# Patient Record
Sex: MALE | Race: WHITE | Employment: UNEMPLOYED | URBAN - METROPOLITAN AREA
[De-identification: names, ages, dates, MRNs, and addresses within clinical notes are randomized per-mention and may not be internally consistent; named-entity substitution may affect disease eponyms.]

---

## 2018-06-15 ENCOUNTER — HOSPITAL ENCOUNTER (INPATIENT)
Age: 23
LOS: 5 days | Discharge: HOME | DRG: 885 | End: 2018-06-20
Attending: PSYCHIATRY & NEUROLOGY | Admitting: PSYCHIATRY & NEUROLOGY
Payer: COMMERCIAL

## 2018-06-15 ENCOUNTER — HOSPITAL ENCOUNTER (OUTPATIENT)
Age: 23
Discharge: HOME | End: 2018-06-15
Payer: COMMERCIAL

## 2018-06-15 ENCOUNTER — HOSPITAL ENCOUNTER (EMERGENCY)
Age: 23
Discharge: PSYCHIATRIC OR SUBSTANCE USE FACILITY | End: 2018-06-15
Attending: EMERGENCY MEDICINE
Payer: COMMERCIAL

## 2018-06-15 VITALS
OXYGEN SATURATION: 99 % | RESPIRATION RATE: 22 BRPM | TEMPERATURE: 97.2 F | WEIGHT: 150 LBS | BODY MASS INDEX: 24.11 KG/M2 | HEART RATE: 85 BPM | DIASTOLIC BLOOD PRESSURE: 54 MMHG | SYSTOLIC BLOOD PRESSURE: 114 MMHG | HEIGHT: 66 IN

## 2018-06-15 DIAGNOSIS — F20.9 SCHIZOPHRENIA, UNSPECIFIED TYPE (HCC): ICD-10-CM

## 2018-06-15 DIAGNOSIS — F19.10 POLYSUBSTANCE ABUSE (HCC): ICD-10-CM

## 2018-06-15 DIAGNOSIS — Z91.199 MEDICALLY NONCOMPLIANT: Primary | ICD-10-CM

## 2018-06-15 DIAGNOSIS — F22 PARANOIA (PSYCHOSIS) (HCC): ICD-10-CM

## 2018-06-15 DIAGNOSIS — F20.0 PARANOID SCHIZOPHRENIA (HCC): ICD-10-CM

## 2018-06-15 LAB
ALBUMIN SERPL-MCNC: 4.2 G/DL (ref 3.5–5.2)
ALP BLD-CCNC: 74 U/L (ref 40–129)
ALT SERPL-CCNC: 40 U/L (ref 0–40)
AMPHETAMINE SCREEN, URINE: POSITIVE
ANION GAP SERPL CALCULATED.3IONS-SCNC: 19 MMOL/L (ref 7–16)
AST SERPL-CCNC: 49 U/L (ref 0–39)
ATYPICAL LYMPHOCYTE RELATIVE PERCENT: 8 % (ref 0–4)
BACTERIA: ABNORMAL /HPF
BARBITURATE SCREEN URINE: NOT DETECTED
BASOPHILS ABSOLUTE: 0.06 E9/L (ref 0–0.2)
BASOPHILS RELATIVE PERCENT: 1 % (ref 0–2)
BENZODIAZEPINE SCREEN, URINE: POSITIVE
BILIRUB SERPL-MCNC: 0.5 MG/DL (ref 0–1.2)
BILIRUBIN URINE: ABNORMAL
BLOOD, URINE: ABNORMAL
BUN BLDV-MCNC: 14 MG/DL (ref 6–20)
CALCIUM SERPL-MCNC: 8.8 MG/DL (ref 8.6–10.2)
CANNABINOID SCREEN URINE: NOT DETECTED
CHLORIDE BLD-SCNC: 102 MMOL/L (ref 98–107)
CLARITY: ABNORMAL
CO2: 19 MMOL/L (ref 22–29)
COCAINE METABOLITE SCREEN URINE: POSITIVE
COLOR: YELLOW
CREAT SERPL-MCNC: 1.1 MG/DL (ref 0.7–1.2)
EOSINOPHILS ABSOLUTE: 0 E9/L (ref 0.05–0.5)
EOSINOPHILS RELATIVE PERCENT: 0 % (ref 0–6)
ETHANOL: 49 MG/DL (ref 0–0.08)
GFR AFRICAN AMERICAN: >60
GFR NON-AFRICAN AMERICAN: >60 ML/MIN/1.73
GLUCOSE BLD-MCNC: 91 MG/DL (ref 74–109)
GLUCOSE URINE: NEGATIVE MG/DL
HCT VFR BLD CALC: 41.9 % (ref 37–54)
HEMOGLOBIN: 14.3 G/DL (ref 12.5–16.5)
KETONES, URINE: ABNORMAL MG/DL
LEUKOCYTE ESTERASE, URINE: NEGATIVE
LYMPHOCYTES ABSOLUTE: 2.22 E9/L (ref 1.5–4)
LYMPHOCYTES RELATIVE PERCENT: 29 % (ref 20–42)
MCH RBC QN AUTO: 29.8 PG (ref 26–35)
MCHC RBC AUTO-ENTMCNC: 34.1 % (ref 32–34.5)
MCV RBC AUTO: 87.3 FL (ref 80–99.9)
METHADONE SCREEN, URINE: NOT DETECTED
MONOCYTES ABSOLUTE: 0.72 E9/L (ref 0.1–0.95)
MONOCYTES RELATIVE PERCENT: 12 % (ref 2–12)
MUCUS: PRESENT
NEUTROPHILS ABSOLUTE: 3 E9/L (ref 1.8–7.3)
NEUTROPHILS RELATIVE PERCENT: 50 % (ref 43–80)
NITRITE, URINE: NEGATIVE
OPIATE SCREEN URINE: POSITIVE
PDW BLD-RTO: 12.8 FL (ref 11.5–15)
PH UA: 5.5 (ref 5–9)
PHENCYCLIDINE SCREEN URINE: NOT DETECTED
PLATELET # BLD: 204 E9/L (ref 130–450)
PMV BLD AUTO: 9.2 FL (ref 7–12)
POTASSIUM SERPL-SCNC: 3.5 MMOL/L (ref 3.5–5)
PROPOXYPHENE SCREEN: NOT DETECTED
PROTEIN UA: ABNORMAL MG/DL
RBC # BLD: 4.8 E12/L (ref 3.8–5.8)
RBC UA: ABNORMAL /HPF (ref 0–2)
SODIUM BLD-SCNC: 140 MMOL/L (ref 132–146)
SPECIFIC GRAVITY UA: >=1.03 (ref 1–1.03)
T4 TOTAL: 7.8 MCG/DL (ref 4.5–11.7)
TOTAL PROTEIN: 6.9 G/DL (ref 6.4–8.3)
TOXIC GRANULATION: ABNORMAL
TSH SERPL DL<=0.05 MIU/L-ACNC: 1.01 UIU/ML (ref 0.27–4.2)
UROBILINOGEN, URINE: 0.2 E.U./DL
VALPROIC ACID LEVEL: <3 MCG/ML (ref 50–100)
WBC # BLD: 6 E9/L (ref 4.5–11.5)
WBC UA: ABNORMAL /HPF (ref 0–5)

## 2018-06-15 PROCEDURE — 96372 THER/PROPH/DIAG INJ SC/IM: CPT

## 2018-06-15 PROCEDURE — 80053 COMPREHEN METABOLIC PANEL: CPT

## 2018-06-15 PROCEDURE — 6360000002 HC RX W HCPCS: Performed by: EMERGENCY MEDICINE

## 2018-06-15 PROCEDURE — 84436 ASSAY OF TOTAL THYROXINE: CPT

## 2018-06-15 PROCEDURE — G0480 DRUG TEST DEF 1-7 CLASSES: HCPCS

## 2018-06-15 PROCEDURE — 99285 EMERGENCY DEPT VISIT HI MDM: CPT

## 2018-06-15 PROCEDURE — 84443 ASSAY THYROID STIM HORMONE: CPT

## 2018-06-15 PROCEDURE — A0425 GROUND MILEAGE: HCPCS

## 2018-06-15 PROCEDURE — 6360000002 HC RX W HCPCS

## 2018-06-15 PROCEDURE — 1240000000 HC EMOTIONAL WELLNESS R&B

## 2018-06-15 PROCEDURE — 81001 URINALYSIS AUTO W/SCOPE: CPT

## 2018-06-15 PROCEDURE — 85025 COMPLETE CBC W/AUTO DIFF WBC: CPT

## 2018-06-15 PROCEDURE — 80164 ASSAY DIPROPYLACETIC ACD TOT: CPT

## 2018-06-15 PROCEDURE — A0428 BLS: HCPCS

## 2018-06-15 PROCEDURE — 36415 COLL VENOUS BLD VENIPUNCTURE: CPT

## 2018-06-15 PROCEDURE — 2580000003 HC RX 258

## 2018-06-15 PROCEDURE — 80307 DRUG TEST PRSMV CHEM ANLYZR: CPT

## 2018-06-15 RX ORDER — ZIPRASIDONE MESYLATE 20 MG/ML
10 INJECTION, POWDER, LYOPHILIZED, FOR SOLUTION INTRAMUSCULAR ONCE
Status: COMPLETED | OUTPATIENT
Start: 2018-06-15 | End: 2018-06-15

## 2018-06-15 RX ORDER — ZIPRASIDONE MESYLATE 20 MG/ML
INJECTION, POWDER, LYOPHILIZED, FOR SOLUTION INTRAMUSCULAR
Status: COMPLETED
Start: 2018-06-15 | End: 2018-06-15

## 2018-06-15 RX ORDER — CHLORPROMAZINE HYDROCHLORIDE 10 MG/1
10 TABLET, FILM COATED ORAL 3 TIMES DAILY
Status: ON HOLD | COMMUNITY
End: 2018-06-20 | Stop reason: HOSPADM

## 2018-06-15 RX ORDER — LORAZEPAM 2 MG/ML
2 INJECTION INTRAMUSCULAR ONCE
Status: COMPLETED | OUTPATIENT
Start: 2018-06-15 | End: 2018-06-15

## 2018-06-15 RX ORDER — SODIUM CHLORIDE 0.9 % (FLUSH) 0.9 %
10 SYRINGE (ML) INJECTION PRN
Status: DISCONTINUED | OUTPATIENT
Start: 2018-06-15 | End: 2018-06-15

## 2018-06-15 RX ORDER — 0.9 % SODIUM CHLORIDE 0.9 %
1000 INTRAVENOUS SOLUTION INTRAVENOUS ONCE
Status: DISCONTINUED | OUTPATIENT
Start: 2018-06-15 | End: 2018-06-15

## 2018-06-15 RX ORDER — ZIPRASIDONE MESYLATE 20 MG/ML
20 INJECTION, POWDER, LYOPHILIZED, FOR SOLUTION INTRAMUSCULAR ONCE
Status: COMPLETED | OUTPATIENT
Start: 2018-06-15 | End: 2018-06-15

## 2018-06-15 RX ADMIN — WATER 1.2 ML: 1 INJECTION INTRAMUSCULAR; INTRAVENOUS; SUBCUTANEOUS at 21:48

## 2018-06-15 RX ADMIN — WATER 10 ML: 1 INJECTION INTRAMUSCULAR; INTRAVENOUS; SUBCUTANEOUS at 14:18

## 2018-06-15 RX ADMIN — ZIPRASIDONE MESYLATE 10 MG: 20 INJECTION, POWDER, LYOPHILIZED, FOR SOLUTION INTRAMUSCULAR at 14:17

## 2018-06-15 RX ADMIN — ZIPRASIDONE MESYLATE 20 MG: 20 INJECTION, POWDER, LYOPHILIZED, FOR SOLUTION INTRAMUSCULAR at 21:47

## 2018-06-15 RX ADMIN — LORAZEPAM 2 MG: 2 INJECTION INTRAMUSCULAR; INTRAVENOUS at 14:16

## 2018-06-15 ASSESSMENT — ENCOUNTER SYMPTOMS
NAUSEA: 0
DIARRHEA: 0
ABDOMINAL PAIN: 0
BACK PAIN: 0
SHORTNESS OF BREATH: 0
WHEEZING: 0
VOMITING: 0
COUGH: 0

## 2018-06-15 NOTE — ED PROVIDER NOTES
69-year-old male with history of schizophrenia on Thorazine presents to the ED with chief complaint of psychiatric evaluation. Patient was pink slipped by police. Police were called due to disturbance by [facility]. Patient was apparently asking people for money and suddenly became \"psychotic\". Patient denies homicidal or suicidal radiation. As patient is rolling in on the EMT cot he states \"you guys are all going to fuck me in the ass and beat me up\". Patient is acting combatively. Denies drug and alcohol use today. Mental Health Problem   Presenting symptoms: aggressive behavior, agitation, delusional and paranoid behavior    Presenting symptoms: no hallucinations, no homicidal ideas, no suicidal thoughts, no suicidal threats and no suicide attempt    Patient accompanied by:  Law enforcement  Degree of incapacity (severity):  Severe  Onset quality:  Unable to specify  Timing:  Constant  Progression:  Worsening  Chronicity:  Recurrent  Treatment compliance:  Unable to specify  Time since last psychoactive medication taken:  4 hours  Relieved by:  Nothing  Worsened by:  Nothing  Ineffective treatments:  None tried  Associated symptoms: anxiety and irritability    Associated symptoms: no abdominal pain, no chest pain and no headaches    Risk factors: hx of mental illness        Review of Systems   Constitutional: Positive for irritability. Negative for chills and fever. Respiratory: Negative for cough, shortness of breath and wheezing. Cardiovascular: Negative for chest pain. Gastrointestinal: Negative for abdominal pain, diarrhea, nausea and vomiting. Genitourinary: Negative for dysuria and frequency. Musculoskeletal: Negative for arthralgias and back pain. Skin: Negative for rash and wound. Neurological: Negative for weakness and headaches. Hematological: Negative for adenopathy. Psychiatric/Behavioral: Positive for agitation and paranoia.  Negative for hallucinations, homicidal ideas and suicidal ideas. The patient is nervous/anxious. All other systems reviewed and are negative. Physical Exam   Constitutional: He appears well-developed and well-nourished. He appears distressed. HENT:   Head: Normocephalic and atraumatic. Eyes: Conjunctivae and EOM are normal. Pupils are equal, round, and reactive to light. Neck: Normal range of motion. Neck supple. Cardiovascular: Normal rate, regular rhythm and normal heart sounds. No murmur heard. Pulmonary/Chest: Effort normal and breath sounds normal. No respiratory distress. He has no wheezes. He has no rales. Abdominal: Soft. Bowel sounds are normal. There is no tenderness. There is no rebound and no guarding. Musculoskeletal: He exhibits no edema, tenderness or deformity. Neurological: He is alert. No cranial nerve deficit. Coordination normal.   Oriented ×2   Skin: Skin is warm. He is diaphoretic. Psychiatric:   Combative behavior  Paranoid and delusional thoughts  Irritable and anxious   Nursing note and vitals reviewed. Procedures    ED Course as of Jayme 15 1740   Fri Jayme 15, 2018   1446 Pt repeatedly asked staff \"are you vázquez\", [place] you trying to fuck me in the ass\". Patient repeatedly mentioned that he will \"fight everybody in the place and would fight each  one on one. \" Patient had to be sedated due to intense combativeness for the safety of himself and the staff. Geodon and Ativan given. [BM]   1535 Pt declined that he recently drank EtOH and did drugs. However, ethanol level, opiate level, cocaine level positive. [BM]   1 Pt medically cleared for psych admission.  [BM]      ED Course User Index  [BM] Nancy Russell, DO       --------------------------------------------- PAST HISTORY ---------------------------------------------  Past Medical History:  has no past medical history on file. Past Surgical History:  has no past surgical history on file.     Social History:  reports that he has quit smoking. His smoking use included Cigarettes. He does not have any smokeless tobacco history on file. He reports that he does not drink alcohol or use drugs. Family History: family history is not on file. The patients home medications have been reviewed. Allergies: Patient has no known allergies.     -------------------------------------------------- RESULTS -------------------------------------------------    LABS:  Results for orders placed or performed during the hospital encounter of 06/15/18   CBC auto differential   Result Value Ref Range    WBC 6.0 4.5 - 11.5 E9/L    RBC 4.80 3.80 - 5.80 E12/L    Hemoglobin 14.3 12.5 - 16.5 g/dL    Hematocrit 41.9 37.0 - 54.0 %    MCV 87.3 80.0 - 99.9 fL    MCH 29.8 26.0 - 35.0 pg    MCHC 34.1 32.0 - 34.5 %    RDW 12.8 11.5 - 15.0 fL    Platelets 394 268 - 164 E9/L    MPV 9.2 7.0 - 12.0 fL    Neutrophils % 50.0 43.0 - 80.0 %    Lymphocytes % 29.0 20.0 - 42.0 %    Monocytes % 12.0 2.0 - 12.0 %    Eosinophils % 0.0 0.0 - 6.0 %    Basophils % 1.0 0.0 - 2.0 %    Neutrophils # 3.00 1.80 - 7.30 E9/L    Lymphocytes # 2.22 1.50 - 4.00 E9/L    Monocytes # 0.72 0.10 - 0.95 E9/L    Eosinophils # 0.00 (L) 0.05 - 0.50 E9/L    Basophils # 0.06 0.00 - 0.20 E9/L    Atypical Lymphocytes Relative 8.0 (H) 0.0 - 4.0 %    Toxic Granulation 1+    Comprehensive Metabolic Panel   Result Value Ref Range    Sodium 140 132 - 146 mmol/L    Potassium 3.5 3.5 - 5.0 mmol/L    Chloride 102 98 - 107 mmol/L    CO2 19 (L) 22 - 29 mmol/L    Anion Gap 19 (H) 7 - 16 mmol/L    Glucose 91 74 - 109 mg/dL    BUN 14 6 - 20 mg/dL    CREATININE 1.1 0.7 - 1.2 mg/dL    GFR Non-African American >60 >=60 mL/min/1.73    GFR African American >60     Calcium 8.8 8.6 - 10.2 mg/dL    Total Protein 6.9 6.4 - 8.3 g/dL    Alb 4.2 3.5 - 5.2 g/dL    Total Bilirubin 0.5 0.0 - 1.2 mg/dL    Alkaline Phosphatase 74 40 - 129 U/L    ALT 40 0 - 40 U/L    AST 49 (H) 0 - 39 U/L   TSH without Reflex   Result Value Ref Range    TSH

## 2018-06-15 NOTE — CARE COORDINATION
Social Work/Transition of Care:    Pt is a 19 y/o male who presented to the ED due to being pinkslipped by [facility]. Per pinkslip \" On 6/15/18 @ 1213 hrs I Ptl[facility]diaz[facility] Courtney [address] was dispatched to [facility] for the above male asking people for money. Upon speaking to [place], he appeared to be under the influence of unknown narcotics and also stated he suffers from schizophrenia and Bipolar. He further stated he has not been taking his medication and did not know what day it is\"  When pt arrived to the ED he needed to be medicated for safety and stabilization, SW attempted to awake pt during shift but due to medication was not able. Once pt is medically cleared can be referred to [facility] for Telehealth assesment for appropriate disposition. Electronically signed by LJ Lane on 4/73/5815 at 4:21 PM       [phone] Pt to be medically cleared once UDS screen is resulted, YESY called [facility] spoke with [place] to refer, pt is [place], Self Pay and will need inpatient mental health admission for safety and stabilization due to pt reporting DX of Schizophrenia and is not med compliant.     Electronically signed by Yinka Cadet on 0/80/1944 at 5:09 PM
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
## 2018-06-15 NOTE — ED NOTES
Rounded on pt pt sleeping at his time sitter at the bedside. Hot meal safety tray ordered for pt.        Lida Hylton RN  06/15/18 1161

## 2018-06-15 NOTE — ED NOTES
Checked on pt pt sleeping sitter at the bedside. Pt meal tray with sitter.   No acute distress noted     Efrain Najera RN  06/15/18 0692

## 2018-06-16 PROBLEM — F20.9 SCHIZOPHRENIA (HCC): Status: ACTIVE | Noted: 2018-06-16

## 2018-06-16 LAB
ACETAMINOPHEN LEVEL: <5 MCG/ML (ref 10–30)
ETHANOL: 130 MG/DL (ref 0–0.08)
SALICYLATE, SERUM: <0.3 MG/DL (ref 0–30)
TRICYCLIC ANTIDEPRESSANTS SCREEN SERUM: NEGATIVE NG/ML

## 2018-06-16 PROCEDURE — 6370000000 HC RX 637 (ALT 250 FOR IP)

## 2018-06-16 PROCEDURE — 6370000000 HC RX 637 (ALT 250 FOR IP): Performed by: PSYCHIATRY & NEUROLOGY

## 2018-06-16 PROCEDURE — 1240000000 HC EMOTIONAL WELLNESS R&B

## 2018-06-16 PROCEDURE — 99221 1ST HOSP IP/OBS SF/LOW 40: CPT | Performed by: PSYCHIATRY & NEUROLOGY

## 2018-06-16 RX ORDER — MAGNESIUM HYDROXIDE/ALUMINUM HYDROXICE/SIMETHICONE 120; 1200; 1200 MG/30ML; MG/30ML; MG/30ML
30 SUSPENSION ORAL PRN
Status: DISCONTINUED | OUTPATIENT
Start: 2018-06-15 | End: 2018-06-20 | Stop reason: HOSPADM

## 2018-06-16 RX ORDER — OLANZAPINE 5 MG/1
5 TABLET ORAL 2 TIMES DAILY
Status: DISCONTINUED | OUTPATIENT
Start: 2018-06-16 | End: 2018-06-18

## 2018-06-16 RX ORDER — TRAZODONE HYDROCHLORIDE 50 MG/1
50 TABLET ORAL NIGHTLY PRN
Status: DISCONTINUED | OUTPATIENT
Start: 2018-06-16 | End: 2018-06-20 | Stop reason: HOSPADM

## 2018-06-16 RX ORDER — HALOPERIDOL 5 MG/ML
5 INJECTION INTRAMUSCULAR EVERY 4 HOURS PRN
Status: DISCONTINUED | OUTPATIENT
Start: 2018-06-15 | End: 2018-06-20 | Stop reason: HOSPADM

## 2018-06-16 RX ORDER — BENZTROPINE MESYLATE 1 MG/ML
2 INJECTION INTRAMUSCULAR; INTRAVENOUS 2 TIMES DAILY PRN
Status: DISCONTINUED | OUTPATIENT
Start: 2018-06-15 | End: 2018-06-20 | Stop reason: HOSPADM

## 2018-06-16 RX ORDER — TRAZODONE HYDROCHLORIDE 50 MG/1
TABLET ORAL
Status: COMPLETED
Start: 2018-06-16 | End: 2018-06-16

## 2018-06-16 RX ORDER — HYDROXYZINE PAMOATE 50 MG/1
50 CAPSULE ORAL 3 TIMES DAILY PRN
Status: DISCONTINUED | OUTPATIENT
Start: 2018-06-15 | End: 2018-06-20 | Stop reason: HOSPADM

## 2018-06-16 RX ORDER — OLANZAPINE 5 MG/1
5 TABLET ORAL
Status: SHIPPED | OUTPATIENT
Start: 2018-06-15 | End: 2018-06-15

## 2018-06-16 RX ORDER — OLANZAPINE 5 MG/1
5 TABLET ORAL NIGHTLY
Status: DISCONTINUED | OUTPATIENT
Start: 2018-06-16 | End: 2018-06-16

## 2018-06-16 RX ORDER — ACETAMINOPHEN 325 MG/1
650 TABLET ORAL EVERY 4 HOURS PRN
Status: DISCONTINUED | OUTPATIENT
Start: 2018-06-15 | End: 2018-06-20 | Stop reason: HOSPADM

## 2018-06-16 RX ORDER — ZOLPIDEM TARTRATE 5 MG/1
5 TABLET ORAL NIGHTLY PRN
Status: DISCONTINUED | OUTPATIENT
Start: 2018-06-16 | End: 2018-06-18

## 2018-06-16 RX ADMIN — TRAZODONE HYDROCHLORIDE 50 MG: 50 TABLET ORAL at 03:22

## 2018-06-16 RX ADMIN — TRAZODONE HYDROCHLORIDE 50 MG: 50 TABLET ORAL at 20:29

## 2018-06-16 RX ADMIN — OLANZAPINE 5 MG: 5 TABLET, FILM COATED ORAL at 20:29

## 2018-06-16 RX ADMIN — HYDROXYZINE PAMOATE 50 MG: 50 CAPSULE ORAL at 14:15

## 2018-06-16 RX ADMIN — ACETAMINOPHEN 650 MG: 325 TABLET ORAL at 02:45

## 2018-06-16 RX ADMIN — ZOLPIDEM TARTRATE 5 MG: 5 TABLET ORAL at 20:29

## 2018-06-16 RX ADMIN — ACETAMINOPHEN 650 MG: 325 TABLET ORAL at 16:53

## 2018-06-16 RX ADMIN — HYDROXYZINE PAMOATE 50 MG: 50 CAPSULE ORAL at 20:29

## 2018-06-16 RX ADMIN — ACETAMINOPHEN 650 MG: 325 TABLET ORAL at 21:22

## 2018-06-16 ASSESSMENT — PAIN DESCRIPTION - DESCRIPTORS
DESCRIPTORS: ACHING;DISCOMFORT;SORE
DESCRIPTORS: ACHING;DISCOMFORT;SORE
DESCRIPTORS: ACHING;CONSTANT;DISCOMFORT

## 2018-06-16 ASSESSMENT — PAIN DESCRIPTION - LOCATION
LOCATION: GENERALIZED
LOCATION: GENERALIZED
LOCATION: ANKLE

## 2018-06-16 ASSESSMENT — PAIN DESCRIPTION - PAIN TYPE
TYPE: ACUTE PAIN

## 2018-06-16 ASSESSMENT — SLEEP AND FATIGUE QUESTIONNAIRES
DIFFICULTY STAYING ASLEEP: YES
DIFFICULTY FALLING ASLEEP: YES
SLEEP PATTERN: DIFFICULTY FALLING ASLEEP;DISTURBED/INTERRUPTED SLEEP;RESTLESSNESS;NIGHTMARES/TERRORS
DO YOU USE A SLEEP AID: NO
RESTFUL SLEEP: NO
AVERAGE NUMBER OF SLEEP HOURS: 4
DIFFICULTY ARISING: NO
DO YOU HAVE DIFFICULTY SLEEPING: YES

## 2018-06-16 ASSESSMENT — LIFESTYLE VARIABLES
HISTORY_ALCOHOL_USE: NO
HISTORY_ALCOHOL_USE: NO

## 2018-06-16 ASSESSMENT — PAIN DESCRIPTION - ORIENTATION: ORIENTATION: RIGHT

## 2018-06-16 ASSESSMENT — PAIN SCALES - GENERAL
PAINLEVEL_OUTOF10: 10
PAINLEVEL_OUTOF10: 6
PAINLEVEL_OUTOF10: 7
PAINLEVEL_OUTOF10: 6
PAINLEVEL_OUTOF10: 10

## 2018-06-16 NOTE — ED NOTES
Rounded on pt,  Pt given orange juice per pt request educated pt reason for pink slip and psychiatric evaluation. Educated pt that services are unavailable here and that he needs to be seen at [facility].        Chuy Branch RN  06/15/18 0187

## 2018-06-16 NOTE — ED NOTES
Pt accepted to [facility] by Dr Michaela Rey.  Please call report to floor @ [phone] and make tx arrangements     Sabina Vigil RN  06/15/18 6470

## 2018-06-16 NOTE — ED NOTES
Called the access center made them aware patient needing placement, and that we are waiting to hear back from [address] [place] [address], [address] [place] [address]  06/15/18 2015

## 2018-06-16 NOTE — ED NOTES
Spoke with Olivia Nance at [facility], patient is accepted to [facility] report number is [phone] RN  06/15/18 8812

## 2018-06-17 PROBLEM — F23 ACUTE PSYCHOSIS (HCC): Status: ACTIVE | Noted: 2018-06-16

## 2018-06-17 PROCEDURE — 6370000000 HC RX 637 (ALT 250 FOR IP): Performed by: PSYCHIATRY & NEUROLOGY

## 2018-06-17 PROCEDURE — 1240000000 HC EMOTIONAL WELLNESS R&B

## 2018-06-17 PROCEDURE — 99231 SBSQ HOSP IP/OBS SF/LOW 25: CPT | Performed by: PSYCHIATRY & NEUROLOGY

## 2018-06-17 RX ADMIN — TRAZODONE HYDROCHLORIDE 50 MG: 50 TABLET ORAL at 20:50

## 2018-06-17 RX ADMIN — OLANZAPINE 5 MG: 5 TABLET, FILM COATED ORAL at 20:51

## 2018-06-17 RX ADMIN — ZOLPIDEM TARTRATE 5 MG: 5 TABLET ORAL at 20:51

## 2018-06-17 RX ADMIN — OLANZAPINE 5 MG: 5 TABLET, FILM COATED ORAL at 08:50

## 2018-06-17 RX ADMIN — ACETAMINOPHEN 650 MG: 325 TABLET ORAL at 17:15

## 2018-06-17 ASSESSMENT — PAIN SCALES - GENERAL
PAINLEVEL_OUTOF10: 6
PAINLEVEL_OUTOF10: 0

## 2018-06-18 PROCEDURE — 6370000000 HC RX 637 (ALT 250 FOR IP): Performed by: PSYCHIATRY & NEUROLOGY

## 2018-06-18 PROCEDURE — 99232 SBSQ HOSP IP/OBS MODERATE 35: CPT | Performed by: PSYCHIATRY & NEUROLOGY

## 2018-06-18 PROCEDURE — 1240000000 HC EMOTIONAL WELLNESS R&B

## 2018-06-18 RX ORDER — OLANZAPINE 5 MG/1
5 TABLET ORAL 3 TIMES DAILY
Status: DISCONTINUED | OUTPATIENT
Start: 2018-06-18 | End: 2018-06-19

## 2018-06-18 RX ADMIN — OLANZAPINE 5 MG: 5 TABLET, FILM COATED ORAL at 09:22

## 2018-06-18 RX ADMIN — TRAZODONE HYDROCHLORIDE 50 MG: 50 TABLET ORAL at 20:22

## 2018-06-18 RX ADMIN — OLANZAPINE 5 MG: 5 TABLET, FILM COATED ORAL at 20:22

## 2018-06-18 RX ADMIN — OLANZAPINE 5 MG: 5 TABLET, FILM COATED ORAL at 12:53

## 2018-06-18 ASSESSMENT — PAIN SCALES - GENERAL: PAINLEVEL_OUTOF10: 0

## 2018-06-19 PROCEDURE — 6370000000 HC RX 637 (ALT 250 FOR IP): Performed by: PSYCHIATRY & NEUROLOGY

## 2018-06-19 PROCEDURE — 1240000000 HC EMOTIONAL WELLNESS R&B

## 2018-06-19 PROCEDURE — 99232 SBSQ HOSP IP/OBS MODERATE 35: CPT | Performed by: PSYCHIATRY & NEUROLOGY

## 2018-06-19 RX ORDER — OLANZAPINE 10 MG/1
20 TABLET ORAL EVERY EVENING
Status: DISCONTINUED | OUTPATIENT
Start: 2018-06-19 | End: 2018-06-20 | Stop reason: HOSPADM

## 2018-06-19 RX ADMIN — HYDROXYZINE PAMOATE 50 MG: 50 CAPSULE ORAL at 15:33

## 2018-06-19 RX ADMIN — OLANZAPINE 20 MG: 10 TABLET, FILM COATED ORAL at 17:44

## 2018-06-19 RX ADMIN — TRAZODONE HYDROCHLORIDE 50 MG: 50 TABLET ORAL at 20:28

## 2018-06-19 RX ADMIN — OLANZAPINE 5 MG: 5 TABLET, FILM COATED ORAL at 09:18

## 2018-06-19 RX ADMIN — ACETAMINOPHEN 650 MG: 325 TABLET ORAL at 15:33

## 2018-06-19 ASSESSMENT — PAIN SCALES - GENERAL
PAINLEVEL_OUTOF10: 0
PAINLEVEL_OUTOF10: 7

## 2018-06-20 VITALS — RESPIRATION RATE: 16 BRPM | WEIGHT: 150 LBS | HEIGHT: 66 IN | BODY MASS INDEX: 24.11 KG/M2

## 2018-06-20 LAB
7-AMINOCLONAZEPAM, URINE: <5 NG/ML
ALPHA-HYDROXYALPRAZOLAM, URINE: 905 NG/ML
ALPHA-HYDROXYMIDAZOLAM, URINE: <20 NG/ML
ALPRAZOLAM, URINE: 282 NG/ML
CHLORDIAZEPOXIDE, URINE: <20 NG/ML
CLONAZEPAM, URINE: <5 NG/ML
DIAZEPAM, URINE: <20 NG/ML
LORAZEPAM, URINE: 50 NG/ML
MIDAZOLAM, URINE: <20 NG/ML
NORDIAZEPAM, URINE: <20 NG/ML
OXAZEPAM, URINE: <20 NG/ML
TEMAZEPAM, URINE: <20 NG/ML

## 2018-06-20 PROCEDURE — 99238 HOSP IP/OBS DSCHRG MGMT 30/<: CPT | Performed by: PSYCHIATRY & NEUROLOGY

## 2018-06-20 RX ORDER — OLANZAPINE 20 MG/1
20 TABLET ORAL EVERY EVENING
Qty: 30 TABLET | Refills: 0 | Status: ON HOLD | OUTPATIENT
Start: 2018-06-20 | End: 2020-08-28 | Stop reason: SDUPTHER

## 2018-06-21 LAB — COCAINE, CONFIRM, URINE: >1000 NG/ML

## 2018-06-22 LAB
6AM URINE: <10 NG/ML
CODEINE, URINE: <20 NG/ML
HYDROCODONE, URINE: 3397 NG/ML
HYDROMORPHONE, URINE: 89 NG/ML
MORPHINE URINE: <20 NG/ML
NORHYDROCODONE, URINE: 3679 NG/ML
NOROXYCODONE, URINE: <20 NG/ML
NOROXYMORPHONE, URINE: <20 NG/ML
OXYCODONE, URINE CONFIRMATION: <20 NG/ML
OXYMORPHONE, URINE: <20 NG/ML

## 2018-06-23 LAB
AMPHETAMINES, URINE: >5000 NG/ML
METHAMPHETAMINE, URINE: NORMAL NG/ML
METHYLENEDIOXYAMPHETAMINE, UR: <200 NG/ML
METHYLENEDIOXYETHYLAMPHETAMINE, UR: <200 NG/ML
METHYLENEDIOXYMETHAMPHETAMINE, UR: <200 NG/ML

## 2019-01-10 ENCOUNTER — HOSPITAL ENCOUNTER (EMERGENCY)
Age: 24
Discharge: ANOTHER HOSPITAL | End: 2019-01-10
Attending: EMERGENCY MEDICINE
Payer: COMMERCIAL

## 2019-01-10 VITALS
OXYGEN SATURATION: 98 % | SYSTOLIC BLOOD PRESSURE: 128 MMHG | DIASTOLIC BLOOD PRESSURE: 88 MMHG | TEMPERATURE: 98.2 F | HEART RATE: 76 BPM | WEIGHT: 150 LBS | BODY MASS INDEX: 22.22 KG/M2 | RESPIRATION RATE: 16 BRPM | HEIGHT: 69 IN

## 2019-01-10 DIAGNOSIS — F39 MOOD DISORDER (HCC): Primary | ICD-10-CM

## 2019-01-10 LAB
ACETAMINOPHEN LEVEL: <5 MCG/ML (ref 10–30)
ALBUMIN SERPL-MCNC: 4.8 G/DL (ref 3.5–5.2)
ALP BLD-CCNC: 78 U/L (ref 40–129)
ALT SERPL-CCNC: 12 U/L (ref 0–40)
AMORPHOUS: NORMAL
AMPHETAMINE SCREEN, URINE: NOT DETECTED
ANION GAP SERPL CALCULATED.3IONS-SCNC: 15 MMOL/L (ref 7–16)
AST SERPL-CCNC: 23 U/L (ref 0–39)
BACTERIA: NORMAL /HPF
BARBITURATE SCREEN URINE: NOT DETECTED
BENZODIAZEPINE SCREEN, URINE: NOT DETECTED
BILIRUB SERPL-MCNC: 0.6 MG/DL (ref 0–1.2)
BILIRUBIN URINE: NEGATIVE
BLOOD, URINE: NEGATIVE
BUN BLDV-MCNC: 14 MG/DL (ref 6–20)
CALCIUM SERPL-MCNC: 9.7 MG/DL (ref 8.6–10.2)
CANNABINOID SCREEN URINE: NOT DETECTED
CHLORIDE BLD-SCNC: 103 MMOL/L (ref 98–107)
CLARITY: ABNORMAL
CO2: 25 MMOL/L (ref 22–29)
COCAINE METABOLITE SCREEN URINE: NOT DETECTED
COLOR: YELLOW
CREAT SERPL-MCNC: 0.9 MG/DL (ref 0.7–1.2)
EKG ATRIAL RATE: 68 BPM
EKG P AXIS: 30 DEGREES
EKG P-R INTERVAL: 122 MS
EKG Q-T INTERVAL: 390 MS
EKG QRS DURATION: 76 MS
EKG QTC CALCULATION (BAZETT): 414 MS
EKG R AXIS: 78 DEGREES
EKG T AXIS: 18 DEGREES
EKG VENTRICULAR RATE: 68 BPM
ETHANOL: <10 MG/DL (ref 0–0.08)
GFR AFRICAN AMERICAN: >60
GFR NON-AFRICAN AMERICAN: >60 ML/MIN/1.73
GLUCOSE BLD-MCNC: 103 MG/DL (ref 74–99)
GLUCOSE URINE: NEGATIVE MG/DL
HCT VFR BLD CALC: 45.2 % (ref 37–54)
HEMOGLOBIN: 15.4 G/DL (ref 12.5–16.5)
KETONES, URINE: NEGATIVE MG/DL
LEUKOCYTE ESTERASE, URINE: NEGATIVE
MCH RBC QN AUTO: 30.4 PG (ref 26–35)
MCHC RBC AUTO-ENTMCNC: 34.1 % (ref 32–34.5)
MCV RBC AUTO: 89.2 FL (ref 80–99.9)
METHADONE SCREEN, URINE: NOT DETECTED
NITRITE, URINE: NEGATIVE
OPIATE SCREEN URINE: NOT DETECTED
PDW BLD-RTO: 11.9 FL (ref 11.5–15)
PH UA: 7.5 (ref 5–9)
PHENCYCLIDINE SCREEN URINE: NOT DETECTED
PLATELET # BLD: 229 E9/L (ref 130–450)
PMV BLD AUTO: 9.5 FL (ref 7–12)
POTASSIUM REFLEX MAGNESIUM: 4.1 MMOL/L (ref 3.5–5)
PROPOXYPHENE SCREEN: NOT DETECTED
PROTEIN UA: NEGATIVE MG/DL
RBC # BLD: 5.07 E12/L (ref 3.8–5.8)
RBC UA: NORMAL /HPF (ref 0–2)
SALICYLATE, SERUM: <0.3 MG/DL (ref 0–30)
SODIUM BLD-SCNC: 143 MMOL/L (ref 132–146)
SPECIFIC GRAVITY UA: 1.01 (ref 1–1.03)
TOTAL PROTEIN: 7.7 G/DL (ref 6.4–8.3)
TRICYCLIC ANTIDEPRESSANTS SCREEN SERUM: NEGATIVE NG/ML
TSH SERPL DL<=0.05 MIU/L-ACNC: 1.1 UIU/ML (ref 0.27–4.2)
UROBILINOGEN, URINE: 0.2 E.U./DL
WBC # BLD: 7 E9/L (ref 4.5–11.5)
WBC UA: NORMAL /HPF (ref 0–5)

## 2019-01-10 PROCEDURE — 93005 ELECTROCARDIOGRAM TRACING: CPT

## 2019-01-10 PROCEDURE — 80307 DRUG TEST PRSMV CHEM ANLYZR: CPT

## 2019-01-10 PROCEDURE — 87088 URINE BACTERIA CULTURE: CPT

## 2019-01-10 PROCEDURE — 99285 EMERGENCY DEPT VISIT HI MDM: CPT

## 2019-01-10 PROCEDURE — 84443 ASSAY THYROID STIM HORMONE: CPT

## 2019-01-10 PROCEDURE — 85027 COMPLETE CBC AUTOMATED: CPT

## 2019-01-10 PROCEDURE — 80053 COMPREHEN METABOLIC PANEL: CPT

## 2019-01-10 PROCEDURE — 81001 URINALYSIS AUTO W/SCOPE: CPT

## 2019-01-10 PROCEDURE — G0480 DRUG TEST DEF 1-7 CLASSES: HCPCS

## 2019-01-10 PROCEDURE — 36415 COLL VENOUS BLD VENIPUNCTURE: CPT

## 2019-01-10 NOTE — ED NOTES
Call placed to [facility] to inquire about status of referral, no answer at [address], left message asking that we be called re: status of referral.      April Simeon, MSW, LSW  01/10/19 9838
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
## 2019-01-10 NOTE — ED NOTES
Pt is uncooperative and nonsensical in his answers makes brief eye contact     Lenard Cheng RN  01/10/19 0929

## 2019-01-10 NOTE — ED PROVIDER NOTES
Department of Emergency Medicine   ED  Provider Note  Admit Date/RoomTime: 1/10/2019 11:15 AM  ED Room: [address] [place] [address]          History of Present Illness:  1/10/19, Time: 12:23 PM         Minh Partida is a 20 y.o. male presenting to the ED for medical clearance, beginning earlier today. The complaint has been persistent, mild in severity, and worsened by nothing. Pt comes to the ED from skilled nursing. It was reported that pt has had aggressive behavior and delusional in skilled nursing. Pt was pick slipped from skilled nursing. Pt comes to the ED for medical clearance to go to another facility, [facility]. Pt does complain of specific pain but asks for pain medication. No reported suicidal ideations. No other complaints. Review of Systems:   Pertinent positives and negatives are stated within HPI, all other systems reviewed and are negative.      --------------------------------------------- PAST HISTORY ---------------------------------------------  Past Medical History:  has no past medical history on file. Past Surgical History:  has no past surgical history on file. Social History:  reports that he has quit smoking. His smoking use included Cigarettes. He does not have any smokeless tobacco history on file. He reports that he does not drink alcohol or use drugs. Family History: family history is not on file. The patients home medications have been reviewed. Allergies: Patient has no known allergies. ---------------------------------------------------PHYSICAL EXAM--------------------------------------    Constitutional/General: Alert and oriented x3  Head: Normocephalic and atraumatic  Eyes: PERRL, EOMI  Mouth: Oropharynx clear, handling secretions  Neck: Supple, full ROM  Respiratory: Lungs clear to auscultation bilaterally, no wheezes, rales, or rhonchi. Not in respiratory distress  Cardiovascular:  Regular rate. Regular rhythm. No murmurs, gallops, or rubs.  2+ distal pulses  GI:  Abdomen Soft, Non tender, Non distended. +BS. No rebound, guarding, or rigidity. No pulsatile masses. Musculoskeletal: Moves all extremities x 4. Warm and well perfused, no edema. Integument: skin warm and dry  Neurologic: GCS 15, no focal deficits  Psychiatric: Normal Affect,       -------------------------------------------------- RESULTS -------------------------------------------------  I have personally reviewed all laboratory and imaging results for this patient. Results are listed below.      LABS:  Results for orders placed or performed during the hospital encounter of 01/10/19   CBC   Result Value Ref Range    WBC 7.0 4.5 - 11.5 E9/L    RBC 5.07 3.80 - 5.80 E12/L    Hemoglobin 15.4 12.5 - 16.5 g/dL    Hematocrit 45.2 37.0 - 54.0 %    MCV 89.2 80.0 - 99.9 fL    MCH 30.4 26.0 - 35.0 pg    MCHC 34.1 32.0 - 34.5 %    RDW 11.9 11.5 - 15.0 fL    Platelets 694 561 - 060 E9/L    MPV 9.5 7.0 - 12.0 fL   Comprehensive Metabolic Panel w/ Reflex to MG   Result Value Ref Range    Sodium 143 132 - 146 mmol/L    Potassium reflex Magnesium 4.1 3.5 - 5.0 mmol/L    Chloride 103 98 - 107 mmol/L    CO2 25 22 - 29 mmol/L    Anion Gap 15 7 - 16 mmol/L    Glucose 103 (H) 74 - 99 mg/dL    BUN 14 6 - 20 mg/dL    CREATININE 0.9 0.7 - 1.2 mg/dL    GFR Non-African American >60 >=60 mL/min/1.73    GFR African American >60     Calcium 9.7 8.6 - 10.2 mg/dL    Total Protein 7.7 6.4 - 8.3 g/dL    Alb 4.8 3.5 - 5.2 g/dL    Total Bilirubin 0.6 0.0 - 1.2 mg/dL    Alkaline Phosphatase 78 40 - 129 U/L    ALT 12 0 - 40 U/L    AST 23 0 - 39 U/L   Urinalysis, reflex to microscopic   Result Value Ref Range    Color, UA Yellow Straw/Yellow    Clarity, UA CLOUDY (A) Clear    Glucose, Ur Negative Negative mg/dL    Bilirubin Urine Negative Negative    Ketones, Urine Negative Negative mg/dL    Specific Gravity, UA 1.015 1.005 - 1.030    Blood, Urine Negative Negative    pH, UA 7.5 5.0 - 9.0    Protein, UA Negative Negative mg/dL    Urobilinogen, Urine 0.2 <2.0 E.U./dL Nitrite, Urine Negative Negative    Leukocyte Esterase, Urine Negative Negative   Urine Drug Screen   Result Value Ref Range    Amphetamine Screen, Urine NOT DETECTED Negative <1000 ng/mL    Barbiturate Screen, Ur NOT DETECTED Negative < 200 ng/mL    Benzodiazepine Screen, Urine NOT DETECTED Negative < 200 ng/mL    Cannabinoid Scrn, Ur NOT DETECTED Negative < 50ng/mL    Cocaine Metabolite Screen, Urine NOT DETECTED Negative < 300 ng/mL    Opiate Scrn, Ur NOT DETECTED Negative < 300ng/mL    PCP Screen, Urine NOT DETECTED Negative < 25 ng/mL    Methadone Screen, Urine NOT DETECTED Negative <300 ng/mL    Propoxyphene Scrn, Ur NOT DETECTED Negative <300 ng/mL   TSH without Reflex   Result Value Ref Range    TSH 1.100 0.270 - 4.200 uIU/mL   Serum Drug Screen   Result Value Ref Range    Ethanol Lvl <10 mg/dL    Acetaminophen Level <5.0 (L) 10.0 - 16.8 mcg/mL    Salicylate, Serum <8.7 0.0 - 30.0 mg/dL    TCA Scrn NEGATIVE Cutoff:300 ng/mL   Microscopic Urinalysis   Result Value Ref Range    WBC, UA NONE 0 - 5 /HPF    RBC, UA NONE 0 - 2 /HPF    Bacteria, UA NONE /HPF    Amorphous, UA MANY        RADIOLOGY:  Interpreted by Radiologist.  No orders to display       ------------------------- NURSING NOTES AND VITALS REVIEWED ---------------------------   The nursing notes within the ED encounter and vital signs as below have been reviewed by myself. /88   Pulse 68   Resp 16   Ht 5' 9\" (1.753 m)   Wt 150 lb (68 kg)   SpO2 99%   BMI 22.15 kg/m²   Oxygen Saturation Interpretation: Normal    The patients available past medical records and past encounters were reviewed.         ------------------------------ ED COURSE/MEDICAL DECISION MAKING----------------------  Medications - No data to display    Medical Decision Making:    [facility] notified, pt can me monitored at [facility], dc until bed available at [facility]    This patient's ED course included: a personal history and physicial examination and re-evaluation prior to disposition  This patient has remained hemodynamically stable during their ED course. Re-Evaluations:                 Counseling: The emergency provider has spoken with the patient and discussed todays results, in addition to providing specific details for the plan of care and counseling regarding the diagnosis and prognosis. Questions are answered at this time and they are agreeable with the plan.       --------------------------------- IMPRESSION AND DISPOSITION ---------------------------------    IMPRESSION  Mood disorder  DISPOSITION  Disposition: Transfer to [facility] mental health unit - medically cleared for admission  Patient condition is  Stable,  Medically cleared for psych    SCRIBE ATTESTATION  1/10/19, 12:23 PM.    This note is prepared by Ronald Gómez acting as Scribe for Jason Muhammad MD.    Jason Muhammad MD:  The scribe's documentation has been prepared under my direction and personally reviewed by me in its entirety. I confirm that the note above accurately reflects all work, treatment, procedures, and medical decision making performed by me.              Jason Muhammad MD  01/10/19 1829 Hooplevalentina Peña MD  01/10/19 4653

## 2019-01-10 NOTE — ED NOTES
Spoke with [address] in Admission at [address] [place] [address], confirm that they have received all needed information with exception of specifics of pending legal charges. They report that currently they have a large number of pending referrals, state it will be at least 24 hours before pt can possibly be admitted. Patient to be returned to [facility] to await transfer to [facility], patient to be on suicide precautions at snf facility per Dr Rosalina Smith until transfer to [facility], Ab [address]. [place] Griffith who accompanies the pt currently was made aware of this. [facility] is aware of patient and referral, all [facility] information was faxed to them. [facility] does request that information regarding specific criminal charges be faxed to them at [phone], this request was given to [employer] and also placed in patient discharge instructions.       [facility] PATEL Rojas, [place]  01/10/19 4121

## 2019-01-11 NOTE — ED NOTES
Pt to go back to [facility] refused to sign paper work n-n given to Zbigniew Brito [phone] informed pt to be on suicide watch also given copy of lab work.        Betzy Hobson RN  01/10/19 1919

## 2019-01-11 NOTE — ED NOTES
Pt cont at times with verbal aggression deputy acosta cont to be at bedside pt cont talking nonsensical      Giuseppe Bethea RN  01/10/19 1929

## 2019-01-13 LAB — URINE CULTURE, ROUTINE: NORMAL

## 2020-02-03 PROCEDURE — 93005 ELECTROCARDIOGRAM TRACING: CPT | Performed by: EMERGENCY MEDICINE

## 2020-08-23 ENCOUNTER — HOSPITAL ENCOUNTER (INPATIENT)
Age: 25
LOS: 5 days | Discharge: HOME | DRG: 750 | End: 2020-08-28
Attending: EMERGENCY MEDICINE | Admitting: PSYCHIATRY & NEUROLOGY
Payer: COMMERCIAL

## 2020-08-23 LAB
ACETAMINOPHEN LEVEL: <5 MCG/ML (ref 10–30)
ALBUMIN SERPL-MCNC: 5 G/DL (ref 3.5–5.2)
ALP BLD-CCNC: 79 U/L (ref 40–129)
ALT SERPL-CCNC: 15 U/L (ref 0–40)
AMPHETAMINE SCREEN, URINE: NOT DETECTED
ANION GAP SERPL CALCULATED.3IONS-SCNC: 13 MMOL/L (ref 7–16)
AST SERPL-CCNC: 17 U/L (ref 0–39)
BARBITURATE SCREEN URINE: NOT DETECTED
BENZODIAZEPINE SCREEN, URINE: NOT DETECTED
BILIRUB SERPL-MCNC: 0.6 MG/DL (ref 0–1.2)
BILIRUBIN URINE: NEGATIVE
BLOOD, URINE: NEGATIVE
BUN BLDV-MCNC: 13 MG/DL (ref 6–20)
CALCIUM SERPL-MCNC: 10 MG/DL (ref 8.6–10.2)
CANNABINOID SCREEN URINE: NOT DETECTED
CHLORIDE BLD-SCNC: 97 MMOL/L (ref 98–107)
CLARITY: CLEAR
CO2: 25 MMOL/L (ref 22–29)
COCAINE METABOLITE SCREEN URINE: NOT DETECTED
COLOR: NORMAL
CREAT SERPL-MCNC: 1 MG/DL (ref 0.7–1.2)
EKG ATRIAL RATE: 71 BPM
EKG P AXIS: 8 DEGREES
EKG P-R INTERVAL: 120 MS
EKG Q-T INTERVAL: 368 MS
EKG QRS DURATION: 80 MS
EKG QTC CALCULATION (BAZETT): 399 MS
EKG R AXIS: 43 DEGREES
EKG T AXIS: 12 DEGREES
EKG VENTRICULAR RATE: 71 BPM
ETHANOL: <10 MG/DL (ref 0–0.08)
FENTANYL SCREEN, URINE: NOT DETECTED
GFR AFRICAN AMERICAN: >60
GFR NON-AFRICAN AMERICAN: >60 ML/MIN/1.73
GLUCOSE BLD-MCNC: 95 MG/DL (ref 74–99)
GLUCOSE URINE: NEGATIVE MG/DL
HCT VFR BLD CALC: 49.5 % (ref 37–54)
HEMOGLOBIN: 16.8 G/DL (ref 12.5–16.5)
KETONES, URINE: NEGATIVE MG/DL
LEUKOCYTE ESTERASE, URINE: NEGATIVE
Lab: NORMAL
MCH RBC QN AUTO: 30.1 PG (ref 26–35)
MCHC RBC AUTO-ENTMCNC: 33.9 % (ref 32–34.5)
MCV RBC AUTO: 88.6 FL (ref 80–99.9)
METHADONE SCREEN, URINE: NOT DETECTED
NITRITE, URINE: NEGATIVE
OPIATE SCREEN URINE: NOT DETECTED
OXYCODONE URINE: NOT DETECTED
PDW BLD-RTO: 11.9 FL (ref 11.5–15)
PH UA: 6.5 (ref 5–9)
PHENCYCLIDINE SCREEN URINE: NOT DETECTED
PLATELET # BLD: 228 E9/L (ref 130–450)
PMV BLD AUTO: 9.4 FL (ref 7–12)
POTASSIUM REFLEX MAGNESIUM: 4.3 MMOL/L (ref 3.5–5)
PROTEIN UA: NEGATIVE MG/DL
RBC # BLD: 5.59 E12/L (ref 3.8–5.8)
SALICYLATE, SERUM: <0.3 MG/DL (ref 0–30)
SARS-COV-2, NAAT: ABNORMAL
SARS-COV-2, NAAT: NOT DETECTED
SODIUM BLD-SCNC: 135 MMOL/L (ref 132–146)
SPECIFIC GRAVITY UA: <=1.005 (ref 1–1.03)
TOTAL PROTEIN: 7.9 G/DL (ref 6.4–8.3)
TRICYCLIC ANTIDEPRESSANTS SCREEN SERUM: NEGATIVE NG/ML
UROBILINOGEN, URINE: 0.2 E.U./DL
WBC # BLD: 7.9 E9/L (ref 4.5–11.5)

## 2020-08-23 PROCEDURE — 81003 URINALYSIS AUTO W/O SCOPE: CPT

## 2020-08-23 PROCEDURE — G0480 DRUG TEST DEF 1-7 CLASSES: HCPCS

## 2020-08-23 PROCEDURE — 1240000000 HC EMOTIONAL WELLNESS R&B

## 2020-08-23 PROCEDURE — 93010 ELECTROCARDIOGRAM REPORT: CPT | Performed by: INTERNAL MEDICINE

## 2020-08-23 PROCEDURE — 2500000003 HC RX 250 WO HCPCS: Performed by: EMERGENCY MEDICINE

## 2020-08-23 PROCEDURE — 80053 COMPREHEN METABOLIC PANEL: CPT

## 2020-08-23 PROCEDURE — 93005 ELECTROCARDIOGRAM TRACING: CPT | Performed by: EMERGENCY MEDICINE

## 2020-08-23 PROCEDURE — U0002 COVID-19 LAB TEST NON-CDC: HCPCS

## 2020-08-23 PROCEDURE — 80307 DRUG TEST PRSMV CHEM ANLYZR: CPT

## 2020-08-23 PROCEDURE — 99284 EMERGENCY DEPT VISIT MOD MDM: CPT

## 2020-08-23 PROCEDURE — 96372 THER/PROPH/DIAG INJ SC/IM: CPT

## 2020-08-23 PROCEDURE — 85027 COMPLETE CBC AUTOMATED: CPT

## 2020-08-23 PROCEDURE — 99283 EMERGENCY DEPT VISIT LOW MDM: CPT

## 2020-08-23 PROCEDURE — 6360000002 HC RX W HCPCS: Performed by: EMERGENCY MEDICINE

## 2020-08-23 RX ORDER — LORAZEPAM 2 MG/ML
2 INJECTION INTRAMUSCULAR ONCE
Status: COMPLETED | OUTPATIENT
Start: 2020-08-23 | End: 2020-08-23

## 2020-08-23 RX ORDER — OLANZAPINE 10 MG/1
10 INJECTION, POWDER, LYOPHILIZED, FOR SOLUTION INTRAMUSCULAR
Status: COMPLETED | OUTPATIENT
Start: 2020-08-23 | End: 2020-08-23

## 2020-08-23 RX ADMIN — LORAZEPAM 2 MG: 2 INJECTION INTRAMUSCULAR; INTRAVENOUS at 13:37

## 2020-08-23 RX ADMIN — OLANZAPINE 10 MG: 10 INJECTION, POWDER, FOR SOLUTION INTRAMUSCULAR at 15:16

## 2020-08-23 NOTE — ED NOTES
Patient arrived in the [facility] AN [facility]FFIL[facility] OF [facility] ER anxious, paranoid. Patient refusing to answer many questions. Patient states he wants to hear information \"from a white person\". Patient fixated on filing a police report, unable to specify for what. Patient reports he does not want to talk to black people,  women, or homosexuals.      PATEL Lehman, [facility]  08/23/20 [address], MSW, [place]  08/23/20 1206
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
## 2020-08-23 NOTE — ED NOTES
Pt not cooperative in triage. Not answering questions.  States \"Im done talking to you\"     Radha Tatum RN  08/23/20 [id number]

## 2020-08-23 NOTE — ED NOTES
Notified Dr. Debbi Liu of need for admission. pt accepted to [facility].       Mey Berry RN  08/23/20 1940

## 2020-08-24 PROBLEM — F25.0 SCHIZOAFFECTIVE DISORDER, BIPOLAR TYPE (HCC): Status: ACTIVE | Noted: 2020-08-24

## 2020-08-24 PROCEDURE — 1240000000 HC EMOTIONAL WELLNESS R&B

## 2020-08-24 PROCEDURE — 6370000000 HC RX 637 (ALT 250 FOR IP): Performed by: PSYCHIATRY & NEUROLOGY

## 2020-08-24 PROCEDURE — 99222 1ST HOSP IP/OBS MODERATE 55: CPT | Performed by: NURSE PRACTITIONER

## 2020-08-24 RX ORDER — HALOPERIDOL 5 MG
5 TABLET ORAL EVERY 6 HOURS PRN
Status: DISCONTINUED | OUTPATIENT
Start: 2020-08-24 | End: 2020-08-28 | Stop reason: HOSPADM

## 2020-08-24 RX ORDER — HYDROXYZINE PAMOATE 50 MG/1
50 CAPSULE ORAL 3 TIMES DAILY PRN
Status: DISCONTINUED | OUTPATIENT
Start: 2020-08-24 | End: 2020-08-28 | Stop reason: HOSPADM

## 2020-08-24 RX ORDER — SERTRALINE HYDROCHLORIDE 100 MG/1
100 TABLET, FILM COATED ORAL DAILY
Status: ON HOLD | COMMUNITY
End: 2020-08-28 | Stop reason: HOSPADM

## 2020-08-24 RX ORDER — OLANZAPINE 10 MG/1
10 TABLET ORAL DAILY
Status: DISCONTINUED | OUTPATIENT
Start: 2020-08-24 | End: 2020-08-26

## 2020-08-24 RX ORDER — OLANZAPINE 10 MG/1
20 TABLET ORAL NIGHTLY
Status: DISCONTINUED | OUTPATIENT
Start: 2020-08-24 | End: 2020-08-27

## 2020-08-24 RX ORDER — TRAZODONE HYDROCHLORIDE 50 MG/1
50 TABLET ORAL NIGHTLY PRN
Status: ON HOLD | COMMUNITY
End: 2020-08-28 | Stop reason: HOSPADM

## 2020-08-24 RX ORDER — OLANZAPINE 10 MG/1
20 TABLET ORAL NIGHTLY
Status: DISCONTINUED | OUTPATIENT
Start: 2020-08-24 | End: 2020-08-24

## 2020-08-24 RX ORDER — HALOPERIDOL 5 MG/ML
5 INJECTION INTRAMUSCULAR EVERY 6 HOURS PRN
Status: DISCONTINUED | OUTPATIENT
Start: 2020-08-24 | End: 2020-08-28 | Stop reason: HOSPADM

## 2020-08-24 RX ORDER — TRAZODONE HYDROCHLORIDE 50 MG/1
50 TABLET ORAL NIGHTLY PRN
Status: DISCONTINUED | OUTPATIENT
Start: 2020-08-24 | End: 2020-08-28 | Stop reason: HOSPADM

## 2020-08-24 RX ORDER — MAGNESIUM HYDROXIDE/ALUMINUM HYDROXICE/SIMETHICONE 120; 1200; 1200 MG/30ML; MG/30ML; MG/30ML
30 SUSPENSION ORAL PRN
Status: DISCONTINUED | OUTPATIENT
Start: 2020-08-24 | End: 2020-08-28 | Stop reason: HOSPADM

## 2020-08-24 RX ORDER — ACETAMINOPHEN 325 MG/1
650 TABLET ORAL EVERY 6 HOURS PRN
Status: DISCONTINUED | OUTPATIENT
Start: 2020-08-24 | End: 2020-08-28 | Stop reason: HOSPADM

## 2020-08-24 RX ADMIN — TRAZODONE HYDROCHLORIDE 50 MG: 50 TABLET ORAL at 21:21

## 2020-08-24 RX ADMIN — OLANZAPINE 20 MG: 10 TABLET, FILM COATED ORAL at 21:21

## 2020-08-24 RX ADMIN — HYDROXYZINE PAMOATE 50 MG: 50 CAPSULE ORAL at 19:19

## 2020-08-24 RX ADMIN — OLANZAPINE 10 MG: 10 TABLET, FILM COATED ORAL at 08:52

## 2020-08-24 RX ADMIN — NICOTINE POLACRILEX 2 MG: 2 GUM, CHEWING BUCCAL at 19:45

## 2020-08-24 ASSESSMENT — PAIN DESCRIPTION - LOCATION
LOCATION: FOOT;HAND
LOCATION_2: FOOT

## 2020-08-24 ASSESSMENT — PAIN SCALES - GENERAL: PAINLEVEL_OUTOF10: 7

## 2020-08-24 ASSESSMENT — PAIN DESCRIPTION - DESCRIPTORS
DESCRIPTORS: ACHING
DESCRIPTORS_2: ACHING

## 2020-08-24 ASSESSMENT — PAIN DESCRIPTION - ONSET
ONSET: ON-GOING
ONSET_2: ON-GOING

## 2020-08-24 ASSESSMENT — PAIN DESCRIPTION - FREQUENCY: FREQUENCY: INTERMITTENT

## 2020-08-24 ASSESSMENT — PAIN DESCRIPTION - PAIN TYPE: TYPE: ACUTE PAIN

## 2020-08-24 ASSESSMENT — PAIN DESCRIPTION - INTENSITY: RATING_2: 7

## 2020-08-24 NOTE — PROGRESS NOTES
Patient arrived to unit via wheel chair from [facility] AN AFFILIATE OF [facility] with belongings that he came to hospital with. Was able to get vital signs, menu completed. Patient then ask to go to consult room so could complete admission. Patient refused to sign paper work, stating about what an  would cost. \"I did not do anything to be here, I just woke up. I was home alone and someone must have called police and now I'm here. \"   Patient was ask to do skin assessment and would only show arms ,feet and legs, refused to show abdomen or back. Stating,\"I'm not a stripper and I'm a Jain. \"  Patient observed very paranoid, asking if there was a warrant for his arrest, \"I belonged to a gang. \"  Demanding a copy of his personal belongings. Would do intense stare and then would not look at you at all. Patient displayed preoccupied, questions that were allowed to be ask in short time had to be repeated multiple times. Denies thoughts to harm self or others ,no hallucinations. But depression is 10 out of 10, \"I have no energy and feel like I'm going to go back to intermediate. \"  Did verbalize that he gets medications filled at [address] Patient became very blunt, irritated and told this nurse he wanted to go back to sleep and would answer questions after sleeping, and having breakfast.  Patient was showed his room,toiletries provided. Will continue to monitor, provide safe environment with continued rounds.

## 2020-08-24 NOTE — PROGRESS NOTES
`Behavioral Health Round O  Admission Note     Admission Type: involuntary       Reason for admission:psychosis       PATIENT STRENGTHS:          Medical Problems:   History reviewed. No pertinent past medical history. Status EXAM:  Status and Exam  Normal: No  Facial Expression: Hostile(UNCOOPERATIVE, REFUSES TO TALK WITH ME.)  Affect: Inappropriate, Blunt  Level of Consciousness: Alert  Mood:Normal: No  Mood: Anxious, Irritable, Other (Comment)  Motor Activity:Normal: Yes  Interview Behavior: Evasive, Irritable, Uncooperative/Withdrawn  Preception: Others(See Comment)  Attention:Normal: No  Attention: Unable to Concentrate, Hyperalert  Thought Processes: Other(See comment)  Thought Content:Normal: No(UNSURE PT. REFUSES TO ANSWER)  Thought Content: Other(See Comment)  Hallucinations: Unable to assess  Delusions: (UNABLE TO ASSESS)  Memory:Normal: (UNABLE TO ASSESS)  Insight and Judgment: No  Insight and Judgment: Poor Judgment, Poor Insight, Other(See comment)  Present Suicidal Ideation: Other(See comment)  Present Homicidal Ideation: Other(See comment)    Tobacco Screening:  Practical Counseling, on admission, kim X, if applicable and completed (first 3 are required if patient doesn't refuse):             (x )  Recognizing danger situations (included triggers and roadblocks)                    (x )  Coping skills (new ways to manage stress, exercise, relaxation techniques, changing routine, distraction)                                                           (x )  Basic information about quitting (benefits of quitting, techniques in how to quit, available resources  ( ) Referral for counseling faxed to [facility]                                              (x ) Patient refused counseling  ( ) Patient has not smoked in the last 30 days    Metabolic Screening:    No results found for: LABA1C    No results found for: CHOL  No results found for: TRIG  No results found for: HDL  No components found for: LDLCAL  No results found for: LABVLDL      Body mass index is 22.75 kg/m². BP Readings from Last 2 Encounters:   08/24/20 132/78   01/10/19 128/88           Pt admitted with followings belongings:  Dentures: None  Vision - Corrective Lenses: None  Hearing Aid: None  Jewelry: None  Body Piercings Removed: No  Clothing: (see note)  Were All Patient Medications Collected?: Not Applicable  Other Valuables: Cell phone       Patient oriented to surroundings and program expectations and copy of patient rights given. Received admission packet:  yes. Consents reviewed, signed refused. Patient verbalize understanding:yes.     Patient education on precautions:yes                  Manuelito Kim RN

## 2020-08-24 NOTE — PLAN OF CARE
Patient was alert to person, very irritable, intense stare at times and other times no eye contact at all. Patient was confused to the day , time repeatly ask what day he came in. Patient observed internally stimulated, very paranoid. Patient repetitively having to be redirected to subject being discussed. Will continue to monitor, provide safe environment with continued rounds.

## 2020-08-24 NOTE — H&P
Department of Psychiatry  History and Physical - Adult     CHIEF COMPLAINT: \"I did not have a knife. I am here because people running their mouth\"    Patient was seen after discussing with the treatment team and reviewing the chart    CIRCUMSTANCES OF ADMISSION: Patient presented the ED after being pink slipped by the police after his mother found him standing at 3 AM with a knife in his room    HISTORY OF PRESENT ILLNESS:      The patient is a 30 y.o. male with significant past history of schizoaffective disorder presents the ED after being pink slipped by the police after patient's mother found him standing at 3 AM with a knife in his room. In the ED patient was paranoid, delusional and suspicious of staff. In the ED was talking on a man with tattoos he refused him service. In the ED his urine drug screen is negative, his blood alcohol level is negative he is medically cleared admitted [facility] for further psychiatric assessment stabilization and treatment. Upon assessment patient makes no eye contact he lies in his bed offers little to no conversation. He denies all the circumstances of his hospitalization he states he never had a knife. He is irritable does not answer all questions. He gives very limited history. He states he is here because people running their mouth when asked what he means by that he does not reply. He appears guarded, paranoid he appears internally stimulated. He has poor insight and judgment to hospitalization need for treatment. Past psychiatric history: Patient is not forthcoming with information states he does not know how many times he has been inpatient hospitalized. Per the chart patient is been hospitalized at [facility]. in [facility] in the past.  He states he follows outpatient at [facility]. He states he is never attempted suicide. He does not believe any when the family attempted suicide or has any mental illness.     Legal history: Patient states has been present 2 times. Per the chart he was just released from prison 2 weeka ago approx. Charges included sexual misconduct with a child, assault, burglary, and violation of probation. Substance history: Patient states \"not anymore. \"  When asked what drugs he used to use he states \"I do not know. \"  Urine drug screen blood alcohol level were negative on admission    Personal family and social history: Patient states he grew up in [place] was raised by his mom states he is an only child with no brothers or sisters. He states he did not finish high school he thinks he dropped out his senior year. He is not currently working states he is not on so secure disability he lives with his mother. He is never been  he has no kids he denies any history of physical sexual motion abuse while growing up. Past Medical History:    History reviewed. No pertinent past medical history. Medications Prior to Admission:   Medications Prior to Admission: sertraline (ZOLOFT) 100 MG tablet, Take 100 mg by mouth daily  traZODone (DESYREL) 50 MG tablet, Take 50 mg by mouth nightly as needed for Sleep 1 -2 tablets at bedtime  OLANZapine (ZYPREXA) 20 MG tablet, Take 1 tablet by mouth every evening    Past Surgical History:    History reviewed. No pertinent surgical history. Allergies:   Patient has no known allergies. Family History  History reviewed. No pertinent family history. EXAMINATION:    REVIEW OF SYSTEMS:    ROS:  [x] All negative/unchanged except if checked.  Explain positive(checked items) below:  [] Constitutional  [] Eyes  [] Ear/Nose/Mouth/Throat  [] Respiratory  [] CV  [] GI  []   [] Musculoskeletal  [] Skin/Breast  [] Neurological  [] Endocrine  [] Heme/Lymph  [] Allergic/Immunologic    Explanation:     Vitals:  /78   Pulse 59   Temp 97.8 °F (36.6 °C) (Temporal)   Resp 15   Ht 5' 9\" (1.753 m)   Wt 154 lb 1 oz (69.9 kg)   SpO2 98%   BMI 22.75 kg/m² Physical Examination:   Head: x  Atraumatic: x normocephalic  Skin and Mucosa        Moist x  Dry   Pale  x Normal   Neck:  Thyroid  Palpable   x  Not palpable   venus distention   adenopathy   Chest: x Clear   Rhonchi     Wheezing   CV:  xS1   xS2    xNo murmer   Abdomen:  x  Soft    Tender    Viceromegaly   Extremities:  x No Edema     Edema     Cranial Nerves Examination:   CN II:   xPupils are reactive to light  Pupils are non reactive to light  CN III, IV, VI:  xNo eye deviation    No diplopia or ptosis   CN V:    xFacial Sensation is intact     Facial Sensation is not intact   CN IIIV:   x Hearing is normal to rubbing fingers   CN IX, X:     xNormal gag reflex and phonation   CN XI:   xShoulder shrug and neck rotation is normal  CNXII:    xTongue is midline no deviation or atrophy    Mental Status Examination:    Level of consciousness:  within normal limits   Appearance:  lying in bed  Behavior/Motor:  no abnormalities noted  Attitude toward examiner:  evasive and guarded  Speech:  spontaneous, monotone and poverty of speech   Mood: constricted and irritable  Affect:  mood congruent  Thought processes:  linear   Thought content: Appears paranoid, guarded and internally stimulated  Cognition:  oriented to person, place, and time   Concentration distractible  Memory impaired  Insight poor   Judgement poor   Fund of Knowledge limited      DIAGNOSIS:  Schizoaffective disorder bipolar type          LABS: REVIEWED TODAY:  Recent Labs     08/23/20  1317   WBC 7.9   HGB 16.8*        Recent Labs     08/23/20  1317      K 4.3   CL 97*   CO2 25   BUN 13   CREATININE 1.0   GLUCOSE 95     Recent Labs     08/23/20  1317   BILITOT 0.6   ALKPHOS 79   AST 17   ALT 15     Lab Results   Component Value Date    LABAMPH NOT DETECTED 08/23/2020    BARBSCNU NOT DETECTED 08/23/2020    LABBENZ NOT DETECTED 08/23/2020    LABMETH NOT DETECTED 08/23/2020    OPIATESCREENURINE NOT DETECTED 08/23/2020

## 2020-08-24 NOTE — CARE COORDINATION
Biopsychosocial Assessment Note    Social work met with patient to complete the biopsychosocial assessment and CSSR-S. Mental Status Exam: Pt denied any si or hi. Pt became agitated when asked about knife incident at home in bedroom. He was guarded and evasive and answered a lot of questions with I don't know. SW had to ask many questions multiple times as pt was laying in bed. He was not engaged with conversation with sw. Chief Complaint: Chart stated that mom found him at 3 am standing in his bedroom with a knife and she was fearful he may hurt himself. Patient Report: Pt reports he has never been suicidal.  He states he lives with mom and plans to return. He had been released from intermediate 2 months ago approx. Charges included sexual misconduct with a child, assault, burglary, and violation of probation. Gender  [x] Male [] Female [] Transgender  [] Other    Sexual Orientation    [x] Heterosexual [] Homosexual [] Bisexual [] Other    Suicidal Ideation  [] Reports [x] Denies    Homicidal Ideation  [] Reports [x] Denies      Hallucinations/Delusions (Specify type)  [] Reports [x] Denies     Substance Use/Alcohol Use/Addiction  [] Reports [x] Denies     Trauma History  [] Reports [x] Denies     Collateral Contact (GABO signed) refused to sign gabo for mother or anyone  Name:   Relationship:  Number:     Collateral Information: Pts  called. Erika Lunaab [phone]. He doesn't understand where chart came up with pt being a child sexual offender.  states he would have access to all charges ever pressed on him and he has never been charged with that. He states that pt has been to intermediate for burglary and violation of parole. Jania Jones called to inform sw that pt has many mh issues that he has been treated at [facility] hospitals and various mh units and agencies.  His mom is currently afraid of him and called the  to let him know that she doesn't want pt returning home but is frightened to tell him. She is worried for his safety though if he is d/c to the streets. She doesn't want him being homeless but doesn't feel comfortable with him returning home at this time. Access to Weapons: Pt states there are no guns in home.     Follow up provider: [facility]    Plan for discharge (where they live can they return): Return to home with mom

## 2020-08-24 NOTE — PROGRESS NOTES
Out on unit \"I don't want to talk\" irritable mood evasive slowly pacing emotional support and space given at this time

## 2020-08-24 NOTE — PROGRESS NOTES
[facility]  Initial Interdisciplinary Treatment Plan NOTE    Review Date & Time: 08/24/2020 0900    Patient WAS NOT in treatment team    Admission Type:        Reason for admission:         Estimated Length of Stay Update:  3-5 DAYS  Estimated Discharge Date Update: 5-8 DAYS    PATIENT STRENGTHS:  Patient Strengths    Patient Strengths and Limitations:   Addictive Behavior:   Medical Problems:History reviewed. No pertinent past medical history.     EDUCATION:   Learner Progress Toward Treatment Goals: PROGRESSING    Method: FOLLOW CARE PLAN    Outcome: MEET GOALS AND RETURN HOME    PATIENT GOALS: \"GO WITH THE FLOW\"    PLAN/TREATMENT RECOMMENDATIONS UPDATE: CONTINUE PRESENT CARE PLAN    GOALS UPDATE:   Time frame for Short-Term Goals: 3-5 DAYS    Yonas Curry RN

## 2020-08-25 PROCEDURE — 99232 SBSQ HOSP IP/OBS MODERATE 35: CPT | Performed by: NURSE PRACTITIONER

## 2020-08-25 PROCEDURE — 6370000000 HC RX 637 (ALT 250 FOR IP): Performed by: PSYCHIATRY & NEUROLOGY

## 2020-08-25 PROCEDURE — 6370000000 HC RX 637 (ALT 250 FOR IP): Performed by: NURSE PRACTITIONER

## 2020-08-25 PROCEDURE — 1240000000 HC EMOTIONAL WELLNESS R&B

## 2020-08-25 RX ORDER — DIVALPROEX SODIUM 250 MG/1
250 TABLET, DELAYED RELEASE ORAL EVERY 12 HOURS SCHEDULED
Status: DISCONTINUED | OUTPATIENT
Start: 2020-08-25 | End: 2020-08-27

## 2020-08-25 RX ADMIN — DIVALPROEX SODIUM 250 MG: 250 TABLET, DELAYED RELEASE ORAL at 20:37

## 2020-08-25 RX ADMIN — ACETAMINOPHEN 650 MG: 325 TABLET, FILM COATED ORAL at 18:13

## 2020-08-25 RX ADMIN — OLANZAPINE 10 MG: 10 TABLET, FILM COATED ORAL at 08:27

## 2020-08-25 RX ADMIN — HYDROXYZINE PAMOATE 50 MG: 50 CAPSULE ORAL at 17:20

## 2020-08-25 RX ADMIN — OLANZAPINE 20 MG: 10 TABLET, FILM COATED ORAL at 20:37

## 2020-08-25 RX ADMIN — NICOTINE POLACRILEX 2 MG: 2 GUM, CHEWING BUCCAL at 17:21

## 2020-08-25 RX ADMIN — TRAZODONE HYDROCHLORIDE 50 MG: 50 TABLET ORAL at 20:37

## 2020-08-25 RX ADMIN — DIVALPROEX SODIUM 250 MG: 250 TABLET, DELAYED RELEASE ORAL at 09:15

## 2020-08-25 ASSESSMENT — PAIN SCALES - GENERAL
PAINLEVEL_OUTOF10: 0
PAINLEVEL_OUTOF10: 0
PAINLEVEL_OUTOF10: 5

## 2020-08-25 NOTE — PROGRESS NOTES
Pt set up to shower at NS frequently with multiple requests challenges unit rules paranoid at times limits set emotional support given

## 2020-08-25 NOTE — PROGRESS NOTES
BEHAVIORAL HEALTH FOLLOW-UP NOTE     8/25/2020     Patient was seen and examined in person, Chart reviewed   Patient's case discussed with staff/team    Chief Complaint: \" I want to go home. \"    Interim History: Patient lying in his bed irritable with very poor insight judgment hospitalization need for treatment. He denies all symptoms although he appears paranoid and guarded. He does not leave his room does not attend groups he eats in his room. He denies the circumstance of his hospitalization. Appetite:   [x] Normal/Unchanged  [] Increased  [] Decreased      Sleep:       [x] Normal/Unchanged  [] Fair       [] Poor              Energy:    [] Normal/Unchanged  [] Increased  [x] Decreased        SI [] Present  [x] Absent    HI  []Present  [x] Absent     Aggression:  [] yes  [x] no    Patient is [x] able  [] unable to CONTRACT FOR SAFETY     PAST MEDICAL/PSYCHIATRIC HISTORY:   History reviewed. No pertinent past medical history. FAMILY/SOCIAL HISTORY:  History reviewed. No pertinent family history.   Social History     Socioeconomic History    Marital status: Single     Spouse name: Not on file    Number of children: Not on file    Years of education: Not on file    Highest education level: Not on file   Occupational History    Not on file   Social Needs    Financial resource strain: Not on file    Food insecurity     Worry: Not on file     Inability: Not on file    Transportation needs     Medical: Not on file     Non-medical: Not on file   Tobacco Use    Smoking status: Former Smoker     Types: Cigarettes    Smokeless tobacco: Never Used   Substance and Sexual Activity    Alcohol use: No    Drug use: No    Sexual activity: Not on file   Lifestyle    Physical activity     Days per week: Not on file     Minutes per session: Not on file    Stress: Not on file   Relationships    Social connections     Talks on phone: Not on file     Gets together: Not on file     Attends Judaism service: Not 20 mg at 08/24/20 2121      Examination:  /78   Pulse 59   Temp 97.8 °F (36.6 °C) (Temporal)   Resp 15   Ht 5' 9\" (1.753 m)   Wt 154 lb 1 oz (69.9 kg)   SpO2 98%   BMI 22.75 kg/m²   Gait - steady  Medication side effects(SE): Denies    Mental Status Examination:    Level of consciousness:  within normal limits   Appearance:  fair grooming and fair hygiene  Behavior/Motor:  psychomotor retardation  Attitude toward examiner:  evasive and guarded  Speech:  spontaneous and poverty of speech   Mood: irritable  Affect:  blunted and flat  Thought processes:  poverty of thought   Thought content: Appears guarded and paranoid  Cognition:  oriented to person, place, and time   Concentration poor  Insight poor   Judgement poor     ASSESSMENT:   Patient symptoms are:  [] Well controlled  [] Improving  [] Worsening  [x] No change      Diagnosis:  Active Problems:    [facility] ([facility]    Schizoaffective disorder, bipolar type ([facility]  Resolved Problems:    * No resolved hospital problems. *      LABS:    Recent Labs     08/23/20  1317   WBC 7.9   HGB 16.8*        Recent Labs     08/23/20  1317      K 4.3   CL 97*   CO2 25   BUN 13   CREATININE 1.0   GLUCOSE 95     Recent Labs     08/23/20  1317   BILITOT 0.6   ALKPHOS 79   AST 17   ALT 15     Lab Results   Component Value Date    LABAMPH NOT DETECTED 08/23/2020    BARBSCNU NOT DETECTED 08/23/2020    LABBENZ NOT DETECTED 08/23/2020    LABMETH NOT DETECTED 08/23/2020    OPIATESCREENURINE NOT DETECTED 08/23/2020    PHENCYCLIDINESCREENURINE NOT DETECTED 08/23/2020    ETOH <10 08/23/2020     Lab Results   Component Value Date    TSH 1.100 01/10/2019     No results found for: LITHIUM  Lab Results   Component Value Date    VALPROATE <3 (L) 06/15/2018         Treatment Plan:  Reviewed current Medications with the patient. Risks, benefits, side effects, drug-to-drug interactions and alternatives to treatment were discussed.   Collateral information:   DANIELLE evaluation  Encourage patient to attend group and other milieu activities.   Discharge planning discussed with the patient and treatment team.    Continue Zyprexa 10 mg daily and 20 mg at bedtime for psychosis and mood  Depakote 250 mg twice daily for mood stabilization      PSYCHOTHERAPY/COUNSELING:  [x] Therapeutic interview  [x] Supportive  [] CBT  [] Ongoing  [] Other    [x] Patient continues to need, on a daily basis, active treatment furnished directly by or requiring the supervision of inpatient psychiatric personnel      Anticipated Length of stay 5 to 7 days based on stability            Electronically signed by LATASHA Scott CNP on 4/17/8361 at 3:22 PM

## 2020-08-25 NOTE — CARE COORDINATION
SW note: d/c planning: SW attempted to speak to pt on 8.25. He however instantly became irritable when sw approached asking if we could talk for a couple minutes. He was resting in bed and was too tired and refused to talk waiving arms and yelling \"No, get out of my room. Im not talking to anyone. \"SW had talked with  earlier on 8/25 after  called stating that  Mom is frightened of pt and told  that she doesn't want pt returning home. She doesn't want us telling him she doesn't want him home.  requested we call mom and talk to her. SW explained that pt refused to sign cathy yesterday for mom but sw will talk with pt and attempt to get release signed. HE IS NOT TO RETURN HOME TO MOM.

## 2020-08-25 NOTE — PROGRESS NOTES
Cielo Downey denies any SI,HI, or any Hallucinations at this time. He states he just woke up to answer any questions. He is irritable. Groups offered and encouraged. Will continue to monitor.

## 2020-08-25 NOTE — PROGRESS NOTES
Asked for ativan because he is\" feeling angry about being in the hospital \" states \"just out of long-term and I locked up again\" refused VS and would not reach hand out o see if diaphoretic. Ativan not ordered PRN Vistaril  Given and nicorett gum . Took a shower. explained the 3 day involuntary hold to the client. Does appear paranoid.

## 2020-08-25 NOTE — PROGRESS NOTES
Pt came up to this nurse asking for something for anxiety. Informed pt that vistaril PRN was already given 1 hr prior. Pt then stated \"What about something for agitation. \" Pt does not present agitated. Pt is restless when pt does not agree with this nurse's answer. Pt informed his HS medications would help with agitation. Pt stated \"I've been on that for 5 months that isn't going to help with agitation. I don't hear voices. \" Pt upset walking away. Will continue to monitor.

## 2020-08-25 NOTE — PROGRESS NOTES
[facility]  Day 3 Interdisciplinary Treatment Plan NOTE    Review Date & Time: 8/25/20 0947    Patient was in treatment team    Admission Type:        Reason for admission:     Estimated Length of Stay Update:  3-5 days  Estimated Discharge Date Update: 3-5 days    PATIENT STRENGTHS:  Patient Strengths    Patient Strengths and Limitations:   Addictive Behavior:   Medical Problems:History reviewed. No pertinent past medical history. Risk:  Fall RiskTotal: 65  William Scale William Scale Score: 22  BVC Total: 1  Change in scores none. Changes to plan of Care  none    Status EXAM:   Status and Exam  Normal: No  Facial Expression: Avoids Gaze, Flat  Affect: Blunt  Level of Consciousness: Alert  Mood:Normal: No  Mood: Anxious, Irritable  Motor Activity:Normal: No  Motor Activity: Increased  Interview Behavior: Irritable, Uncooperative/Withdrawn, Evasive  Preception: [place] to Person(sttaes just woke up to answer a lot of questions)  Attention:Normal: No  Attention: Unable to Concentrate  Thought Processes: (disorganized)  Thought Content:Normal: No(UNSURE PT. REFUSES TO ANSWER)  Thought Content: Preoccupations  Hallucinations: None  Delusions: No  Memory:Normal: No  Memory: Poor Recent  Insight and Judgment: No  Insight and Judgment: Poor Judgment, Poor Insight  Present Suicidal Ideation: No  Present Homicidal Ideation: No    Daily Assessment Last Entry:   Daily Sleep (WDL): Within Defined Limits         Patient Currently in Pain: Yes  Daily Nutrition (WDL): Within Defined Limits    Patient Monitoring:  Frequency of Checks: 4 times per hour, close    Psychiatric Symptoms:   Depression Symptoms  Depression Symptoms: No problems reported or observed. Anxiety Symptoms  Anxiety Symptoms: Generalized  Elisa Symptoms  Elisa Symptoms: No problems reported or observed. Psychosis Symptoms  Hallucination Type: No problems reported or observed. Delusion Type: No problems reported or observed.     Suicide Risk

## 2020-08-25 NOTE — PLAN OF CARE
Problem: Altered Mood, Psychotic Behavior:  Goal: Able to verbalize decrease in frequency and intensity of hallucinations  Description: Able to verbalize decrease in frequency and intensity of hallucinations  Outcome: Met This Shift  Goal: Absence of self-harm  Description: Absence of self-harm  Outcome: Met This Shift     Problem: Altered Mood, Psychotic Behavior:  Goal: Able to demonstrate trust by eating, participating in treatment and following staff's direction  Description: Able to demonstrate trust by eating, participating in treatment and following staff's direction  Outcome: Ongoing  Goal: Able to verbalize reality based thinking  Description: Able to verbalize reality based thinking  Outcome: Ongoing  Goal: Ability to achieve adequate nutritional intake will improve  Description: Ability to achieve adequate nutritional intake will improve  Outcome: Ongoing  Goal: Ability to interact with others will improve  Description: Ability to interact with others will improve  Outcome: Ongoing  Goal: Compliance with prescribed medication regimen will improve  Description: Compliance with prescribed medication regimen will improve  Outcome: Ongoing  Goal: Patient specific goal  Description: Patient specific goal  Outcome: Ongoing    Shelly May denies any SI,HI, or any Hallucinations at this time. He states he just woke up to answer any questions. He is irritable. Groups offered and encouraged. Will continue to monitor.

## 2020-08-26 PROBLEM — F60.2 ANTISOCIAL PERSONALITY DISORDER (HCC): Status: ACTIVE | Noted: 2020-08-26

## 2020-08-26 PROCEDURE — 6370000000 HC RX 637 (ALT 250 FOR IP): Performed by: NURSE PRACTITIONER

## 2020-08-26 PROCEDURE — 6370000000 HC RX 637 (ALT 250 FOR IP): Performed by: PSYCHIATRY & NEUROLOGY

## 2020-08-26 PROCEDURE — 99232 SBSQ HOSP IP/OBS MODERATE 35: CPT | Performed by: NURSE PRACTITIONER

## 2020-08-26 PROCEDURE — 1240000000 HC EMOTIONAL WELLNESS R&B

## 2020-08-26 RX ADMIN — DIVALPROEX SODIUM 250 MG: 250 TABLET, DELAYED RELEASE ORAL at 09:06

## 2020-08-26 RX ADMIN — OLANZAPINE 20 MG: 10 TABLET, FILM COATED ORAL at 20:16

## 2020-08-26 RX ADMIN — TRAZODONE HYDROCHLORIDE 50 MG: 50 TABLET ORAL at 20:16

## 2020-08-26 RX ADMIN — HYDROXYZINE PAMOATE 50 MG: 50 CAPSULE ORAL at 17:30

## 2020-08-26 RX ADMIN — ACETAMINOPHEN 650 MG: 325 TABLET, FILM COATED ORAL at 18:00

## 2020-08-26 RX ADMIN — OLANZAPINE 10 MG: 10 TABLET, FILM COATED ORAL at 09:06

## 2020-08-26 RX ADMIN — DIVALPROEX SODIUM 250 MG: 250 TABLET, DELAYED RELEASE ORAL at 20:16

## 2020-08-26 ASSESSMENT — PAIN SCALES - GENERAL
PAINLEVEL_OUTOF10: 0
PAINLEVEL_OUTOF10: 0
PAINLEVEL_OUTOF10: 5

## 2020-08-26 NOTE — PLAN OF CARE
Problem: Pain:  Goal: Pain level will decrease  Description: Pain level will decrease  Outcome: Met This Shift     Problem: Pain:  Goal: Control of acute pain  Description: Control of acute pain  Outcome: Met This Shift     Problem: Pain:  Goal: Control of chronic pain  Description: Control of chronic pain  Outcome: Ongoing     Problem: Altered Mood, Psychotic Behavior:  Goal: Able to demonstrate trust by eating, participating in treatment and following staff's direction  Description: Able to demonstrate trust by eating, participating in treatment and following staff's direction  8/25/2020 2124 by Adebayo Teague RN  Outcome: Met This Shift     Problem: Altered Mood, Psychotic Behavior:  Goal: Able to verbalize decrease in frequency and intensity of hallucinations  Description: Able to verbalize decrease in frequency and intensity of hallucinations  8/25/2020 2124 by Adebayo Teague RN  Outcome: Ongoing     Problem: Altered Mood, Psychotic Behavior:  Goal: Able to verbalize reality based thinking  Description: Able to verbalize reality based thinking  8/25/2020 2124 by Adebayo Teague RN  Outcome: Ongoing     Problem: Altered Mood, Psychotic Behavior:  Goal: Absence of self-harm  Description: Absence of self-harm  8/25/2020 2124 by Adebayo Teague RN  Outcome: Ongoing     Problem: Altered Mood, Psychotic Behavior:  Goal: Ability to achieve adequate nutritional intake will improve  Description: Ability to achieve adequate nutritional intake will improve  8/25/2020 2124 by Adebayo Teague RN  Outcome: Met This Shift     Problem: Altered Mood, Psychotic Behavior:  Goal: Ability to interact with others will improve  Description: Ability to interact with others will improve  8/25/2020 2124 by Adebayo Teague RN  Outcome: Not Met This Shift     Problem: Altered Mood, Psychotic Behavior:  Goal: Compliance with prescribed medication regimen will improve  Description: Compliance with prescribed medication regimen will improve  8/25/2020 2124 by Xin Mcfarlane RN  Outcome: Ongoing     Problem: Altered Mood, Psychotic Behavior:  Goal: Patient specific goal  Description: Patient specific goal  8/25/2020 2124 by Xin Mcfarlane RN  Outcome: Ongoing

## 2020-08-26 NOTE — PROGRESS NOTES
PT. REFUSED TO ATTEND TREATMENT TEAM TODAY. PT. DENIES SUICIDAL IDEATION, HALLUCINATIONS. NO VOICED DELUSIONS. AVOIDANT ON ASSESSMENT, KEEPS SELF COVERED IN BLANKETS. UP FOR MEALS,RETURNED TO BED. REPORTED READINESS FOR DISCHARGE AND LIVES IN [facility]. IT WAS PREVIOUSLY REPORTED THAT PT. WAS HOMELESS. PT. REUSING TO GIVE PERMISSION TO CONTACT SUPPORT PERSON TO VERIFY PT. HAS A PLACE TO LIVE. ATTENDS NO GROUPS, BUT IS MEDICATION COMPLIANT.

## 2020-08-26 NOTE — PLAN OF CARE
Problem: Altered Mood, Psychotic Behavior:  Goal: Able to verbalize reality based thinking  Description: Able to verbalize reality based thinking  8/25/2020 2124 by Xin Mcfarlane RN  Outcome: Ongoing  NO VOICED DELUSIONS. Problem: Altered Mood, Psychotic Behavior:  Goal: Absence of self-harm  Description: Absence of self-harm  8/26/2020 1044 by Dharmesh Price RN  Outcome: Met This Shift  NO SELF HARM. 8/25/2020 2124 by Xin Mcfarlane RN  Outcome: Ongoing     Problem: Altered Mood, Psychotic Behavior:  Goal: Ability to interact with others will improve  Description: Ability to interact with others will improve  8/25/2020 2124 by Xni Mcfarlane RN  Outcome: Not Met This Shift  UP FOR MEAL, RETURNED TO BED. NO OBSERVED PEER INTERACTION.  REFUSED TO ATTEND TREATMENT TEAM.

## 2020-08-26 NOTE — PROGRESS NOTES
Pacing the halls for awhile . Asked for seroquel or Phenabarb. Explained theses were not ordered but had haldol . Refused haldol. Standing in hallway and staring at the clock . When asked why he was looking at the clock stated\" I should be home learning Tamazight\" . Was informed did not have Kinyarwanda book here but could choose a book from [employer] to read. Vistaril given earlier minimaly effective, states he spoke with his mom and she is making him angry. Asked client not to speak with mom  For awhile if getting angry.

## 2020-08-26 NOTE — PROGRESS NOTES
BEHAVIORAL HEALTH FOLLOW-UP NOTE     8/26/2020       Patient personally seen and examined by me mental status examination performed. Agree with the below assessment by the nurse practitioner student. Patient is isolative to his room does not socialize with peers does not attend groups he is irritable appears guarded and paranoid no insight and judgment to hospitalization need for treatment. Psychomotor evaluation revealed increased psychomotor retardation his eye contact is poor his speech is normal rate and tone. His mood is \"I am fine. \"  Affect is irritable easily agitated. Thought process is linear without flight of ideas or loose associations thought contents with suspiciousness and paranoia. He denies suicidal homicidal ideations intent or plans impulse control is poor cognitive function appears to be below his baseline insight judgment is poor he is alert oriented time place and person        Patient was seen and examined in person, Chart reviewed   Patient's case discussed with staff/team    Chief Complaint: \" I want to go home. \"    Interim History: Patient lying in his bed irritable and uncooperative with very poor insight and judgment for hospitalization needed for treatment. He denies all symptoms. Remains paranoid and guarded. He only leaves his room for meals and is not socializing with peers. Refused to attend treatment team this morning. He denies the circumstance of his hospitalization. Appetite:   [x] Normal/Unchanged  [] Increased  [] Decreased      Sleep:       [x] Normal/Unchanged  [] Fair       [] Poor              Energy:    [] Normal/Unchanged  [] Increased  [x] Decreased        SI [] Present  [x] Absent    HI  []Present  [x] Absent     Aggression:  [] yes  [x] no    Patient is [x] able  [] unable to CONTRACT FOR SAFETY     PAST MEDICAL/PSYCHIATRIC HISTORY:   History reviewed. No pertinent past medical history. FAMILY/SOCIAL HISTORY:  History reviewed.  No pertinent family history. Social History     Socioeconomic History    Marital status: Single     Spouse name: Not on file    Number of children: Not on file    Years of education: Not on file    Highest education level: Not on file   Occupational History    Not on file   Social Needs    Financial resource strain: Not on file    Food insecurity     Worry: Not on file     Inability: Not on file    Transportation needs     Medical: Not on file     Non-medical: Not on file   Tobacco Use    Smoking status: Former Smoker     Types: Cigarettes    Smokeless tobacco: Never Used   Substance and Sexual Activity    Alcohol use: No    Drug use: No    Sexual activity: Not on file   Lifestyle    Physical activity     Days per week: Not on file     Minutes per session: Not on file    Stress: Not on file   Relationships    Social connections     Talks on phone: Not on file     Gets together: Not on file     Attends [employer] service: Not on file     Active member of club or organization: Not on file     Attends meetings of clubs or organizations: Not on file     Relationship status: Not on file    Intimate partner violence     Fear of current or ex partner: Not on file     Emotionally abused: Not on file     Physically abused: Not on file     Forced sexual activity: Not on file   Other Topics Concern    Not on file   Social History Narrative    Not on file           ROS:  [x] All negative/unchanged except if checked.  Explain positive(checked items) below:  [] Constitutional  [] Eyes  [] Ear/Nose/Mouth/Throat  [] Respiratory  [] CV  [] GI  []   [] Musculoskeletal  [] Skin/Breast  [] Neurological  [] Endocrine  [] Heme/Lymph  [] Allergic/Immunologic    Explanation:     MEDICATIONS:    Current Facility-Administered Medications:     divalproex (DEPAKOTE) DR tablet 250 mg, 250 mg, Oral, 2 times per day, LATASHA Spencer - CNP, 459 mg at 08/26/20 0906    acetaminophen (TYLENOL) tablet 650 mg, 650 mg, Oral, Q6H PRN, Mendel Lucks MD Jaida, 650 mg at 08/25/20 1813    hydrOXYzine (VISTARIL) capsule 50 mg, 50 mg, Oral, TID PRN, Rayne Patricia MD, 50 mg at 08/25/20 1720    haloperidol lactate (HALDOL) injection 5 mg, 5 mg, Intramuscular, Q6H PRN **OR** haloperidol (HALDOL) tablet 5 mg, 5 mg, Oral, Q6H PRN, Rayne Patricia MD    traZODone (DESYREL) tablet 50 mg, 50 mg, Oral, Nightly PRN, Rayne Patricia MD, 50 mg at 08/25/20 2037    magnesium hydroxide (MILK OF MAGNESIA) 400 MG/5ML suspension 30 mL, 30 mL, Oral, Daily PRN, Rayne Patricia MD    aluminum & magnesium hydroxide-simethicone (MAALOX) 200-200-20 MG/5ML suspension 30 mL, 30 mL, Oral, PRN, Rayne Patricia MD    OLANZapine (ZYPREXA) tablet 10 mg, 10 mg, Oral, Daily, Rayne Patricia MD, 10 mg at 08/26/20 6788    nicotine polacrilex (NICORETTE) gum 2 mg, 2 mg, Oral, Q2H PRN, Rayne Patricia MD, 2 mg at 08/25/20 1721    OLANZapine (ZYPREXA) tablet 20 mg, 20 mg, Oral, Nightly, Rayne Patricia MD, 20 mg at 08/25/20 2037      Examination:  /78   Pulse 59   Temp 97.8 °F (36.6 °C) (Temporal)   Resp 15   Ht 5' 9\" (1.753 m)   Wt 154 lb 1 oz (69.9 kg)   SpO2 98%   BMI 22.75 kg/m²   Gait - steady  Medication side effects(SE): Denies    Mental Status Examination:    Level of consciousness:  within normal limits   Appearance:  fair grooming and fair hygiene  Behavior/Motor:  psychomotor retardation  Attitude toward examiner:  evasive and guarded  Speech:  spontaneous and poverty of speech   Mood: irritable  Affect:  blunted and flat  Thought processes:  poverty of thought   Thought content: Appears guarded and paranoid  Cognition:  oriented to person, place, and time   Concentration poor  Insight poor   Judgement poor     ASSESSMENT:   Patient symptoms are:  [] Well controlled  [] Improving  [] Worsening  [x] No change      Diagnosis:  Active Problems:    Acute psychosis ([facility]    [facility]    Antisocial personality disorder

## 2020-08-27 VITALS
SYSTOLIC BLOOD PRESSURE: 137 MMHG | HEIGHT: 69 IN | HEART RATE: 55 BPM | DIASTOLIC BLOOD PRESSURE: 68 MMHG | OXYGEN SATURATION: 95 % | RESPIRATION RATE: 16 BRPM | TEMPERATURE: 97.3 F | WEIGHT: 154.06 LBS | BODY MASS INDEX: 22.82 KG/M2

## 2020-08-27 LAB — VALPROIC ACID LEVEL: 30 MCG/ML (ref 50–100)

## 2020-08-27 PROCEDURE — 6370000000 HC RX 637 (ALT 250 FOR IP): Performed by: PSYCHIATRY & NEUROLOGY

## 2020-08-27 PROCEDURE — 6370000000 HC RX 637 (ALT 250 FOR IP): Performed by: NURSE PRACTITIONER

## 2020-08-27 PROCEDURE — 1240000000 HC EMOTIONAL WELLNESS R&B

## 2020-08-27 PROCEDURE — 80164 ASSAY DIPROPYLACETIC ACD TOT: CPT

## 2020-08-27 PROCEDURE — 36415 COLL VENOUS BLD VENIPUNCTURE: CPT

## 2020-08-27 PROCEDURE — 99232 SBSQ HOSP IP/OBS MODERATE 35: CPT | Performed by: NURSE PRACTITIONER

## 2020-08-27 RX ORDER — OLANZAPINE 10 MG/1
20 TABLET ORAL NIGHTLY
Status: DISCONTINUED | OUTPATIENT
Start: 2020-08-27 | End: 2020-08-28 | Stop reason: HOSPADM

## 2020-08-27 RX ORDER — HALOPERIDOL 5 MG
5 TABLET ORAL 2 TIMES DAILY
Status: DISCONTINUED | OUTPATIENT
Start: 2020-08-27 | End: 2020-08-27

## 2020-08-27 RX ORDER — DIVALPROEX SODIUM 250 MG/1
500 TABLET, DELAYED RELEASE ORAL EVERY 12 HOURS SCHEDULED
Status: DISCONTINUED | OUTPATIENT
Start: 2020-08-27 | End: 2020-08-28 | Stop reason: HOSPADM

## 2020-08-27 RX ORDER — OLANZAPINE 5 MG/1
5 TABLET ORAL DAILY
Status: DISCONTINUED | OUTPATIENT
Start: 2020-08-27 | End: 2020-08-28 | Stop reason: HOSPADM

## 2020-08-27 RX ADMIN — HALOPERIDOL 5 MG: 5 TABLET ORAL at 09:41

## 2020-08-27 RX ADMIN — HYDROXYZINE PAMOATE 50 MG: 50 CAPSULE ORAL at 20:22

## 2020-08-27 RX ADMIN — OLANZAPINE 20 MG: 10 TABLET, FILM COATED ORAL at 20:22

## 2020-08-27 RX ADMIN — ACETAMINOPHEN 650 MG: 325 TABLET, FILM COATED ORAL at 20:22

## 2020-08-27 RX ADMIN — DIVALPROEX SODIUM 250 MG: 250 TABLET, DELAYED RELEASE ORAL at 09:41

## 2020-08-27 RX ADMIN — TRAZODONE HYDROCHLORIDE 50 MG: 50 TABLET ORAL at 20:22

## 2020-08-27 RX ADMIN — NICOTINE POLACRILEX 2 MG: 2 GUM, CHEWING BUCCAL at 11:44

## 2020-08-27 RX ADMIN — DIVALPROEX SODIUM 500 MG: 250 TABLET, DELAYED RELEASE ORAL at 20:22

## 2020-08-27 ASSESSMENT — PAIN SCALES - GENERAL
PAINLEVEL_OUTOF10: 0
PAINLEVEL_OUTOF10: 2
PAINLEVEL_OUTOF10: 0

## 2020-08-27 NOTE — PROGRESS NOTES
PT. WITH PERIODS OF INCREASED IRRITABILITY AND DEMANDS TO BE DISCHARGED. DOES ACCEPT SUPPORT, CHALLENGES EXPLANATION FOR NEED TO CONTACT OM FOR COLLATERAL. REFUSED TO SIGN RELEASE OF INFORMATION FOR MOM AND REFUSED ZYPREXA AT THAT TIME. WILL CONTINUE TO FOLLOW CLOSELY FOR FURTHER ESCALATION. PT. IS UP FOR MEALS, TO USE PHONE, THEN RETURNS TO ROOM. DENIES SUICIDAL IDEATION, HOMICIDAL IDEATION AND HALLUCINATIONS.

## 2020-08-27 NOTE — PLAN OF CARE
Problem: Altered Mood, Psychotic Behavior:  Goal: Able to verbalize reality based thinking  Description: Able to verbalize reality based thinking  Outcome: Met This Shift  NO VOICED DELUSIONS. Goal: Absence of self-harm  Description: Absence of self-harm  Outcome: Met This Shift  NO SELF HARM.

## 2020-08-27 NOTE — PROGRESS NOTES
BEHAVIORAL HEALTH FOLLOW-UP NOTE     8/27/2020       Patient personally seen and examined by me mental status examination performed. Agree with the below assessment by the nurse practitioner student. Patient is out of his room more out of the main unit. Insight and judgment to hospitalization need for treatment. Psychomotor evaluation revealed increased psychomotor retardation his eye contact is poor his speech is normal rate and tone. His mood is \"I am fine. \"  Affect is irritable easily agitated. Thought process is linear and discharged focused without flight of ideas or loose associations thought contents with suspiciousness and paranoia. He denies suicidal homicidal ideations intent or plans impulse control is poor cognitive function appears to be below his baseline insight judgment is poor he is alert oriented time place and person        Patient was seen and examined in person, Chart reviewed   Patient's case discussed with staff/team    Chief Complaint: \" I need to go home today. \"    Interim History: Patient up and out on the unit with very poor insight and judgment for hospitalization needed for treatment. He denies all symptoms. He denies the circumstance of his hospitalization. He does not want to be started on haldol. States \"It made my jaw lock up. \" Reports that he did better when taking Zoloft and Zyprexa. He is very focused on discharged. Patient repeatedly asking if he was going to be discharged today and would not accept that he was not being discharged today after being told multiple times. He is not attending groups but is compliant with medications.        Appetite:   [x] Normal/Unchanged  [] Increased  [] Decreased      Sleep:       [x] Normal/Unchanged  [] Fair       [] Poor              Energy:    [] Normal/Unchanged  [] Increased  [x] Decreased        SI [] Present  [x] Absent    HI  []Present  [x] Absent     Aggression:  [] yes  [x] no    Patient is [x] able  [] unable to CONTRACT FOR SAFETY     PAST MEDICAL/PSYCHIATRIC HISTORY:   History reviewed. No pertinent past medical history. FAMILY/SOCIAL HISTORY:  History reviewed. No pertinent family history. Social History     Socioeconomic History    Marital status: Single     Spouse name: Not on file    Number of children: Not on file    Years of education: Not on file    Highest education level: Not on file   Occupational History    Not on file   Social Needs    Financial resource strain: Not on file    Food insecurity     Worry: Not on file     Inability: Not on file    Transportation needs     Medical: Not on file     Non-medical: Not on file   Tobacco Use    Smoking status: Former Smoker     Types: Cigarettes    Smokeless tobacco: Never Used   Substance and Sexual Activity    Alcohol use: No    Drug use: No    Sexual activity: Not on file   Lifestyle    Physical activity     Days per week: Not on file     Minutes per session: Not on file    Stress: Not on file   Relationships    Social connections     Talks on phone: Not on file     Gets together: Not on file     Attends Scientologist service: Not on file     Active member of club or organization: Not on file     Attends meetings of clubs or organizations: Not on file     Relationship status: Not on file    Intimate partner violence     Fear of current or ex partner: Not on file     Emotionally abused: Not on file     Physically abused: Not on file     Forced sexual activity: Not on file   Other Topics Concern    Not on file   Social History Narrative    Not on file           ROS:  [x] All negative/unchanged except if checked.  Explain positive(checked items) below:  [] Constitutional  [] Eyes  [] Ear/Nose/Mouth/Throat  [] Respiratory  [] CV  [] GI  []   [] Musculoskeletal  [] Skin/Breast  [] Neurological  [] Endocrine  [] Heme/Lymph  [] Allergic/Immunologic    Explanation:     MEDICATIONS:    Current Facility-Administered Medications:     OLANZapine (ZYPREXA) tablet 5 mg, 5 psychosis ([facility]    Schizoaffective disorder, bipolar type ([facility]    Antisocial personality disorder ([facility]  Resolved Problems:    * No resolved hospital problems. *      LABS:    No results for input(s): WBC, HGB, PLT in the last 72 hours. No results for input(s): NA, K, CL, CO2, BUN, CREATININE, GLUCOSE in the last 72 hours. No results for input(s): BILITOT, ALKPHOS, AST, ALT in the last 72 hours. Lab Results   Component Value Date    LABAMPH NOT DETECTED 08/23/2020    BARBSCNU NOT DETECTED 08/23/2020    LABBENZ NOT DETECTED 08/23/2020    LABMETH NOT DETECTED 08/23/2020    OPIATESCREENURINE NOT DETECTED 08/23/2020    PHENCYCLIDINESCREENURINE NOT DETECTED 08/23/2020    ETOH <10 08/23/2020     Lab Results   Component Value Date    TSH 1.100 01/10/2019     No results found for: LITHIUM  Lab Results   Component Value Date    VALPROATE 30 (L) 08/27/2020         Treatment Plan:  Reviewed current Medications with the patient. Risks, benefits, side effects, drug-to-drug interactions and alternatives to treatment were discussed. Collateral information:   CD evaluation  Encourage patient to attend group and other milieu activities.   Discharge planning discussed with the patient and treatment team.    Discussed with Dr. Rayna Robles we will start back on Zyprexa 5 mg daily and 20 mg at bedtime increase Depakote 500 mg twice daily for mood stabilization for VPA level 30      PSYCHOTHERAPY/COUNSELING:  [x] Therapeutic interview  [x] Supportive  [] CBT  [] Ongoing  [] Other    [x] Patient continues to need, on a daily basis, active treatment furnished directly by or requiring the supervision of inpatient psychiatric personnel      Anticipated Length of stay 5 to 7 days based on stability            Electronically signed by Martha Patton on 8/27/2020 at 3:13 PM

## 2020-08-27 NOTE — CARE COORDINATION
SERGIO note: d/c planning: SW has been attempting for days to talk with pt. He is always irritable and raises voice when I try to talk to him stating he's too tired and to let him rest. SERGIO spoke with him 3x today. On second visit, sergio explained that I needed him to sign release for his mom. He stated no, he was too tired to talk let alone sign anything. I then asked him for verbal permission to call mom and he declined. SERGIO explained that we can't d/c until we do a collateral. He states he's returning home to mom but sergio briefly acknowledged that  had called me and informed me that mom had some concerns re: him returning home and that  wouldn't d/c until I can reach mom and she gives permission for him to return home. He again refused to talk to me and ordered me to leave. SERGIO then went back in at end of shift to try to talk. SERGIO informed him it was almost dinner time and tried to talk to him again re: what concerns mom might have. He gets instantly irritable stating she has no concerns for him returning home and that he will be going home there. SERGIO asks again for him to sign release and he refuses. SERGIO told him I would call  in am if he doesn't sign. SERGIO asked him re: If he could stay anywhere else and he states he has no where else to go. He then stated \" I don't want to get angry at you but you need to leave now and leave me alone\". SERGIO will attempt to talk with him tomorrow. Although sergio doesn't have release sw needs to talk to mom to see if she has any concerns as  is looking at d/c tomorrow. SERGIO left message for her to call back.

## 2020-08-27 NOTE — PLAN OF CARE
Resting in his bed wrapped up in his blanket. Refusing vital signs. Refusing to attend group. Refusing to answer any assessment questions. Did come out for dinner and is eating well. Out of room more after 1900. Does deny hallucinations. Denies suicidal thoughts or intent to harm himself or others.

## 2020-08-27 NOTE — PROGRESS NOTES
Vistaril slightly effective for anxiety . Pacing unit this afternoon. Asked when he would be able to leave. did take hs meds this evening.

## 2020-08-28 PROCEDURE — 6370000000 HC RX 637 (ALT 250 FOR IP): Performed by: NURSE PRACTITIONER

## 2020-08-28 PROCEDURE — 6370000000 HC RX 637 (ALT 250 FOR IP): Performed by: PSYCHIATRY & NEUROLOGY

## 2020-08-28 PROCEDURE — 99239 HOSP IP/OBS DSCHRG MGMT >30: CPT | Performed by: NURSE PRACTITIONER

## 2020-08-28 RX ORDER — DIVALPROEX SODIUM 500 MG/1
500 TABLET, DELAYED RELEASE ORAL EVERY 12 HOURS SCHEDULED
Qty: 60 TABLET | Refills: 0 | Status: ON HOLD | OUTPATIENT
Start: 2020-08-28 | End: 2021-01-13 | Stop reason: HOSPADM

## 2020-08-28 RX ORDER — OLANZAPINE 5 MG/1
5 TABLET ORAL DAILY
Qty: 30 TABLET | Refills: 0 | Status: ON HOLD | OUTPATIENT
Start: 2020-08-28 | End: 2021-01-13 | Stop reason: HOSPADM

## 2020-08-28 RX ORDER — OLANZAPINE 20 MG/1
20 TABLET ORAL EVERY EVENING
Qty: 30 TABLET | Refills: 0 | Status: ON HOLD | OUTPATIENT
Start: 2020-08-28 | End: 2021-01-13

## 2020-08-28 RX ADMIN — DIVALPROEX SODIUM 500 MG: 250 TABLET, DELAYED RELEASE ORAL at 10:08

## 2020-08-28 RX ADMIN — OLANZAPINE 5 MG: 5 TABLET, FILM COATED ORAL at 10:08

## 2020-08-28 RX ADMIN — ACETAMINOPHEN 650 MG: 325 TABLET, FILM COATED ORAL at 10:21

## 2020-08-28 ASSESSMENT — PAIN SCALES - GENERAL
PAINLEVEL_OUTOF10: 4
PAINLEVEL_OUTOF10: 0

## 2020-08-28 NOTE — CARE COORDINATION
In order to ensure appropriate transition and discharge planning is in place, the following documents have been transmitted to [facility], as the new outpatient provider:     The d/c diagnosis was transmitted to the next care provider   The reason for hospitalization was transmitted to the next care provider   The d/c medications (dosage and indication) were transmitted to the next care provider    The continuing care plan was transmitted to the next care provider

## 2020-08-28 NOTE — PROGRESS NOTES
CLINICAL PHARMACY NOTE: MEDS TO [address] Select Patient?: No  Total # of Prescriptions Filled: 3   The following medications were delivered to the patient:  · Divalproex 500 mg  · Olanzapine 20 mg  · Olanzapine 5 mg  Total # of Interventions Completed: 4  Time Spent (min): 30    Additional Documentation:
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
## 2020-08-28 NOTE — GROUP NOTE
Group Therapy Note    Date: 8/28/2020    Group Start Time: 1000  Group End Time: 0618  Group Topic: Psychoeducation    [facility]    Odilia Clark, CTRS        Group Therapy Note    Number of participants: 12  Type of group: Psychoeducation  Mode of intervention: Education, Support, Socialization, Exploration, Clarifying, and Problem-solving  Topic: Strengths Exploration  Objective: Pt will identify strengths gained during current treatment stay. Notes:  Pt offered minimal interaction during group but did share when prompted. Accepting of handout and support from others. Status After Intervention:  Unchanged    Participation Level:  Active Listener and Minimal    Participation Quality: Appropriate and Attentive      Speech:  normal      Thought Process/Content: Logical      Affective Functioning: Congruent      Mood: euthymic      Level of consciousness:  Alert, Oriented x4 and Attentive      Response to Learning: Able to verbalize current knowledge/experience, Able to verbalize/acknowledge new learning, Able to retain information, Capable of insight, Able to change behavior and Progressing to goal      Endings: None Reported    Modes of Intervention: Education, Support, Socialization, Exploration, Clarifying and Problem-solving
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
## 2020-08-28 NOTE — PROGRESS NOTES
[facility]  Discharge Note    Pt discharged with followings belongings:   Dentures: None  Vision - Corrective Lenses: None  Hearing Aid: None  Jewelry: None  Body Piercings Removed: No  Clothing: (see note)  Were All Patient Medications Collected?: Not Applicable  Other Valuables: Cell phone   Valuables sent home with patient,  gave cell phone to mother. Patient education on aftercare instructions: yes   Patient verbalize understanding of AVS:  yes. Status EXAM upon discharge:  Status and Exam  Normal: No  Facial Expression: (irritated)  Affect: Congruent  Level of Consciousness: Alert  Mood:Normal: No  Mood: Anxious, Irritable  Motor Activity:Normal: Yes  Motor Activity: (WDL)  Interview Behavior: Uncooperative/Withdrawn, Irritable  Preception: Berthold to Person, Berthold to Time, Berthold to Place, Berthold to Situation  Attention:Normal: No  Attention: Distractible  Thought Processes: Other(See comment)(SLOW)  Thought Content:Normal: No  Thought Content: Preoccupations  Hallucinations: None  Delusions: No  Delusions:  Other(See Comment)(NONE VOICED)  Memory:Normal: Yes  Memory: (WDL)  Insight and Judgment: No  Insight and Judgment: Poor Insight, Poor Judgment  Present Suicidal Ideation: No  Present Homicidal Ideation: No      Metabolic Screening:    No results found for: LABA1C    No results found for: CHOL  No results found for: TRIG  No results found for: HDL  No components found for: LDLCAL  No results found for: Sharona Pendleton RN

## 2020-08-28 NOTE — PLAN OF CARE
Patient has been pacing in the lewis much of the morning. He is anxious for discharge. He is angry with his mother but reports he will take the bus to [facility] upon discharge. Patient has not had any angry outbursts and is compliant with medications.  He denies SI, HI or hallucinations     Problem: Altered Mood, Psychotic Behavior:  Goal: Absence of self-harm  Description: Absence of self-harm  Outcome: Met This Shift  Goal: Compliance with prescribed medication regimen will improve  Description: Compliance with prescribed medication regimen will improve  Outcome: Met This Shift

## 2020-08-28 NOTE — PROGRESS NOTES
Requesting additional dose of trazodone. Informed pt that he had slept most of the day and had been uncooperative and threatening with staff and I could not call the doctor for additional sleep med at this time. Reports he got this in jail and should get it here.  Informed pt to talk to the doctor in the am.
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
## 2020-09-09 ENCOUNTER — HOSPITAL ENCOUNTER (EMERGENCY)
Age: 25
Discharge: PSYCHIATRIC OR SUBSTANCE USE FACILITY | End: 2020-09-10
Attending: EMERGENCY MEDICINE
Payer: COMMERCIAL

## 2020-09-09 LAB
ACETAMINOPHEN LEVEL: <5 MCG/ML (ref 10–30)
ALBUMIN SERPL-MCNC: 4.2 G/DL (ref 3.5–5.2)
ALP BLD-CCNC: 102 U/L (ref 40–129)
ALT SERPL-CCNC: 28 U/L (ref 0–40)
AMPHETAMINE SCREEN, URINE: NOT DETECTED
ANION GAP SERPL CALCULATED.3IONS-SCNC: 14 MMOL/L (ref 7–16)
AST SERPL-CCNC: 30 U/L (ref 0–39)
BARBITURATE SCREEN URINE: NOT DETECTED
BASOPHILS ABSOLUTE: 0.03 E9/L (ref 0–0.2)
BASOPHILS RELATIVE PERCENT: 0.4 % (ref 0–2)
BENZODIAZEPINE SCREEN, URINE: NOT DETECTED
BILIRUB SERPL-MCNC: <0.2 MG/DL (ref 0–1.2)
BUN BLDV-MCNC: 11 MG/DL (ref 6–20)
CALCIUM SERPL-MCNC: 9.2 MG/DL (ref 8.6–10.2)
CANNABINOID SCREEN URINE: NOT DETECTED
CHLORIDE BLD-SCNC: 106 MMOL/L (ref 98–107)
CO2: 21 MMOL/L (ref 22–29)
COCAINE METABOLITE SCREEN URINE: NOT DETECTED
CREAT SERPL-MCNC: 1 MG/DL (ref 0.7–1.2)
EOSINOPHILS ABSOLUTE: 0.24 E9/L (ref 0.05–0.5)
EOSINOPHILS RELATIVE PERCENT: 3.4 % (ref 0–6)
ETHANOL: 46 MG/DL (ref 0–0.08)
FENTANYL SCREEN, URINE: NOT DETECTED
GFR AFRICAN AMERICAN: >60
GFR NON-AFRICAN AMERICAN: >60 ML/MIN/1.73
GLUCOSE BLD-MCNC: 89 MG/DL (ref 74–99)
HCT VFR BLD CALC: 44.6 % (ref 37–54)
HEMOGLOBIN: 15 G/DL (ref 12.5–16.5)
IMMATURE GRANULOCYTES #: 0.02 E9/L
IMMATURE GRANULOCYTES %: 0.3 % (ref 0–5)
LYMPHOCYTES ABSOLUTE: 2.38 E9/L (ref 1.5–4)
LYMPHOCYTES RELATIVE PERCENT: 33.7 % (ref 20–42)
Lab: NORMAL
MCH RBC QN AUTO: 29.8 PG (ref 26–35)
MCHC RBC AUTO-ENTMCNC: 33.6 % (ref 32–34.5)
MCV RBC AUTO: 88.5 FL (ref 80–99.9)
METHADONE SCREEN, URINE: NOT DETECTED
MONOCYTES ABSOLUTE: 0.61 E9/L (ref 0.1–0.95)
MONOCYTES RELATIVE PERCENT: 8.6 % (ref 2–12)
NEUTROPHILS ABSOLUTE: 3.79 E9/L (ref 1.8–7.3)
NEUTROPHILS RELATIVE PERCENT: 53.6 % (ref 43–80)
OPIATE SCREEN URINE: NOT DETECTED
OXYCODONE URINE: NOT DETECTED
PDW BLD-RTO: 12.3 FL (ref 11.5–15)
PHENCYCLIDINE SCREEN URINE: NOT DETECTED
PLATELET # BLD: 288 E9/L (ref 130–450)
PMV BLD AUTO: 9 FL (ref 7–12)
POTASSIUM SERPL-SCNC: 3.7 MMOL/L (ref 3.5–5)
RBC # BLD: 5.04 E12/L (ref 3.8–5.8)
SALICYLATE, SERUM: <0.3 MG/DL (ref 0–30)
SODIUM BLD-SCNC: 141 MMOL/L (ref 132–146)
TOTAL PROTEIN: 6.9 G/DL (ref 6.4–8.3)
TRICYCLIC ANTIDEPRESSANTS SCREEN SERUM: NEGATIVE NG/ML
WBC # BLD: 7.1 E9/L (ref 4.5–11.5)

## 2020-09-09 PROCEDURE — G0480 DRUG TEST DEF 1-7 CLASSES: HCPCS

## 2020-09-09 PROCEDURE — 80307 DRUG TEST PRSMV CHEM ANLYZR: CPT

## 2020-09-09 PROCEDURE — 80053 COMPREHEN METABOLIC PANEL: CPT

## 2020-09-09 PROCEDURE — 99284 EMERGENCY DEPT VISIT MOD MDM: CPT

## 2020-09-09 PROCEDURE — 85025 COMPLETE CBC W/AUTO DIFF WBC: CPT

## 2020-09-09 PROCEDURE — 6360000002 HC RX W HCPCS: Performed by: EMERGENCY MEDICINE

## 2020-09-09 PROCEDURE — 99283 EMERGENCY DEPT VISIT LOW MDM: CPT

## 2020-09-09 PROCEDURE — 96372 THER/PROPH/DIAG INJ SC/IM: CPT

## 2020-09-09 RX ORDER — LORAZEPAM 2 MG/ML
2 INJECTION INTRAMUSCULAR ONCE
Status: COMPLETED | OUTPATIENT
Start: 2020-09-09 | End: 2020-09-09

## 2020-09-09 RX ORDER — ZIPRASIDONE MESYLATE 20 MG/ML
20 INJECTION, POWDER, LYOPHILIZED, FOR SOLUTION INTRAMUSCULAR ONCE
Status: COMPLETED | OUTPATIENT
Start: 2020-09-09 | End: 2020-09-09

## 2020-09-09 RX ORDER — ZIPRASIDONE MESYLATE 20 MG/ML
INJECTION, POWDER, LYOPHILIZED, FOR SOLUTION INTRAMUSCULAR
Status: DISPENSED
Start: 2020-09-09 | End: 2020-09-10

## 2020-09-09 RX ORDER — LORAZEPAM 2 MG/ML
INJECTION INTRAMUSCULAR
Status: DISPENSED
Start: 2020-09-09 | End: 2020-09-10

## 2020-09-09 RX ADMIN — ZIPRASIDONE MESYLATE 20 MG: 20 INJECTION, POWDER, LYOPHILIZED, FOR SOLUTION INTRAMUSCULAR at 21:27

## 2020-09-09 RX ADMIN — LORAZEPAM 2 MG: 2 INJECTION INTRAMUSCULAR; INTRAVENOUS at 21:24

## 2020-09-10 VITALS
HEART RATE: 70 BPM | TEMPERATURE: 98.7 F | OXYGEN SATURATION: 95 % | SYSTOLIC BLOOD PRESSURE: 115 MMHG | DIASTOLIC BLOOD PRESSURE: 61 MMHG | HEIGHT: 69 IN | BODY MASS INDEX: 22.81 KG/M2 | WEIGHT: 154 LBS | RESPIRATION RATE: 16 BRPM

## 2020-09-10 PROCEDURE — U0003 INFECTIOUS AGENT DETECTION BY NUCLEIC ACID (DNA OR RNA); SEVERE ACUTE RESPIRATORY SYNDROME CORONAVIRUS 2 (SARS-COV-2) (CORONAVIRUS DISEASE [COVID-19]), AMPLIFIED PROBE TECHNIQUE, MAKING USE OF HIGH THROUGHPUT TECHNOLOGIES AS DESCRIBED BY CMS-2020-01-R: HCPCS

## 2020-09-10 NOTE — ED PROVIDER NOTES
0.4 0.0 - 2.0 %    Neutrophils Absolute 3.79 1.80 - 7.30 E9/L    Immature Granulocytes # 0.02 E9/L    Lymphocytes Absolute 2.38 1.50 - 4.00 E9/L    Monocytes Absolute 0.61 0.10 - 0.95 E9/L    Eosinophils Absolute 0.24 0.05 - 0.50 E9/L    Basophils Absolute 0.03 0.00 - 0.20 E9/L   Comprehensive Metabolic Panel   Result Value Ref Range    Sodium 141 132 - 146 mmol/L    Potassium 3.7 3.5 - 5.0 mmol/L    Chloride 106 98 - 107 mmol/L    CO2 21 (L) 22 - 29 mmol/L    Anion Gap 14 7 - 16 mmol/L    Glucose 89 74 - 99 mg/dL    BUN 11 6 - 20 mg/dL    CREATININE 1.0 0.7 - 1.2 mg/dL    GFR Non-African American >60 >=60 mL/min/1.73    GFR African American >60     Calcium 9.2 8.6 - 10.2 mg/dL    Total Protein 6.9 6.4 - 8.3 g/dL    Alb 4.2 3.5 - 5.2 g/dL    Total Bilirubin <0.2 0.0 - 1.2 mg/dL    Alkaline Phosphatase 102 40 - 129 U/L    ALT 28 0 - 40 U/L    AST 30 0 - 39 U/L   Serum Drug Screen   Result Value Ref Range    Ethanol Lvl 46 mg/dL    Acetaminophen Level <5.0 (L) 10.0 - 80.8 mcg/mL    Salicylate, Serum <0.2 0.0 - 30.0 mg/dL    TCA Scrn NEGATIVE Cutoff:300 ng/mL   Urine Drug Screen   Result Value Ref Range    Amphetamine Screen, Urine NOT DETECTED Negative <1000 ng/mL    Barbiturate Screen, Ur NOT DETECTED Negative < 200 ng/mL    Benzodiazepine Screen, Urine NOT DETECTED Negative < 200 ng/mL    Cannabinoid Scrn, Ur NOT DETECTED Negative < 50ng/mL    Cocaine Metabolite Screen, Urine NOT DETECTED Negative < 300 ng/mL    Opiate Scrn, Ur NOT DETECTED Negative < 300ng/mL    PCP Screen, Urine NOT DETECTED Negative < 25 ng/mL    Methadone Screen, Urine NOT DETECTED Negative <300 ng/mL    Oxycodone Urine NOT DETECTED Negative <100 ng/mL    FENTANYL SCREEN, URINE NOT DETECTED Negative <1 ng/mL    Drug Screen Comment: see below        RADIOLOGY:  Interpreted by Radiologist.  No orders to display       ------------------------- NURSING NOTES AND VITALS REVIEWED ---------------------------   The nursing notes within the ED encounter DISPOSITION  Disposition: Admit to mental health unit - medically cleared for admission  Patient condition is stable      NOTE: This report was transcribed using voice recognition software.  Every effort was made to ensure accuracy; however, inadvertent computerized transcription errors may be present        Gaby Gomez MD  09/09/20 8336

## 2020-09-10 NOTE — ED NOTES
Pt resting quietly. Pt appears in no distress.  Pt awaiting medical clearance and Sergio, RN  09/09/20 0717

## 2020-09-10 NOTE — ED NOTES
SW placed phone call to [facility] [phone] and spoke with Rep. Joseph Krabbe. Initiated referral for inpatient mental health.      Swapna Brooks, MSW, LSW  09/10/20 1016

## 2020-09-10 NOTE — ED NOTES
Patient has been accepted to [facility] by Dr. Iker Murillo    Patient to go to the adult unit, rm #[id number]    N2N: [address] to arrange transport    Ambulance packet is complete.      Kathy Wymanr, PATEL, LJ  09/10/20 0944

## 2020-09-10 NOTE — ED NOTES
Bed: formerly [facility]  Expected date: 9/10/20  Expected time:   Means of arrival:   Comments:  [id number] [place] [address]  09/10/20 5919

## 2020-09-10 NOTE — ED NOTES
2 bags of belongings and one book bag brought to [facility] AN [facility]FFILIATE OF [facility] by ED staff. Placed in locker 29.      Garrett Wilson RN  09/10/20 1071

## 2020-09-10 NOTE — ED NOTES
Patient had no belongings with him on transfer from [facility] AN [facility]FFILI[facility] OF [facility]. KAELYN Martins Necessary  09/10/20 0596

## 2020-09-10 NOTE — ED NOTES
Pt had been awake, and walked out of his room through the hallway. He returned to his room after a short walk. SW entered room after pt returned. Pt was back in bed and stated he'd \"have to think about\" having discussion with SW.     He stated \"maybe when I shake it off. \" When asked what pt meant by this he stated \"shake off the sleep. \"  He would not respond further to [address] [place] [address] PATEL Holder, [place]  09/10/20 9785

## 2020-09-10 NOTE — ED NOTES
Bed: 21  Expected date:   Expected time:   Means of arrival:   Comments:     Joy Angelo RN  09/09/20 2055

## 2020-09-10 NOTE — ED NOTES
Faxed pink slip to [facility]. COVID screen is complete    COVID Ambulatory swab collected and sent for processing.      PATEL Fernández, JILLIANW  09/10/20 3206

## 2020-09-10 NOTE — ED NOTES
Call from [facility] to arrive approx 20 minutes, advised SOCORRO Traylor.      [address], PAT[place], [place]  09/10/20 3562

## 2020-09-10 NOTE — ED NOTES
Emergency Department [facility] AN [facility]FFILIATE OF [facility] Biopsychosocial Assessment Note     Chief Complaint:      Patient is a 30year old, male presenting to ED for psychiatric evaluation. Per pink slip, patient was recently discharged from inpatient mental health and became \"highly erratic\" and nonsensical. Patient reportedly has been refusing to take his psychiatric medications since discharge. Patient has a hx of schizoaffective disorder.     In the ED, patient presented with increased irritability, guarded/paranoid, and refusing to speak with staff. Patient has a past hx of misinterpreting staff and becoming defensive when paranoid.     MSE: Patient is alert & oriented x 4. Patient mood & affect are anxious and irritable. Patient thought process is delusional and paranoid. Patient speech normal. Patient denies A/V hallucinations.     Clinical Summary/History:      Patient reports a mental health hx of schizoaffective disorder, patient was referred to begin services with [facility] on 9/8 - patient was not compliant with this appointment; and patient last psychiatric hospitalization was on 8/23-8/28 on [facility].     Patient denies hx of suicide attempts or self injurious behaviors.     Patient denies any current drug/alcohol use - UDS & Serum drug are both negative.     Gender  [x]? Male []? Female []? Transgender  []? Other     Sexual Orientation    []? Heterosexual [x]? Homosexual []? Bisexual []? Other     Suicidal Behavioral: CSSR-S Complete. []? Reports:               []? Past []? Present   [x]? Denies     Homicidal/ Violent Behavior  []? Reports:              []? Past []? Present   [x]? Denies      Hallucinations/Delusions   []? Reports:   [x]? Denies  However patient is delusional & paranoid     Substance Use/Alcohol Use/Addiction: SBIRT Screen Complete. []? Reports:   [x]? Denies      Trauma History  []? Reports:  []? Denies      Collateral Information:          Level of Care/Disposition Plan  []? Home:   []?  Outpatient Provider:

## 2020-09-12 LAB
SARS-COV-2: NOT DETECTED
SOURCE: NORMAL

## 2020-12-24 ENCOUNTER — HOSPITAL ENCOUNTER (EMERGENCY)
Age: 25
Discharge: OTHER INSTITUTION | End: 2020-12-24
Attending: EMERGENCY MEDICINE
Payer: COMMERCIAL

## 2020-12-24 ENCOUNTER — APPOINTMENT (OUTPATIENT)
Dept: GENERAL RADIOLOGY | Age: 25
End: 2020-12-24
Payer: COMMERCIAL

## 2020-12-24 ENCOUNTER — HOSPITAL ENCOUNTER (EMERGENCY)
Age: 25
Discharge: HOME | End: 2020-12-24
Attending: EMERGENCY MEDICINE
Payer: COMMERCIAL

## 2020-12-24 VITALS
TEMPERATURE: 97.8 F | DIASTOLIC BLOOD PRESSURE: 105 MMHG | OXYGEN SATURATION: 100 % | BODY MASS INDEX: 22.73 KG/M2 | HEIGHT: 68 IN | RESPIRATION RATE: 16 BRPM | HEART RATE: 97 BPM | SYSTOLIC BLOOD PRESSURE: 141 MMHG | WEIGHT: 150 LBS

## 2020-12-24 VITALS
HEIGHT: 68 IN | DIASTOLIC BLOOD PRESSURE: 65 MMHG | WEIGHT: 150 LBS | TEMPERATURE: 98.7 F | RESPIRATION RATE: 16 BRPM | HEART RATE: 97 BPM | OXYGEN SATURATION: 98 % | BODY MASS INDEX: 22.73 KG/M2 | SYSTOLIC BLOOD PRESSURE: 102 MMHG

## 2020-12-24 LAB
ACETAMINOPHEN LEVEL: <5 MCG/ML (ref 10–30)
ALBUMIN SERPL-MCNC: 4.3 G/DL (ref 3.5–5.2)
ALP BLD-CCNC: 91 U/L (ref 40–129)
ALT SERPL-CCNC: 18 U/L (ref 0–40)
AMORPHOUS: NORMAL
AMPHETAMINE SCREEN, URINE: NOT DETECTED
ANION GAP SERPL CALCULATED.3IONS-SCNC: 10 MMOL/L (ref 7–16)
AST SERPL-CCNC: 20 U/L (ref 0–39)
BACTERIA: NORMAL /HPF
BARBITURATE SCREEN URINE: NOT DETECTED
BASOPHILS ABSOLUTE: 0.03 E9/L (ref 0–0.2)
BASOPHILS RELATIVE PERCENT: 0.5 % (ref 0–2)
BENZODIAZEPINE SCREEN, URINE: NOT DETECTED
BILIRUB SERPL-MCNC: 0.2 MG/DL (ref 0–1.2)
BILIRUBIN URINE: NEGATIVE
BLOOD, URINE: NEGATIVE
BUN BLDV-MCNC: 19 MG/DL (ref 6–20)
CALCIUM SERPL-MCNC: 9.5 MG/DL (ref 8.6–10.2)
CANNABINOID SCREEN URINE: NOT DETECTED
CHLORIDE BLD-SCNC: 100 MMOL/L (ref 98–107)
CLARITY: NORMAL
CO2: 29 MMOL/L (ref 22–29)
COCAINE METABOLITE SCREEN URINE: POSITIVE
COLOR: YELLOW
CREAT SERPL-MCNC: 1 MG/DL (ref 0.7–1.2)
EOSINOPHILS ABSOLUTE: 0.37 E9/L (ref 0.05–0.5)
EOSINOPHILS RELATIVE PERCENT: 6.1 % (ref 0–6)
ETHANOL: <10 MG/DL (ref 0–0.08)
FENTANYL SCREEN, URINE: POSITIVE
GFR AFRICAN AMERICAN: >60
GFR NON-AFRICAN AMERICAN: >60 ML/MIN/1.73
GLUCOSE BLD-MCNC: 102 MG/DL (ref 74–99)
GLUCOSE URINE: NEGATIVE MG/DL
HCT VFR BLD CALC: 45.2 % (ref 37–54)
HEMOGLOBIN: 14.7 G/DL (ref 12.5–16.5)
IMMATURE GRANULOCYTES #: 0.02 E9/L
IMMATURE GRANULOCYTES %: 0.3 % (ref 0–5)
KETONES, URINE: NEGATIVE MG/DL
LEUKOCYTE ESTERASE, URINE: NEGATIVE
LYMPHOCYTES ABSOLUTE: 1.52 E9/L (ref 1.5–4)
LYMPHOCYTES RELATIVE PERCENT: 25 % (ref 20–42)
Lab: ABNORMAL
MCH RBC QN AUTO: 30.4 PG (ref 26–35)
MCHC RBC AUTO-ENTMCNC: 32.5 % (ref 32–34.5)
MCV RBC AUTO: 93.4 FL (ref 80–99.9)
METHADONE SCREEN, URINE: NOT DETECTED
MONOCYTES ABSOLUTE: 0.75 E9/L (ref 0.1–0.95)
MONOCYTES RELATIVE PERCENT: 12.4 % (ref 2–12)
NEUTROPHILS ABSOLUTE: 3.38 E9/L (ref 1.8–7.3)
NEUTROPHILS RELATIVE PERCENT: 55.7 % (ref 43–80)
NITRITE, URINE: NEGATIVE
OPIATE SCREEN URINE: NOT DETECTED
OXYCODONE URINE: NOT DETECTED
PDW BLD-RTO: 12.5 FL (ref 11.5–15)
PH UA: 7.5 (ref 5–9)
PHENCYCLIDINE SCREEN URINE: NOT DETECTED
PLATELET # BLD: 246 E9/L (ref 130–450)
PMV BLD AUTO: 9 FL (ref 7–12)
POTASSIUM SERPL-SCNC: 4.5 MMOL/L (ref 3.5–5)
PROTEIN UA: NEGATIVE MG/DL
RBC # BLD: 4.84 E12/L (ref 3.8–5.8)
RBC UA: NORMAL /HPF (ref 0–2)
SALICYLATE, SERUM: <0.3 MG/DL (ref 0–30)
SARS-COV-2, NAAT: NOT DETECTED
SODIUM BLD-SCNC: 139 MMOL/L (ref 132–146)
SPECIFIC GRAVITY UA: 1.01 (ref 1–1.03)
TOTAL PROTEIN: 7.2 G/DL (ref 6.4–8.3)
TRICYCLIC ANTIDEPRESSANTS SCREEN SERUM: NEGATIVE NG/ML
TROPONIN: <0.01 NG/ML (ref 0–0.03)
UROBILINOGEN, URINE: 0.2 E.U./DL
VALPROIC ACID LEVEL: 3 MCG/ML (ref 50–100)
WBC # BLD: 6.1 E9/L (ref 4.5–11.5)
WBC UA: NORMAL /HPF (ref 0–5)

## 2020-12-24 PROCEDURE — 81001 URINALYSIS AUTO W/SCOPE: CPT

## 2020-12-24 PROCEDURE — 80053 COMPREHEN METABOLIC PANEL: CPT

## 2020-12-24 PROCEDURE — 99282 EMERGENCY DEPT VISIT SF MDM: CPT

## 2020-12-24 PROCEDURE — 80307 DRUG TEST PRSMV CHEM ANLYZR: CPT

## 2020-12-24 PROCEDURE — 6370000000 HC RX 637 (ALT 250 FOR IP): Performed by: EMERGENCY MEDICINE

## 2020-12-24 PROCEDURE — 93005 ELECTROCARDIOGRAM TRACING: CPT | Performed by: NURSE PRACTITIONER

## 2020-12-24 PROCEDURE — 84484 ASSAY OF TROPONIN QUANT: CPT

## 2020-12-24 PROCEDURE — 6370000000 HC RX 637 (ALT 250 FOR IP)

## 2020-12-24 PROCEDURE — 80164 ASSAY DIPROPYLACETIC ACD TOT: CPT

## 2020-12-24 PROCEDURE — G0480 DRUG TEST DEF 1-7 CLASSES: HCPCS

## 2020-12-24 PROCEDURE — 85025 COMPLETE CBC W/AUTO DIFF WBC: CPT

## 2020-12-24 PROCEDURE — 73030 X-RAY EXAM OF SHOULDER: CPT

## 2020-12-24 PROCEDURE — U0002 COVID-19 LAB TEST NON-CDC: HCPCS

## 2020-12-24 PROCEDURE — 99285 EMERGENCY DEPT VISIT HI MDM: CPT

## 2020-12-24 RX ORDER — CEPHALEXIN 500 MG/1
500 CAPSULE ORAL 3 TIMES DAILY
Qty: 30 CAPSULE | Refills: 0 | Status: SHIPPED | OUTPATIENT
Start: 2020-12-24 | End: 2021-01-03

## 2020-12-24 RX ORDER — CEPHALEXIN 500 MG/1
500 CAPSULE ORAL ONCE
Status: DISCONTINUED | OUTPATIENT
Start: 2020-12-24 | End: 2020-12-24 | Stop reason: HOSPADM

## 2020-12-24 RX ORDER — ACETAMINOPHEN 500 MG
1000 TABLET ORAL ONCE
Status: COMPLETED | OUTPATIENT
Start: 2020-12-24 | End: 2020-12-24

## 2020-12-24 RX ORDER — ACETAMINOPHEN 500 MG
1000 TABLET ORAL ONCE
Status: DISCONTINUED | OUTPATIENT
Start: 2020-12-24 | End: 2020-12-24 | Stop reason: HOSPADM

## 2020-12-24 RX ORDER — ACETAMINOPHEN 500 MG
TABLET ORAL
Status: COMPLETED
Start: 2020-12-24 | End: 2020-12-24

## 2020-12-24 RX ORDER — CEPHALEXIN 500 MG/1
500 CAPSULE ORAL 3 TIMES DAILY
Qty: 30 CAPSULE | Refills: 0 | Status: SHIPPED | OUTPATIENT
Start: 2020-12-24 | End: 2020-12-24 | Stop reason: SDUPTHER

## 2020-12-24 RX ADMIN — Medication 1000 MG: at 15:16

## 2020-12-24 RX ADMIN — ACETAMINOPHEN 1000 MG: 500 TABLET ORAL at 15:16

## 2020-12-24 ASSESSMENT — PAIN DESCRIPTION - PAIN TYPE: TYPE: ACUTE PAIN

## 2020-12-24 ASSESSMENT — PAIN DESCRIPTION - ORIENTATION: ORIENTATION: RIGHT

## 2020-12-24 ASSESSMENT — PAIN SCALES - GENERAL
PAINLEVEL_OUTOF10: 8
PAINLEVEL_OUTOF10: 3
PAINLEVEL_OUTOF10: 10
PAINLEVEL_OUTOF10: 10

## 2020-12-24 ASSESSMENT — PAIN DESCRIPTION - FREQUENCY: FREQUENCY: CONTINUOUS

## 2020-12-24 ASSESSMENT — PAIN DESCRIPTION - LOCATION: LOCATION: FOOT

## 2020-12-24 ASSESSMENT — PAIN DESCRIPTION - ONSET: ONSET: SUDDEN

## 2020-12-24 ASSESSMENT — PAIN DESCRIPTION - DESCRIPTORS: DESCRIPTORS: BURNING

## 2020-12-24 ASSESSMENT — PAIN DESCRIPTION - PROGRESSION: CLINICAL_PROGRESSION: GRADUALLY WORSENING

## 2020-12-24 NOTE — ED NOTES
Patient upset regarding pain medications, screamed at this nurse and was very derogatory. Reports \"man I will just shoot up heroin since you guys wont help me\". Police in room to deescalate with patient, patient calling police names. Left, but while in waitting room screamed again at this nurse and at police calling them \"fucking bitches\".  Patient refused all meds, and refused his discharge paperwork     Luly Ahuja RN  12/24/20 2592

## 2020-12-24 NOTE — ED PROVIDER NOTES
Department of Emergency Medicine   ED  Provider Note  Admit Date/RoomTime: 12/24/2020  7:00 AM  ED Room: [facility]          History of Present Illness:  12/24/20, Time: 7:09 AM EST  Chief Complaint   Patient presents with    Foot Pain     Redness and pain to all toes right foot                Lacie Hassan is a 30 y.o. male presenting to the ED for redness and pain to his right toes, beginning several days ago. Patient states that he has been having ongoing redness to his toes. Patient has been outdoors with wet shoes and socks. He states that he feels that the toes are throbbing but denies any trauma. No fever or chills. Review of Systems:   Pertinent positives and negatives are stated within HPI, all other systems reviewed and are negative.        --------------------------------------------- PAST HISTORY ---------------------------------------------  Past Medical History:  has no past medical history on file. Past Surgical History:  has no past surgical history on file. Social History:  reports that he has quit smoking. His smoking use included cigarettes. He has never used smokeless tobacco. He reports that he does not drink alcohol or use drugs. Family History: family history is not on file. . Unless otherwise noted, family history is non contributory    The patients home medications have been reviewed. Allergies: Haloperidol and related        ---------------------------------------------------PHYSICAL EXAM--------------------------------------    Constitutional/General: Alert and oriented x3  Head: Normocephalic and atraumatic  Musculoskeletal: Moves all extremities x 4. Warm and well perfused, no clubbing, cyanosis, or edema. Capillary refill <3 seconds  Integument: skin warm and dry. Erythema suspicious for fungal infection noted to the toes of the right foot.    Neurologic: GCS 15, no focal deficits, symmetric strength 5/5 in the upper and lower extremities bilaterally          ------------------------- NURSING NOTES AND VITALS REVIEWED ---------------------------   The nursing notes within the ED encounter and vital signs as below have been reviewed by myself  BP (!) 141/105   Pulse 97   Temp 97.8 °F (36.6 °C) (Temporal)   Resp 16   Ht 5' 8\" (1.727 m)   Wt 150 lb (68 kg)   SpO2 100%   BMI 22.81 kg/m²     Oxygen Saturation Interpretation: Normal    The patients available past medical records and past encounters were reviewed. ------------------------------ ED COURSE/MEDICAL DECISION MAKING----------------------  Medications   acetaminophen (TYLENOL) tablet 1,000 mg (1,000 mg Oral Not Given 12/24/20 0723)   miconazole (MICOTIN) 2 % cream ( Topical Not Given 12/24/20 0728)   cephALEXin (KEFLEX) capsule 500 mg (500 mg Oral Not Given 12/24/20 1610)               Medical Decision Making:     I, Dr. Sandra Ramirez, am the primary provider of record    47-year-old male presenting with tinea pedis. There does not appear to be any cellulitis. Patient is not allowing me to evaluate him other than looking at his toes. He will be given miconazole and Keflex, Tylenol for pain. Discharged with follow-up as needed, instruction to return for changes in condition or new symptoms. Counseling: The emergency provider has spoken with the patient and discussed todays results, in addition to providing specific details for the plan of care and counseling regarding the diagnosis and prognosis. Questions are answered at this time and they are agreeable with the plan.       --------------------------------- IMPRESSION AND DISPOSITION ---------------------------------    IMPRESSION  1. Tinea pedis of right foot        DISPOSITION  Disposition: Discharge to home  Patient condition is stable        NOTE: This report was transcribed using voice recognition software.  Every effort was made to ensure accuracy; however, inadvertent computerized transcription errors may be present        Dino Mccauley, DO  12/24/20 8206

## 2020-12-24 NOTE — ED PROVIDER NOTES
ED Attending  CC: No      Department of Emergency Medicine  ED Provider Note  Admit Date: 12/24/2020  Room: 27/BH-27B          12/24/20  2:31 PM EST    HPI: Deon De Oliveira 30 y.o. male presents with a complaint of depression and anxiety beginning chronic in nature ago. Complaint has been constant and became more severe today which is what prompted the visit. Denies suicidal ideation. Denies homicidal ideation. Has no specific plan. Patient presents stating that he needs to be started back on his medication and have follow-up with a psychiatrist.  He last took his dose of trazodone approximately 1 month ago. He has not been following up with outpatient psychiatry as was previously instructed. He formally was a patient with [facility]. He has been here multiple times for various mental health complaints. Denies any suicidal homicidal ideations. He is currently homeless. Does admit to recent cocaine use. Review of Systems:   Pertinent positives and negatives are stated within HPI, all other systems reviewed and are negative.      --------------------------------------------- PAST HISTORY ---------------------------------------------  Past Medical History:  has no past medical history on file. Past Surgical History:  has no past surgical history on file. Social History:  reports that he has quit smoking. His smoking use included cigarettes. He has never used smokeless tobacco. He reports that he does not drink alcohol or use drugs. Family History: family history is not on file. The patients home medications have been reviewed. Allergies: Haloperidol and related    -------------------------------------------------- RESULTS -------------------------------------------------  All laboratory and imaging studies were reviewed by myself.     LABS:  Results for orders placed or performed during the hospital encounter of 12/24/20   Troponin   Result Value Ref Range    Troponin <0.01 0.00 - 0.03 ng/mL   CBC Auto Differential   Result Value Ref Range    WBC 6.1 4.5 - 11.5 E9/L    RBC 4.84 3.80 - 5.80 E12/L    Hemoglobin 14.7 12.5 - 16.5 g/dL    Hematocrit 45.2 37.0 - 54.0 %    MCV 93.4 80.0 - 99.9 fL    MCH 30.4 26.0 - 35.0 pg    MCHC 32.5 32.0 - 34.5 %    RDW 12.5 11.5 - 15.0 fL    Platelets 644 232 - 229 E9/L    MPV 9.0 7.0 - 12.0 fL    Neutrophils % 55.7 43.0 - 80.0 %    Immature Granulocytes % 0.3 0.0 - 5.0 %    Lymphocytes % 25.0 20.0 - 42.0 %    Monocytes % 12.4 (H) 2.0 - 12.0 %    Eosinophils % 6.1 (H) 0.0 - 6.0 %    Basophils % 0.5 0.0 - 2.0 %    Neutrophils Absolute 3.38 1.80 - 7.30 E9/L    Immature Granulocytes # 0.02 E9/L    Lymphocytes Absolute 1.52 1.50 - 4.00 E9/L    Monocytes Absolute 0.75 0.10 - 0.95 E9/L    Eosinophils Absolute 0.37 0.05 - 0.50 E9/L    Basophils Absolute 0.03 0.00 - 0.20 E9/L   Comprehensive Metabolic Panel   Result Value Ref Range    Sodium 139 132 - 146 mmol/L    Potassium 4.5 3.5 - 5.0 mmol/L    Chloride 100 98 - 107 mmol/L    CO2 29 22 - 29 mmol/L    Anion Gap 10 7 - 16 mmol/L    Glucose 102 (H) 74 - 99 mg/dL    BUN 19 6 - 20 mg/dL    CREATININE 1.0 0.7 - 1.2 mg/dL    GFR Non-African American >60 >=60 mL/min/1.73    GFR African American >60     Calcium 9.5 8.6 - 10.2 mg/dL    Total Protein 7.2 6.4 - 8.3 g/dL    Alb 4.3 3.5 - 5.2 g/dL    Total Bilirubin 0.2 0.0 - 1.2 mg/dL    Alkaline Phosphatase 91 40 - 129 U/L    ALT 18 0 - 40 U/L    AST 20 0 - 39 U/L   Urinalysis   Result Value Ref Range    Color, UA Yellow Straw/Yellow    Clarity, UA SL CLOUDY Clear    Glucose, Ur Negative Negative mg/dL    Bilirubin Urine Negative Negative    Ketones, Urine Negative Negative mg/dL    Specific Gravity, UA 1.015 1.005 - 1.030    Blood, Urine Negative Negative    pH, UA 7.5 5.0 - 9.0    Protein, UA Negative Negative mg/dL    Urobilinogen, Urine 0.2 <2.0 E.U./dL    Nitrite, Urine Negative Negative    Leukocyte Esterase, Urine Negative Negative   Urine Drug Screen   Result Value Ref Range Amphetamine Screen, Urine NOT DETECTED Negative <1000 ng/mL    Barbiturate Screen, Ur NOT DETECTED Negative < 200 ng/mL    Benzodiazepine Screen, Urine NOT DETECTED Negative < 200 ng/mL    Cannabinoid Scrn, Ur NOT DETECTED Negative < 50ng/mL    Cocaine Metabolite Screen, Urine POSITIVE (A) Negative < 300 ng/mL    Opiate Scrn, Ur NOT DETECTED Negative < 300ng/mL    PCP Screen, Urine NOT DETECTED Negative < 25 ng/mL    Methadone Screen, Urine NOT DETECTED Negative <300 ng/mL    Oxycodone Urine NOT DETECTED Negative <100 ng/mL    FENTANYL SCREEN, URINE POSITIVE (A) Negative <1 ng/mL    Drug Screen Comment: see below    Serum Drug Screen   Result Value Ref Range    Ethanol Lvl <10 mg/dL    Acetaminophen Level <5.0 (L) 10.0 - 87.3 mcg/mL    Salicylate, Serum <1.4 0.0 - 30.0 mg/dL    TCA Scrn NEGATIVE Cutoff:300 ng/mL   Valproic acid level, total   Result Value Ref Range    Valproic Acid Lvl 3 (L) 50 - 100 mcg/mL   COVID-19   Result Value Ref Range    SARS-CoV-2, NAAT Not Detected Not Detected   Microscopic Urinalysis   Result Value Ref Range    WBC, UA NONE 0 - 5 /HPF    RBC, UA NONE 0 - 2 /HPF    Bacteria, UA NONE SEEN None Seen /HPF    Amorphous, UA FEW        RADIOLOGY:  Interpreted by Radiologist.  XR SHOULDER LEFT (MIN 2 VIEWS)   Final Result   Normal radiograph. EKG Interpretation  Interpreted by emergency department physician    Rhythm: normal sinus   Rate: normal  Axis: normal  Conduction: normal  ST Segments: no acute change  T Waves: no acute change    Clinical Impression: no acute changes  Comparison to Old EKG          ------------------------- NURSING NOTES AND VITALS REVIEWED ---------------------------   The nursing notes within the ED encounter and vital signs as below have been reviewed.    BP (!) 152/100   Pulse 79   Temp 98.7 °F (37.1 °C)   Resp 18   Ht 5' 8\" (1.727 m)   Wt 150 lb (68 kg)   SpO2 98%   BMI 22.81 kg/m²   Oxygen Saturation Interpretation: Normal ---------------------------------------------------PHYSICAL EXAM--------------------------------------      Constitutional/General: Alert and oriented x3, well appearing, non toxic in NAD  Head: Normocephalic, atraumatic  Eyes: PERRL, EOMI  Mouth: Oropharynx clear, handling secretions, no trismus  Neck: Supple, full ROM, non tender to palpation in the midline, no stridor, no crepitus, no meningeal signs  Pulmonary: Lungs clear to auscultation bilaterally, no wheezes, rales, or rhonchi. Not in respiratory distress  Cardiovascular:  Regular rate and rhythm, no murmurs, gallops, or rubs. 2+ distal pulses  Abdomen: Soft, non tender, non distended, +BS, no rebound, guarding, or rigidity. No pulsatile masses appreciated  Extremities: Moves all extremities x 4. Warm and well perfused, no clubbing, cyanosis, or edema. Capillary refill <3 seconds  Skin: warm and dry without rash  Neurologic: GCS 15, CN 2-12 grossly intact, no focal deficits, symmetric strength 5/5 in the upper and lower extremities bilaterally  Psych: normal Affect      ------------------------------------------ PROGRESS NOTES ------------------------------------------     Medical decision making:  Patient was examined by Dr. Sherel Bence disposition per social service recommendation. Consultations:   Social work     Re-Evaluations:        Counseling: The emergency provider has spoken with thepatient and discussed todays results, in addition to providing specific details for the plan of care and counseling regarding the diagnosis and prognosis. Questions are answered at this time and they are agreeable with the plan.         --------------------------------- IMPRESSION AND DISPOSITION ---------------------------------    IMPRESSION  1. Depression, unspecified depression type    2.  Mood disorder ([facility].)        DISPOSITION  Disposition: as per consultation   Patient condition is stable             LATASHA Forbes - TAINA  12/24/20 5707

## 2020-12-24 NOTE — ED NOTES
Now pt stating that he needs detox stes I do fentanyl and heroin informed pt that will let  and dr know ant that we do not do detox here in ed.      Rebekah Mitchell RN  12/24/20 6423

## 2020-12-24 NOTE — ED NOTES
PT HAS BEEN ACCEPTED TO [facility]    [facility] [address] [facility] [address] [place] [address], [facility]  12/24/20 1557

## 2020-12-24 NOTE — ED NOTES
Pt agreeable to discharge states \" I want to get my head right\"  Pt then requests to stand outside to find a cigarette informed will have to wait until taxi comes also informed pt that his prescriptions were called into [facility] pharmacy which is not open until the 28th of December pt give 2 prescription to take to crisis unit until his can be filled at [facility] pt pharmacy     Lulu Lowe RN  12/24/20 3946

## 2020-12-24 NOTE — ED NOTES
Pt now stating he has left shoulder pain \"it pops sometimes I need an xray\" informed would let dr know\"      Gerardo Gonzalez RN  12/24/20 3158

## 2020-12-24 NOTE — ED NOTES
Emergency Department [facility] AN [facility] OF [facility] Biopsychosocial Assessment Note    Chief Complaint: states he needs a medication adjustment and needs to see psych dr, denies SI/HI    MSE:  Pt is calm, cooperative, alert, oriented x's 3, shares good eye contact, has clear speech, thoughts are mostly stable, mood is anxious, behavior controlled, denies SI, denies HI and is having no hallucinations. Has fair insight and judgement. Clinical Summary/History:   Pt presents sad and in need of getting his \"medication adjusted\". He explained that he wants to get back on his meds, unable to follow up with [facility] as scheduled. He was recently at [facility], and has had no follow up since then. Pt denies any SI, no HI and no hallucinations. He has some anxiety and explained feeling overwhelmed with life in general.  He appears to be in need of crisis services to help get him stabilized. Gender  [x] Male [] Female [] Transgender  [] Other    Sexual Orientation    [x] Heterosexual [] Homosexual [] Bisexual [] Other    Suicidal Behavioral: CSSR-S Complete. [] Reports:    [] Past [] Present   [x] Denies    Homicidal/ Violent Behavior  [] Reports:   [] Past [] Present   [x] Denies     Hallucinations/Delusions   [] Reports:   [x] Denies     Substance Use/Alcohol Use/Addiction: SBIRT Screen Complete.    [x] Reports:   [] Denies     Trauma History  [] Reports:  [x] Denies     Collateral Information:   No collateral obtained at this time    Level of Care/Disposition Plan  [] Home:   [] Outpatient Provider:   [] Crisis Unit:   [x] Inpatient Psychiatric Unit:  [] Other:        LJ Gomez  12/24/20 3663

## 2020-12-24 NOTE — ED NOTES
Pt denies si/hi/ hallucinations reports just wants his meds adjusted reports didn't follow up with [facility]. Pt admits to homelessness.        Casey Perez RN  12/24/20 2284

## 2020-12-25 LAB
EKG ATRIAL RATE: 77 BPM
EKG P AXIS: 44 DEGREES
EKG P-R INTERVAL: 148 MS
EKG Q-T INTERVAL: 390 MS
EKG QRS DURATION: 72 MS
EKG QTC CALCULATION (BAZETT): 441 MS
EKG R AXIS: 42 DEGREES
EKG T AXIS: 14 DEGREES
EKG VENTRICULAR RATE: 77 BPM

## 2020-12-25 PROCEDURE — 93010 ELECTROCARDIOGRAM REPORT: CPT | Performed by: INTERNAL MEDICINE

## 2021-01-04 ENCOUNTER — APPOINTMENT (OUTPATIENT)
Dept: GENERAL RADIOLOGY | Age: 26
End: 2021-01-04
Payer: COMMERCIAL

## 2021-01-04 ENCOUNTER — HOSPITAL ENCOUNTER (EMERGENCY)
Age: 26
Discharge: HOME | End: 2021-01-04
Attending: EMERGENCY MEDICINE
Payer: COMMERCIAL

## 2021-01-04 VITALS
WEIGHT: 139 LBS | OXYGEN SATURATION: 98 % | HEART RATE: 79 BPM | HEIGHT: 67 IN | TEMPERATURE: 98.6 F | BODY MASS INDEX: 21.82 KG/M2 | RESPIRATION RATE: 16 BRPM | DIASTOLIC BLOOD PRESSURE: 79 MMHG | SYSTOLIC BLOOD PRESSURE: 125 MMHG
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
## 2021-01-04 DIAGNOSIS — M25.512 ACUTE PAIN OF LEFT SHOULDER: ICD-10-CM

## 2021-01-04 DIAGNOSIS — M79.672 FOOT PAIN, BILATERAL: ICD-10-CM

## 2021-01-04 DIAGNOSIS — M79.671 FOOT PAIN, BILATERAL: ICD-10-CM

## 2021-01-04 PROCEDURE — 96372 THER/PROPH/DIAG INJ SC/IM: CPT

## 2021-01-04 PROCEDURE — 90715 TDAP VACCINE 7 YRS/> IM: CPT | Performed by: EMERGENCY MEDICINE

## 2021-01-04 PROCEDURE — 73620 X-RAY EXAM OF FOOT: CPT

## 2021-01-04 PROCEDURE — 73030 X-RAY EXAM OF SHOULDER: CPT

## 2021-01-04 PROCEDURE — 71101 X-RAY EXAM UNILAT RIBS/CHEST: CPT

## 2021-01-04 PROCEDURE — 99283 EMERGENCY DEPT VISIT LOW MDM: CPT

## 2021-01-04 PROCEDURE — 90471 IMMUNIZATION ADMIN: CPT | Performed by: EMERGENCY MEDICINE

## 2021-01-04 PROCEDURE — 6360000002 HC RX W HCPCS: Performed by: EMERGENCY MEDICINE

## 2021-01-04 RX ORDER — KETOROLAC TROMETHAMINE 30 MG/ML
30 INJECTION, SOLUTION INTRAMUSCULAR; INTRAVENOUS ONCE
Status: COMPLETED | OUTPATIENT
Start: 2021-01-04 | End: 2021-01-04

## 2021-01-04 RX ADMIN — KETOROLAC TROMETHAMINE 30 MG: 30 INJECTION, SOLUTION INTRAMUSCULAR; INTRAVENOUS at 02:33

## 2021-01-04 RX ADMIN — TETANUS TOXOID, REDUCED DIPHTHERIA TOXOID AND ACELLULAR PERTUSSIS VACCINE, ADSORBED 0.5 ML: 5; 2.5; 8; 8; 2.5 SUSPENSION INTRAMUSCULAR at 02:33

## 2021-01-04 ASSESSMENT — PAIN SCALES - GENERAL: PAINLEVEL_OUTOF10: 8
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
## 2021-01-04 NOTE — ED NOTES
[id number] [facility] PD at the bedside patient continues to verbal attack staff. Patient stated \"I just want pain pills, and for you to leave me the fuck alone. \" Patient asked multiple times to calm down, and let us help him.       Morales Kohli RN  01/04/21 8389

## 2021-01-08 ENCOUNTER — HOSPITAL ENCOUNTER (INPATIENT)
Age: 26
LOS: 4 days | Discharge: HOME | DRG: 753 | End: 2021-01-13
Attending: EMERGENCY MEDICINE | Admitting: PSYCHIATRY & NEUROLOGY
Payer: COMMERCIAL

## 2021-01-08 ENCOUNTER — APPOINTMENT (OUTPATIENT)
Dept: GENERAL RADIOLOGY | Age: 26
DRG: 753 | End: 2021-01-08
Payer: COMMERCIAL

## 2021-01-08 DIAGNOSIS — F19.10 POLYSUBSTANCE ABUSE (HCC): Primary | ICD-10-CM

## 2021-01-08 DIAGNOSIS — R45.851 SUICIDAL IDEATION: ICD-10-CM

## 2021-01-08 PROCEDURE — 99285 EMERGENCY DEPT VISIT HI MDM: CPT

## 2021-01-08 PROCEDURE — 73030 X-RAY EXAM OF SHOULDER: CPT

## 2021-01-08 ASSESSMENT — PAIN DESCRIPTION - LOCATION: LOCATION: ARM

## 2021-01-08 ASSESSMENT — PAIN DESCRIPTION - PAIN TYPE: TYPE: ACUTE PAIN

## 2021-01-08 ASSESSMENT — PAIN DESCRIPTION - DESCRIPTORS: DESCRIPTORS: ACHING

## 2021-01-09 ENCOUNTER — APPOINTMENT (OUTPATIENT)
Dept: CT IMAGING | Age: 26
DRG: 753 | End: 2021-01-09
Payer: COMMERCIAL

## 2021-01-09 PROBLEM — F32.9 MAJOR DEPRESSION, SINGLE EPISODE: Status: ACTIVE | Noted: 2021-01-09

## 2021-01-09 LAB
ACETAMINOPHEN LEVEL: <5 MCG/ML (ref 10–30)
ALBUMIN SERPL-MCNC: 4.2 G/DL (ref 3.5–5.2)
ALP BLD-CCNC: 103 U/L (ref 40–129)
ALT SERPL-CCNC: 26 U/L (ref 0–40)
AMPHETAMINE SCREEN, URINE: NOT DETECTED
ANION GAP SERPL CALCULATED.3IONS-SCNC: 15 MMOL/L (ref 7–16)
AST SERPL-CCNC: 22 U/L (ref 0–39)
BARBITURATE SCREEN URINE: NOT DETECTED
BASOPHILS ABSOLUTE: 0.03 E9/L (ref 0–0.2)
BASOPHILS RELATIVE PERCENT: 0.3 % (ref 0–2)
BENZODIAZEPINE SCREEN, URINE: NOT DETECTED
BILIRUB SERPL-MCNC: <0.2 MG/DL (ref 0–1.2)
BUN BLDV-MCNC: 17 MG/DL (ref 6–20)
CALCIUM SERPL-MCNC: 9.2 MG/DL (ref 8.6–10.2)
CANNABINOID SCREEN URINE: POSITIVE
CHLORIDE BLD-SCNC: 103 MMOL/L (ref 98–107)
CO2: 23 MMOL/L (ref 22–29)
COCAINE METABOLITE SCREEN URINE: POSITIVE
CREAT SERPL-MCNC: 0.9 MG/DL (ref 0.7–1.2)
EKG ATRIAL RATE: 87 BPM
EKG P AXIS: 50 DEGREES
EKG P-R INTERVAL: 136 MS
EKG Q-T INTERVAL: 368 MS
EKG QRS DURATION: 70 MS
EKG QTC CALCULATION (BAZETT): 442 MS
EKG R AXIS: 48 DEGREES
EKG T AXIS: -28 DEGREES
EKG VENTRICULAR RATE: 87 BPM
EOSINOPHILS ABSOLUTE: 0.36 E9/L (ref 0.05–0.5)
EOSINOPHILS RELATIVE PERCENT: 3.6 % (ref 0–6)
ETHANOL: 47 MG/DL (ref 0–0.08)
FENTANYL SCREEN, URINE: NOT DETECTED
GFR AFRICAN AMERICAN: >60
GFR NON-AFRICAN AMERICAN: >60 ML/MIN/1.73
GLUCOSE BLD-MCNC: 94 MG/DL (ref 74–99)
HCT VFR BLD CALC: 45.6 % (ref 37–54)
HEMOGLOBIN: 15.5 G/DL (ref 12.5–16.5)
IMMATURE GRANULOCYTES #: 0.05 E9/L
IMMATURE GRANULOCYTES %: 0.5 % (ref 0–5)
LYMPHOCYTES ABSOLUTE: 2.03 E9/L (ref 1.5–4)
LYMPHOCYTES RELATIVE PERCENT: 20.2 % (ref 20–42)
Lab: ABNORMAL
MCH RBC QN AUTO: 30.5 PG (ref 26–35)
MCHC RBC AUTO-ENTMCNC: 34 % (ref 32–34.5)
MCV RBC AUTO: 89.6 FL (ref 80–99.9)
METHADONE SCREEN, URINE: NOT DETECTED
MONOCYTES ABSOLUTE: 0.59 E9/L (ref 0.1–0.95)
MONOCYTES RELATIVE PERCENT: 5.9 % (ref 2–12)
NEUTROPHILS ABSOLUTE: 7.01 E9/L (ref 1.8–7.3)
NEUTROPHILS RELATIVE PERCENT: 69.5 % (ref 43–80)
OPIATE SCREEN URINE: NOT DETECTED
OXYCODONE URINE: NOT DETECTED
PDW BLD-RTO: 12.3 FL (ref 11.5–15)
PHENCYCLIDINE SCREEN URINE: NOT DETECTED
PLATELET # BLD: 310 E9/L (ref 130–450)
PMV BLD AUTO: 8.6 FL (ref 7–12)
POTASSIUM REFLEX MAGNESIUM: 3.7 MMOL/L (ref 3.5–5)
RBC # BLD: 5.09 E12/L (ref 3.8–5.8)
SALICYLATE, SERUM: <0.3 MG/DL (ref 0–30)
SARS-COV-2, NAAT: NOT DETECTED
SODIUM BLD-SCNC: 141 MMOL/L (ref 132–146)
TOTAL PROTEIN: 7.1 G/DL (ref 6.4–8.3)
TRICYCLIC ANTIDEPRESSANTS SCREEN SERUM: NEGATIVE NG/ML
VALPROIC ACID LEVEL: 3 MCG/ML (ref 50–100)
WBC # BLD: 10.1 E9/L (ref 4.5–11.5)

## 2021-01-09 PROCEDURE — 70486 CT MAXILLOFACIAL W/O DYE: CPT

## 2021-01-09 PROCEDURE — 93010 ELECTROCARDIOGRAM REPORT: CPT | Performed by: INTERNAL MEDICINE

## 2021-01-09 PROCEDURE — 6370000000 HC RX 637 (ALT 250 FOR IP): Performed by: EMERGENCY MEDICINE

## 2021-01-09 PROCEDURE — 80053 COMPREHEN METABOLIC PANEL: CPT

## 2021-01-09 PROCEDURE — 6370000000 HC RX 637 (ALT 250 FOR IP): Performed by: PSYCHIATRY & NEUROLOGY

## 2021-01-09 PROCEDURE — G0480 DRUG TEST DEF 1-7 CLASSES: HCPCS

## 2021-01-09 PROCEDURE — 80307 DRUG TEST PRSMV CHEM ANLYZR: CPT

## 2021-01-09 PROCEDURE — 1240000000 HC EMOTIONAL WELLNESS R&B

## 2021-01-09 PROCEDURE — 72125 CT NECK SPINE W/O DYE: CPT

## 2021-01-09 PROCEDURE — 93005 ELECTROCARDIOGRAM TRACING: CPT | Performed by: EMERGENCY MEDICINE

## 2021-01-09 PROCEDURE — 6370000000 HC RX 637 (ALT 250 FOR IP): Performed by: NURSE PRACTITIONER

## 2021-01-09 PROCEDURE — U0002 COVID-19 LAB TEST NON-CDC: HCPCS

## 2021-01-09 PROCEDURE — 83036 HEMOGLOBIN GLYCOSYLATED A1C: CPT

## 2021-01-09 PROCEDURE — 80164 ASSAY DIPROPYLACETIC ACD TOT: CPT

## 2021-01-09 PROCEDURE — 80061 LIPID PANEL: CPT

## 2021-01-09 PROCEDURE — 85025 COMPLETE CBC W/AUTO DIFF WBC: CPT

## 2021-01-09 PROCEDURE — 70450 CT HEAD/BRAIN W/O DYE: CPT

## 2021-01-09 RX ORDER — FOLIC ACID 1 MG/1
1 TABLET ORAL DAILY
Status: DISCONTINUED | OUTPATIENT
Start: 2021-01-09 | End: 2021-01-13 | Stop reason: HOSPADM

## 2021-01-09 RX ORDER — OLANZAPINE 5 MG/1
5 TABLET ORAL EVERY 8 HOURS PRN
Status: DISCONTINUED | OUTPATIENT
Start: 2021-01-09 | End: 2021-01-13 | Stop reason: HOSPADM

## 2021-01-09 RX ORDER — LANOLIN ALCOHOL/MO/W.PET/CERES
100 CREAM (GRAM) TOPICAL DAILY
Status: DISCONTINUED | OUTPATIENT
Start: 2021-01-09 | End: 2021-01-10

## 2021-01-09 RX ORDER — IBUPROFEN 400 MG/1
400 TABLET ORAL ONCE
Status: COMPLETED | OUTPATIENT
Start: 2021-01-09 | End: 2021-01-09

## 2021-01-09 RX ORDER — OLANZAPINE 10 MG/1
10 INJECTION, POWDER, LYOPHILIZED, FOR SOLUTION INTRAMUSCULAR EVERY 4 HOURS PRN
Status: DISCONTINUED | OUTPATIENT
Start: 2021-01-09 | End: 2021-01-09

## 2021-01-09 RX ORDER — DIVALPROEX SODIUM 250 MG/1
250 TABLET, DELAYED RELEASE ORAL EVERY 12 HOURS SCHEDULED
Status: DISCONTINUED | OUTPATIENT
Start: 2021-01-09 | End: 2021-01-13 | Stop reason: HOSPADM

## 2021-01-09 RX ORDER — CHLORDIAZEPOXIDE HYDROCHLORIDE 25 MG/1
25 CAPSULE, GELATIN COATED ORAL EVERY 4 HOURS
Status: DISCONTINUED | OUTPATIENT
Start: 2021-01-09 | End: 2021-01-11

## 2021-01-09 RX ORDER — OLANZAPINE 5 MG/1
5 TABLET ORAL EVERY 4 HOURS PRN
Status: DISCONTINUED | OUTPATIENT
Start: 2021-01-09 | End: 2021-01-09

## 2021-01-09 RX ORDER — OLANZAPINE 10 MG/1
10 INJECTION, POWDER, LYOPHILIZED, FOR SOLUTION INTRAMUSCULAR EVERY 12 HOURS PRN
Status: DISCONTINUED | OUTPATIENT
Start: 2021-01-09 | End: 2021-01-13 | Stop reason: HOSPADM

## 2021-01-09 RX ORDER — M-VIT,TX,IRON,MINS/CALC/FOLIC 27MG-0.4MG
1 TABLET ORAL DAILY
Status: DISCONTINUED | OUTPATIENT
Start: 2021-01-09 | End: 2021-01-13 | Stop reason: HOSPADM

## 2021-01-09 RX ORDER — HYDROXYZINE PAMOATE 50 MG/1
50 CAPSULE ORAL 3 TIMES DAILY PRN
Status: DISCONTINUED | OUTPATIENT
Start: 2021-01-09 | End: 2021-01-13 | Stop reason: HOSPADM

## 2021-01-09 RX ORDER — MAGNESIUM HYDROXIDE/ALUMINUM HYDROXICE/SIMETHICONE 120; 1200; 1200 MG/30ML; MG/30ML; MG/30ML
30 SUSPENSION ORAL PRN
Status: DISCONTINUED | OUTPATIENT
Start: 2021-01-09 | End: 2021-01-13 | Stop reason: HOSPADM

## 2021-01-09 RX ORDER — ACETAMINOPHEN 325 MG/1
650 TABLET ORAL EVERY 6 HOURS PRN
Status: DISCONTINUED | OUTPATIENT
Start: 2021-01-09 | End: 2021-01-13 | Stop reason: HOSPADM

## 2021-01-09 RX ORDER — TRAZODONE HYDROCHLORIDE 50 MG/1
50 TABLET ORAL NIGHTLY PRN
Status: DISCONTINUED | OUTPATIENT
Start: 2021-01-09 | End: 2021-01-13 | Stop reason: HOSPADM

## 2021-01-09 RX ADMIN — DIVALPROEX SODIUM 250 MG: 250 TABLET, DELAYED RELEASE ORAL at 21:07

## 2021-01-09 RX ADMIN — IBUPROFEN 400 MG: 400 TABLET, FILM COATED ORAL at 11:04

## 2021-01-09 RX ADMIN — CHLORDIAZEPOXIDE HYDROCHLORIDE 25 MG: 25 CAPSULE ORAL at 16:46

## 2021-01-09 RX ADMIN — FOLIC ACID 1 MG: 1 TABLET ORAL at 16:46

## 2021-01-09 RX ADMIN — Medication 1 TABLET: at 16:46

## 2021-01-09 RX ADMIN — HYDROXYZINE PAMOATE 50 MG: 50 CAPSULE ORAL at 16:46

## 2021-01-09 RX ADMIN — TRAZODONE HYDROCHLORIDE 50 MG: 50 TABLET ORAL at 21:07

## 2021-01-09 RX ADMIN — CHLORDIAZEPOXIDE HYDROCHLORIDE 25 MG: 25 CAPSULE ORAL at 21:08

## 2021-01-09 RX ADMIN — IBUPROFEN 400 MG: 400 TABLET, FILM COATED ORAL at 01:47

## 2021-01-09 ASSESSMENT — PAIN DESCRIPTION - DESCRIPTORS: DESCRIPTORS: ACHING

## 2021-01-09 ASSESSMENT — ENCOUNTER SYMPTOMS
COUGH: 0
ABDOMINAL PAIN: 0
VOMITING: 0
SHORTNESS OF BREATH: 0
SORE THROAT: 0
BACK PAIN: 0
NAUSEA: 0
EYE PAIN: 0
DIARRHEA: 0

## 2021-01-09 ASSESSMENT — PAIN DESCRIPTION - PAIN TYPE: TYPE: OTHER (COMMENT)

## 2021-01-09 ASSESSMENT — SLEEP AND FATIGUE QUESTIONNAIRES: AVERAGE NUMBER OF SLEEP HOURS: 6

## 2021-01-09 ASSESSMENT — LIFESTYLE VARIABLES: HISTORY_ALCOHOL_USE: YES

## 2021-01-09 ASSESSMENT — PAIN SCALES - GENERAL: PAINLEVEL_OUTOF10: 7

## 2021-01-09 NOTE — ED NOTES
Patient is refusing to have an EKG performed at this time. KAELYN napier.       Ariadna Morrell  01/09/21 0131       Ariadna Morrell  01/09/21 0132

## 2021-01-09 NOTE — ED NOTES
Lab notified of add-on Valproic, paper rec sent to lab per request.      Joe Ennis RN  01/09/21 1519
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
## 2021-01-09 NOTE — ED NOTES
Floor called to give nurse to nurse. 2 recent admissions. Floor Busy. Floor will call for report when ready.      Frantz Finney RN  01/09/21 6158
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
## 2021-01-09 NOTE — ED NOTES
Patient reviewed for admission by Mallory Grier NP. Patient accepted. LSW notified.      Shilo Sykes RN  01/09/21 9592

## 2021-01-09 NOTE — ED NOTES
Patient still refusing EKG because he is in \"too much pain\". Patient has been sleeping soundly in bed, resp easy and unlabored. Chest rises and falls evenly. Patient cleaned his own face/wound d/t having pain. Dr. Rimma Quiñones notified.         Juan Pinto RN  01/09/21 4835

## 2021-01-09 NOTE — ED NOTES
KAELYN Tobias advised patient that an EKG was needed. Patient was agreeable to having EKG. KAELYN Tobias left the unit to speak with the ED physician. When the unit door closed, patient refused the EKG. Will advise KAELYN Keys when returns to unit.       Lisa Hoover  01/09/21 4664

## 2021-01-09 NOTE — ED NOTES
Patient still refusing EKG. Stating \" I don't need that. \"     Humberto Cristobal RN  01/09/21 4331

## 2021-01-09 NOTE — ED NOTES
Pt admitted to [address] [place] [address] by Dr. Naldo Guallpa, Bed 7813Q     Disposition called into [facility] in admitting.     Accepting information given to Venessa Wagner RN on Sofy Campos, MSW, [place]  01/09/21 5985

## 2021-01-09 NOTE — ED NOTES
SW informed pt that it was necessary for him to comply with treatment in order to proceed with his care.  Pt agreed to EKG     Brian Downs, PATEL, LJ  01/09/21 1043

## 2021-01-09 NOTE — ED NOTES
Bed: [facility]  Expected date:   Expected time:   Means of arrival:   Comments:  AMMON Baumann RN  01/08/21 5557
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
## 2021-01-09 NOTE — ED NOTES
Attempted to call lab 3x since valproic level was added onto labs to notify them of add-on, no answer.      Marcelina Song, KAELYN  01/09/21 9499

## 2021-01-10 PROBLEM — F31.64 BIPOLAR AFFECTIVE DISORDER, MIXED, SEVERE, WITH PSYCHOTIC BEHAVIOR (HCC): Status: ACTIVE | Noted: 2021-01-10

## 2021-01-10 LAB
CHOLESTEROL, TOTAL: 181 MG/DL (ref 0–199)
HBA1C MFR BLD: 5.2 % (ref 4–5.6)
HDLC SERPL-MCNC: 55 MG/DL
LDL CHOLESTEROL CALCULATED: 54 MG/DL (ref 0–99)
TRIGL SERPL-MCNC: 362 MG/DL (ref 0–149)
VLDLC SERPL CALC-MCNC: 72 MG/DL

## 2021-01-10 PROCEDURE — 6370000000 HC RX 637 (ALT 250 FOR IP): Performed by: NURSE PRACTITIONER

## 2021-01-10 PROCEDURE — 99222 1ST HOSP IP/OBS MODERATE 55: CPT | Performed by: PSYCHIATRY & NEUROLOGY

## 2021-01-10 PROCEDURE — 1240000000 HC EMOTIONAL WELLNESS R&B

## 2021-01-10 PROCEDURE — 6370000000 HC RX 637 (ALT 250 FOR IP): Performed by: PSYCHIATRY & NEUROLOGY

## 2021-01-10 RX ORDER — GAUZE BANDAGE 2" X 2"
100 BANDAGE TOPICAL DAILY
Status: DISCONTINUED | OUTPATIENT
Start: 2021-01-11 | End: 2021-01-13 | Stop reason: HOSPADM

## 2021-01-10 RX ORDER — OLANZAPINE 10 MG/1
10 TABLET ORAL ONCE
Status: COMPLETED | OUTPATIENT
Start: 2021-01-10 | End: 2021-01-10

## 2021-01-10 RX ORDER — LORAZEPAM 0.5 MG/1
0.5 TABLET ORAL EVERY 8 HOURS PRN
Status: DISCONTINUED | OUTPATIENT
Start: 2021-01-14 | End: 2021-01-13 | Stop reason: HOSPADM

## 2021-01-10 RX ORDER — LORAZEPAM 0.5 MG/1
0.5 TABLET ORAL EVERY 4 HOURS PRN
Status: DISPENSED | OUTPATIENT
Start: 2021-01-10 | End: 2021-01-13

## 2021-01-10 RX ORDER — DIVALPROEX SODIUM 250 MG/1
250 TABLET, DELAYED RELEASE ORAL EVERY 12 HOURS SCHEDULED
Status: DISCONTINUED | OUTPATIENT
Start: 2021-01-10 | End: 2021-01-10 | Stop reason: SDUPTHER

## 2021-01-10 RX ORDER — OLANZAPINE 10 MG/1
10 TABLET ORAL NIGHTLY
Status: DISCONTINUED | OUTPATIENT
Start: 2021-01-10 | End: 2021-01-12

## 2021-01-10 RX ADMIN — CHLORDIAZEPOXIDE HYDROCHLORIDE 25 MG: 25 CAPSULE ORAL at 13:09

## 2021-01-10 RX ADMIN — OLANZAPINE 10 MG: 10 TABLET, FILM COATED ORAL at 10:44

## 2021-01-10 RX ADMIN — CHLORDIAZEPOXIDE HYDROCHLORIDE 25 MG: 25 CAPSULE ORAL at 21:35

## 2021-01-10 RX ADMIN — CHLORDIAZEPOXIDE HYDROCHLORIDE 25 MG: 25 CAPSULE ORAL at 16:52

## 2021-01-10 RX ADMIN — MICONAZOLE NITRATE: 20 CREAM TOPICAL at 16:52

## 2021-01-10 RX ADMIN — CHLORDIAZEPOXIDE HYDROCHLORIDE 25 MG: 25 CAPSULE ORAL at 09:31

## 2021-01-10 RX ADMIN — LORAZEPAM 0.5 MG: 0.5 TABLET ORAL at 21:49

## 2021-01-10 RX ADMIN — MICONAZOLE NITRATE: 20 CREAM TOPICAL at 21:37

## 2021-01-10 RX ADMIN — CHLORDIAZEPOXIDE HYDROCHLORIDE 25 MG: 25 CAPSULE ORAL at 04:49

## 2021-01-10 RX ADMIN — DIVALPROEX SODIUM 250 MG: 250 TABLET, DELAYED RELEASE ORAL at 21:34

## 2021-01-10 RX ADMIN — FOLIC ACID 1 MG: 1 TABLET ORAL at 09:31

## 2021-01-10 RX ADMIN — CHLORDIAZEPOXIDE HYDROCHLORIDE 25 MG: 25 CAPSULE ORAL at 00:40

## 2021-01-10 RX ADMIN — Medication 100 MG: at 09:30

## 2021-01-10 RX ADMIN — TRAZODONE HYDROCHLORIDE 50 MG: 50 TABLET ORAL at 21:35

## 2021-01-10 RX ADMIN — Medication 1 TABLET: at 09:30

## 2021-01-10 RX ADMIN — DIVALPROEX SODIUM 250 MG: 250 TABLET, DELAYED RELEASE ORAL at 09:30

## 2021-01-10 RX ADMIN — OLANZAPINE 10 MG: 10 TABLET, FILM COATED ORAL at 21:34

## 2021-01-10 ASSESSMENT — PAIN SCALES - GENERAL: PAINLEVEL_OUTOF10: 0

## 2021-01-10 NOTE — PLAN OF CARE
Isolative to room this shift   denies SI/HI  denies hallucinations  not attending groups  cooperative with meds   evasive   focused on medications    will continue to monitor
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
## 2021-01-10 NOTE — H&P
PSYCHIATRIC EVALUATION      CHIEF COMPLAINT: \"I am tired of being followed \"    HISTORY OF PRESENT ILLNESS: Licha Quintana  is a 30 y.o. male single never  has no children lives with mom recently released from skilled nursing on parole, with history of bipolar disorder and treatment with Depakote Zyprexa in skilled nursing and also history of polysubstance abuse, antisocial personality disorder, came to the emergency room with suicidal ideation with nonspecific plan and paranoia. He presented with very strong body odor and was disheveled with pressured speech poor eye contact and largely uncooperative. His urine was positive for THC. He seemed psychotic and was pink slipped admitted to 7 S. Patient was largely uncooperative with the staff  this morning and when I went to see him he was initially uncooperative but when I explained my role he came out and talk to me but very limited way. He was behaving very bizarre manner. He was complaining of some pain on the shoulder but will not elaborate what the pain coming from. He said he is being followed and being watched by others and he is tired of living like this. He is very irritable angry. He said that he is on parole now for initial charges of burglary. He came out and then again violated the parole and went back to the skilled nursing. He said he was seeing a psychiatrist in the skilled nursing and getting medication. He does not have any job he does not have any plan he says he cannot do anything because he is being watched and followed. Insight and judgment poor. Impulse control poor. Cognitive function seem to be at the baseline. He is rambling at times. He is largely uncooperative with very poor hygiene grooming disheveled and he said that he is going through a phase of depression and mood swing. He does not want to talk anything and he suddenly left the room.   He said he wants to take the Xanax but I said I would give you some benzo to calm him down and acute CN IX, X:     xNormal gag reflex and phonation   CN XI:   xShoulder shrug and neck rotation is normal  CNXII:    xTongue is midline no deviation or atrophy      For further PE refer to ED note      MENTAL STATUS EXAM:   Mental status examination revealed a 47-year-old  woman with strong body odor disheveled poor hygiene grooming with cut marks on the face extremely irritable agitated uncooperative and will only answer certain questions. Psychomotor revealed increased agitation. Eye contact was poor. Speech was little pressured. Mood \"I am tired of being followed\" affect is blunted anxious fearful guarded. Thought process linear without flight of ideas or looseness of association thought contents with paranoia and delusion that people are following him. Denies auditory visual hallucination. Denies suicidal homicidal ideation at this time. Insight and judgment poor. Impulse control poor. Cognitive function seem to be at the baseline. Alert and oriented time place and person.     LABS:   Admission on 01/08/2021   Component Date Value Ref Range Status    Ventricular Rate 01/09/2021 87  BPM Final    Atrial Rate 01/09/2021 87  BPM Final    P-R Interval 01/09/2021 136  ms Final    QRS Duration 01/09/2021 70  ms Final    Q-T Interval 01/09/2021 368  ms Final    QTc Calculation (Bazett) 01/09/2021 442  ms Final    P Axis 01/09/2021 50  degrees Final    R Axis 01/09/2021 48  degrees Final    T Axis 01/09/2021 -28  degrees Final    WBC 01/09/2021 10.1  4.5 - 11.5 E9/L Final    RBC 01/09/2021 5.09  3.80 - 5.80 E12/L Final    Hemoglobin 01/09/2021 15.5  12.5 - 16.5 g/dL Final    Hematocrit 01/09/2021 45.6  37.0 - 54.0 % Final    MCV 01/09/2021 89.6  80.0 - 99.9 fL Final    MCH 01/09/2021 30.5  26.0 - 35.0 pg Final    MCHC 01/09/2021 34.0  32.0 - 34.5 % Final    RDW 01/09/2021 12.3  11.5 - 15.0 fL Final    Platelets 60/65/8951 310  130 - 450 E9/L Final    MPV 01/09/2021 8.6  7.0 - 12.0 fL Final    Neutrophils % 01/09/2021 69.5  43.0 - 80.0 % Final    Immature Granulocytes % 01/09/2021 0.5  0.0 - 5.0 % Final    Lymphocytes % 01/09/2021 20.2  20.0 - 42.0 % Final    Monocytes % 01/09/2021 5.9  2.0 - 12.0 % Final    Eosinophils % 01/09/2021 3.6  0.0 - 6.0 % Final    Basophils % 01/09/2021 0.3  0.0 - 2.0 % Final    Neutrophils Absolute 01/09/2021 7.01  1.80 - 7.30 E9/L Final    Immature Granulocytes # 01/09/2021 0.05  E9/L Final    Lymphocytes Absolute 01/09/2021 2.03  1.50 - 4.00 E9/L Final    Monocytes Absolute 01/09/2021 0.59  0.10 - 0.95 E9/L Final    Eosinophils Absolute 01/09/2021 0.36  0.05 - 0.50 E9/L Final    Basophils Absolute 01/09/2021 0.03  0.00 - 0.20 E9/L Final    Sodium 01/09/2021 141  132 - 146 mmol/L Final    Potassium reflex Magnesium 01/09/2021 3.7  3.5 - 5.0 mmol/L Final    Chloride 01/09/2021 103  98 - 107 mmol/L Final    CO2 01/09/2021 23  22 - 29 mmol/L Final    Anion Gap 01/09/2021 15  7 - 16 mmol/L Final    Glucose 01/09/2021 94  74 - 99 mg/dL Final    BUN 01/09/2021 17  6 - 20 mg/dL Final    CREATININE 01/09/2021 0.9  0.7 - 1.2 mg/dL Final    GFR Non- 01/09/2021 >60  >=60 mL/min/1.73 Final    Comment: Chronic Kidney Disease: less than 60 ml/min/1.73 sq.m. Kidney Failure: less than 15 ml/min/1.73 sq.m. Results valid for patients 18 years and older.       GFR  01/09/2021 >60   Final    Calcium 01/09/2021 9.2  8.6 - 10.2 mg/dL Final    Total Protein 01/09/2021 7.1  6.4 - 8.3 g/dL Final    Alb 01/09/2021 4.2  3.5 - 5.2 g/dL Final    Total Bilirubin 01/09/2021 <0.2  0.0 - 1.2 mg/dL Final    Alkaline Phosphatase 01/09/2021 103  40 - 129 U/L Final    ALT 01/09/2021 26  0 - 40 U/L Final    AST 01/09/2021 22  0 - 39 U/L Final    Ethanol Lvl 01/09/2021 47  mg/dL Final    Not Detected    Acetaminophen Level 01/09/2021 <5.0* 10.0 - 30.0 mcg/mL Final    Salicylate, Serum 08/43/0640 <0.3  0.0 - 30.0 mg/dL Final    TCA Scrn 01/09/2021 NEGATIVE  Cutoff:300 ng/mL Final    Amphetamine Screen, Urine 01/09/2021 NOT DETECTED  Negative <1000 ng/mL Final    Barbiturate Screen, Ur 01/09/2021 NOT DETECTED  Negative < 200 ng/mL Final    Benzodiazepine Screen, Urine 01/09/2021 NOT DETECTED  Negative < 200 ng/mL Final    Cannabinoid Scrn, Ur 01/09/2021 POSITIVE* Negative < 50ng/mL Final    Cocaine Metabolite Screen, Urine 01/09/2021 POSITIVE* Negative < 300 ng/mL Final    Opiate Scrn, Ur 01/09/2021 NOT DETECTED  Negative < 300ng/mL Final    Note:  The Opiate Screen is not intended to detect Oxycodone.  PCP Screen, Urine 01/09/2021 NOT DETECTED  Negative < 25 ng/mL Final    Methadone Screen, Urine 01/09/2021 NOT DETECTED  Negative <300 ng/mL Final    Oxycodone Urine 01/09/2021 NOT DETECTED  Negative <100 ng/mL Final    FENTANYL SCREEN, URINE 01/09/2021 NOT DETECTED  Negative <1 ng/mL Final    Drug Screen Comment: 01/09/2021 see below   Final    Comment: These drug screen results are for medical purposes only and  should not be considered definitive or confirmed. The drug  methodology concentration value must be greater than or equal  to the cutoff to be reported as positive. Confirmatory testing  orders and/or interpretive screening questions can be directed  to toxicology at [phone]. The absence of expected drug(s) and/or metabolite(s) may be due  to inappropriate timing of specimen collection relative to drug  administration, poor drug absorption, diluted/adulterated urine,  or limitations of screening testing methodology.  Valproic Acid Lvl 01/09/2021 3* 50 - 100 mcg/mL Final    SARS-CoV-2, NAAT 01/09/2021 Not Detected  Not Detected Final    Comment: Rapid NAAT:   Negative results should be treated as presumptive and,  if inconsistent with clinical signs and symptoms or necessary for  patient management, should be tested with an alternative molecular  assay.  Negative results do not preclude SARS-CoV-2 infection and  should not be used as the sole basis for patient management decisions. This test has been authorized by the FDA under an Emergency Use  Authorization (EUA) for use by authorized laboratories.     Fact sheet for Healthcare Providers:  Andrew.ivette  Fact sheet for Patients: Andrew.ivette    METHODOLOGY: Isothermal Nucleic Acid Amplification      Hemoglobin A1C 01/09/2021 5.2  4.0 - 5.6 % Final    Cholesterol, Total 01/09/2021 181  0 - 199 mg/dL Final    Triglycerides 01/09/2021 362* 0 - 149 mg/dL Final    HDL 01/09/2021 55  >40 mg/dL Final    LDL Calculated 01/09/2021 54  0 - 99 mg/dL Final    VLDL Cholesterol Calculated 01/09/2021 72  mg/dL Final           MEDICATIONS: Current Facility-Administered Medications: LORazepam (ATIVAN) tablet 0.5 mg, 0.5 mg, Oral, Q4H PRN  [START ON 1/14/2021] LORazepam (ATIVAN) tablet 0.5 mg, 0.5 mg, Oral, Q8H PRN  OLANZapine (ZYPREXA) tablet 10 mg, 10 mg, Oral, Nightly  miconazole (MICOTIN) 2 % cream, , Topical, BID  nicotine polacrilex (NICORETTE) gum 2 mg, 2 mg, Oral, PRN  acetaminophen (TYLENOL) tablet 650 mg, 650 mg, Oral, Q6H PRN  magnesium hydroxide (MILK OF MAGNESIA) 400 MG/5ML suspension 30 mL, 30 mL, Oral, Daily PRN  aluminum & magnesium hydroxide-simethicone (MAALOX) 200-200-20 MG/5ML suspension 30 mL, 30 mL, Oral, PRN  hydrOXYzine (VISTARIL) capsule 50 mg, 50 mg, Oral, TID PRN  traZODone (DESYREL) tablet 50 mg, 50 mg, Oral, Nightly PRN  sterile water injection 2.1 mL, 2.1 mL, Intramuscular, Q4H PRN  OLANZapine (ZYPREXA) tablet 5 mg, 5 mg, Oral, Q8H PRN **OR** OLANZapine (ZYPREXA) injection 10 mg, 10 mg, Intramuscular, Q12H PRN  chlordiazePOXIDE (LIBRIUM) capsule 25 mg, 25 mg, Oral, Q4H  divalproex (DEPAKOTE) DR tablet 250 mg, 250 mg, Oral, 2 times per day  therapeutic multivitamin-minerals 1 tablet, 1 tablet, Oral, Daily  thiamine tablet 100 mg, 100 mg, Oral, Daily  folic acid (FOLVITE) tablet 1 mg, 1 mg,

## 2021-01-10 NOTE — GROUP NOTE
Group Therapy Note    Date: 1/10/2021    Group Start Time: 1125  Group End Time: 2616  Group Topic: Cognitive Skills    [facility]    YNES Ballard        Group Therapy Note    Attendees: 12     Patient's Goal:  Pt will be able to identify actions that tear down self esteem and build up self esteem. Notes:  Pt active in class discussion and activity. Status After Intervention:  Improved    Participation Level:  Active Listener and Interactive    Participation Quality: Appropriate, Attentive, Sharing and Supportive      Speech:  normal      Thought Process/Content: Logical      Affective Functioning: Blunted      Mood: depressed      Level of consciousness:  Alert, Oriented x4 and Attentive      Response to Learning: Able to verbalize current knowledge/experience, Able to verbalize/acknowledge new learning and Progressing to goal      Endings: None Reported    Modes of Intervention: Education, Support, Socialization and Activity      Discipline Responsible: /Counselor      Signature:  YNES Arriaga

## 2021-01-10 NOTE — CARE COORDINATION
SW note: sw attempted to meet with pt for assessment but he refused stating he doesn't feel well enough to talk. SW went back in afternoon and attempted to assess pt. Pt was groaning in bed stating he is in too much pain to do assessment. Pt states he is having terrible pain in his arms and back. RN notified. SW will attempt when he is feeling better able to talk. SW then went back again later in afternoon and he continues to refuse to talk with sw and answer \"dumb questions\". SW will attempt when more cooperative.

## 2021-01-10 NOTE — PROGRESS NOTES
Leisure assessment incomplete. Very irritable and angry when approached for conversation. States he is in pain and unable to answer \"dumb questions\". Observed to be in room most of day, unwilling to accept encouragement to try afternnoon leisure group of movie and popcorn. Will continue to complete as more cooperative.

## 2021-01-11 PROCEDURE — 1240000000 HC EMOTIONAL WELLNESS R&B

## 2021-01-11 PROCEDURE — 99232 SBSQ HOSP IP/OBS MODERATE 35: CPT | Performed by: NURSE PRACTITIONER

## 2021-01-11 PROCEDURE — 6370000000 HC RX 637 (ALT 250 FOR IP): Performed by: PSYCHIATRY & NEUROLOGY

## 2021-01-11 PROCEDURE — 6370000000 HC RX 637 (ALT 250 FOR IP): Performed by: NURSE PRACTITIONER

## 2021-01-11 RX ORDER — CHLORDIAZEPOXIDE HYDROCHLORIDE 25 MG/1
25 CAPSULE, GELATIN COATED ORAL EVERY 6 HOURS
Status: DISCONTINUED | OUTPATIENT
Start: 2021-01-11 | End: 2021-01-12

## 2021-01-11 RX ADMIN — Medication 100 MG: at 09:47

## 2021-01-11 RX ADMIN — CHLORDIAZEPOXIDE HYDROCHLORIDE 25 MG: 25 CAPSULE ORAL at 22:13

## 2021-01-11 RX ADMIN — CHLORDIAZEPOXIDE HYDROCHLORIDE 25 MG: 25 CAPSULE ORAL at 09:46

## 2021-01-11 RX ADMIN — LORAZEPAM 0.5 MG: 0.5 TABLET ORAL at 16:51

## 2021-01-11 RX ADMIN — CHLORDIAZEPOXIDE HYDROCHLORIDE 25 MG: 25 CAPSULE ORAL at 05:26

## 2021-01-11 RX ADMIN — DIVALPROEX SODIUM 250 MG: 250 TABLET, DELAYED RELEASE ORAL at 09:47

## 2021-01-11 RX ADMIN — DIVALPROEX SODIUM 250 MG: 250 TABLET, DELAYED RELEASE ORAL at 20:35

## 2021-01-11 RX ADMIN — OLANZAPINE 10 MG: 10 TABLET, FILM COATED ORAL at 20:35

## 2021-01-11 RX ADMIN — CHLORDIAZEPOXIDE HYDROCHLORIDE 25 MG: 25 CAPSULE ORAL at 00:21

## 2021-01-11 RX ADMIN — ACETAMINOPHEN 650 MG: 325 TABLET ORAL at 16:51

## 2021-01-11 RX ADMIN — CHLORDIAZEPOXIDE HYDROCHLORIDE 25 MG: 25 CAPSULE ORAL at 16:46

## 2021-01-11 RX ADMIN — MICONAZOLE NITRATE: 20 CREAM TOPICAL at 20:37

## 2021-01-11 RX ADMIN — FOLIC ACID 1 MG: 1 TABLET ORAL at 09:47

## 2021-01-11 RX ADMIN — LORAZEPAM 0.5 MG: 0.5 TABLET ORAL at 22:25

## 2021-01-11 RX ADMIN — Medication 1 TABLET: at 09:47

## 2021-01-11 ASSESSMENT — PAIN SCALES - GENERAL
PAINLEVEL_OUTOF10: 9
PAINLEVEL_OUTOF10: 0

## 2021-01-11 NOTE — PLAN OF CARE
[address] [place] [address]  Day 3 Interdisciplinary Treatment Plan NOTE    Review Date & Time: 1/11/2021 1000    Patient was not in treatment team. PT. REFUSED    Admission Type:   Admission Type: Involuntary    Reason for admission:  Reason for Admission: \"suicidal\"  Estimated Length of Stay Update:  1 WEEK  Estimated Discharge Date Update:  FRIDAY    PATIENT STRENGTHS:  Patient Strengths Strengths: No significant Physical Illness  Patient Strengths and Limitations:Limitations: Unrealistic self-view  Addictive Behavior:Addictive Behavior  In the past 3 months, have you felt or has someone told you that you have a problem with:  : None  Do you have a history of Chemical Use?: No  Do you have a history of Alcohol Use?: Yes  Do you have a history of Street Drug Abuse?: Yes  Histroy of Prescripton Drug Abuse?: No  Medical Problems:History reviewed. No pertinent past medical history. Risk:  Fall RiskTotal: 65  William Scale William Scale Score: 22  BVC Total: 1  Change in scores: NO FALLS OR WILLIAM RISK IDENTIFIED .  Changes to plan of Care : CON    Status EXAM:   Status and Exam  Normal: No  Facial Expression: Avoids Gaze, Flat  Affect: Blunt  Level of Consciousness: Alert  Mood:Normal: No  Mood: Irritable  Motor Activity:Normal: No  Motor Activity: Decreased  Interview Behavior: Evasive, Uncooperative/Withdrawn  Preception: [place] to Person, [place] to Time, [place] to Place, [place] to Situation  Attention:Normal: No  Attention: Unable to Concentrate  Thought Processes: Other(See comment)(IMPOVERISHED)  Thought Content:Normal: No  Thought Content: Poverty of Content  Hallucinations: None  Delusions: No  Delusions: Obsessions  Memory:Normal: No  Memory: Confabulation, Other(See comment)(IMPAIRED)  Insight and Judgment: No  Insight and Judgment: Poor Judgment, Poor Insight, Unmotivated  Present Suicidal Ideation: No  Present Homicidal Ideation: No    Daily Assessment Last Entry:             Patient Currently in Pain: No  Daily Nutrition (WDL): Within Defined Limits    Patient Monitoring:  Frequency of Checks: 4 times per hour, close    Psychiatric Symptoms:   Depression Symptoms  Depression Symptoms: No problems reported or observed. Anxiety Symptoms  Anxiety Symptoms: Generalized  Elisa Symptoms  Elisa Symptoms: No problems reported or observed. Psychosis Symptoms  Delusion Type: No problems reported or observed. Suicide Risk CSSR-S:  1) Within the past month, have you wished you were dead or wished you could go to sleep and not wake up? : Yes  2) Have you actually had any thoughts of killing yourself? : Yes  3) Have you been thinking about how you might kill yourself? : No  5) Have you started to work out or worked out the details of how to kill yourself?  Do you intend to carry out this plan? : No  6) Have you ever done anything, started to do anything, or prepared to do anything to end your life?: No  Change in Result; PT. DENIES SUICIDAL IDEATIONS Change in Plan of care; CONTINUE TO [address]:   Learner Progress Toward Treatment Goals: Reviewed results and recommendations of this team    Method: Individual    Outcome: Needs reinforcement    PATIENT GOALS: DECLINED TO GIVE    PLAN/TREATMENT RECOMMENDATIONS UPDATE: WITHDRAWAL PRECAUTIONS, AOD REFERRAL, SUPPORTIVE CARE, ENCOURAGE HYGIENE,  ENCOURAGE GROUPS, DISCHARGE PLANNING AD FOLLOW UP    GOALS UPDATE:   Time frame for Short-Term Goals:  1 WEEK      Nathan Paul RN

## 2021-01-11 NOTE — PROGRESS NOTES
Attempted to have patient complete leisure assessment. Pt continues to refuse to answer any questions. Will re-attempt when patient becomes more cooperative and willing to answer questions.

## 2021-01-11 NOTE — PLAN OF CARE
[address] [place] [address]  Initial Interdisciplinary Treatment Plan NOTE    Review Date & Time: 1/9/21 1200    Patient was in treatment team    Admission Type:   Admission Type: Involuntary    Reason for admission:  Reason for Admission: \"suicidal\"      Estimated Length of Stay Update:  3-5 days  Estimated Discharge Date Update: 5-7 days    PATIENT STRENGTHS:  Patient Strengths Strengths: No significant Physical Illness  Patient Strengths and Limitations:Limitations: Unrealistic self-view  Addictive Behavior:Addictive Behavior  In the past 3 months, have you felt or has someone told you that you have a problem with:  : None  Do you have a history of Chemical Use?: No  Do you have a history of Alcohol Use?: Yes  Do you have a history of Street Drug Abuse?: Yes  Histroy of Prescripton Drug Abuse?: No  Medical Problems:History reviewed. No pertinent past medical history.     EDUCATION:   Learner Progress Toward Treatment Goals: Reviewed results and recommendations of this team    Method: Small group    Outcome: Verbalized understanding    PATIENT GOALS:     PLAN/TREATMENT RECOMMENDATIONS UPDATE:day 3    GOALS UPDATE:   Time frame for Short-Term Goals: 3-5 days    Saleem Hong RN

## 2021-01-11 NOTE — PROGRESS NOTES
Pt denies suicidal ideations, homicidal ideations and hallucinations. Pt presents flat and anxious. Pt isolative to room. Irritable. Multiple requests. Attending group. Medication complaint. Will continue to monitor.

## 2021-01-11 NOTE — PROGRESS NOTES
BEHAVIORAL HEALTH FOLLOW-UP NOTE     1/11/2021     Patient was seen and examined in person, Chart reviewed   Patient's case discussed with staff/team    Chief Complaint: \" I cannot talk right now. \"    Interim History: Patient seen in his room he yells at that he cannot talk right now. He shows no insight and judgment to hospitalization need for treatment he is highly irritable and labile easily agitated      Appetite:   [x] Normal/Unchanged  [] Increased  [] Decreased      Sleep:       [x] Normal/Unchanged  [] Fair       [] Poor              Energy:    [x] Normal/Unchanged  [] Increased  [] Decreased        SI [] Present  [x] Absent    HI  []Present  [x] Absent     Aggression:  [] yes  [x] no    Patient is [x] able  [] unable to CONTRACT FOR SAFETY     PAST MEDICAL/PSYCHIATRIC HISTORY:   History reviewed. No pertinent past medical history. FAMILY/SOCIAL HISTORY:  Family History   Problem Relation Age of Onset    No Known Problems Mother     No Known Problems Father      Social History     Socioeconomic History    Marital status: Single     Spouse name: Not on file    Number of children: 0    Years of education: 15    Highest education level: Not on file   Occupational History    Not on file   Social Needs    Financial resource strain: Not on file    Food insecurity     Worry: Not on file     Inability: Not on file   Rollad needs     Medical: Not on file     Non-medical: Not on file   Tobacco Use    Smoking status: Former Smoker     Packs/day: 1.00     Years: 15.00     Pack years: 15.00     Types: Cigarettes     Start date: 1/9/2004    Smokeless tobacco: Never Used   Substance and Sexual Activity    Alcohol use:  Yes     Alcohol/week: 3.0 standard drinks     Types: 3 Cans of beer per week     Comment: 3 tall boys and malt liquor/ qd    Drug use: Yes     Types: Cocaine     Comment: 8 ball /wk    Sexual activity: Not Currently   Lifestyle    Physical activity     Days per week: Not on file Minutes per session: Not on file    Stress: Not on file   Relationships    Social connections     Talks on phone: Not on file     Gets together: Not on file     Attends Congregational service: Not on file     Active member of club or organization: Not on file     Attends meetings of clubs or organizations: Not on file     Relationship status: Not on file    Intimate partner violence     Fear of current or ex partner: Not on file     Emotionally abused: Not on file     Physically abused: Not on file     Forced sexual activity: Not on file   Other Topics Concern    Not on file   Social History Narrative    Not on file           ROS:  [x] All negative/unchanged except if checked.  Explain positive(checked items) below:  [] Constitutional  [] Eyes  [] Ear/Nose/Mouth/Throat  [] Respiratory  [] CV  [] GI  []   [] Musculoskeletal  [] Skin/Breast  [] Neurological  [] Endocrine  [] Heme/Lymph  [] Allergic/Immunologic    Explanation:     MEDICATIONS:    Current Facility-Administered Medications:     chlordiazePOXIDE (LIBRIUM) capsule 25 mg, 25 mg, Oral, M8S, Mi LATASHA Craig CNP    LORazepam (ATIVAN) tablet 0.5 mg, 0.5 mg, Oral, Q4H PRN, Sydnee Faye MD, 0.5 mg at 01/10/21 2149    [START ON 1/14/2021] LORazepam (ATIVAN) tablet 0.5 mg, 0.5 mg, Oral, Q8H PRN, Sydnee Faye MD    OLANZapine (ZYPREXA) tablet 10 mg, 10 mg, Oral, Nightly, Sydnee Faye MD, 10 mg at 01/10/21 2134    miconazole (MICOTIN) 2 % cream, , Topical, BID, LATASHA Marie CNP, Given at 01/10/21 2137    thiamine mononitrate tablet 100 mg, 100 mg, Oral, Daily, LATASHA Marie CNP, 473 mg at 01/11/21 0947    nicotine polacrilex (NICORETTE) gum 2 mg, 2 mg, Oral, PRN, Mamta Melchor MD    acetaminophen (TYLENOL) tablet 650 mg, 650 mg, Oral, Q6H PRN, Sydnee Faye MD    magnesium hydroxide (MILK OF MAGNESIA) 400 MG/5ML suspension 30 mL, 30 mL, Oral, Daily PRN, Sydnee Faye MD    aluminum & magnesium

## 2021-01-11 NOTE — PLAN OF CARE
Problem: Depressive Behavior With or Without Suicide Precautions:  Goal: Ability to disclose and discuss suicidal ideas will improve  Description: Ability to disclose and discuss suicidal ideas will improve  1/11/2021 1819 by Delia Field RN  Outcome: Met This Shift     Problem: Depressive Behavior With or Without Suicide Precautions:  Goal: Able to verbalize and/or display a decrease in depressive symptoms  Description: Able to verbalize and/or display a decrease in depressive symptoms  1/11/2021 1819 by Delia Field RN  Outcome: Ongoing   Pt has been withdrawn to his room and in bed. He did get up and eat dinner and then returned to bed. Pt appears to be manipulative for medication. He is laying quietly in bed when viewed from the door but as soon as staff enters his room and makes noise, he begins to rock back and forth and states that he feels like shit. He also states that he feels depressed and rates it 6/10. He denies any suicidal ideations. He states that he is also anxious.

## 2021-01-12 PROCEDURE — 99232 SBSQ HOSP IP/OBS MODERATE 35: CPT | Performed by: NURSE PRACTITIONER

## 2021-01-12 PROCEDURE — 6370000000 HC RX 637 (ALT 250 FOR IP): Performed by: NURSE PRACTITIONER

## 2021-01-12 PROCEDURE — 6370000000 HC RX 637 (ALT 250 FOR IP): Performed by: PSYCHIATRY & NEUROLOGY

## 2021-01-12 PROCEDURE — 1240000000 HC EMOTIONAL WELLNESS R&B

## 2021-01-12 RX ORDER — CHLORDIAZEPOXIDE HYDROCHLORIDE 25 MG/1
25 CAPSULE, GELATIN COATED ORAL EVERY 8 HOURS
Status: DISCONTINUED | OUTPATIENT
Start: 2021-01-12 | End: 2021-01-13

## 2021-01-12 RX ADMIN — Medication 100 MG: at 08:42

## 2021-01-12 RX ADMIN — CHLORDIAZEPOXIDE HYDROCHLORIDE 25 MG: 25 CAPSULE ORAL at 08:42

## 2021-01-12 RX ADMIN — Medication 1 TABLET: at 08:42

## 2021-01-12 RX ADMIN — FOLIC ACID 1 MG: 1 TABLET ORAL at 08:42

## 2021-01-12 RX ADMIN — DIVALPROEX SODIUM 250 MG: 250 TABLET, DELAYED RELEASE ORAL at 21:16

## 2021-01-12 RX ADMIN — TRAZODONE HYDROCHLORIDE 50 MG: 50 TABLET ORAL at 21:16

## 2021-01-12 RX ADMIN — LORAZEPAM 0.5 MG: 0.5 TABLET ORAL at 15:18

## 2021-01-12 RX ADMIN — ACETAMINOPHEN 650 MG: 325 TABLET ORAL at 18:59

## 2021-01-12 RX ADMIN — CHLORDIAZEPOXIDE HYDROCHLORIDE 25 MG: 25 CAPSULE ORAL at 16:30

## 2021-01-12 RX ADMIN — DIVALPROEX SODIUM 250 MG: 250 TABLET, DELAYED RELEASE ORAL at 08:42

## 2021-01-12 RX ADMIN — HYDROXYZINE PAMOATE 50 MG: 50 CAPSULE ORAL at 18:59

## 2021-01-12 RX ADMIN — LORAZEPAM 0.5 MG: 0.5 TABLET ORAL at 21:21

## 2021-01-12 RX ADMIN — OLANZAPINE 15 MG: 5 TABLET, FILM COATED ORAL at 21:16

## 2021-01-12 RX ADMIN — ACETAMINOPHEN 650 MG: 325 TABLET ORAL at 08:43

## 2021-01-12 ASSESSMENT — PAIN SCALES - GENERAL
PAINLEVEL_OUTOF10: 10
PAINLEVEL_OUTOF10: 10

## 2021-01-12 NOTE — PROGRESS NOTES
PT. UP FOR MEALS. IN CONTROL. MOOD IRRITABLE. ROOM IN DISARRAY, AND SELF CARE IS POOR. PT. REPORTS IS \"LAZY\". MEDICATION COMPLIANT. DECLINES GROUPS. PT. STATED IS STILL GOING THROUGH WITHDRAWAL, WHEN AWAKENED, BUT FALLS BACK TO SLEEP. PT. IS AVOIDANT OF CONVERSATION, UNLESS IS EXPRESSING NEEDS. IN BED MOST OF SHIFT.

## 2021-01-12 NOTE — PROGRESS NOTES
BEHAVIORAL HEALTH FOLLOW-UP NOTE     1/12/2021     Patient was seen and examined in person, Chart reviewed   Patient's case discussed with staff/team    Chief Complaint: \" I came here to get my meds adjusted. \"    Interim History: Patient is room remains malodorous not showering room is disheveled. He is not attending groups and socializing with peers. States he came here to get his medications adjusted. He is isolative not attending groups or socializing with peers. He denies SI/HI intent or plan denies auditory visual hallucinations poor limited insight and judgment into hospitalization need for treatment    Appetite:   [x] Normal/Unchanged  [] Increased  [] Decreased      Sleep:       [x] Normal/Unchanged  [] Fair       [] Poor              Energy:    [x] Normal/Unchanged  [] Increased  [] Decreased        SI [] Present  [x] Absent    HI  []Present  [x] Absent     Aggression:  [] yes  [x] no    Patient is [x] able  [] unable to CONTRACT FOR SAFETY     PAST MEDICAL/PSYCHIATRIC HISTORY:   History reviewed. No pertinent past medical history. FAMILY/SOCIAL HISTORY:  Family History   Problem Relation Age of Onset    No Known Problems Mother     No Known Problems Father      Social History     Socioeconomic History    Marital status: Single     Spouse name: Not on file    Number of children: 0    Years of education: 15    Highest education level: Not on file   Occupational History    Not on file   Social Needs    Financial resource strain: Not on file    Food insecurity     Worry: Not on file     Inability: Not on file   [employer] needs     Medical: Not on file     Non-medical: Not on file   Tobacco Use    Smoking status: Former Smoker     Packs/day: 1.00     Years: 15.00     Pack years: 15.00     Types: Cigarettes     Start date: 1/9/2004    Smokeless tobacco: Never Used   Substance and Sexual Activity    Alcohol use:  Yes     Alcohol/week: 3.0 standard drinks     Types: 3 Cans of beer per week Comment: 3 tall boys and malt liquor/ qd    Drug use: Yes     Types: Cocaine     Comment: 8 ball /wk    Sexual activity: Not Currently   Lifestyle    Physical activity     Days per week: Not on file     Minutes per session: Not on file    Stress: Not on file   Relationships    Social connections     Talks on phone: Not on file     Gets together: Not on file     Attends [employer] service: Not on file     Active member of club or organization: Not on file     Attends meetings of clubs or organizations: Not on file     Relationship status: Not on file    Intimate partner violence     Fear of current or ex partner: Not on file     Emotionally abused: Not on file     Physically abused: Not on file     Forced sexual activity: Not on file   Other Topics Concern    Not on file   Social History Narrative    Not on file           ROS:  [x] All negative/unchanged except if checked.  Explain positive(checked items) below:  [] Constitutional  [] Eyes  [] Ear/Nose/Mouth/Throat  [] Respiratory  [] CV  [] GI  []   [] Musculoskeletal  [] Skin/Breast  [] Neurological  [] Endocrine  [] Heme/Lymph  [] Allergic/Immunologic    Explanation:     MEDICATIONS:    Current Facility-Administered Medications:     chlordiazePOXIDE (LIBRIUM) capsule 25 mg, 25 mg, Oral, R7D, MiLATASHA Bermudez CNP    OLANZapine (ZYPREXA) tablet 15 mg, 15 mg, Oral, Nightly, LATASHA Johnson CNP    LORazepam (ATIVAN) tablet 0.5 mg, 0.5 mg, Oral, Q4H PRN, Ludwin Mondragon MD, 0.5 mg at 01/11/21 2225    [START ON 1/14/2021] LORazepam (ATIVAN) tablet 0.5 mg, 0.5 mg, Oral, Q8H PRN, Ludwin Mondragon MD    miconazole (MICOTIN) 2 % cream, , Topical, BID, LATASHA Chávez CNP, Given at 01/11/21 2037    thiamine mononitrate tablet 100 mg, 100 mg, Oral, Daily, LATASHA Chávez CNP, 203 mg at 01/12/21 1548    nicotine polacrilex (NICORETTE) gum 2 mg, 2 mg, Oral, PRN, Shiv Grey MD    acetaminophen (TYLENOL) tablet 650 mg, 650 mg, Oral, Q6H PRN, John Ganser, MD, 650 mg at 01/12/21 0843    magnesium hydroxide (MILK OF MAGNESIA) 400 MG/5ML suspension 30 mL, 30 mL, Oral, Daily PRN, John Ganser, MD    aluminum & magnesium hydroxide-simethicone (MAALOX) 200-200-20 MG/5ML suspension 30 mL, 30 mL, Oral, PRN, John Ganser, MD    hydrOXYzine (VISTARIL) capsule 50 mg, 50 mg, Oral, TID PRN, John Ganser, MD, 50 mg at 01/09/21 1646    traZODone (DESYREL) tablet 50 mg, 50 mg, Oral, Nightly PRN, John Ganser, MD, 50 mg at 01/10/21 2135    sterile water injection 2.1 mL, 2.1 mL, Intramuscular, Q4H PRN, John Ganser, MD    OLANZapine (ZYPREXA) tablet 5 mg, 5 mg, Oral, Q8H PRN **OR** OLANZapine (ZYPREXA) injection 10 mg, 10 mg, Intramuscular, S78O PRN, LATASHA Fernandez - CNP    divalproex (DEPAKOTE) DR tablet 250 mg, 250 mg, Oral, 2 times per day, LATASHA Walls CNP, 333 mg at 01/12/21 8344    therapeutic multivitamin-minerals 1 tablet, 1 tablet, Oral, Daily, LATASHA Walls CNP, 1 tablet at 57/16/36 6188    folic acid (FOLVITE) tablet 1 mg, 1 mg, Oral, Daily, LATASHA Walls CNP, 1 mg at 01/12/21 6449      Examination:  BP (!) 148/93   Pulse 115   Temp 98.1 °F (36.7 °C) (Temporal)   Resp 18   Ht 5' 7\" (1.702 m)   Wt 150 lb (68 kg)   SpO2 98%   BMI 23.49 kg/m²   Gait - steady  Medication side effects(SE): Denies    Mental Status Examination:    Level of consciousness:  within normal limits   Appearance:  poor grooming and poor hygiene  Behavior/Motor: No abnormalities  Attitude toward examiner: Uncooperative  Speech:  spontaneous, normal rate and normal volume   Mood:\" I am okay. \"  Affect: Mood incongruent guarded constricted  Thought processes: Linear without flight of ideas or loose associations  Thought content: Devoid of any auditory visualizations delusions or perceptual denies.   Denies SI/HI intent or plan  Cognition: Oriented to person place and time  Concentration distractible  Insight poor   Judgement poor     ASSESSMENT:   Patient symptoms are:  [] Well controlled  [] Improving  [] Worsening  [x] No change      Diagnosis:   Principal Problem:    Bipolar affective disorder, mixed, severe, with psychotic behavior ([facility].)  Active Problems:    Polysubstance abuse ([facility]    Antisocial personality disorder ([facility].)  Resolved Problems:    * No resolved hospital problems. *      LABS:    No results for input(s): WBC, HGB, PLT in the last 72 hours. No results for input(s): NA, K, CL, CO2, BUN, CREATININE, GLUCOSE in the last 72 hours. No results for input(s): BILITOT, ALKPHOS, AST, ALT in the last 72 hours. Lab Results   Component Value Date    LABAMPH NOT DETECTED 01/09/2021    BARBSCNU NOT DETECTED 01/09/2021    LABBENZ NOT DETECTED 01/09/2021    LABMETH NOT DETECTED 01/09/2021    OPIATESCREENURINE NOT DETECTED 01/09/2021    PHENCYCLIDINESCREENURINE NOT DETECTED 01/09/2021    ETOH 47 01/09/2021     Lab Results   Component Value Date    TSH 1.100 01/10/2019     No results found for: LITHIUM  Lab Results   Component Value Date    VALPROATE 3 (L) 01/09/2021           Treatment Plan:  Reviewed current Medications with the patient. Risks, benefits, side effects, drug-to-drug interactions and alternatives to treatment were discussed. Collateral information:   CD evaluation  Encourage patient to attend group and other milieu activities.   Discharge planning discussed with the patient and treatment team.    Continue Depakote 250 mg twice daily  Continue Zyprexa 15 mg at bedtime  Decrease Librium to 25 mg every 8 hours plan to go as needed tomorrow    PSYCHOTHERAPY/COUNSELING:  [x] Therapeutic interview  [x] Supportive  [] CBT  [] Ongoing  [] Other    [x] Patient continues to need, on a daily basis, active treatment furnished directly by or requiring the supervision of inpatient psychiatric personnel      Anticipated Length of stay: 3-5 based on stability            Electronically signed by LATASHA Sandoval - CNP on 7/79/7447 at 9:39 AM

## 2021-01-12 NOTE — PROGRESS NOTES
Leisure assessment incomplete. Remains resistant to conversation, ignoring multiple attempts. Expresses no current interests nor needs. Will encourage completion and improved group participation. 1520 : Re-attempted and Refused. Only current focus is getting \"more ativan\". Irritable and uncooperative.

## 2021-01-12 NOTE — PLAN OF CARE
Problem: Depressive Behavior With or Without Suicide Precautions:  Goal: Able to verbalize and/or display a decrease in depressive symptoms  Description: Able to verbalize and/or display a decrease in depressive symptoms  Outcome: Ongoing  MOOD DEPRESSED, ANXIOUS AND IRRITABLE. Goal: Ability to disclose and discuss suicidal ideas will improve  Description: Ability to disclose and discuss suicidal ideas will improve  Outcome: Met This Shift  PT. DENIED SUICIDAL IDEATIONS ON A.M. ASSESSMENT.

## 2021-01-12 NOTE — PROGRESS NOTES
Pt refused to talk to this nurse stating \"I'm not talking. \" VS unable to be done. Will try again later.

## 2021-01-13 VITALS
HEIGHT: 67 IN | OXYGEN SATURATION: 98 % | SYSTOLIC BLOOD PRESSURE: 118 MMHG | TEMPERATURE: 98.1 F | BODY MASS INDEX: 23.54 KG/M2 | DIASTOLIC BLOOD PRESSURE: 71 MMHG | WEIGHT: 150 LBS | RESPIRATION RATE: 18 BRPM | HEART RATE: 114 BPM

## 2021-01-13 PROCEDURE — 6370000000 HC RX 637 (ALT 250 FOR IP): Performed by: PSYCHIATRY & NEUROLOGY

## 2021-01-13 PROCEDURE — 6370000000 HC RX 637 (ALT 250 FOR IP): Performed by: NURSE PRACTITIONER

## 2021-01-13 PROCEDURE — 99239 HOSP IP/OBS DSCHRG MGMT >30: CPT | Performed by: NURSE PRACTITIONER

## 2021-01-13 RX ORDER — M-VIT,TX,IRON,MINS/CALC/FOLIC 27MG-0.4MG
1 TABLET ORAL DAILY
Qty: 30 TABLET | Refills: 0 | Status: ON HOLD | OUTPATIENT
Start: 2021-01-14 | End: 2021-03-05

## 2021-01-13 RX ORDER — GAUZE BANDAGE 2" X 2"
100 BANDAGE TOPICAL DAILY
Qty: 30 TABLET | Refills: 0 | Status: ON HOLD | OUTPATIENT
Start: 2021-01-14 | End: 2021-02-04 | Stop reason: HOSPADM

## 2021-01-13 RX ORDER — OLANZAPINE 20 MG/1
20 TABLET ORAL EVERY EVENING
Qty: 30 TABLET | Refills: 0 | Status: ON HOLD | OUTPATIENT
Start: 2021-01-13 | End: 2021-01-25 | Stop reason: HOSPADM

## 2021-01-13 RX ORDER — LORAZEPAM 0.5 MG/1
0.5 TABLET ORAL EVERY 8 HOURS PRN
Qty: 9 TABLET | Refills: 0 | Status: SHIPPED | OUTPATIENT
Start: 2021-01-14 | End: 2021-01-17

## 2021-01-13 RX ORDER — OLANZAPINE 10 MG/1
20 TABLET ORAL NIGHTLY
Status: DISCONTINUED | OUTPATIENT
Start: 2021-01-13 | End: 2021-01-13 | Stop reason: HOSPADM

## 2021-01-13 RX ORDER — DIVALPROEX SODIUM 250 MG/1
250 TABLET, DELAYED RELEASE ORAL EVERY 12 HOURS SCHEDULED
Qty: 60 TABLET | Refills: 0 | Status: ON HOLD | OUTPATIENT
Start: 2021-01-13 | End: 2021-01-25 | Stop reason: SDUPTHER

## 2021-01-13 RX ADMIN — ACETAMINOPHEN 650 MG: 325 TABLET ORAL at 09:40

## 2021-01-13 RX ADMIN — ACETAMINOPHEN 650 MG: 325 TABLET ORAL at 15:53

## 2021-01-13 RX ADMIN — FOLIC ACID 1 MG: 1 TABLET ORAL at 09:40

## 2021-01-13 RX ADMIN — CHLORDIAZEPOXIDE HYDROCHLORIDE 25 MG: 25 CAPSULE ORAL at 01:13

## 2021-01-13 RX ADMIN — DIVALPROEX SODIUM 250 MG: 250 TABLET, DELAYED RELEASE ORAL at 09:40

## 2021-01-13 RX ADMIN — CHLORDIAZEPOXIDE HYDROCHLORIDE 25 MG: 25 CAPSULE ORAL at 09:40

## 2021-01-13 RX ADMIN — Medication 1 TABLET: at 09:40

## 2021-01-13 RX ADMIN — Medication 100 MG: at 09:40

## 2021-01-13 ASSESSMENT — PAIN SCALES - GENERAL: PAINLEVEL_OUTOF10: 10

## 2021-01-13 NOTE — CARE COORDINATION
Biopsychosocial Assessment Note    Social work met with patient to complete the biopsychosocial assessment and CSSR-S. Mental Status Exam: pt was not feeling well and preferred not to talk. He however did though answer questions re; d/c planning. He denied any si, hi, or halluc. Pt sates he hasn't had any suicidal thoughts since he got out of skilled nursing, about 6-7 months ago. He states he has never heard voices in his life. Chief Complaint: \"I am tired of being followed \". HISTORY OF PRESENT ILLNESS: Chirag Campoverde  is a 30 y.o. male single never  has no children lives with mom recently released from skilled nursing on parole, with history of bipolar disorder and treatment with Depakote Zyprexa in skilled nursing and also history of polysubstance abuse, antisocial personality disorder, came to the emergency room with suicidal ideation with nonspecific plan and paranoia. He presented with very strong body odor     Patient Report: Pt reports he bounces around and stays between various friends and his mom. He declined to sign a release for his mom though or anyone else. Gender  [x] Male [] Female [] Transgender  [] Other    Sexual Orientation    [x] Heterosexual [] Homosexual [] Bisexual [] Other    Suicidal Ideation  [] Reports [x] Denies    Homicidal Ideation  [] Reports [x] Denies      Hallucinations/Delusions (Specify type)  [] Reports [x] Denies     Substance Use/Alcohol Use/Addiction  [] Reports [] Denies     Trauma History  [] Reports [x] Denies     Collateral Contact (CATHY signed) pt declines to sign cathy for anyone at this time. Name:   Relationship:  Number:     Collateral Information:     Access to Weapons:none  Follow up provider: compass    Plan for discharge (where they live can they return):States he has various friends he stays on and off with or his mom. Pt declines referral to rescue mission or crisis unit. He states that he doesn't feel ready for d/c and states he is too sick to get out of bed.  He asks that d/c be postponed until tomorrow.

## 2021-01-13 NOTE — PROGRESS NOTES
Pt keeps approaching nursing staff asking for \"I need a raise in my medication. My anxiety is bad. \" Pt received PRN Librium, Ativan and vistaril within a four hour period. Pt does not present anxious. Calm talking to peers in unit. When this nurse won't give him more medication pt states \"I'll just come back later. \" Pt refused VS. Denies suicidal ideations, homicidal ideations and hallucinations. Pt also seeking more food \"It would help me to stop thinking about smoking if I had some bread, peanut butter and jelly. \" Irritable when needs aren't met. Will continue to monitor.

## 2021-01-13 NOTE — PROGRESS NOTES
[employer] denies SI,HI, or any Hallucinations at this time. He refused labs stating he only wants HIV test. States his family and everyone is making fun of him with his diagnosis and wants it changed. He states he is studying Jehovah's witness. He becomes irritated easily. Groups are offered and encouraged. He took his meds and attends groups. Will continue to monitor.

## 2021-01-13 NOTE — PROGRESS NOTES
[facility]  Discharge Note    Pt discharged with followings belongings:   Dentures: None  Vision - Corrective Lenses: None  Hearing Aid: None  Jewelry: None  Body Piercings Removed: No  Clothing: Other (Comment)  Were All Patient Medications Collected?: Not Applicable  Other Valuables: Other (Comment)   Valuables sent home with patient. Patient education on aftercare instructions: yes. Patient verbalize understanding of AVS:  yes.     Status EXAM upon discharge:  Status and Exam  Normal: No  Facial Expression: Sad  Affect: Appropriate, Constricted  Level of Consciousness: Lethargic  Mood:Normal: No  Mood: Sad  Motor Activity:Normal: No  Motor Activity: Decreased  Interview Behavior: Cooperative, Evasive, Irritable  Preception: [place] to Person, [place] S[place] to Time, [place] to Place, [place] to Situation  Attention:Normal: No  Attention: Distractible  Thought Processes: Perseveration  Thought Content:Normal: No  Thought Content: Preoccupations  Hallucinations: None  Delusions: No  Delusions: Obsessions  Memory:Normal: No  Memory: Poor Recent, Poor Remote  Insight and Judgment: No  Insight and Judgment: Poor Judgment, Poor Insight  Present Suicidal Ideation: No  Present Homicidal Ideation: No      Metabolic Screening:    Lab Results   Component Value Date    LABA1C 5.2 01/09/2021       Lab Results   Component Value Date    CHOL 181 01/09/2021     Lab Results   Component Value Date    TRIG 362 (H) 01/09/2021     Lab Results   Component Value Date    HDL 55 01/09/2021     No components found for: [facility] [place]  Lab Results   Component Value Date    LABVLDL 72 01/09/2021       Bonnie Rodriguez RN

## 2021-01-13 NOTE — PLAN OF CARE
Problem: Depressive Behavior With or Without Suicide Precautions:  Goal: Able to verbalize acceptance of life and situations over which he or she has no control  Description: Able to verbalize acceptance of life and situations over which he or she has no control  Outcome: Ongoing  Goal: Able to verbalize and/or display a decrease in depressive symptoms  Description: Able to verbalize and/or display a decrease in depressive symptoms  Outcome: Ongoing  Goal: Ability to disclose and discuss suicidal ideas will improve  Description: Ability to disclose and discuss suicidal ideas will improve  Outcome: Met This Shift     Debbie Gilman denies SI,HI, or any Hallucinations at this time. He refused labs stating he only wants HIV test. States his family and everyone is making fun of him with his diagnosis and wants it changed. He states he is studying Zoroastrian. He becomes irritated easily. Groups are offered and encouraged. He took his meds but does not attend  groups. Will continue to monitor.

## 2021-01-13 NOTE — PROGRESS NOTES
Parisa Llanos denies SI,HI, or any Hallucinations at this time. He refused labs stating he only wants HIV test. States his family and everyone is making fun of him with his diagnosis and wants it changed. He states he is studying Adventism. He becomes irritated easily. Groups are offered and encouraged. He took his meds but did not attend groups. Will continue to monitor.

## 2021-01-13 NOTE — PROGRESS NOTES
Attended afternoon meet and greet. Patient updated on staffing and evening expectations. Patient 1 of 10 om attendance.

## 2021-01-20 ENCOUNTER — HOSPITAL ENCOUNTER (INPATIENT)
Age: 26
LOS: 4 days | Discharge: HOME | DRG: 753 | End: 2021-01-25
Attending: EMERGENCY MEDICINE | Admitting: PSYCHIATRY & NEUROLOGY
Payer: COMMERCIAL

## 2021-01-20 DIAGNOSIS — T50.905A DELIRIUM, DRUG-INDUCED: Primary | ICD-10-CM

## 2021-01-20 DIAGNOSIS — R41.0 DELIRIUM, DRUG-INDUCED: Primary | ICD-10-CM

## 2021-01-20 DIAGNOSIS — F99 MENTAL HEALTH DISORDER: ICD-10-CM

## 2021-01-20 LAB
ACETAMINOPHEN LEVEL: <5 MCG/ML (ref 10–30)
AMPHETAMINE SCREEN, URINE: POSITIVE
ANION GAP SERPL CALCULATED.3IONS-SCNC: 12 MMOL/L (ref 7–16)
BARBITURATE SCREEN URINE: NOT DETECTED
BENZODIAZEPINE SCREEN, URINE: POSITIVE
BUN BLDV-MCNC: 14 MG/DL (ref 6–20)
CALCIUM SERPL-MCNC: 8.7 MG/DL (ref 8.6–10.2)
CANNABINOID SCREEN URINE: POSITIVE
CHLORIDE BLD-SCNC: 105 MMOL/L (ref 98–107)
CO2: 22 MMOL/L (ref 22–29)
COCAINE METABOLITE SCREEN URINE: POSITIVE
CREAT SERPL-MCNC: 1.1 MG/DL (ref 0.7–1.2)
ETHANOL: <10 MG/DL (ref 0–0.08)
FENTANYL SCREEN, URINE: POSITIVE
GFR AFRICAN AMERICAN: >60
GFR NON-AFRICAN AMERICAN: >60 ML/MIN/1.73
GLUCOSE BLD-MCNC: 99 MG/DL (ref 74–99)
HCT VFR BLD CALC: 40.9 % (ref 37–54)
HEMOGLOBIN: 13.5 G/DL (ref 12.5–16.5)
Lab: ABNORMAL
MCH RBC QN AUTO: 30 PG (ref 26–35)
MCHC RBC AUTO-ENTMCNC: 33 % (ref 32–34.5)
MCV RBC AUTO: 90.9 FL (ref 80–99.9)
METHADONE SCREEN, URINE: NOT DETECTED
OPIATE SCREEN URINE: NOT DETECTED
OXYCODONE URINE: NOT DETECTED
PDW BLD-RTO: 12.6 FL (ref 11.5–15)
PHENCYCLIDINE SCREEN URINE: NOT DETECTED
PLATELET # BLD: 292 E9/L (ref 130–450)
PMV BLD AUTO: 8.8 FL (ref 7–12)
POTASSIUM SERPL-SCNC: 3.5 MMOL/L (ref 3.5–5)
RBC # BLD: 4.5 E12/L (ref 3.8–5.8)
SALICYLATE, SERUM: <0.3 MG/DL (ref 0–30)
SARS-COV-2, NAAT: NOT DETECTED
SODIUM BLD-SCNC: 139 MMOL/L (ref 132–146)
TRICYCLIC ANTIDEPRESSANTS SCREEN SERUM: NEGATIVE NG/ML
WBC # BLD: 6.8 E9/L (ref 4.5–11.5)

## 2021-01-20 PROCEDURE — G0480 DRUG TEST DEF 1-7 CLASSES: HCPCS

## 2021-01-20 PROCEDURE — 6360000002 HC RX W HCPCS

## 2021-01-20 PROCEDURE — 80307 DRUG TEST PRSMV CHEM ANLYZR: CPT

## 2021-01-20 PROCEDURE — 96372 THER/PROPH/DIAG INJ SC/IM: CPT

## 2021-01-20 PROCEDURE — U0002 COVID-19 LAB TEST NON-CDC: HCPCS

## 2021-01-20 PROCEDURE — 2580000003 HC RX 258

## 2021-01-20 PROCEDURE — 99284 EMERGENCY DEPT VISIT MOD MDM: CPT

## 2021-01-20 PROCEDURE — 80048 BASIC METABOLIC PNL TOTAL CA: CPT

## 2021-01-20 PROCEDURE — 85027 COMPLETE CBC AUTOMATED: CPT

## 2021-01-20 PROCEDURE — 93005 ELECTROCARDIOGRAM TRACING: CPT | Performed by: EMERGENCY MEDICINE

## 2021-01-20 RX ORDER — LORAZEPAM 2 MG/ML
2 INJECTION INTRAMUSCULAR ONCE
Status: COMPLETED | OUTPATIENT
Start: 2021-01-20 | End: 2021-01-20

## 2021-01-20 RX ORDER — ZIPRASIDONE MESYLATE 20 MG/ML
INJECTION, POWDER, LYOPHILIZED, FOR SOLUTION INTRAMUSCULAR
Status: COMPLETED
Start: 2021-01-20 | End: 2021-01-20

## 2021-01-20 RX ORDER — HALOPERIDOL 5 MG/ML
5 INJECTION INTRAMUSCULAR ONCE
Status: COMPLETED | OUTPATIENT
Start: 2021-01-20 | End: 2021-01-20

## 2021-01-20 RX ORDER — DIPHENHYDRAMINE HYDROCHLORIDE 50 MG/ML
50 INJECTION INTRAMUSCULAR; INTRAVENOUS ONCE
Status: COMPLETED | OUTPATIENT
Start: 2021-01-20 | End: 2021-01-20

## 2021-01-20 RX ORDER — HALOPERIDOL 5 MG/ML
INJECTION INTRAMUSCULAR
Status: DISCONTINUED
Start: 2021-01-20 | End: 2021-01-20

## 2021-01-20 RX ORDER — ZIPRASIDONE MESYLATE 20 MG/ML
20 INJECTION, POWDER, LYOPHILIZED, FOR SOLUTION INTRAMUSCULAR ONCE
Status: COMPLETED | OUTPATIENT
Start: 2021-01-20 | End: 2021-01-20

## 2021-01-20 RX ORDER — LORAZEPAM 2 MG/ML
INJECTION INTRAMUSCULAR
Status: DISCONTINUED
Start: 2021-01-20 | End: 2021-01-20

## 2021-01-20 RX ORDER — DIPHENHYDRAMINE HYDROCHLORIDE 50 MG/ML
INJECTION INTRAMUSCULAR; INTRAVENOUS
Status: DISCONTINUED
Start: 2021-01-20 | End: 2021-01-20

## 2021-01-20 RX ADMIN — DIPHENHYDRAMINE HYDROCHLORIDE 50 MG: 50 INJECTION INTRAMUSCULAR; INTRAVENOUS at 17:31

## 2021-01-20 RX ADMIN — DIPHENHYDRAMINE HYDROCHLORIDE 50 MG: 50 INJECTION, SOLUTION INTRAMUSCULAR; INTRAVENOUS at 17:31

## 2021-01-20 RX ADMIN — HALOPERIDOL LACTATE 5 MG: 5 INJECTION, SOLUTION INTRAMUSCULAR at 17:31

## 2021-01-20 RX ADMIN — HALOPERIDOL 5 MG: 5 INJECTION INTRAMUSCULAR at 17:31

## 2021-01-20 RX ADMIN — LORAZEPAM 2 MG: 2 INJECTION INTRAMUSCULAR at 17:32

## 2021-01-20 RX ADMIN — ZIPRASIDONE MESYLATE 10 MG: 20 INJECTION, POWDER, LYOPHILIZED, FOR SOLUTION INTRAMUSCULAR at 20:51

## 2021-01-20 RX ADMIN — LORAZEPAM 2 MG: 2 INJECTION INTRAMUSCULAR; INTRAVENOUS at 17:32

## 2021-01-20 RX ADMIN — WATER 10 ML: 1 INJECTION INTRAMUSCULAR; INTRAVENOUS; SUBCUTANEOUS at 20:52

## 2021-01-20 NOTE — ED PROVIDER NOTES
29 yo male brought in by police and paramedics. He was initially seen by police and requested to go to a bus stop. He was ultimately taken to the bus stop and then found later acting in a very unusual manner. He had taken most of his clothes off was doing snow angels in the snow. History of drug and alcohol abuse. Upon arrival he is agitated, aggressive, shouting, yelling, requiring multiple nurses, police staff and support staff to help with the situation. His drug and alcohol and mental health issues are chronic, is persistent, worsening now, severe now, duration of at least today, associated with his drug and alcohol and mental health problems. Family History   Problem Relation Age of Onset    No Known Problems Mother     No Known Problems Father      History reviewed. No pertinent surgical history. Review of Systems   Unable to perform ROS: Psychiatric disorder   Psychiatric/Behavioral: Positive for agitation and behavioral problems. The patient is nervous/anxious. Physical Exam  Constitutional:       General: He is not in acute distress. Appearance: He is well-developed. Comments: Very agitated and aggressive   HENT:      Head: Normocephalic and atraumatic. Eyes:      Pupils: Pupils are equal, round, and reactive to light. Neck:      Musculoskeletal: Normal range of motion and neck supple. Thyroid: No thyromegaly. Cardiovascular:      Rate and Rhythm: Regular rhythm. Tachycardia present. Pulmonary:      Effort: Pulmonary effort is normal. No respiratory distress. Breath sounds: Normal breath sounds. No wheezing. Abdominal:      General: There is no distension. Palpations: Abdomen is soft. There is no mass. Tenderness: There is no abdominal tenderness. There is no guarding or rebound. Musculoskeletal: Normal range of motion. General: No tenderness. Skin:     General: Skin is warm and dry. Findings: No erythema.    Neurological: Mental Status: He is alert. Cranial Nerves: No cranial nerve deficit. Psychiatric:         Mood and Affect: Mood is anxious. Affect is angry and inappropriate. Speech: Speech is rapid and pressured. Behavior: Behavior is agitated, aggressive, hyperactive and combative. Comments: Repeats over and over \"5x5 is 25, 6x6 is 36. .. I am not suicidal.\"    Very agitated, very aggressive, required several police, staff, nurses to administer medications as the patient is a threat to himself, staff, other patients. Procedures     [facility]          --------------------------------------------- PAST HISTORY ---------------------------------------------  Past Medical History:  has no past medical history on file. Past Surgical History:  has no past surgical history on file. Social History:  reports that he has quit smoking. His smoking use included cigarettes. He started smoking about 17 years ago. He has a 15.00 pack-year smoking history. He has never used smokeless tobacco. He reports current alcohol use of about 3.0 standard drinks of alcohol per week. He reports current drug use. Drug: Cocaine. Family History: family history includes No Known Problems in his father and mother. The patients home medications have been reviewed.     Allergies: Haloperidol and related    -------------------------------------------------- RESULTS -------------------------------------------------    Lab  Results for orders placed or performed during the hospital encounter of 01/20/21   CBC   Result Value Ref Range    WBC 6.8 4.5 - 11.5 E9/L    RBC 4.50 3.80 - 5.80 E12/L    Hemoglobin 13.5 12.5 - 16.5 g/dL    Hematocrit 40.9 37.0 - 54.0 %    MCV 90.9 80.0 - 99.9 fL    MCH 30.0 26.0 - 35.0 pg    MCHC 33.0 32.0 - 34.5 %    RDW 12.6 11.5 - 15.0 fL    Platelets 922 915 - 448 E9/L    MPV 8.8 7.0 - 12.0 fL   Basic metabolic panel   Result Value Ref Range    Sodium 139 132 - 146 mmol/L    Potassium 3.5 3.5 - 5.0 mmol/L    Chloride 105 98 - 107 mmol/L    CO2 22 22 - 29 mmol/L    Anion Gap 12 7 - 16 mmol/L    Glucose 99 74 - 99 mg/dL    BUN 14 6 - 20 mg/dL    CREATININE 1.1 0.7 - 1.2 mg/dL    GFR Non-African American >60 >=60 mL/min/1.73    GFR African American >60     Calcium 8.7 8.6 - 10.2 mg/dL   Serum Drug Screen   Result Value Ref Range    Ethanol Lvl <10 mg/dL    Acetaminophen Level <5.0 (L) 10.0 - 70.4 mcg/mL    Salicylate, Serum <8.9 0.0 - 30.0 mg/dL    TCA Scrn NEGATIVE Cutoff:300 ng/mL   URINE DRUG SCREEN   Result Value Ref Range    Amphetamine Screen, Urine POSITIVE (A) Negative <1000 ng/mL    Barbiturate Screen, Ur NOT DETECTED Negative < 200 ng/mL    Benzodiazepine Screen, Urine POSITIVE (A) Negative < 200 ng/mL    Cannabinoid Scrn, Ur POSITIVE (A) Negative < 50ng/mL    Cocaine Metabolite Screen, Urine POSITIVE (A) Negative < 300 ng/mL    Opiate Scrn, Ur NOT DETECTED Negative < 300ng/mL    PCP Screen, Urine NOT DETECTED Negative < 25 ng/mL    Methadone Screen, Urine NOT DETECTED Negative <300 ng/mL    Oxycodone Urine NOT DETECTED Negative <100 ng/mL    FENTANYL SCREEN, URINE POSITIVE (A) Negative <1 ng/mL    Drug Screen Comment: see below    COVID-19   Result Value Ref Range    SARS-CoV-2, NAAT Not Detected Not Detected   EKG 12 Lead   Result Value Ref Range    Ventricular Rate 61 BPM    Atrial Rate 61 BPM    P-R Interval 134 ms    QRS Duration 84 ms    Q-T Interval 456 ms    QTc Calculation (Bazett) 459 ms    P Axis 45 degrees    R Axis 98 degrees    T Axis -16 degrees       Radiology  No orders to display       EKG:  Sinus rhythm, rate of 61, rightward axis, no ST elevations or depressions, T wave inversions in the inferior leads, no signs of right heart strain or bundle-branch blocks, no prolonged QT, no Brugada pattern.    ------------------------- NURSING NOTES AND VITALS REVIEWED ---------------------------  Date / Time Roomed:  1/20/2021  5:05 PM  ED Bed Assignment:  30BH/BH-30    The nursing notes

## 2021-01-21 LAB
EKG ATRIAL RATE: 61 BPM
EKG P AXIS: 45 DEGREES
EKG P-R INTERVAL: 134 MS
EKG Q-T INTERVAL: 456 MS
EKG QRS DURATION: 84 MS
EKG QTC CALCULATION (BAZETT): 459 MS
EKG R AXIS: 98 DEGREES
EKG T AXIS: -16 DEGREES
EKG VENTRICULAR RATE: 61 BPM

## 2021-01-21 PROCEDURE — 6370000000 HC RX 637 (ALT 250 FOR IP): Performed by: PSYCHIATRY & NEUROLOGY

## 2021-01-21 PROCEDURE — 93010 ELECTROCARDIOGRAM REPORT: CPT | Performed by: INTERNAL MEDICINE

## 2021-01-21 PROCEDURE — 6360000002 HC RX W HCPCS: Performed by: PSYCHIATRY & NEUROLOGY

## 2021-01-21 PROCEDURE — 6360000002 HC RX W HCPCS

## 2021-01-21 PROCEDURE — 2580000003 HC RX 258

## 2021-01-21 PROCEDURE — 1240000000 HC EMOTIONAL WELLNESS R&B

## 2021-01-21 PROCEDURE — 6370000000 HC RX 637 (ALT 250 FOR IP): Performed by: NURSE PRACTITIONER

## 2021-01-21 RX ORDER — DIPHENHYDRAMINE HYDROCHLORIDE 50 MG/ML
INJECTION INTRAMUSCULAR; INTRAVENOUS
Status: DISPENSED
Start: 2021-01-21 | End: 2021-01-22

## 2021-01-21 RX ORDER — DIVALPROEX SODIUM 500 MG/1
500 TABLET, DELAYED RELEASE ORAL 2 TIMES DAILY
Status: DISCONTINUED | OUTPATIENT
Start: 2021-01-21 | End: 2021-01-25 | Stop reason: HOSPADM

## 2021-01-21 RX ORDER — MAGNESIUM HYDROXIDE/ALUMINUM HYDROXICE/SIMETHICONE 120; 1200; 1200 MG/30ML; MG/30ML; MG/30ML
30 SUSPENSION ORAL PRN
Status: DISCONTINUED | OUTPATIENT
Start: 2021-01-21 | End: 2021-01-25 | Stop reason: HOSPADM

## 2021-01-21 RX ORDER — DIPHENHYDRAMINE HYDROCHLORIDE 50 MG/ML
50 INJECTION INTRAMUSCULAR; INTRAVENOUS ONCE
Status: COMPLETED | OUTPATIENT
Start: 2021-01-21 | End: 2021-01-21

## 2021-01-21 RX ORDER — LORAZEPAM 2 MG/ML
INJECTION INTRAMUSCULAR
Status: DISPENSED
Start: 2021-01-21 | End: 2021-01-22

## 2021-01-21 RX ORDER — LORAZEPAM 2 MG/ML
2 INJECTION INTRAMUSCULAR ONCE
Status: COMPLETED | OUTPATIENT
Start: 2021-01-21 | End: 2021-01-21

## 2021-01-21 RX ORDER — ZIPRASIDONE MESYLATE 20 MG/ML
INJECTION, POWDER, LYOPHILIZED, FOR SOLUTION INTRAMUSCULAR
Status: COMPLETED
Start: 2021-01-21 | End: 2021-01-21

## 2021-01-21 RX ORDER — HYDROXYZINE PAMOATE 50 MG/1
50 CAPSULE ORAL 3 TIMES DAILY PRN
Status: DISCONTINUED | OUTPATIENT
Start: 2021-01-21 | End: 2021-01-25 | Stop reason: HOSPADM

## 2021-01-21 RX ORDER — ACETAMINOPHEN 325 MG/1
650 TABLET ORAL EVERY 6 HOURS PRN
Status: DISCONTINUED | OUTPATIENT
Start: 2021-01-21 | End: 2021-01-25 | Stop reason: HOSPADM

## 2021-01-21 RX ORDER — ZIPRASIDONE MESYLATE 20 MG/ML
20 INJECTION, POWDER, LYOPHILIZED, FOR SOLUTION INTRAMUSCULAR ONCE
Status: COMPLETED | OUTPATIENT
Start: 2021-01-21 | End: 2021-01-21

## 2021-01-21 RX ORDER — TRAZODONE HYDROCHLORIDE 50 MG/1
50 TABLET ORAL NIGHTLY PRN
Status: DISCONTINUED | OUTPATIENT
Start: 2021-01-21 | End: 2021-01-25 | Stop reason: HOSPADM

## 2021-01-21 RX ORDER — CHLORDIAZEPOXIDE HYDROCHLORIDE 25 MG/1
25 CAPSULE, GELATIN COATED ORAL EVERY 4 HOURS PRN
Status: DISCONTINUED | OUTPATIENT
Start: 2021-01-21 | End: 2021-01-22 | Stop reason: SDUPTHER

## 2021-01-21 RX ADMIN — ZIPRASIDONE MESYLATE 20 MG: 20 INJECTION, POWDER, LYOPHILIZED, FOR SOLUTION INTRAMUSCULAR at 19:09

## 2021-01-21 RX ADMIN — DIVALPROEX SODIUM 500 MG: 250 TABLET, DELAYED RELEASE ORAL at 09:32

## 2021-01-21 RX ADMIN — LORAZEPAM 2 MG: 2 INJECTION INTRAMUSCULAR; INTRAVENOUS at 19:05

## 2021-01-21 RX ADMIN — HYDROXYZINE PAMOATE 50 MG: 50 CAPSULE ORAL at 18:02

## 2021-01-21 RX ADMIN — DIVALPROEX SODIUM 500 MG: 250 TABLET, DELAYED RELEASE ORAL at 20:06

## 2021-01-21 RX ADMIN — DIPHENHYDRAMINE HYDROCHLORIDE 50 MG: 50 INJECTION, SOLUTION INTRAMUSCULAR; INTRAVENOUS at 19:04

## 2021-01-21 RX ADMIN — WATER 10 ML: 1 INJECTION INTRAMUSCULAR; INTRAVENOUS; SUBCUTANEOUS at 19:08

## 2021-01-21 RX ADMIN — CHLORDIAZEPOXIDE HYDROCHLORIDE 25 MG: 25 CAPSULE ORAL at 18:02

## 2021-01-21 RX ADMIN — ACETAMINOPHEN 650 MG: 325 TABLET ORAL at 18:02

## 2021-01-21 ASSESSMENT — SLEEP AND FATIGUE QUESTIONNAIRES
DO YOU USE A SLEEP AID: YES
DIFFICULTY STAYING ASLEEP: YES
DO YOU HAVE DIFFICULTY SLEEPING: YES

## 2021-01-21 ASSESSMENT — PAIN SCALES - GENERAL: PAINLEVEL_OUTOF10: 10

## 2021-01-21 ASSESSMENT — LIFESTYLE VARIABLES: HISTORY_ALCOHOL_USE: NO

## 2021-01-21 NOTE — ED NOTES
In addition to 4 plastic bags in [place] [address],  has 2 large, full back packs in [facility]. The back packs are emitting strong odor.      PATEL Car, [place]  01/21/21 2513

## 2021-01-21 NOTE — ED NOTES
Emergency Department [facility] AN AFFILIATE OF [facility] Biopsychosocial Assessment Note    Chief Complaint:     Patient presents to the ED via EMS for psychiatric evaluation. Pt was seen by police and requested to go to the bus stop. After being taken to the bus stop, pt began behaving in [place] very unusual manner. \" He took most of his clothing off and began doing snow angels. MSE:    Pt presents as a 30year old male. He is groggy, uncooperative, orientation to person only initially. Orientation improves as assessment progresses, however pt denies the information shared by [employer]/EMS. Speech is slow in rate. Pt makes minimal at best eye contact. Pt does not answer questions regarding SI, HI, AVH or drug use. Clinical Summary/History:     Situation leading to pt presenting to ED is denied by pt. He relays initially that he had no encounter with police today. As he talks more, pt eventually states \"I didn't do anything wrong. I was changing my clothes, there was a bus and I had bus money. ..they don't like me. \" When asked why he would be changing his clothes outside, pt's only response is \"I do pushups outside. \" He fails to respond to further questions regarding the situation. Pt does acknowledge that he was recently admitted, and states he is medication compliant. SW attempted further assessment questions and pt fell completely silent. He waited nearly a minute before turning to look at SW. Pt asked Alline Fabiane you female? \" SW relayed she is. Pt then asked \"not bisexual, not transgender or anything? \" SW answered again, she is a female. Pt then began to speak further than he had prior to asking these questions. Pt relays he has not yet scheduled an Intake for outpatient mental health treatment. He states \"I need to get back to my house to schedule counseling. \" He mumbles about his mom, and not following the rules about eviction. Pt then states he has places to go, he is not homeless.     Questions regarding SI, HI, AVH and drug use are not answered by pt. His only response at this point is Merari say I'm a [facility] boy, and I'm not. \"       Gender  [x] Male [] Female [] Transgender  [] Other    Sexual Orientation    [x] Heterosexual [] Homosexual [] Bisexual [] Other    Suicidal Behavioral: CSSR-S Complete. [] Reports:    [] Past [] Present   [] Denies    Pt does not answer questions regarding this, however it is known from previous encounters that pt has historically reported SI. Homicidal/ Violent Behavior  [] Reports:   [] Past [] Present   [] Denies     Pt does not answer questions regarding this, however his behavior presenting in [facility] was highly agitated, and it is known from previous encounters that pt has prior Aggravated Burglary conviction. Hallucinations/Delusions   [] Reports:   [] Denies     Pt does not report any hallucinations or delusions, however he is denying the events reported by [employer] and EMS leading to pink slip being issued. Substance Use/Alcohol Use/Addiction: SBIRT Screen Complete. [] Reports:   [] Denies     Patient also fails to answer these questions. His drug screen and noted history in chart are reflective of chronic substance abuse/misuse    Trauma History  [x] Reports:  [] Denies     Collateral Information:   No collateral collected at this time.     Level of Care/Disposition Plan  [] Home:   [] Outpatient Provider:   [] Crisis Unit:   [x] Inpatient Psychiatric Unit:  [] Other:        PATEL Rodriguez, LJ  01/21/21 0021

## 2021-01-21 NOTE — PROGRESS NOTES
`[facility] [place]  Admission Note     Admission Type:   Admission Type: Involuntary    Reason for admission:  Reason for Admission: \"I did some [place] white heroin yesterday and I was sitting in the snow waiting for the bus and the  came and I said I wanted to die\"    PATIENT STRENGTHS:  Strengths: Positive Support, Social Skills, No significant Physical Illness    Patient Strengths and Limitations:  Limitations: Unrealistic self-view, Inappropriate/potentially harmful leisure interests, Multiple barriers to leisure interests, Lacks leisure interests, Difficulty problem solving/relies on others to help solve problems, Hopeless about future, Apathetic / unmotivated, Tendency to isolate self    Addictive Behavior:   Addictive Behavior  In the past 3 months, have you felt or has someone told you that you have a problem with:  : None  Do you have a history of Chemical Use?: No  Do you have a history of Alcohol Use?: No  Do you have a history of Street Drug Abuse?: Yes  Histroy of Prescripton Drug Abuse?: No    Medical Problems:   History reviewed. No pertinent past medical history.     Status EXAM:  Status and Exam  Normal: No  Facial Expression: Worried  Affect: Constricted  Level of Consciousness: Alert  Mood:Normal: No  Mood: Anxious, Depressed, Irritable, Suspicious, Helpless, Despair, Labile, Angry  Motor Activity:Normal: No  Motor Activity: Increased  Interview Behavior: Irritable  Preception: [place] to Person, [place]ust to Time, [place] to Place, [place] to Situation  Attention:Normal: No  Attention: Unable to Concentrate  Thought Processes: Flt.of Ideas, Loose Assoc., Tangential  Thought Content:Normal: No  Thought Content: Paranoia  Hallucinations: None  Delusions: Yes  Delusions: Persecution  Memory:Normal: No  Memory: Poor Recent, Poor Remote, Confabulation  Insight and Judgment: No  Insight and Judgment: Poor Insight  Present Suicidal Ideation: No  Present Homicidal Ideation: No    Tobacco Screening:  Practical Counseling, on admission, kim X, if applicable and completed (first 3 are required if patient doesn't refuse):            ( )  Recognizing danger situations (included triggers and roadblocks)                    ( )  Coping skills (new ways to manage stress, exercise, relaxation techniques, changing routine, distraction)                                                           ( )  Basic information about quitting (benefits of quitting, techniques in how to quit, available resources  ( ) Referral for counseling faxed to [facility]                                           ( x) Patient refused counseling  ( ) Patient has not smoked in the last 30 days    Metabolic Screening:    Lab Results   Component Value Date    LABA1C 5.2 01/09/2021       Lab Results   Component Value Date    CHOL 181 01/09/2021     Lab Results   Component Value Date    TRIG 362 (H) 01/09/2021     Lab Results   Component Value Date    HDL 55 01/09/2021     No components found for: [facility] EVALUATION AND TREATMENT [place]  Lab Results   Component Value Date    LABVLDL 72 01/09/2021         Body mass index is 22.81 kg/m². BP Readings from Last 2 Encounters:   01/21/21 (!) 110/56   01/13/21 118/71           Pt admitted with followings belongings:  Dentures: None     Valuables sent home with locked in locker. Valuables placed in safe in security envelope, number:  [id number]([facility]) Patient's home medications were: locked in med room  Patient oriented to surroundings and program expectations and copy of patient rights given. Received admission packet:  yes. Consents reviewed, signed yes. Refused no. Patient verbalize understanding:  yes. Patient education on precautions: yes    Patient admitted from [facility] AN [facility]ILI[facility] OF [facility] \"I did some [place] heroin yesterday, I was looking for my money and sitting in the snow waiting for the bus and the  came and gave me a ride down here, then I said I wanted to die\".  Patient denies thoughts to harm self or others, signed all consents, thoughts are racing, speech is pressured, eye contact is poor, patient is guarded and suspicious of staff. Patient states he lives with his mother and she is a good support for him. Patient denies hallucinations. Patient ate 100% breakfast, was given hygiene items for shower. Emotional support given.                     Paul Molina RN

## 2021-01-21 NOTE — PROGRESS NOTES
Shared goal for the day as to eat. Recreation assessment completed.            MEDS IN BELONGINGS, GIVEN TO RN

## 2021-01-21 NOTE — ED NOTES
Pt medicated for combative behavior upon arrival, cannot assess at this time. Collateral info from [employer]:  -Pt was sitting downtown in 20 degree weather in wet shorts, no shirt, no shoes or socks.  -Hx of schizophrenia diagnosis  -Making delusional statements that police were going to hurt, rape and kill him. -Reports as extremely manic searching for items that were not there  -stated to police that he needs his meds and drugs to live and he wants to die without them.       [address], [place], [place]  01/20/21 2226

## 2021-01-21 NOTE — ED NOTES
Pt called out from his room, asking about his belongings. SW showed pt that there are 4 bags of his belongings locked in [address]. Pt asked if he is a Police Hold. SW explained she was not advised that pt is a police hold, but she would double check. Pt stated he knows he is pink slipped, based on \"what mom said. \" He would not relay what he believes his mother has said.      PATEL Booth, Karmen Enrique  01/20/21 3859

## 2021-01-21 NOTE — ED NOTES
Pt has made a phone call, and requesting from whomever he contacted that they get information for the nearest FBI. He states he \"got the pink slip thing, extortion against me. \"    He states to [facility] AN AFFILIATE OF [facility] staff that he has to pay, cash, when he gets \"one of those pinks. \" Pt relays that \"it's ridiculous, I don't even ask for it and I don't need it, but then I gotta come up with the parra. \" He requests to see the doctor, so the pink slip can be taken away. [facility] staff attempted to educate pt regarding pink slip and his pending admission. Pt dismisses education efforts. Pt has stated that he is \"low on calories\" and has complained that he has not been given enough food. Pt was advised that he has consumed a dinner tray, bridger crackers, a box lunch since SW has been on shift. He states it is not enough. That \"after yesterday\" he needs to consume extra calories today. When asked what happened yesterday, pt relays that he \"did a lot of running around. \"         PATEL Marques, [place]  01/21/21 5051

## 2021-01-21 NOTE — CARE COORDINATION
SW Intern attempted to met with client to complete assessment. Pt was in room laying in the bed and said with mumbled speech mumbled that he did not was not feeling up to it. The Intern asked if he would be interested in an hour, the Pt said that he would come to the desk when he felt ready.

## 2021-01-21 NOTE — ED NOTES
Maribell Vincent, notified of pt placement in [facility], [place]  01/21/21 5031

## 2021-01-21 NOTE — ED NOTES
Pt up to desk onseveral occassions requesting food and pop denies feeling suicidal pt has poor eye contact rapid speech flight of ideas      Charlie Flores RN  01/21/21 1140

## 2021-01-21 NOTE — ED NOTES
Pt speaking with social workers requesting to speak to [facility]  Pt asking about removing pink slipped explained to pt several time it cannot be removed until seen by psychiatrist  Pt tends to be argumentative at times     Jessy Mary RN  01/21/21 6372

## 2021-01-21 NOTE — ED NOTES
Pt emerges from his room, requesting a phone book and a soda. He asks if his drug screen has resulted. Pt informed it has. Pt asks what the screen showed to be in his system. SW pulled up the UDS result, and read it back to pt. He stared off for a period, then questioned \"no heroin? \" SW relayed that the screen is showing as negative for opiates, so no heroin was detected. Pt relayed with a condescending tone \"fentanyl's an opiate. \"    Pt asks if SW believes pt will go home at some point today. SW relays that she does not believe so, however this is the Psychiatrist's decision that will not be made until after Psychiatrist assesses pt. Pt asks who he can call to change that. SW educates pt that he is not able to change his pink slip status. Pt stares at [address] badge. States \"Oh, you're a ? \" SW confirms she is. Pt states \"that's nice, good to know. \" He walks back to his room. After returning to room, pt shouts out \"do you even know my criminal history? \" \"Nurse!? Ayesha!?\" SW reminds pt that she is a SW, not RN. She also reminds pt that he has shared at least part of his criminal history when he met with this SW during his last encounter. Pt states he did not. He then shouts from his room \"there's a lot of people who think I should be on Death Row. \"     PATEL Crooks, [place]  01/21/21 2452

## 2021-01-22 PROCEDURE — 1240000000 HC EMOTIONAL WELLNESS R&B

## 2021-01-22 PROCEDURE — 6370000000 HC RX 637 (ALT 250 FOR IP): Performed by: NURSE PRACTITIONER

## 2021-01-22 PROCEDURE — 99222 1ST HOSP IP/OBS MODERATE 55: CPT | Performed by: NURSE PRACTITIONER

## 2021-01-22 PROCEDURE — 6370000000 HC RX 637 (ALT 250 FOR IP): Performed by: PSYCHIATRY & NEUROLOGY

## 2021-01-22 RX ORDER — CHLORDIAZEPOXIDE HYDROCHLORIDE 25 MG/1
25 CAPSULE, GELATIN COATED ORAL EVERY 6 HOURS PRN
Status: DISCONTINUED | OUTPATIENT
Start: 2021-01-22 | End: 2021-01-25 | Stop reason: HOSPADM

## 2021-01-22 RX ORDER — OLANZAPINE 10 MG/1
10 TABLET ORAL NIGHTLY
Status: DISCONTINUED | OUTPATIENT
Start: 2021-01-22 | End: 2021-01-25 | Stop reason: HOSPADM

## 2021-01-22 RX ORDER — DIVALPROEX SODIUM 250 MG/1
250 TABLET, DELAYED RELEASE ORAL EVERY 12 HOURS SCHEDULED
Status: DISCONTINUED | OUTPATIENT
Start: 2021-01-22 | End: 2021-01-22

## 2021-01-22 RX ADMIN — CHLORDIAZEPOXIDE HYDROCHLORIDE 25 MG: 25 CAPSULE ORAL at 10:46

## 2021-01-22 RX ADMIN — ACETAMINOPHEN 650 MG: 325 TABLET ORAL at 09:50

## 2021-01-22 RX ADMIN — CHLORDIAZEPOXIDE HYDROCHLORIDE 25 MG: 25 CAPSULE ORAL at 02:25

## 2021-01-22 RX ADMIN — DIVALPROEX SODIUM 500 MG: 250 TABLET, DELAYED RELEASE ORAL at 09:38

## 2021-01-22 RX ADMIN — CHLORDIAZEPOXIDE HYDROCHLORIDE 25 MG: 25 CAPSULE ORAL at 23:00

## 2021-01-22 RX ADMIN — DIVALPROEX SODIUM 500 MG: 250 TABLET, DELAYED RELEASE ORAL at 21:47

## 2021-01-22 RX ADMIN — ACETAMINOPHEN 650 MG: 325 TABLET ORAL at 15:35

## 2021-01-22 RX ADMIN — TRAZODONE HYDROCHLORIDE 50 MG: 50 TABLET ORAL at 21:47

## 2021-01-22 RX ADMIN — CHLORDIAZEPOXIDE HYDROCHLORIDE 25 MG: 25 CAPSULE ORAL at 15:35

## 2021-01-22 RX ADMIN — CHLORDIAZEPOXIDE HYDROCHLORIDE 25 MG: 25 CAPSULE ORAL at 06:38

## 2021-01-22 RX ADMIN — OLANZAPINE 10 MG: 10 TABLET, FILM COATED ORAL at 21:47

## 2021-01-22 RX ADMIN — HYDROXYZINE PAMOATE 50 MG: 50 CAPSULE ORAL at 10:46

## 2021-01-22 ASSESSMENT — PAIN SCALES - GENERAL
PAINLEVEL_OUTOF10: 10
PAINLEVEL_OUTOF10: 10
PAINLEVEL_OUTOF10: 0
PAINLEVEL_OUTOF10: 10

## 2021-01-22 ASSESSMENT — SLEEP AND FATIGUE QUESTIONNAIRES
DO YOU HAVE DIFFICULTY SLEEPING: NO
DIFFICULTY ARISING: NO
AVERAGE NUMBER OF SLEEP HOURS: 8
SLEEP PATTERN: NORMAL
RESTFUL SLEEP: YES
DO YOU USE A SLEEP AID: NO

## 2021-01-22 NOTE — PROGRESS NOTES
Patient agitated due to being on unit without \" detox meds\" Explained that Librium is ordered PRN along with other medications. Voicing need for Ativan and Ultram, something for detox that will help.  Wanting to go to detox facility to start Methadone but is not in Methadone program.

## 2021-01-22 NOTE — PROGRESS NOTES
[address] [place] [address]  Initial Interdisciplinary Treatment Plan NOTE    Review Date & Time: 1/22/2021 1000    Patient was in treatment team    Admission Type:   Admission Type: Involuntary    Reason for admission:  Reason for Admission: \"I did some [place] white heroin yesterday and I was sitting in the snow waiting for the bus and the  came and I said I wanted to die\"      Estimated Length of Stay Update:  3-5 days  Estimated Discharge Date Update: 1/25/2021    PATIENT STRENGTHS:  Patient Strengths Strengths: Positive Support, Social Skills, No significant Physical Illness  Patient Strengths and Limitations:Limitations: Multiple barriers to leisure interests  Addictive Behavior:Addictive Behavior  In the past 3 months, have you felt or has someone told you that you have a problem with:  : None  Do you have a history of Chemical Use?: No  Do you have a history of Alcohol Use?: No  Do you have a history of Street Drug Abuse?: Yes  Histroy of Prescripton Drug Abuse?: No  Medical Problems:History reviewed. No pertinent past medical history.     EDUCATION:   Learner Progress Toward Treatment Goals: Reviewed group plan and strategies    Method: Individual    Outcome: Needs reinforcement    PATIENT GOALS: medication compliance and group therapy attendance    PLAN/TREATMENT RECOMMENDATIONS UPDATE:medication compliance and group therapy attendance    GOALS UPDATE:   Time frame for Short-Term Goals: 3-5 days    Grace Diamond RN

## 2021-01-22 NOTE — PROGRESS NOTES
Resting quietly in bed with eyes closed q 15minute checks continued throughout shift pt received IM medication for outburst effective

## 2021-01-22 NOTE — PROGRESS NOTES
Patient medicated with PRN medication for agitation esd slamming door and screaming., Dr Xavier Patel updated by phone and PRN med orders obtained. Patient threatening staff and to [facility] thru the open door you motherfuckers! \" request for shoes denies due to patient high risk for elopement. Patient then states \"I should have pretended to be schizophrenic I would have gotten better meds! \" outlined acceptable behavior expectations on the unit, emotional  support given.

## 2021-01-22 NOTE — CARE COORDINATION
Biopsychosocial Assessment Note    Social work met with patient to complete the biopsychosocial assessment and CSSR-S. Mental Status Exam: Pt. Was cooperative at times and repeadly asking this staff to \"get me outta her\". Pt. Has irritable mood and affect, evasive with questions and avoids eye contact. Pt. Denies SI, HI, and hallucinations. Chief Complaint:\"I didn't do nothing, I was carrying my clothes and trying to find my money\"    Patient Report:     Gender  [x] Male [] Female [] Transgender  [] Other    Sexual Orientation    [x] Heterosexual [] Homosexual [] Bisexual [] Other    Suicidal Ideation  [] Reports [x] Denies    Homicidal Ideation  [] Reports [x] Denies      Hallucinations/Delusions (Specify type)  [] Reports [x] Denies     Substance Use/Alcohol Use/Addiction  [] Reports [x] Denies     Trauma History  [] Reports [x] Denies     Collateral Contact (GABO signed)  Name: Su Song  Relationship: Mother  Number: Unknown    Collateral Information: Not gotten yet    Access to Weapons:Pt. States no  Follow up provider: [address] [place] [address] for discharge (where they live can they return):Pt.  States he can return home with his mother
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
## 2021-01-22 NOTE — PROGRESS NOTES
Patient denies suicidal ideation, denies any hallucinations. Patient is anxious, depressed and sad, seeks PRN medication not ordered and before it is due for ordered meds. Patient was encouraged to attend groups. Patient is irritable and evasive at times, speech is pressured. Appetite is good.  Behavior is in control

## 2021-01-23 PROCEDURE — 6370000000 HC RX 637 (ALT 250 FOR IP): Performed by: NURSE PRACTITIONER

## 2021-01-23 PROCEDURE — 1240000000 HC EMOTIONAL WELLNESS R&B

## 2021-01-23 PROCEDURE — 6370000000 HC RX 637 (ALT 250 FOR IP): Performed by: PSYCHIATRY & NEUROLOGY

## 2021-01-23 PROCEDURE — 99232 SBSQ HOSP IP/OBS MODERATE 35: CPT | Performed by: NURSE PRACTITIONER

## 2021-01-23 RX ADMIN — TRAZODONE HYDROCHLORIDE 50 MG: 50 TABLET ORAL at 20:25

## 2021-01-23 RX ADMIN — CHLORDIAZEPOXIDE HYDROCHLORIDE 25 MG: 25 CAPSULE ORAL at 15:14

## 2021-01-23 RX ADMIN — CHLORDIAZEPOXIDE HYDROCHLORIDE 25 MG: 25 CAPSULE ORAL at 06:47

## 2021-01-23 RX ADMIN — ACETAMINOPHEN 650 MG: 325 TABLET ORAL at 15:14

## 2021-01-23 RX ADMIN — OLANZAPINE 10 MG: 10 TABLET, FILM COATED ORAL at 20:28

## 2021-01-23 ASSESSMENT — PAIN SCALES - GENERAL: PAINLEVEL_OUTOF10: 0

## 2021-01-23 NOTE — CARE COORDINATION
This note will not be viewable in [employer] for the following reason(s). This is a Psychotherapy Note. SW met with patient he wants to treat a local Methadone clinic. He is asking for medication for withdraw. He declines in patient or IOP . He plans to live with mother after discharge. Later on the patient came up to [facility] and asked to be sent to detox, SW noted that would not happen today.     Electronically signed by PATEL Salcido LSW on 1/23/2021 at 6:16 PM

## 2021-01-23 NOTE — PROGRESS NOTES
Patient ws isolative to his room. Denies SI,HI or AV hallucinations. Patient ws irritable, evasive, flight of ideas. Compliant with medications and did not attend groups. Safety rounds continue.

## 2021-01-23 NOTE — PLAN OF CARE
Isolative to room  denies SI/HI  denies hallucinations   refused meds   did not attend groups  irritable on approach    will continue to monitor

## 2021-01-24 PROCEDURE — 99232 SBSQ HOSP IP/OBS MODERATE 35: CPT | Performed by: NURSE PRACTITIONER

## 2021-01-24 PROCEDURE — 6370000000 HC RX 637 (ALT 250 FOR IP): Performed by: NURSE PRACTITIONER

## 2021-01-24 PROCEDURE — 1240000000 HC EMOTIONAL WELLNESS R&B

## 2021-01-24 PROCEDURE — 6370000000 HC RX 637 (ALT 250 FOR IP): Performed by: PSYCHIATRY & NEUROLOGY

## 2021-01-24 RX ADMIN — DIVALPROEX SODIUM 500 MG: 250 TABLET, DELAYED RELEASE ORAL at 21:11

## 2021-01-24 RX ADMIN — CHLORDIAZEPOXIDE HYDROCHLORIDE 25 MG: 25 CAPSULE ORAL at 21:17

## 2021-01-24 RX ADMIN — ACETAMINOPHEN 650 MG: 325 TABLET ORAL at 14:57

## 2021-01-24 RX ADMIN — HYDROXYZINE PAMOATE 50 MG: 50 CAPSULE ORAL at 15:48

## 2021-01-24 RX ADMIN — ACETAMINOPHEN 650 MG: 325 TABLET ORAL at 21:16

## 2021-01-24 RX ADMIN — TRAZODONE HYDROCHLORIDE 50 MG: 50 TABLET ORAL at 21:11

## 2021-01-24 RX ADMIN — CHLORDIAZEPOXIDE HYDROCHLORIDE 25 MG: 25 CAPSULE ORAL at 14:57

## 2021-01-24 RX ADMIN — OLANZAPINE 10 MG: 10 TABLET, FILM COATED ORAL at 21:11

## 2021-01-24 ASSESSMENT — PAIN SCALES - GENERAL
PAINLEVEL_OUTOF10: 10
PAINLEVEL_OUTOF10: 6
PAINLEVEL_OUTOF10: 0
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
## 2021-01-24 NOTE — BH NOTE
Lying in bed with covers over head, refusing vital signs. Forcefully stating \"I am trying to sleep\". Room littered with wrappers, juice cups, and linen.

## 2021-01-24 NOTE — PROGRESS NOTES
BEHAVIORAL HEALTH FOLLOW-UP NOTE     1/24/2021     Patient was seen and examined in person, Chart reviewed   Patient's case discussed with staff/team    Chief Complaint: \" I need tramadal.\"    Interim History: Patient lying in bed impulsive and easily agitated. He shows limited insight judgment hospitalization need for treatment. Not attending groups or socializing with peers. denies any auditory visualizations denies SI/HI intent or plan asking for seroquel or tramal.      Appetite:   [x] Normal/Unchanged  [] Increased  [] Decreased      Sleep:       [x] Normal/Unchanged  [] Fair       [] Poor              Energy:    [x] Normal/Unchanged  [] Increased  [] Decreased        SI [] Present  [x] Absent    HI  []Present  [x] Absent     Aggression:  [] yes  [x] no    Patient is [x] able  [] unable to CONTRACT FOR SAFETY     PAST MEDICAL/PSYCHIATRIC HISTORY:   History reviewed. No pertinent past medical history. FAMILY/SOCIAL HISTORY:  Family History   Problem Relation Age of Onset    No Known Problems Mother     No Known Problems Father      Social History     Socioeconomic History    Marital status: Single     Spouse name: Not on file    Number of children: 0    Years of education: 15    Highest education level: Not on file   Occupational History    Not on file   Social Needs    Financial resource strain: Not on file    Food insecurity     Worry: Not on file     Inability: Not on file   [employer] needs     Medical: Not on file     Non-medical: Not on file   Tobacco Use    Smoking status: Former Smoker     Packs/day: 1.00     Years: 15.00     Pack years: 15.00     Types: Cigarettes     Start date: 1/9/2004    Smokeless tobacco: Never Used   Substance and Sexual Activity    Alcohol use:  Yes     Alcohol/week: 3.0 standard drinks     Types: 3 Cans of beer per week     Comment: 3 tall boys and malt liquor/ qd    Drug use: Yes     Types: Cocaine     Comment: 8 ball /wk    Sexual activity: Not Currently   Lifestyle    Physical activity     Days per week: Not on file     Minutes per session: Not on file    Stress: Not on file   Relationships    Social connections     Talks on phone: Not on file     Gets together: Not on file     Attends Congregational service: Not on file     Active member of club or organization: Not on file     Attends meetings of clubs or organizations: Not on file     Relationship status: Not on file    Intimate partner violence     Fear of current or ex partner: Not on file     Emotionally abused: Not on file     Physically abused: Not on file     Forced sexual activity: Not on file   Other Topics Concern    Not on file   Social History Narrative    Not on file           ROS:  [x] All negative/unchanged except if checked.  Explain positive(checked items) below:  [] Constitutional  [] Eyes  [] Ear/Nose/Mouth/Throat  [] Respiratory  [] CV  [] GI  []   [] Musculoskeletal  [] Skin/Breast  [] Neurological  [] Endocrine  [] Heme/Lymph  [] Allergic/Immunologic    Explanation:     MEDICATIONS:    Current Facility-Administered Medications:     OLANZapine (ZYPREXA) tablet 10 mg, 10 mg, Oral, Nightly, LATASHA Johnson - CNP, 10 mg at 01/23/21 2028    chlordiazePOXIDE (LIBRIUM) capsule 25 mg, 25 mg, Oral, V9Y PRN, LATASHA Gordillo - CNP, 25 mg at 01/23/21 1514    acetaminophen (TYLENOL) tablet 650 mg, 650 mg, Oral, Q6H PRN, Conchita Tao MD, 650 mg at 01/23/21 1514    magnesium hydroxide (MILK OF MAGNESIA) 400 MG/5ML suspension 30 mL, 30 mL, Oral, Daily PRN, Conchita Tao MD    aluminum & magnesium hydroxide-simethicone (MAALOX) 200-200-20 MG/5ML suspension 30 mL, 30 mL, Oral, PRN, Conchita Tao MD    hydrOXYzine (VISTARIL) capsule 50 mg, 50 mg, Oral, TID PRN, Conchita Tao MD, 50 mg at 01/22/21 1046    traZODone (DESYREL) tablet 50 mg, 50 mg, Oral, Nightly PRN, Conchita Tao MD, 50 mg at 01/23/21 2025    divalproex (DEPAKOTE) DR tablet 500 mg, 500 mg, Oral, BID, David Ceron MD, 500 mg at 01/22/21 2147      Examination:  BP (!) 125/59   Pulse 66   Temp 97.8 °F (36.6 °C) (Temporal)   Resp 17   Ht 5' 8\" (1.727 m)   Wt 150 lb (68 kg)   SpO2 98%   BMI 22.81 kg/m²   Gait - steady  Medication side effects(SE): Denies    Mental Status Examination:    Level of consciousness:  within normal limits   Appearance:  poor grooming and poor hygiene  Behavior/Motor:  no abnormalities noted  Attitude toward examiner:  evasive and guarded  Speech:  spontaneous, normal rate and normal volume   Mood: irritable and labile  Affect:  mood congruent  Thought processes:  rapid   Thought content: Devoid of any auditory visualizations delusions or perceptual denies. Denies SI/HI intent or plan  Cognition:  oriented to person, place, and time   Concentration poor  Insight poor   Judgement poor     ASSESSMENT:   Patient symptoms are:  [] Well controlled  [] Improving  [] Worsening  [x] No change      Diagnosis:   Principal Problem:    Bipolar affective disorder, mixed, severe, with psychotic behavior ([facility] [address]  Active Problems:    Polysubstance abuse ([facility] [address]    Antisocial personality disorder ([facility] [address]  Resolved Problems:    * No resolved hospital problems. *      LABS:    No results for input(s): WBC, HGB, PLT in the last 72 hours. No results for input(s): NA, K, CL, CO2, BUN, CREATININE, GLUCOSE in the last 72 hours. No results for input(s): BILITOT, ALKPHOS, AST, ALT in the last 72 hours.   Lab Results   Component Value Date    [address] POSITIVE 01/20/2021    BARBSCNU NOT DETECTED 01/20/2021    LABBENZ POSITIVE 01/20/2021    LABMETH NOT DETECTED 01/20/2021    OPIATESCREENURINE NOT DETECTED 01/20/2021    PHENCYCLIDINESCREENURINE NOT DETECTED 01/20/2021    ETOH <10 01/20/2021     Lab Results   Component Value Date    TSH 1.100 01/10/2019     No results found for: LITHIUM  Lab Results   Component Value Date    VALPROATE 3 (L) 01/09/2021       Treatment Plan:  Reviewed current Medications with the patient. Risks, benefits, side effects, drug-to-drug interactions and alternatives to treatment were discussed. Collateral information:   CD evaluation  Encourage patient to attend group and other milieu activities.   Discharge planning discussed with the patient and treatment team.    Continue Depakote 500 mg twice daily for mood stabilization  Continue Zyprexa 10 mg at bedtime for mood and psychosis    PSYCHOTHERAPY/COUNSELING:  [x] Therapeutic interview  [x] Supportive  [] CBT  [] Ongoing  [] Other    [x] Patient continues to need, on a daily basis, active treatment furnished directly by or requiring the supervision of inpatient psychiatric personnel      Anticipated Length of stay: 3 to 5 days based on stability            Electronically signed by LATASHA Villanueva CNP on [birth year]1950 at 8:58 AM

## 2021-01-24 NOTE — PROGRESS NOTES
BEHAVIORAL HEALTH FOLLOW-UP NOTE     1/23/2021     Patient was seen and examined in person, Chart reviewed   Patient's case discussed with staff/team    Chief Complaint: \" [place] move out of state. \"    Interim History: Patient lying in bed impulsive and easily agitated. He shows limited insight judgment hospitalization need for treatment. States he wants to move out of state when he is discharged. Later stated he wanted to go to rehab. He is asking for Seroquel he denies any auditory visualizations denies SI/HI intent or plan he is isolative to his room not attending groups or socializing with peers      Appetite:   [x] Normal/Unchanged  [] Increased  [] Decreased      Sleep:       [x] Normal/Unchanged  [] Fair       [] Poor              Energy:    [x] Normal/Unchanged  [] Increased  [] Decreased        SI [] Present  [x] Absent    HI  []Present  [x] Absent     Aggression:  [] yes  [x] no    Patient is [x] able  [] unable to CONTRACT FOR SAFETY     PAST MEDICAL/PSYCHIATRIC HISTORY:   History reviewed. No pertinent past medical history. FAMILY/SOCIAL HISTORY:  Family History   Problem Relation Age of Onset    No Known Problems Mother     No Known Problems Father      Social History     Socioeconomic History    Marital status: Single     Spouse name: Not on file    Number of children: 0    Years of education: 15    Highest education level: Not on file   Occupational History    Not on file   Social Needs    Financial resource strain: Not on file    Food insecurity     Worry: Not on file     Inability: Not on file   [employer] needs     Medical: Not on file     Non-medical: Not on file   Tobacco Use    Smoking status: Former Smoker     Packs/day: 1.00     Years: 15.00     Pack years: 15.00     Types: Cigarettes     Start date: 1/9/2004    Smokeless tobacco: Never Used   Substance and Sexual Activity    Alcohol use:  Yes     Alcohol/week: 3.0 standard drinks     Types: 3 Cans of beer per week Comment: 3 tall boys and malt liquor/ qd    Drug use: Yes     Types: Cocaine     Comment: 8 ball /wk    Sexual activity: Not Currently   Lifestyle    Physical activity     Days per week: Not on file     Minutes per session: Not on file    Stress: Not on file   Relationships    Social connections     Talks on phone: Not on file     Gets together: Not on file     Attends Yazdanism service: Not on file     Active member of club or organization: Not on file     Attends meetings of clubs or organizations: Not on file     Relationship status: Not on file    Intimate partner violence     Fear of current or ex partner: Not on file     Emotionally abused: Not on file     Physically abused: Not on file     Forced sexual activity: Not on file   Other Topics Concern    Not on file   Social History Narrative    Not on file           ROS:  [x] All negative/unchanged except if checked.  Explain positive(checked items) below:  [] Constitutional  [] Eyes  [] Ear/Nose/Mouth/Throat  [] Respiratory  [] CV  [] GI  []   [] Musculoskeletal  [] Skin/Breast  [] Neurological  [] Endocrine  [] Heme/Lymph  [] Allergic/Immunologic    Explanation:     MEDICATIONS:    Current Facility-Administered Medications:     OLANZapine (ZYPREXA) tablet 10 mg, 10 mg, Oral, Nightly, LATASHA Johnson - CNP, 10 mg at 01/23/21 2028    chlordiazePOXIDE (LIBRIUM) capsule 25 mg, 25 mg, Oral, L5T PRN, LATASHA Marie - CNP, 25 mg at 01/23/21 1514    acetaminophen (TYLENOL) tablet 650 mg, 650 mg, Oral, Q6H PRN, Jodi Hua MD, 650 mg at 01/23/21 1514    magnesium hydroxide (MILK OF MAGNESIA) 400 MG/5ML suspension 30 mL, 30 mL, Oral, Daily PRN, Jodi Hua MD    aluminum & magnesium hydroxide-simethicone (MAALOX) 200-200-20 MG/5ML suspension 30 mL, 30 mL, Oral, PRN, Jodi Hua MD    hydrOXYzine (VISTARIL) capsule 50 mg, 50 mg, Oral, TID PRN, Jodi Hua MD, 50 mg at 01/22/21 1046    traZODone (DESYREL) tablet 50 mg, 50 mg, Oral, Nightly PRN, Jim Mccauley MD, 50 mg at 01/23/21 2025    divalproex (DEPAKOTE) DR tablet 500 mg, 500 mg, Oral, BID, Jim Mccauley MD, 500 mg at 01/22/21 2147      Examination:  BP (!) 124/58   Pulse 84   Temp 98.1 °F (36.7 °C) (Temporal)   Resp 14   Ht 5' 8\" (1.727 m)   Wt 150 lb (68 kg)   SpO2 98%   BMI 22.81 kg/m²   Gait - steady  Medication side effects(SE): Denies    Mental Status Examination:    Level of consciousness:  within normal limits   Appearance:  poor grooming and poor hygiene  Behavior/Motor:  no abnormalities noted  Attitude toward examiner:  evasive and guarded  Speech:  spontaneous, normal rate and normal volume   Mood: irritable and labile  Affect:  mood congruent  Thought processes:  rapid   Thought content: Devoid of any auditory visualizations delusions or perceptual denies. Denies SI/HI intent or plan  Cognition:  oriented to person, place, and time   Concentration poor  Insight poor   Judgement poor     ASSESSMENT:   Patient symptoms are:  [] Well controlled  [] Improving  [] Worsening  [x] No change      Diagnosis:   Principal Problem:    Bipolar affective disorder, mixed, severe, with psychotic behavior ([facility] [address]  Active Problems:    Polysubstance abuse ([facility] [address]    Antisocial personality disorder ([facility] [address]  Resolved Problems:    * No resolved hospital problems. *      LABS:    No results for input(s): WBC, HGB, PLT in the last 72 hours. No results for input(s): NA, K, CL, CO2, BUN, CREATININE, GLUCOSE in the last 72 hours. No results for input(s): BILITOT, ALKPHOS, AST, ALT in the last 72 hours.   Lab Results   Component Value Date    [address] POSITIVE 01/20/2021    BARBSCNU NOT DETECTED 01/20/2021    LABBENZ POSITIVE 01/20/2021    LABMETH NOT DETECTED 01/20/2021    OPIATESCREENURINE NOT DETECTED 01/20/2021    PHENCYCLIDINESCREENURINE NOT DETECTED 01/20/2021    ETOH <10 01/20/2021     Lab Results   Component Value Date    TSH 1.100 01/10/2019     No results found for: LITHIUM  Lab Results   Component Value Date    VALPROATE 3 (L) 01/09/2021       Treatment Plan:  Reviewed current Medications with the patient. Risks, benefits, side effects, drug-to-drug interactions and alternatives to treatment were discussed. Collateral information:   CD evaluation  Encourage patient to attend group and other milieu activities.   Discharge planning discussed with the patient and treatment team.    Continue Depakote 500 mg twice daily for mood stabilization  Continue Zyprexa 10 mg at bedtime for mood and psychosis    PSYCHOTHERAPY/COUNSELING:  [x] Therapeutic interview  [x] Supportive  [] CBT  [] Ongoing  [] Other    [x] Patient continues to need, on a daily basis, active treatment furnished directly by or requiring the supervision of inpatient psychiatric personnel      Anticipated Length of stay: 3 to 5 days based on stability            Electronically signed by LATASHA Irizarry CNP on 6/40/0603 at 8:37 PM

## 2021-01-24 NOTE — PLAN OF CARE
Isolative to room most of this day  Out for meals then returns to room  Refusing medication  Not attending group  Will continue to monitor

## 2021-01-25 ENCOUNTER — HOSPITAL ENCOUNTER (EMERGENCY)
Age: 26
Discharge: HOME | End: 2021-01-25
Attending: EMERGENCY MEDICINE
Payer: COMMERCIAL

## 2021-01-25 VITALS
HEIGHT: 68 IN | WEIGHT: 150 LBS | DIASTOLIC BLOOD PRESSURE: 64 MMHG | TEMPERATURE: 97.6 F | HEART RATE: 83 BPM | OXYGEN SATURATION: 98 % | RESPIRATION RATE: 14 BRPM | SYSTOLIC BLOOD PRESSURE: 112 MMHG | BODY MASS INDEX: 22.73 KG/M2

## 2021-01-25 VITALS
TEMPERATURE: 97.4 F | DIASTOLIC BLOOD PRESSURE: 88 MMHG | BODY MASS INDEX: 22.73 KG/M2 | OXYGEN SATURATION: 98 % | HEART RATE: 112 BPM | RESPIRATION RATE: 18 BRPM | SYSTOLIC BLOOD PRESSURE: 136 MMHG | HEIGHT: 68 IN | WEIGHT: 150 LBS

## 2021-01-25 DIAGNOSIS — T40.1X1A ACCIDENTAL OVERDOSE OF HEROIN, INITIAL ENCOUNTER (HCC): Primary | ICD-10-CM

## 2021-01-25 PROCEDURE — 99284 EMERGENCY DEPT VISIT MOD MDM: CPT

## 2021-01-25 PROCEDURE — 6370000000 HC RX 637 (ALT 250 FOR IP): Performed by: NURSE PRACTITIONER

## 2021-01-25 PROCEDURE — 6370000000 HC RX 637 (ALT 250 FOR IP): Performed by: PSYCHIATRY & NEUROLOGY

## 2021-01-25 PROCEDURE — 99239 HOSP IP/OBS DSCHRG MGMT >30: CPT | Performed by: NURSE PRACTITIONER

## 2021-01-25 RX ORDER — DIVALPROEX SODIUM 250 MG/1
250 TABLET, DELAYED RELEASE ORAL EVERY 12 HOURS SCHEDULED
Qty: 60 TABLET | Refills: 0 | Status: ON HOLD | OUTPATIENT
Start: 2021-01-25 | End: 2021-02-04 | Stop reason: SDUPTHER

## 2021-01-25 RX ORDER — OLANZAPINE 10 MG/1
10 TABLET ORAL NIGHTLY
Qty: 30 TABLET | Refills: 0 | Status: ON HOLD | OUTPATIENT
Start: 2021-01-25 | End: 2021-02-04 | Stop reason: SDUPTHER

## 2021-01-25 RX ADMIN — CHLORDIAZEPOXIDE HYDROCHLORIDE 25 MG: 25 CAPSULE ORAL at 04:38

## 2021-01-25 RX ADMIN — ACETAMINOPHEN 650 MG: 325 TABLET ORAL at 04:38

## 2021-01-25 ASSESSMENT — ENCOUNTER SYMPTOMS
COUGH: 0
SHORTNESS OF BREATH: 0
ABDOMINAL PAIN: 0
BLOOD IN STOOL: 0
VOMITING: 0
CONSTIPATION: 0
SORE THROAT: 0
RHINORRHEA: 0
DIARRHEA: 0
BACK PAIN: 0
NAUSEA: 0

## 2021-01-25 ASSESSMENT — PAIN SCALES - GENERAL
PAINLEVEL_OUTOF10: 0
PAINLEVEL_OUTOF10: 10

## 2021-01-25 NOTE — PROGRESS NOTES
Called lynn following discharge and following the discovery of his home medications, informed him that his home medications were found and he would need to return to the main lobby and staff would bring them to him. [facility] nurses' station number given in the message.

## 2021-01-25 NOTE — PLAN OF CARE
Patient is no behavioral issues and presents less isolative. He continues with underlying irritability, however, this is lessening. Patient denies intent to harm self and denies SI, HI, and AVH. Patient insight and judgement impaired, but improving. He is refusing medications. No grandiose statements made. Will monitor closely.

## 2021-01-25 NOTE — PROGRESS NOTES
CLINICAL PHARMACY NOTE: MEDS TO [address] Select Patient?: No  Total # of Prescriptions Filled: 1   The following medications were delivered to the patient:  · Olanzapine 10  Total # of Interventions Completed: 2  Time Spent (min): 30    Additional Documentation:

## 2021-01-25 NOTE — PROGRESS NOTES
Patient was out on unit, but does not interact with others. Denies SI,HI or AV hallucinations. Patient observed underlying irritability. Verbalizes his mind is racing. Appetite is alright. Sleep, verbalizes poor due to wasting time could be out making money. Compliant with evening medications,but does not attend groups. Safety rounds continue.
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
## 2021-01-25 NOTE — ED PROVIDER NOTES
Tova Arteaga is a 30 y.o. male with a PMHx significant for polysubstance abuse, schizoaffective disorder who presents for evaluation of drug overdose, beginning prior to arrival.  The complaint has been intermittent, mild in severity, and worsened by nothing. The patient was found on the ground at a gas station and was given 2mg of Narcan Intranasal prior to arrival.  The patient states that he was at the gas station and denies any drug use. He is awake, alert, and oriented to person, place, and time. Patient simply states that the police were harassing him. Per EMS the patient was found unresponsive at a gas station. The history is provided by the patient and medical records. Review of Systems   Constitutional: Negative for chills and fever. HENT: Negative for postnasal drip, rhinorrhea and sore throat. Eyes: Negative for visual disturbance. Respiratory: Negative for cough and shortness of breath. Cardiovascular: Negative for chest pain. Gastrointestinal: Negative for abdominal pain, blood in stool, constipation, diarrhea, nausea and vomiting. Genitourinary: Negative for difficulty urinating, dysuria and urgency. Musculoskeletal: Negative for back pain. Skin: Negative for rash. Neurological: Negative for dizziness, numbness and headaches. Unresponsiveness        Physical Exam  Constitutional:       General: He is not in acute distress. Appearance: Normal appearance. He is well-developed. He is not ill-appearing. HENT:      Head: Normocephalic and atraumatic. Right Ear: External ear normal.      Left Ear: External ear normal.   Eyes:      Extraocular Movements: Extraocular movements intact. Conjunctiva/sclera: Conjunctivae normal.   Neck:      Musculoskeletal: Normal range of motion and neck supple. Cardiovascular:      Rate and Rhythm: Normal rate and regular rhythm. Heart sounds: Normal heart sounds.    Pulmonary:      Effort: Pulmonary effort is convince the patient to stay, I have asked them to return as soon as possible to complete their evaluation. I have answered all their questions           --------------------------------------------- PAST HISTORY ---------------------------------------------  Past Medical History:  has no past medical history on file. Past Surgical History:  has no past surgical history on file. Social History:  reports that he has quit smoking. His smoking use included cigarettes. He started smoking about 17 years ago. He has a 15.00 pack-year smoking history. He has never used smokeless tobacco. He reports current alcohol use of about 3.0 standard drinks of alcohol per week. He reports current drug use. Drug: Cocaine. Family History: family history includes No Known Problems in his father and mother. The patients home medications have been reviewed. Allergies: Haloperidol and related    -------------------------------------------------- RESULTS -------------------------------------------------  Labs:  No results found for this visit on 01/25/21. Radiology:  No orders to display       ------------------------- NURSING NOTES AND VITALS REVIEWED ---------------------------  Date / Time Roomed:  1/25/2021  5:29 PM  ED Bed Assignment:  [facility]    The nursing notes within the ED encounter and vital signs as below have been reviewed. /88   Pulse 112   Temp 97.4 °F (36.3 °C) (Temporal)   Resp 18   Ht 5' 8\" (1.727 m)   Wt 150 lb (68 kg)   SpO2 98%   BMI 22.81 kg/m²   Oxygen Saturation Interpretation: Normal        Diagnosis:  1.  Accidental overdose of heroin, initial encounter [facility])        Disposition:  Patient's disposition: Patricio Cristobal,   Resident  01/25/21 6249

## 2021-01-25 NOTE — PROGRESS NOTES
[address] [facility]  Discharge Note  PATIENT WAS ADMITTED WITH MEDICATIONS FROM HOME. HOWEVER, BELONGINGS RECEIPT WAS NOT IN THE CHART. ADDITIONALLY, THERE WAS NO PATIENT STICKER ON PATIENT MEDICATIONS. BROUGHT TO THE ATTENTION OF DISCHARGING NURSE AFTER PATIENT LEFT THE FACILITY. Pt discharged with followings belongings:   Dentures: None  Clothing: (pants/hoodie/tshirts/sneakers/hat)  Other Valuables: (/coat MEDS GIVEN TO RN)   Valuables sent home with n/a. Valuables retrieved from safe, Security envelope number:  n/a and returned to patient. Patient education on aftercare instructions: yes  Information faxed to next level of care by social work Patient verbalize understanding of AVS:  yes.     Status EXAM upon discharge:  Status and Exam  Normal: No  Facial Expression: Flat  Affect: Appropriate  Level of Consciousness: Alert  Mood:Normal: No  Mood: Irritable(underlying; decreasing)  Motor Activity:Normal: No  Motor Activity: Decreased  Interview Behavior: Cooperative, Irritable  Preception: [place] to Person, [place] to Time, [place] to Place, [place] to Situation  Attention:Normal: No  Attention: Distractible  Thought Processes: Other(See comment)(impaired; improving)  Thought Content:Normal: No  Thought Content: Poverty of Content  Hallucinations: None  Delusions: No  Delusions: Persecution  Memory:Normal: No  Memory: Poor Remote  Insight and Judgment: No  Insight and Judgment: Poor Judgment, Poor Insight  Present Suicidal Ideation: No  Present Homicidal Ideation: No      Metabolic Screening:    Lab Results   Component Value Date    LABA1C 5.2 01/09/2021       Lab Results   Component Value Date    CHOL 181 01/09/2021     Lab Results   Component Value Date    TRIG 362 (H) 01/09/2021     Lab Results   Component Value Date    HDL 55 01/09/2021     No components found for: [facility]  Lab Results   Component Value Date    LABVLDL 72 01/09/2021       Alida Coburn RN

## 2021-02-02 ENCOUNTER — HOSPITAL ENCOUNTER (INPATIENT)
Age: 26
LOS: 1 days | Discharge: HOME | DRG: 753 | End: 2021-02-04
Attending: EMERGENCY MEDICINE | Admitting: PSYCHIATRY & NEUROLOGY
Payer: COMMERCIAL

## 2021-02-02 DIAGNOSIS — F19.922 DRUG INTOXICATION WITH PERCEPTUAL DISTURBANCE (HCC): Primary | ICD-10-CM

## 2021-02-02 LAB
ACETAMINOPHEN LEVEL: <5 MCG/ML (ref 10–30)
ALBUMIN SERPL-MCNC: 4.3 G/DL (ref 3.5–5.2)
ALP BLD-CCNC: 94 U/L (ref 40–129)
ALT SERPL-CCNC: 41 U/L (ref 0–40)
ANION GAP SERPL CALCULATED.3IONS-SCNC: 15 MMOL/L (ref 7–16)
AST SERPL-CCNC: 30 U/L (ref 0–39)
BASOPHILS ABSOLUTE: 0.07 E9/L (ref 0–0.2)
BASOPHILS RELATIVE PERCENT: 0.8 % (ref 0–2)
BILIRUB SERPL-MCNC: 0.3 MG/DL (ref 0–1.2)
BUN BLDV-MCNC: 14 MG/DL (ref 6–20)
CALCIUM SERPL-MCNC: 9.1 MG/DL (ref 8.6–10.2)
CHLORIDE BLD-SCNC: 104 MMOL/L (ref 98–107)
CO2: 22 MMOL/L (ref 22–29)
CREAT SERPL-MCNC: 1.4 MG/DL (ref 0.7–1.2)
EOSINOPHILS ABSOLUTE: 0.1 E9/L (ref 0.05–0.5)
EOSINOPHILS RELATIVE PERCENT: 1.1 % (ref 0–6)
ETHANOL: <10 MG/DL (ref 0–0.08)
GFR AFRICAN AMERICAN: >60
GFR NON-AFRICAN AMERICAN: >60 ML/MIN/1.73
GLUCOSE BLD-MCNC: 95 MG/DL (ref 74–99)
HCT VFR BLD CALC: 42.3 % (ref 37–54)
HEMOGLOBIN: 14 G/DL (ref 12.5–16.5)
IMMATURE GRANULOCYTES #: 0.07 E9/L
IMMATURE GRANULOCYTES %: 0.8 % (ref 0–5)
LYMPHOCYTES ABSOLUTE: 2.59 E9/L (ref 1.5–4)
LYMPHOCYTES RELATIVE PERCENT: 28.7 % (ref 20–42)
MCH RBC QN AUTO: 29.9 PG (ref 26–35)
MCHC RBC AUTO-ENTMCNC: 33.1 % (ref 32–34.5)
MCV RBC AUTO: 90.4 FL (ref 80–99.9)
MONOCYTES ABSOLUTE: 0.89 E9/L (ref 0.1–0.95)
MONOCYTES RELATIVE PERCENT: 9.9 % (ref 2–12)
NEUTROPHILS ABSOLUTE: 5.3 E9/L (ref 1.8–7.3)
NEUTROPHILS RELATIVE PERCENT: 58.7 % (ref 43–80)
PDW BLD-RTO: 12.7 FL (ref 11.5–15)
PLATELET # BLD: 338 E9/L (ref 130–450)
PMV BLD AUTO: 8.4 FL (ref 7–12)
POTASSIUM SERPL-SCNC: 4.1 MMOL/L (ref 3.5–5)
RBC # BLD: 4.68 E12/L (ref 3.8–5.8)
SALICYLATE, SERUM: <0.3 MG/DL (ref 0–30)
SODIUM BLD-SCNC: 141 MMOL/L (ref 132–146)
T4 TOTAL: 6.9 MCG/DL (ref 4.5–11.7)
TOTAL PROTEIN: 7.3 G/DL (ref 6.4–8.3)
TRICYCLIC ANTIDEPRESSANTS SCREEN SERUM: NEGATIVE NG/ML
TSH SERPL DL<=0.05 MIU/L-ACNC: 8.92 UIU/ML (ref 0.27–4.2)
WBC # BLD: 9 E9/L (ref 4.5–11.5)

## 2021-02-02 PROCEDURE — 80307 DRUG TEST PRSMV CHEM ANLYZR: CPT

## 2021-02-02 PROCEDURE — 80179 DRUG ASSAY SALICYLATE: CPT

## 2021-02-02 PROCEDURE — 99284 EMERGENCY DEPT VISIT MOD MDM: CPT

## 2021-02-02 PROCEDURE — 6360000002 HC RX W HCPCS: Performed by: EMERGENCY MEDICINE

## 2021-02-02 PROCEDURE — 80053 COMPREHEN METABOLIC PANEL: CPT

## 2021-02-02 PROCEDURE — 96372 THER/PROPH/DIAG INJ SC/IM: CPT

## 2021-02-02 PROCEDURE — 84436 ASSAY OF TOTAL THYROXINE: CPT

## 2021-02-02 PROCEDURE — 82077 ASSAY SPEC XCP UR&BREATH IA: CPT

## 2021-02-02 PROCEDURE — 80143 DRUG ASSAY ACETAMINOPHEN: CPT

## 2021-02-02 PROCEDURE — 85025 COMPLETE CBC W/AUTO DIFF WBC: CPT

## 2021-02-02 PROCEDURE — 84443 ASSAY THYROID STIM HORMONE: CPT

## 2021-02-02 PROCEDURE — 93005 ELECTROCARDIOGRAM TRACING: CPT | Performed by: EMERGENCY MEDICINE

## 2021-02-02 PROCEDURE — 6360000002 HC RX W HCPCS

## 2021-02-02 RX ORDER — DROPERIDOL 2.5 MG/ML
5 INJECTION, SOLUTION INTRAMUSCULAR; INTRAVENOUS ONCE
Status: COMPLETED | OUTPATIENT
Start: 2021-02-02 | End: 2021-02-02

## 2021-02-02 RX ORDER — DIPHENHYDRAMINE HYDROCHLORIDE 50 MG/ML
50 INJECTION INTRAMUSCULAR; INTRAVENOUS ONCE
Status: COMPLETED | OUTPATIENT
Start: 2021-02-02 | End: 2021-02-02

## 2021-02-02 RX ORDER — DIPHENHYDRAMINE HYDROCHLORIDE 50 MG/ML
50 INJECTION INTRAMUSCULAR; INTRAVENOUS ONCE
Status: DISCONTINUED | OUTPATIENT
Start: 2021-02-02 | End: 2021-02-02

## 2021-02-02 RX ORDER — DROPERIDOL 2.5 MG/ML
INJECTION, SOLUTION INTRAMUSCULAR; INTRAVENOUS
Status: COMPLETED
Start: 2021-02-02 | End: 2021-02-02

## 2021-02-02 RX ORDER — NALOXONE HYDROCHLORIDE 1 MG/ML
0.5 INJECTION INTRAMUSCULAR; INTRAVENOUS; SUBCUTANEOUS ONCE
Status: DISCONTINUED | OUTPATIENT
Start: 2021-02-02 | End: 2021-02-04 | Stop reason: HOSPADM

## 2021-02-02 RX ADMIN — DROPERIDOL 5 MG: 2.5 INJECTION, SOLUTION INTRAMUSCULAR; INTRAVENOUS at 21:49

## 2021-02-02 RX ADMIN — DIPHENHYDRAMINE HYDROCHLORIDE 50 MG: 50 INJECTION, SOLUTION INTRAMUSCULAR; INTRAVENOUS at 23:11

## 2021-02-03 VITALS
HEIGHT: 68 IN | RESPIRATION RATE: 18 BRPM | TEMPERATURE: 97.1 F | BODY MASS INDEX: 22.73 KG/M2 | SYSTOLIC BLOOD PRESSURE: 114 MMHG | DIASTOLIC BLOOD PRESSURE: 78 MMHG | HEART RATE: 80 BPM | OXYGEN SATURATION: 97 % | WEIGHT: 150 LBS

## 2021-02-03 PROBLEM — F10.950 PSYCHOSIS DUE TO ALCOHOL, WITH DELUSIONS (HCC): Status: ACTIVE | Noted: 2021-02-03

## 2021-02-03 LAB
AMPHETAMINE SCREEN, URINE: NOT DETECTED
BARBITURATE SCREEN URINE: NOT DETECTED
BENZODIAZEPINE SCREEN, URINE: NOT DETECTED
BILIRUBIN URINE: NEGATIVE
BLOOD, URINE: NEGATIVE
CANNABINOID SCREEN URINE: NOT DETECTED
CLARITY: CLEAR
COCAINE METABOLITE SCREEN URINE: POSITIVE
COLOR: YELLOW
EKG ATRIAL RATE: 92 BPM
EKG P AXIS: 107 DEGREES
EKG P-R INTERVAL: 120 MS
EKG Q-T INTERVAL: 370 MS
EKG QRS DURATION: 74 MS
EKG QTC CALCULATION (BAZETT): 457 MS
EKG R AXIS: 94 DEGREES
EKG T AXIS: -24 DEGREES
EKG VENTRICULAR RATE: 92 BPM
FENTANYL SCREEN, URINE: POSITIVE
GLUCOSE URINE: NEGATIVE MG/DL
KETONES, URINE: NEGATIVE MG/DL
LEUKOCYTE ESTERASE, URINE: NEGATIVE
Lab: ABNORMAL
METHADONE SCREEN, URINE: NOT DETECTED
NITRITE, URINE: NEGATIVE
OPIATE SCREEN URINE: NOT DETECTED
OXYCODONE URINE: NOT DETECTED
PH UA: 6 (ref 5–9)
PHENCYCLIDINE SCREEN URINE: NOT DETECTED
PROTEIN UA: NEGATIVE MG/DL
SARS-COV-2, NAAT: NOT DETECTED
SPECIFIC GRAVITY UA: <=1.005 (ref 1–1.03)
UROBILINOGEN, URINE: 0.2 E.U./DL

## 2021-02-03 PROCEDURE — 1240000000 HC EMOTIONAL WELLNESS R&B

## 2021-02-03 PROCEDURE — 81003 URINALYSIS AUTO W/O SCOPE: CPT

## 2021-02-03 PROCEDURE — 2580000003 HC RX 258

## 2021-02-03 PROCEDURE — 93010 ELECTROCARDIOGRAM REPORT: CPT | Performed by: INTERNAL MEDICINE

## 2021-02-03 PROCEDURE — 93005 ELECTROCARDIOGRAM TRACING: CPT | Performed by: EMERGENCY MEDICINE

## 2021-02-03 PROCEDURE — 80307 DRUG TEST PRSMV CHEM ANLYZR: CPT

## 2021-02-03 PROCEDURE — U0002 COVID-19 LAB TEST NON-CDC: HCPCS

## 2021-02-03 PROCEDURE — 2500000003 HC RX 250 WO HCPCS

## 2021-02-03 PROCEDURE — 6360000002 HC RX W HCPCS: Performed by: NURSE PRACTITIONER

## 2021-02-03 PROCEDURE — 6360000002 HC RX W HCPCS

## 2021-02-03 RX ORDER — ZIPRASIDONE MESYLATE 20 MG/ML
INJECTION, POWDER, LYOPHILIZED, FOR SOLUTION INTRAMUSCULAR
Status: DISPENSED
Start: 2021-02-03 | End: 2021-02-03

## 2021-02-03 RX ORDER — OLANZAPINE 10 MG/1
INJECTION, POWDER, LYOPHILIZED, FOR SOLUTION INTRAMUSCULAR
Status: COMPLETED
Start: 2021-02-03 | End: 2021-02-03

## 2021-02-03 RX ORDER — DIPHENHYDRAMINE HYDROCHLORIDE 50 MG/ML
50 INJECTION INTRAMUSCULAR; INTRAVENOUS ONCE
Status: COMPLETED | OUTPATIENT
Start: 2021-02-03 | End: 2021-02-03

## 2021-02-03 RX ORDER — ACETAMINOPHEN 325 MG/1
650 TABLET ORAL EVERY 4 HOURS PRN
Status: DISCONTINUED | OUTPATIENT
Start: 2021-02-03 | End: 2021-02-04 | Stop reason: HOSPADM

## 2021-02-03 RX ORDER — TRAZODONE HYDROCHLORIDE 50 MG/1
50 TABLET ORAL NIGHTLY PRN
Status: DISCONTINUED | OUTPATIENT
Start: 2021-02-03 | End: 2021-02-04 | Stop reason: HOSPADM

## 2021-02-03 RX ORDER — ZIPRASIDONE MESYLATE 20 MG/ML
20 INJECTION, POWDER, LYOPHILIZED, FOR SOLUTION INTRAMUSCULAR ONCE
Status: DISCONTINUED | OUTPATIENT
Start: 2021-02-03 | End: 2021-02-04 | Stop reason: HOSPADM

## 2021-02-03 RX ORDER — HALOPERIDOL 5 MG
5 TABLET ORAL EVERY 4 HOURS PRN
Status: DISCONTINUED | OUTPATIENT
Start: 2021-02-03 | End: 2021-02-04 | Stop reason: HOSPADM

## 2021-02-03 RX ORDER — LORAZEPAM 2 MG/ML
2 INJECTION INTRAMUSCULAR ONCE
Status: COMPLETED | OUTPATIENT
Start: 2021-02-03 | End: 2021-02-03

## 2021-02-03 RX ORDER — HYDROXYZINE PAMOATE 50 MG/1
50 CAPSULE ORAL 3 TIMES DAILY PRN
Status: DISCONTINUED | OUTPATIENT
Start: 2021-02-03 | End: 2021-02-04 | Stop reason: HOSPADM

## 2021-02-03 RX ORDER — OLANZAPINE 10 MG/1
10 INJECTION, POWDER, LYOPHILIZED, FOR SOLUTION INTRAMUSCULAR ONCE
Status: COMPLETED | OUTPATIENT
Start: 2021-02-03 | End: 2021-02-03

## 2021-02-03 RX ORDER — HALOPERIDOL 5 MG/ML
5 INJECTION INTRAMUSCULAR EVERY 4 HOURS PRN
Status: DISCONTINUED | OUTPATIENT
Start: 2021-02-03 | End: 2021-02-04 | Stop reason: HOSPADM

## 2021-02-03 RX ORDER — LORAZEPAM 2 MG/ML
INJECTION INTRAMUSCULAR
Status: COMPLETED
Start: 2021-02-03 | End: 2021-02-03

## 2021-02-03 RX ORDER — MAGNESIUM HYDROXIDE/ALUMINUM HYDROXICE/SIMETHICONE 120; 1200; 1200 MG/30ML; MG/30ML; MG/30ML
30 SUSPENSION ORAL EVERY 6 HOURS PRN
Status: DISCONTINUED | OUTPATIENT
Start: 2021-02-03 | End: 2021-02-04 | Stop reason: HOSPADM

## 2021-02-03 RX ADMIN — LORAZEPAM 2 MG: 2 INJECTION INTRAMUSCULAR at 06:00

## 2021-02-03 RX ADMIN — OLANZAPINE 10 MG: 10 INJECTION, POWDER, LYOPHILIZED, FOR SOLUTION INTRAMUSCULAR at 13:04

## 2021-02-03 RX ADMIN — LORAZEPAM 2 MG: 2 INJECTION INTRAMUSCULAR; INTRAVENOUS at 06:00

## 2021-02-03 RX ADMIN — WATER 2.1 ML: 1 INJECTION INTRAMUSCULAR; INTRAVENOUS; SUBCUTANEOUS at 13:04

## 2021-02-03 RX ADMIN — DIPHENHYDRAMINE HYDROCHLORIDE 50 MG: 50 INJECTION, SOLUTION INTRAMUSCULAR; INTRAVENOUS at 13:03

## 2021-02-03 ASSESSMENT — SLEEP AND FATIGUE QUESTIONNAIRES
DO YOU HAVE DIFFICULTY SLEEPING: NO
DIFFICULTY FALLING ASLEEP: NO
RESTFUL SLEEP: YES
DO YOU USE A SLEEP AID: NO
DIFFICULTY ARISING: NO

## 2021-02-03 ASSESSMENT — PATIENT HEALTH QUESTIONNAIRE - PHQ9: SUM OF ALL RESPONSES TO PHQ QUESTIONS 1-9: 0
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
## 2021-02-03 NOTE — ED NOTES
Pt refused set of vitals at this time and Odilia Gore from [facility] notified     Adrianne Shetty, RN  02/03/21 0080
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
## 2021-02-03 NOTE — ED NOTES
Initial pt contact. Pt calm and flat. Pt up in room gave this RN a Urine sample. Pt is in room talking to himself. He is eating and redirectable. Pt is denying any harmful thoughts at this time.   No further orders at this time     Ilsa Alexandra RN  02/03/21 3391

## 2021-02-03 NOTE — ED NOTES
Attempted to assess Pt.  Pt is unable to stay a wake to answer any questions most likely due to the medications that were given to calm Pt down upon arrival    Pt will be assessed when awake and more alert     Josh Mirza, MSW, JILLIANW  02/03/21 7737

## 2021-02-03 NOTE — PLAN OF CARE
Problem: Substance Abuse:  Goal: Absence of drug withdrawal signs and symptoms  Description: Absence of drug withdrawal signs and symptoms  Outcome: Ongoing  Goal: Participates in care planning  Description: Participates in care planning  Outcome: Ongoing  Goal: Patient specific goal  Description: Patient specific goal  Outcome: Ongoing

## 2021-02-03 NOTE — ED NOTES
Ok to admit to [id number] [place] [address] [address] Unit per Dr Luan Holcomb, RN  02/03/21 1050
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
## 2021-02-04 PROCEDURE — 99221 1ST HOSP IP/OBS SF/LOW 40: CPT | Performed by: NURSE PRACTITIONER

## 2021-02-04 PROCEDURE — 6370000000 HC RX 637 (ALT 250 FOR IP): Performed by: NURSE PRACTITIONER

## 2021-02-04 RX ORDER — M-VIT,TX,IRON,MINS/CALC/FOLIC 27MG-0.4MG
1 TABLET ORAL DAILY
Status: DISCONTINUED | OUTPATIENT
Start: 2021-02-04 | End: 2021-02-04 | Stop reason: HOSPADM

## 2021-02-04 RX ORDER — DIVALPROEX SODIUM 250 MG/1
250 TABLET, DELAYED RELEASE ORAL EVERY 12 HOURS SCHEDULED
Qty: 90 TABLET | Refills: 3 | Status: ON HOLD | OUTPATIENT
Start: 2021-02-04 | End: 2021-03-05 | Stop reason: SDUPTHER

## 2021-02-04 RX ORDER — DIVALPROEX SODIUM 250 MG/1
250 TABLET, DELAYED RELEASE ORAL EVERY 12 HOURS SCHEDULED
Qty: 60 TABLET | Refills: 0 | Status: ON HOLD | OUTPATIENT
Start: 2021-02-04 | End: 2021-03-05 | Stop reason: HOSPADM

## 2021-02-04 RX ORDER — OLANZAPINE 10 MG/1
10 TABLET ORAL NIGHTLY
Qty: 30 TABLET | Refills: 0 | Status: ON HOLD | OUTPATIENT
Start: 2021-02-04 | End: 2021-03-05 | Stop reason: SDUPTHER

## 2021-02-04 RX ORDER — DIVALPROEX SODIUM 250 MG/1
250 TABLET, DELAYED RELEASE ORAL EVERY 12 HOURS SCHEDULED
Status: DISCONTINUED | OUTPATIENT
Start: 2021-02-04 | End: 2021-02-04 | Stop reason: HOSPADM

## 2021-02-04 RX ORDER — OLANZAPINE 10 MG/1
10 TABLET ORAL NIGHTLY
Status: DISCONTINUED | OUTPATIENT
Start: 2021-02-04 | End: 2021-02-04 | Stop reason: HOSPADM

## 2021-02-04 RX ADMIN — DIVALPROEX SODIUM 250 MG: 250 TABLET, DELAYED RELEASE ORAL at 11:59

## 2021-02-04 RX ADMIN — MULTIPLE VITAMINS W/ MINERALS TAB 1 TABLET: TAB at 11:59

## 2021-02-04 NOTE — CARE COORDINATION
SW note: SW unable to assess at this time as RN advised sw to not wake him up d/t  Agitation. Will attempt again when he is more cooperative.
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
## 2021-02-04 NOTE — DISCHARGE SUMMARY
DISCHARGE SUMMARY      Patient ID:  Francisco Vásquez  [id number]  30 y.o.  1995    Admit date: 2/2/2021    Discharge date and time: 2/4/2021    Admitting Physician: Skip Marquez MD     Discharge Physician: Dr Jesus Alberto Oreilly MD    Admission Diagnoses: Bipolar affective disorder mixed with psychotic behavior  Admission Condition: poor    Discharged Condition: stable    Admission Circumstance: Presented to the ED after he was found psychotic lying in middle the road positive for cocaine and fentanyl      PAST MEDICAL/PSYCHIATRIC HISTORY:   History reviewed. No pertinent past medical history. FAMILY/SOCIAL HISTORY:  Family History   Problem Relation Age of Onset    No Known Problems Mother     No Known Problems Father      Social History     Socioeconomic History    Marital status: Single     Spouse name: Not on file    Number of children: 0    Years of education: 15    Highest education level: Not on file   Occupational History    Not on file   Social Needs    Financial resource strain: Not on file    Food insecurity     Worry: Not on file     Inability: Not on file   [employer] needs     Medical: Not on file     Non-medical: Not on file   Tobacco Use    Smoking status: Former Smoker     Packs/day: 1.00     Years: 15.00     Pack years: 15.00     Types: Cigarettes     Start date: 1/9/2004    Smokeless tobacco: Never Used   Substance and Sexual Activity    Alcohol use:  Yes     Alcohol/week: 3.0 standard drinks     Types: 3 Cans of beer per week     Comment: 3 tall boys and malt liquor/ qd    Drug use: Yes     Types: Cocaine     Comment: 8 ball /wk    Sexual activity: Not Currently   Lifestyle    Physical activity     Days per week: Not on file     Minutes per session: Not on file    Stress: Not on file   Relationships    Social connections     Talks on phone: Not on file     Gets together: Not on file     Attends Sikh service: Not on file     Active member of club or organization: Not on file Attends meetings of clubs or organizations: Not on file     Relationship status: Not on file    Intimate partner violence     Fear of current or ex partner: Not on file     Emotionally abused: Not on file     Physically abused: Not on file     Forced sexual activity: Not on file   Other Topics Concern    Not on file   Social History Narrative    Not on file       MEDICATIONS:    Current Facility-Administered Medications:     divalproex (DEPAKOTE) DR tablet 250 mg, 250 mg, Oral, 2 times per day, Xiang Kidney, APRN - CNP, 452 mg at 02/04/21 1159    OLANZapine (ZYPREXA) tablet 10 mg, 10 mg, Oral, Nightly, Shilo David, APRN - CNP    therapeutic multivitamin-minerals 1 tablet, 1 tablet, Oral, Daily, Xiang Acosta, APRN - CNP, 1 tablet at 02/04/21 1159    ziprasidone (GEODON) injection 20 mg, 20 mg, Intramuscular, Once, [employer], DO    acetaminophen (TYLENOL) tablet 650 mg, 650 mg, Oral, Q4H PRN, Naresh Clarke MD    hydrOXYzine (VISTARIL) capsule 50 mg, 50 mg, Oral, TID PRN, Naresh Clarke MD    haloperidol lactate (HALDOL) injection 5 mg, 5 mg, Intramuscular, Q4H PRN **OR** haloperidol (HALDOL) tablet 5 mg, 5 mg, Oral, Q4H PRN, Naresh Clarke MD    traZODone (DESYREL) tablet 50 mg, 50 mg, Oral, Nightly PRN, Naresh Clarke MD    magnesium hydroxide (MILK OF MAGNESIA) 400 MG/5ML suspension 30 mL, 30 mL, Oral, Daily PRN, Naresh Clarke MD    aluminum & magnesium hydroxide-simethicone (MAALOX) 200-200-20 MG/5ML suspension 30 mL, 30 mL, Oral, Q6H PRN, Naresh Clarke MD    naloxone [facility]) injection 0.5 mg, 0.5 mg, Intravenous, Once, Abran Umaña MD    Current Outpatient Medications:     divalproex (DEPAKOTE) 250 MG DR tablet, Take 1 tablet by mouth every 12 hours, Disp: 90 tablet, Rfl: 3    OLANZapine (ZYPREXA) 10 MG tablet, Take 1 tablet by mouth nightly, Disp: 30 tablet, Rfl: 0    divalproex (DEPAKOTE) 250 MG DR tablet, Take 1 tablet by mouth every 12 hours, Disp: 60 GLUCOSE 95     Recent Labs     02/02/21  2210   BILITOT 0.3   ALKPHOS 94   AST 30   ALT 41*     Lab Results   Component Value Date    LABAMPH NOT DETECTED 02/03/2021    BARBSCNU NOT DETECTED 02/03/2021    LABBENZ NOT DETECTED 02/03/2021    LABMETH NOT DETECTED 02/03/2021    OPIATESCREENURINE NOT DETECTED 02/03/2021    PHENCYCLIDINESCREENURINE NOT DETECTED 02/03/2021    ETOH <10 02/02/2021     Lab Results   Component Value Date    TSH 8.920 02/02/2021     No results found for: LITHIUM  Lab Results   Component Value Date    VALPROATE 3 (L) 01/09/2021       RISK ASSESSMENT AT DISCHARGE: Low risk for suicide and homicide. Treatment Plan:  Reviewed current Medications with the patient. Education provided on the complaince with treatment. Risks, benefits, side effects, drug-to-drug interactions and alternatives to treatment were discussed. Encourage patient to attend outpatient follow up appointment and therapy. Patient was advised to call the outpatient provider, visit the nearest ED or call 911 if symptoms are not manageable. Patient's family member was contacted prior to the discharge. Medication List      START taking these medications    * divalproex 250 MG DR tablet  Commonly known as: DEPAKOTE  Take 1 tablet by mouth every 12 hours     * divalproex 250 MG DR tablet  Commonly known as: DEPAKOTE  Take 1 tablet by mouth every 12 hours         * This list has 2 medication(s) that are the same as other medications prescribed for you. Read the directions carefully, and ask your doctor or other care provider to review them with you.             CONTINUE taking these medications    nicotine polacrilex 2 MG gum  Commonly known as: NICORETTE  Take 1 each by mouth as needed for Smoking cessation     OLANZapine 10 MG tablet  Commonly known as: ZYPREXA  Take 1 tablet by mouth nightly     therapeutic multivitamin-minerals tablet  Take 1 tablet by mouth daily        STOP taking these medications thiamine mononitrate 100 MG tablet           Where to Get Your Medications      These medications were sent to [facility] [place] [address]    Phone: [phone]   · divalproex 250 MG DR tablet  · divalproex 250 MG DR tablet  · OLANZapine 10 MG tablet       Patient is counseled if he continues to abuse drugs or alcohol he could act out impulsively causing serious harm to himself or others even though may be unintentional.  He demonstrated understanding of this and has the capacity understand this    Patient is counseled that his mental health cannot be optimized the ongoing use of drugs or alcohol he demonstrated standing of this as the capacity understand this    Patient counseled he must been compliant with all medications outpatient follow-up appointments    Patient is discharged home in stable condition    TIME SPEND - 35 MINUTES TO COMPLETE THE EVALUATION, DISCHARGE SUMMARY, MEDICATION RECONCILIATION AND FOLLOW UP CARE     Signed:  Nghia Atwood  7/6/2627  9:51 PM

## 2021-02-04 NOTE — PLAN OF CARE
No withdrawal symptoms. Patient does remain irritable and paranoid, with poor insight. Patient is no behavioral issues. Patient denies SI, HI, and AVH. Patient is medication compliant. Denies depression and anxiety. Will monitor closely, need to obtain collateral but at this time patient is refusing to sign an GABO for mom. Will monitor closely.
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
## 2021-02-04 NOTE — ED PROVIDER NOTES
ED PROVIDER NOTE    Chief Complaint   Patient presents with   [employer] [facility] Evaluation     found in the middle of the road lying down, pt yelling at the time of triage and unable to answer questions appropriately        HPI:  2/4/21,   Time: 10:27 AM EDUARD Arteaga is a 30 y.o. male presenting to the ED for psych evaluation. Unable to obtain full hx from patient due to psychosis. Per EMS patient was found laying in the street. Patient does admit to heroin and cocaine use. Otherwise does not answer questions. Chart review: hx of schizoaffective disorder, polysubstance use    Review of Systems:     Review of Systems   Unable to perform ROS: Psychiatric disorder         --------------------------------------------- PAST HISTORY ---------------------------------------------  Past Medical History:   History reviewed. No pertinent past medical history. Past Surgical History:   History reviewed. No pertinent surgical history. Social History:   Social History     Socioeconomic History    Marital status: Single     Spouse name: None    Number of children: 0    Years of education: 12    Highest education level: None   Occupational History    None   Social Needs    Financial resource strain: None    Food insecurity     Worry: None     Inability: None    Transportation needs     Medical: None     Non-medical: None   Tobacco Use    Smoking status: Former Smoker     Packs/day: 1.00     Years: 15.00     Pack years: 15.00     Types: Cigarettes     Start date: 1/9/2004    Smokeless tobacco: Never Used   Substance and Sexual Activity    Alcohol use:  Yes     Alcohol/week: 3.0 standard drinks     Types: 3 Cans of beer per week     Comment: 3 tall boys and malt liquor/ qd    Drug use: Yes     Types: Cocaine     Comment: 8 ball /wk    Sexual activity: Not Currently   Lifestyle    Physical activity     Days per week: None     Minutes per session: None    Stress: None   Relationships    Social connections Talks on phone: None     Gets together: None     Attends Christian service: None     Active member of club or organization: None     Attends meetings of clubs or organizations: None     Relationship status: None    Intimate partner violence     Fear of current or ex partner: None     Emotionally abused: None     Physically abused: None     Forced sexual activity: None   Other Topics Concern    None   Social History Narrative    None       Family History:   Family History   Problem Relation Age of Onset    No Known Problems Mother     No Known Problems Father        The patients home medications have been reviewed. Allergies: Allergies   Allergen Reactions    Haloperidol And Related            ---------------------------------------------------PHYSICAL EXAM--------------------------------------    /78   Pulse 80   Temp 97.1 °F (36.2 °C)   Resp 18   Ht 5' 8\" (1.727 m)   Wt 150 lb (68 kg)   SpO2 97%   BMI 22.81 kg/m²     Physical Exam  Vitals signs and nursing note reviewed. Constitutional:       Appearance: He is not toxic-appearing. Comments: Agitated, combative   HENT:      Mouth/Throat:      Mouth: Mucous membranes are moist.   Eyes:      General: No scleral icterus. Extraocular Movements: Extraocular movements intact. Pupils: Pupils are equal, round, and reactive to light. Neck:      Musculoskeletal: Normal range of motion and neck supple. No neck rigidity or muscular tenderness. Cardiovascular:      Rate and Rhythm: Regular rhythm. Tachycardia present. Pulses: Normal pulses. Heart sounds: Normal heart sounds. No murmur. Pulmonary:      Effort: Pulmonary effort is normal. No respiratory distress. Breath sounds: Normal breath sounds. No wheezing or rales. Abdominal:      General: There is no distension. Palpations: Abdomen is soft. Tenderness: There is no abdominal tenderness. There is no guarding or rebound.    Musculoskeletal: Normal Interpretation: Normal    The patients available past medical records and past encounters were reviewed. ------------------------------ ED COURSE/MEDICAL DECISION MAKING----------------------  Medications   naloxone (NARCAN) injection 0.5 mg (has no administration in time range)   ziprasidone (GEODON) injection 20 mg (has no administration in time range)   acetaminophen (TYLENOL) tablet 650 mg (has no administration in time range)   hydrOXYzine (VISTARIL) capsule 50 mg (has no administration in time range)   haloperidol lactate (HALDOL) injection 5 mg (has no administration in time range)     Or   haloperidol (HALDOL) tablet 5 mg (has no administration in time range)   traZODone (DESYREL) tablet 50 mg (has no administration in time range)   magnesium hydroxide (MILK OF MAGNESIA) 400 MG/5ML suspension 30 mL (has no administration in time range)   aluminum & magnesium hydroxide-simethicone (MAALOX) 200-200-20 MG/5ML suspension 30 mL (has no administration in time range)   droperidol (INAPSINE) injection 5 mg (5 mg Intramuscular Given 219)   diphenhydrAMINE (BENADRYL) injection 50 mg (50 mg Intramuscular Given 21 2311)   LORazepam (ATIVAN) injection 2 mg (2 mg Intramuscular Given 2/3/21 0600)   diphenhydrAMINE (BENADRYL) injection 50 mg (50 mg Intramuscular Given 2/3/21 1303)   OLANZapine (ZYPREXA) injection 10 mg (10 mg Intramuscular Given 2/3/21 1304)   sterile water injection (2.1 mLs  Given 2/3/21 1304)     Consultations:             Social work    Counseling: The emergency provider has spoken with the patient and discussed todays results, in addition to providing specific details for the plan of care and counseling regarding the diagnosis and prognosis. Questions are answered at this time and they are agreeable with the plan. ED Course/Medical Decision Makin y.o. male here with drug induced psychosis.  Agitated and combative on arrival requiring antipsychotics, subsequently more calm and cooperative. Medically cleared for psych admission. Signed out to oncoming physician pending transfer to psych unit.       --------------------------------- IMPRESSION AND DISPOSITION ---------------------------------    IMPRESSION  1. Drug intoxication with perceptual disturbance ([facility] [address]        DISPOSITION  Disposition: Admit to mental health unit - medically cleared for admission  Patient condition is stable    NOTE: This report was transcribed using voice recognition software.  Every effort was made to ensure accuracy; however, inadvertent computerized transcription errors may be present    Fidelia Sotelo MD  Attending Emergency Physician          Fidelia Sotelo MD  02/04/21 1035

## 2021-02-04 NOTE — CARE COORDINATION
This note will not be viewable in [facility] for the following reason(s). Suspected substance abuse disorder. Peer Recovery Support Note    Name: Edward Boss  Date: 2/4/2021    Chief Complaint   Patient presents with   [facility] [employer] [facility] Evaluation     found in the middle of the road lying down, pt yelling at the time of triage and unable to answer questions appropriately        Peer Support met with patient.   [] Support and education provided  [] Resources provided   [] Treatment referral:   [] Other:   [x] Patient declined peer recovery services     Referred By:    Notes:     Signed: Felicia Cheney, 2/4/2021

## 2021-02-04 NOTE — CARE COORDINATION
YESY note: d/c planning: SW spoke with mom on phone re: her taking out protection order on pt. She feels he only becomes violent and threatening when he is high and using drugs. She states that she has wanted him to get treatment but he refuses. She states she doesn't know what else to do but wants him to realize that using drugs is causing problems in his life and relationships. She is aware he will be d/c today and asked that we wait until he is served protection order today and then we can d.c him. YESY met with pt and informed him that mom took out protection order on pt as she states he has become violent and threatening when he is high. He denies that and shows no insight into need for substance abuse treatment. SW had spoke with him earlier re: wanting to refer him to [place] [facility] as they have a co occurring treatment program as well as MAT and residential program. However, pt stated he didn't need drug treatment as he only \"dabbles occasionally with drugs and doesn't feel he needs treatment for it. \" Pt states he is open with [facility] and just wants to follow there to get help for his depression. YESY did schedule intake appt for [facility] and placed meridian information on d/c instructions.

## 2021-02-04 NOTE — PROGRESS NOTES
Leisure assessment incomplete at this time. RN advised  to not wake him up d/t agitation and threatening. Will attempt again when as more cooperative.

## 2021-02-04 NOTE — CARE COORDINATION
In order to ensure appropriate transition and discharge planning is in place, the following documents have been transmitted to [facility], as the new outpatient provider:     The d/c diagnosis was transmitted to the next care provider   The reason for hospitalization was transmitted to the next care provider   The d/c medications (dosage and indication) were transmitted to the next care provider    The continuing care plan was transmitted to the next care provider

## 2021-02-05 NOTE — FLOWSHEET NOTE
SW Note: SW attempted to contact pt for post follow up phone call. SW received message with an unidentified female voice and  did not leave message dt confidentiality purposes.

## 2021-02-06 ENCOUNTER — HOSPITAL ENCOUNTER (EMERGENCY)
Age: 26
Discharge: LEFT BEFORE DISCHARGE | End: 2021-02-06
Attending: EMERGENCY MEDICINE
Payer: COMMERCIAL

## 2021-02-06 VITALS
DIASTOLIC BLOOD PRESSURE: 69 MMHG | HEART RATE: 85 BPM | BODY MASS INDEX: 22.81 KG/M2 | WEIGHT: 150 LBS | OXYGEN SATURATION: 97 % | SYSTOLIC BLOOD PRESSURE: 142 MMHG | TEMPERATURE: 97 F | RESPIRATION RATE: 17 BRPM

## 2021-02-06 DIAGNOSIS — T40.601A NARCOTIC OVERDOSE, ACCIDENTAL OR UNINTENTIONAL, INITIAL ENCOUNTER (HCC): ICD-10-CM

## 2021-02-06 DIAGNOSIS — T50.901A ACCIDENTAL DRUG OVERDOSE, INITIAL ENCOUNTER: Primary | ICD-10-CM

## 2021-02-06 LAB
EKG ATRIAL RATE: 68 BPM
EKG P AXIS: 45 DEGREES
EKG P-R INTERVAL: 150 MS
EKG Q-T INTERVAL: 380 MS
EKG QRS DURATION: 78 MS
EKG QTC CALCULATION (BAZETT): 404 MS
EKG R AXIS: 56 DEGREES
EKG T AXIS: -15 DEGREES
EKG VENTRICULAR RATE: 68 BPM

## 2021-02-06 PROCEDURE — 99283 EMERGENCY DEPT VISIT LOW MDM: CPT

## 2021-02-06 PROCEDURE — 93010 ELECTROCARDIOGRAM REPORT: CPT | Performed by: INTERNAL MEDICINE

## 2021-02-06 ASSESSMENT — ENCOUNTER SYMPTOMS
SORE THROAT: 1
BACK PAIN: 1

## 2021-02-06 NOTE — ED NOTES
Pt refusing any labs ekg or treatment. Pt refusing to give uirne. Pt requesting to sign out against medical advice despite this Rn as well as Dr speaking to him to explain he needs monitoring. Pt still refusing treatment.   rest of assesment unremarkable     Alejandra Duque RN  02/06/21 6144

## 2021-02-06 NOTE — ED PROVIDER NOTES
and related    ---------------------------------------------------PHYSICAL EXAM--------------------------------------  Physical Exam  Constitutional:       General: He is not in acute distress. Appearance: Normal appearance. HENT:      Head: Normocephalic. Comments: Abrasions on forehead, lower lip     Nose: Nose normal.      Mouth/Throat:      Pharynx: Posterior oropharyngeal erythema present. Eyes:      General: No scleral icterus. Extraocular Movements: Extraocular movements intact. Neck:      Musculoskeletal: Normal range of motion. Cardiovascular:      Rate and Rhythm: Normal rate and regular rhythm. Heart sounds: No murmur. No gallop. Pulmonary:      Effort: Pulmonary effort is normal. No respiratory distress. Breath sounds: No wheezing, rhonchi or rales. Abdominal:      General: Abdomen is flat. There is no distension. Palpations: Abdomen is soft. Tenderness: There is no abdominal tenderness. There is no guarding. Musculoskeletal: Normal range of motion. General: No tenderness. Skin:     Findings: Lesion (track marks B/L wrists) present. Neurological:      General: No focal deficit present. Mental Status: He is alert. Psychiatric:      Comments: Patient mildly agitated, states he does not need to be here       ------------------------------------------------- RESULTS -------------------------------------------------  I have personally reviewed all laboratory and imaging results for this patient. Results are listed below.      LABS: (Lab results interpreted by me)  Results for orders placed or performed during the hospital encounter of 02/06/21   EKG 12 Lead   Result Value Ref Range    Ventricular Rate 68 BPM    Atrial Rate 68 BPM    P-R Interval 150 ms    QRS Duration 78 ms    Q-T Interval 380 ms    QTc Calculation (Bazett) 404 ms    P Axis 45 degrees    R Axis 56 degrees    T Axis -15 degrees   ,       RADIOLOGY:  Interpreted by Radiologist unless otherwise specified  No orders to display           ------------------------- NURSING NOTES AND VITALS REVIEWED ---------------------------   The nursing notes within the ED encounter and vital signs as below have been reviewed by myself  BP (!) 142/69   Pulse 85   Temp 97 °F (36.1 °C)   Resp 17   Wt 150 lb (68 kg)   SpO2 97%   BMI 22.81 kg/m²     Oxygen Saturation Interpretation: Normal    The patients available past medical records and past encounters were reviewed. ------------------------------ ED COURSE/MEDICAL DECISION MAKING----------------------  Medications - No data to display       Medical Decision Making:     Patient wishes to leave AMA. Denies SI and HI. Able to make decisions. This patient's ED course included: a personal history and physicial examination    This patient has remained hemodynamically stable during their ED course. Consultations:  none         --------------------------------- IMPRESSION AND DISPOSITION ---------------------------------    IMPRESSION  1. Accidental drug overdose, initial encounter    2. Narcotic overdose, accidental or unintentional, initial encounter ([facility]        DISPOSITION  Disposition: Left AMA        NOTE: This report was transcribed using voice recognition software.  Every effort was made to ensure accuracy; however, inadvertent computerized transcription errors may be present     Chiquis Naranjo,   Resident  02/06/21 [address]   [address], DO  Resident  02/06/21 9552

## 2021-02-06 NOTE — ED NOTES
Bed: HB  Expected date: 2/6/21  Expected time:   Means of arrival: [facility]  Comments:  AMMON Cordero RN  02/06/21 1772

## 2021-02-07 ENCOUNTER — HOSPITAL ENCOUNTER (EMERGENCY)
Age: 26
Discharge: OTHER INSTITUTION | End: 2021-02-08
Attending: EMERGENCY MEDICINE
Payer: COMMERCIAL

## 2021-02-07 DIAGNOSIS — F48.9 MENTAL HEALTH PROBLEM: Primary | ICD-10-CM

## 2021-02-07 PROCEDURE — 99283 EMERGENCY DEPT VISIT LOW MDM: CPT

## 2021-02-08 VITALS
DIASTOLIC BLOOD PRESSURE: 69 MMHG | HEART RATE: 74 BPM | SYSTOLIC BLOOD PRESSURE: 116 MMHG | HEIGHT: 68 IN | BODY MASS INDEX: 22.73 KG/M2 | TEMPERATURE: 98.2 F | OXYGEN SATURATION: 96 % | RESPIRATION RATE: 16 BRPM | WEIGHT: 150 LBS

## 2021-02-08 ASSESSMENT — ENCOUNTER SYMPTOMS
PHOTOPHOBIA: 0
COUGH: 1
CHEST TIGHTNESS: 0
ABDOMINAL PAIN: 0
SHORTNESS OF BREATH: 0
DIARRHEA: 0
NAUSEA: 0
ABDOMINAL DISTENTION: 0
VOMITING: 0

## 2021-02-08 NOTE — ED NOTES
YESY called [facility] and confirmed pt has follow up appointment today at 1:00. Spoke with Misa and explained pt's situation with his medications. Misa stated she will relay information to her supervisor so they can help pt acquire new medication at his appointment. YESY spoke with ED physician and plan is to discharge pt with transportation at noon so that he will be on time to his 1:00 appointment at [address] [facility].       PATEL Boswell, Delano Hannah  02/08/21 2769

## 2021-02-08 NOTE — ED NOTES
Pt has been to and from nursing station with various requests pt denies si/hi/ hallucinations reports that he was on a bus he had acutal pills and states when he got off the bus he left the meds on the bus.   Pt is calm and cooperative makes brief eye contact pt informed of plan of care     Josie Weston RN  02/08/21 0642

## 2021-02-08 NOTE — ED PROVIDER NOTES
Tova Arteaga is a 30year old male who presented to ED with concerns for being out of his depakote medication for two days. Patient states that he is also homeless and does not have any access to food at this time. Patient was recently admitted to the hospital and chose to sign out AMA 2 days ago. Patient states he has had a cough for several days which is mild and nonproductive. Patient denies any chest pain or shortness of breath. Patient has any fever, chills, nausea, vomiting. Patient denies any known sick contacts. Patient denies any suicidal or homicidal ideations. Patient denies any hallucinations of any kind. Patient states that he was concerned he was out of medication and is worried that he will have return of psychiatric symptoms. The history is provided by the patient and medical records. Review of Systems   Constitutional: Negative for chills, diaphoresis, fatigue and fever. Eyes: Negative for photophobia and visual disturbance. Respiratory: Positive for cough. Negative for chest tightness and shortness of breath. Cardiovascular: Negative for chest pain, palpitations and leg swelling. Gastrointestinal: Negative for abdominal distention, abdominal pain, diarrhea, nausea and vomiting. Genitourinary: Negative for dysuria. Musculoskeletal: Negative for neck pain and neck stiffness. Skin: Negative for pallor and rash. Allergic/Immunologic: Negative for immunocompromised state. Neurological: Negative for headaches. Psychiatric/Behavioral: Negative for agitation, behavioral problems, confusion, hallucinations, self-injury, sleep disturbance and suicidal ideas. The patient is not nervous/anxious. Physical Exam  Vitals signs and nursing note reviewed. Constitutional:       General: He is not in acute distress. Appearance: Normal appearance. He is not ill-appearing. HENT:      Head: Normocephalic and atraumatic. Eyes:      General: No scleral icterus. Conjunctiva/sclera: Conjunctivae normal.      Pupils: Pupils are equal, round, and reactive to light. Neck:      Musculoskeletal: Normal range of motion and neck supple. No neck rigidity or muscular tenderness. Cardiovascular:      Rate and Rhythm: Normal rate and regular rhythm. Pulmonary:      Effort: Pulmonary effort is normal. No respiratory distress. Breath sounds: Normal breath sounds. No stridor. No wheezing, rhonchi or rales. Chest:      Chest wall: No tenderness. Abdominal:      General: Bowel sounds are normal. There is no distension. Palpations: Abdomen is soft. Tenderness: There is no abdominal tenderness. There is no guarding or rebound. Musculoskeletal:      Right lower leg: No edema. Left lower leg: No edema. Skin:     General: Skin is warm and dry. Capillary Refill: Capillary refill takes less than 2 seconds. Coloration: Skin is not pale. Findings: No erythema or rash. Neurological:      Mental Status: He is alert and oriented to person, place, and time. GCS: GCS eye subscore is 4. GCS verbal subscore is 5. GCS motor subscore is 6. Psychiatric:         Mood and Affect: Mood normal.          Procedures     MDM  Number of Diagnoses or Management Options  Mental health problem  Diagnosis management comments: Keesha Mtichell is a 24-year-old male present emergency department with concern for being out of medication and food. Patient does have complaints of cough but is without any other symptoms. Patient does not any chest pain or shortness of breath. Patient was offered a chest x-ray and evaluation but declined. Patient does not have any homicidal or suicidal ideations at this time. Patient not having any hallucinations.   Patient is medically cleared for social work evaluation.                    --------------------------------------------- PAST HISTORY ---------------------------------------------  Past Medical History:  has no past medical history on file. Past Surgical History:  has no past surgical history on file. Social History:  reports that he has quit smoking. His smoking use included cigarettes. He started smoking about 17 years ago. He has a 15.00 pack-year smoking history. He has never used smokeless tobacco. He reports current alcohol use of about 3.0 standard drinks of alcohol per week. He reports current drug use. Drug: Cocaine. Family History: family history includes No Known Problems in his father and mother. The patients home medications have been reviewed. Allergies: Haloperidol and related    -------------------------------------------------- RESULTS -------------------------------------------------  Labs:  No results found for this visit on 02/07/21. Radiology:  No orders to display       ------------------------- NURSING NOTES AND VITALS REVIEWED ---------------------------  Date / Time Roomed:  2/7/2021 11:46 PM  ED Bed Assignment:  [facility]    The nursing notes within the ED encounter and vital signs as below have been reviewed. /87   Pulse 68   Temp 98.2 °F (36.8 °C)   Resp 14   Ht 5' 8\" (1.727 m)   Wt 150 lb (68 kg)   SpO2 98%   BMI 22.81 kg/m²   Oxygen Saturation Interpretation: Normal      ------------------------------------------ PROGRESS NOTES ------------------------------------------  6:33 AM EST  I have spoken with the patient and discussed todays results, in addition to providing specific details for the plan of care and counseling regarding the diagnosis and prognosis. Their questions are answered at this time and they are agreeable with the plan. I discussed at length with them reasons for immediate return here for re evaluation. They will followup with their primary care physician by calling their office tomorrow.       --------------------------------- ADDITIONAL PROVIDER NOTES ---------------------------------  At this time the patient is without objective evidence of an acute process requiring hospitalization or inpatient management. They have remained hemodynamically stable throughout their entire ED visit and are stable for discharge with outpatient follow-up. The plan has been discussed in detail and they are aware of the specific conditions for emergent return, as well as the importance of follow-up. New Prescriptions    No medications on file       Diagnosis:  1. Mental health problem        Disposition:  Patient's disposition: Discharge per social work patient is medically cleared. Patient's condition is stable.             Jack Bender MD  Resident  02/08/21 9274

## 2021-02-08 NOTE — ED NOTES
Emergency Department [facility] Biopsychosocial Assessment Note    Pt is voluntary    Chief Complaint:     Pt is a 31 yo male presenting to the ED for, Patient states he wants back on his psych meds    MSE:    Pt is agitated, oriented x 4, alert, labile affect, poor eye contact, pressured speech, denies SI, HI and AVH. Pt appears to possible be either high or not taking his meds for longer then a day. Pt's appearance is neglected and unkept. Pt reports poor sleep and ok appetite but does not have access to food. Clinical Summary/History:     Pt reports a MH hx of BiPolar and depression. Pt reports he was med compliant until yesterday. Pt states he sat his meds down and left and then they were gone. Pt reports was connected to [facility] when he was discharged 4 days ago be has not had his intake yet and is unsure of when his appointment is. Pt denies SI, HI and AVH. Pt denies AOD. No labs were ordered. Informed Pt that he did not meet inpatient criteria and options were limited since he just picked up his meds and most insurance companies will not approve another refill before 30 days. Pt now states he is homeless. Informed Pt that he could sleep in ED and [facility] to stay warm and get breakfast, Pt states he can not go there but will not say why. Pt then states he can not get a hold of his friends and that he needs admitted so that he does not steal anything. Informed Pt stealing is not a mental health issues it is a legal issue. With this info Pt started to get agitated. SW informed Pt she will talk with ED Doc. Gender  [x] Male [] Female [] Transgender  [] Other    Sexual Orientation    [x] Heterosexual [] Homosexual [] Bisexual [] Other    Suicidal Behavioral: CSSR-S Complete.   [] Reports:    [] Past [] Present   [x] Denies    Homicidal/ Violent Behavior  [] Reports:   [] Past [] Present   [x] Denies     Hallucinations/Delusions   [] Reports:   [x] Denies     Substance

## 2021-02-08 NOTE — ED NOTES
Per Dr. Ld Tracy, please have day turn Nurse Practitioner reassess patient at 0800.      Kassy Burns RN  02/08/21 0575

## 2021-02-08 NOTE — ED NOTES
Pt agreeable to discharge to be tx to [facility] via taxi for his 1300 appt and discuss housing issue     Altagracia Llamas, RN  02/08/21 4652

## 2021-03-01 ENCOUNTER — HOSPITAL ENCOUNTER (INPATIENT)
Age: 26
LOS: 4 days | Discharge: HOME | DRG: 753 | End: 2021-03-05
Attending: EMERGENCY MEDICINE | Admitting: PSYCHIATRY & NEUROLOGY
Payer: COMMERCIAL

## 2021-03-01 DIAGNOSIS — F29 PSYCHOSIS, UNSPECIFIED PSYCHOSIS TYPE (HCC): Primary | ICD-10-CM

## 2021-03-01 LAB
ACETAMINOPHEN LEVEL: <5 MCG/ML (ref 10–30)
ALBUMIN SERPL-MCNC: 3.8 G/DL (ref 3.5–5.2)
ALP BLD-CCNC: 80 U/L (ref 40–129)
ALT SERPL-CCNC: 44 U/L (ref 0–40)
AMPHETAMINE SCREEN, URINE: NOT DETECTED
ANION GAP SERPL CALCULATED.3IONS-SCNC: 8 MMOL/L (ref 7–16)
AST SERPL-CCNC: 39 U/L (ref 0–39)
BARBITURATE SCREEN URINE: NOT DETECTED
BASOPHILS ABSOLUTE: 0 E9/L (ref 0–0.2)
BASOPHILS RELATIVE PERCENT: 0.5 % (ref 0–2)
BENZODIAZEPINE SCREEN, URINE: NOT DETECTED
BILIRUB SERPL-MCNC: 0.3 MG/DL (ref 0–1.2)
BUN BLDV-MCNC: 10 MG/DL (ref 6–20)
CALCIUM SERPL-MCNC: 8.3 MG/DL (ref 8.6–10.2)
CANNABINOID SCREEN URINE: NOT DETECTED
CHLORIDE BLD-SCNC: 104 MMOL/L (ref 98–107)
CO2: 26 MMOL/L (ref 22–29)
COCAINE METABOLITE SCREEN URINE: POSITIVE
CREAT SERPL-MCNC: 1.1 MG/DL (ref 0.7–1.2)
EKG ATRIAL RATE: 87 BPM
EKG P AXIS: 36 DEGREES
EKG P-R INTERVAL: 148 MS
EKG Q-T INTERVAL: 386 MS
EKG QRS DURATION: 78 MS
EKG QTC CALCULATION (BAZETT): 464 MS
EKG R AXIS: 35 DEGREES
EKG T AXIS: -163 DEGREES
EKG VENTRICULAR RATE: 87 BPM
EOSINOPHILS ABSOLUTE: 0.11 E9/L (ref 0.05–0.5)
EOSINOPHILS RELATIVE PERCENT: 2.6 % (ref 0–6)
ETHANOL: <10 MG/DL (ref 0–0.08)
FENTANYL SCREEN, URINE: POSITIVE
GFR AFRICAN AMERICAN: >60
GFR NON-AFRICAN AMERICAN: >60 ML/MIN/1.73
GLUCOSE BLD-MCNC: 91 MG/DL (ref 74–99)
HCT VFR BLD CALC: 36.7 % (ref 37–54)
HEMOGLOBIN: 12.1 G/DL (ref 12.5–16.5)
LYMPHOCYTES ABSOLUTE: 1.29 E9/L (ref 1.5–4)
LYMPHOCYTES RELATIVE PERCENT: 30.4 % (ref 20–42)
Lab: ABNORMAL
MCH RBC QN AUTO: 29.2 PG (ref 26–35)
MCHC RBC AUTO-ENTMCNC: 33 % (ref 32–34.5)
MCV RBC AUTO: 88.4 FL (ref 80–99.9)
METAMYELOCYTES RELATIVE PERCENT: 0.9 % (ref 0–1)
METHADONE SCREEN, URINE: NOT DETECTED
MONOCYTES ABSOLUTE: 0.26 E9/L (ref 0.1–0.95)
MONOCYTES RELATIVE PERCENT: 6.1 % (ref 2–12)
NEUTROPHILS ABSOLUTE: 2.62 E9/L (ref 1.8–7.3)
NEUTROPHILS RELATIVE PERCENT: 60 % (ref 43–80)
OPIATE SCREEN URINE: NOT DETECTED
OXYCODONE URINE: NOT DETECTED
PDW BLD-RTO: 13 FL (ref 11.5–15)
PHENCYCLIDINE SCREEN URINE: NOT DETECTED
PLATELET # BLD: 189 E9/L (ref 130–450)
PMV BLD AUTO: 8.8 FL (ref 7–12)
POTASSIUM SERPL-SCNC: 3.8 MMOL/L (ref 3.5–5)
RBC # BLD: 4.15 E12/L (ref 3.8–5.8)
RBC # BLD: NORMAL 10*6/UL
SALICYLATE, SERUM: <0.3 MG/DL (ref 0–30)
SARS-COV-2, NAAT: NOT DETECTED
SODIUM BLD-SCNC: 138 MMOL/L (ref 132–146)
TOTAL PROTEIN: 6.3 G/DL (ref 6.4–8.3)
TRICYCLIC ANTIDEPRESSANTS SCREEN SERUM: NEGATIVE NG/ML
WBC # BLD: 4.3 E9/L (ref 4.5–11.5)

## 2021-03-01 PROCEDURE — 82077 ASSAY SPEC XCP UR&BREATH IA: CPT

## 2021-03-01 PROCEDURE — 85025 COMPLETE CBC W/AUTO DIFF WBC: CPT

## 2021-03-01 PROCEDURE — 80307 DRUG TEST PRSMV CHEM ANLYZR: CPT

## 2021-03-01 PROCEDURE — 6360000002 HC RX W HCPCS: Performed by: EMERGENCY MEDICINE

## 2021-03-01 PROCEDURE — 80143 DRUG ASSAY ACETAMINOPHEN: CPT

## 2021-03-01 PROCEDURE — 80179 DRUG ASSAY SALICYLATE: CPT

## 2021-03-01 PROCEDURE — 93010 ELECTROCARDIOGRAM REPORT: CPT | Performed by: INTERNAL MEDICINE

## 2021-03-01 PROCEDURE — 80053 COMPREHEN METABOLIC PANEL: CPT

## 2021-03-01 PROCEDURE — 87635 SARS-COV-2 COVID-19 AMP PRB: CPT

## 2021-03-01 PROCEDURE — 96372 THER/PROPH/DIAG INJ SC/IM: CPT

## 2021-03-01 PROCEDURE — 99285 EMERGENCY DEPT VISIT HI MDM: CPT

## 2021-03-01 PROCEDURE — 93005 ELECTROCARDIOGRAM TRACING: CPT | Performed by: EMERGENCY MEDICINE

## 2021-03-01 PROCEDURE — 1240000000 HC EMOTIONAL WELLNESS R&B

## 2021-03-01 RX ORDER — OLANZAPINE 10 MG/1
5 INJECTION, POWDER, LYOPHILIZED, FOR SOLUTION INTRAMUSCULAR EVERY 6 HOURS PRN
Status: DISCONTINUED | OUTPATIENT
Start: 2021-03-01 | End: 2021-03-05 | Stop reason: HOSPADM

## 2021-03-01 RX ORDER — HYDROXYZINE PAMOATE 50 MG/1
50 CAPSULE ORAL 3 TIMES DAILY PRN
Status: DISCONTINUED | OUTPATIENT
Start: 2021-03-01 | End: 2021-03-05 | Stop reason: HOSPADM

## 2021-03-01 RX ORDER — ZIPRASIDONE MESYLATE 20 MG/ML
20 INJECTION, POWDER, LYOPHILIZED, FOR SOLUTION INTRAMUSCULAR ONCE
Status: COMPLETED | OUTPATIENT
Start: 2021-03-01 | End: 2021-03-01

## 2021-03-01 RX ORDER — LORAZEPAM 2 MG/ML
2 INJECTION INTRAMUSCULAR ONCE
Status: COMPLETED | OUTPATIENT
Start: 2021-03-01 | End: 2021-03-01

## 2021-03-01 RX ORDER — TRAZODONE HYDROCHLORIDE 50 MG/1
50 TABLET ORAL NIGHTLY PRN
Status: DISCONTINUED | OUTPATIENT
Start: 2021-03-01 | End: 2021-03-05 | Stop reason: HOSPADM

## 2021-03-01 RX ORDER — ZIPRASIDONE MESYLATE 20 MG/ML
10 INJECTION, POWDER, LYOPHILIZED, FOR SOLUTION INTRAMUSCULAR ONCE
Status: COMPLETED | OUTPATIENT
Start: 2021-03-01 | End: 2021-03-01

## 2021-03-01 RX ORDER — MAGNESIUM HYDROXIDE/ALUMINUM HYDROXICE/SIMETHICONE 120; 1200; 1200 MG/30ML; MG/30ML; MG/30ML
30 SUSPENSION ORAL PRN
Status: DISCONTINUED | OUTPATIENT
Start: 2021-03-01 | End: 2021-03-05 | Stop reason: HOSPADM

## 2021-03-01 RX ORDER — ACETAMINOPHEN 325 MG/1
650 TABLET ORAL EVERY 6 HOURS PRN
Status: DISCONTINUED | OUTPATIENT
Start: 2021-03-01 | End: 2021-03-05 | Stop reason: HOSPADM

## 2021-03-01 RX ADMIN — LORAZEPAM 2 MG: 2 INJECTION INTRAMUSCULAR; INTRAVENOUS at 12:34

## 2021-03-01 RX ADMIN — LORAZEPAM 2 MG: 2 INJECTION INTRAMUSCULAR; INTRAVENOUS at 05:52

## 2021-03-01 RX ADMIN — ZIPRASIDONE MESYLATE 10 MG: 20 INJECTION, POWDER, LYOPHILIZED, FOR SOLUTION INTRAMUSCULAR at 05:52

## 2021-03-01 RX ADMIN — ZIPRASIDONE MESYLATE 20 MG: 20 INJECTION, POWDER, LYOPHILIZED, FOR SOLUTION INTRAMUSCULAR at 12:34

## 2021-03-01 ASSESSMENT — PAIN SCALES - GENERAL: PAINLEVEL_OUTOF10: 0

## 2021-03-01 NOTE — ED PROVIDER NOTES
x3, agitated and yelling at staff  Head: Normocephalic and atraumatic  Eyes: PERRL, EOMI  Mouth: Oropharynx clear, handling secretions, no trismus  Neck: Supple, full ROM, non tender to palpation in the midline, no stridor, no crepitus, no meningeal signs  Pulmonary: Lungs clear to auscultation bilaterally, no wheezes, rales, or rhonchi. Not in respiratory distress  Cardiovascular:  Regular rate. Regular rhythm. No murmurs, gallops, or rubs. 2+ distal pulses  Chest: no chest wall tenderness  Abdomen: Soft. Non tender. Non distended. +BS. No rebound, guarding, or rigidity. No pulsatile masses appreciated. Musculoskeletal: Moves all extremities x 4. Warm and well perfused, no clubbing, cyanosis, or edema. Capillary refill <3 seconds  Skin: warm and dry. No rashes. Neurologic: GCS 15, CN 2-12 grossly intact, no focal deficits, symmetric strength 5/5 in the upper and lower extremities bilaterally  Psych: Agitated anxious yelling at staff not homicidal or suicidal per patient no history of hallucinations    -------------------------------------------------- RESULTS -------------------------------------------------  I have personally reviewed all laboratory and imaging results for this patient. Results are listed below. LABS:  No results found for this visit on 03/01/21. RADIOLOGY:  Interpreted by Radiologist.  No orders to display             ------------------------- NURSING NOTES AND VITALS REVIEWED ---------------------------   The nursing notes within the ED encounter and vital signs as below have been reviewed by myself. BP (!) 156/98   Pulse 120   Temp 96.8 °F (36 °C) (Temporal)   Resp (!) 36   Ht 5' 8\" (1.727 m)   Wt 150 lb (68 kg)   SpO2 100%   BMI 22.81 kg/m²   Oxygen Saturation Interpretation: Normal    The patients available past medical records and past encounters were reviewed.         ------------------------------ ED COURSE/MEDICAL DECISION MAKING----------------------  Medications ziprasidone (GEODON) injection 10 mg (10 mg Intramuscular Given 3/1/21 0552)   LORazepam (ATIVAN) injection 2 mg (2 mg Intramuscular Given 3/1/21 0552)             Medical Decision Making:    Patient presenting here because of agitation. Patient does have history of bipolar disease. Patient reportedly is not taking any medications. Patient was pink slipped by police. Patient extremely agitated here had to be medicated with Geodon and Ativan. Patient labs are still pending. Plan will be for  to evaluate but also I feel that patient would benefit for admission to psychiatric unit due to his behavior and underlying psychiatric disease    Re-Evaluations:             Re-evaluation. Patients symptoms show no change  Patient while here in the emergency department and on initial evaluation extremely agitated and yelling. Patient attempting to get out of cot. Patient was medicated with Geodon and Ativan. Consultations:                 Critical Care: This patient's ED course included: a personal history and physicial eaxmination    This patient has been closely monitored during their ED course. Counseling: The emergency provider has spoken with the patient and discussed todays results, in addition to providing specific details for the plan of care and counseling regarding the diagnosis and prognosis. Questions are answered at this time and they are agreeable with the plan.       --------------------------------- IMPRESSION AND DISPOSITION ---------------------------------    IMPRESSION  1. Psychosis, unspecified psychosis type ([facility]        DISPOSITION  Disposition: to Be admitted to general psychiatric unit  Patient condition is stable        NOTE: This report was transcribed using voice recognition software.  Every effort was made to ensure accuracy; however, inadvertent computerized transcription errors may be present          Peggy Bryant MD  03/01/21 9231 Maritza Paez MD  03/01/21 9667

## 2021-03-01 NOTE — ED NOTES
Pt still unable to provide urine specimen, pt states \"I just cant go, im trying\".      Esteban Pires RN  03/01/21 1034

## 2021-03-01 NOTE — PROGRESS NOTES
`[facility]  Admission Note     Patient refusing admission process. Admission Type:        Reason for admission:       PATIENT STRENGTHS:       Patient Strengths and Limitations:       Addictive Behavior:        Medical Problems:   History reviewed. No pertinent past medical history. Status EXAM:       Tobacco Screening:  Practical Counseling, on admission, kim X, if applicable and completed (first 3 are required if patient doesn't refuse):            ( )  Recognizing danger situations (included triggers and roadblocks)                    ( )  Coping skills (new ways to manage stress, exercise, relaxation techniques, changing routine, distraction)                                                           ( )  Basic information about quitting (benefits of quitting, techniques in how to quit, available resources  ( ) Referral for counseling faxed to [facility]                                           ( ) Patient refused counseling  ( ) Patient has not smoked in the last 30 days    Metabolic Screening:    Lab Results   Component Value Date    LABA1C 5.2 01/09/2021       Lab Results   Component Value Date    CHOL 181 01/09/2021     Lab Results   Component Value Date    TRIG 362 (H) 01/09/2021     Lab Results   Component Value Date    HDL 55 01/09/2021     No components found for: [facility] [place]  Lab Results   Component Value Date    LABVLDL 72 01/09/2021         Body mass index is 22.81 kg/m². BP Readings from Last 2 Encounters:   03/01/21 (!) 147/89   02/08/21 116/69           Pt admitted with followings belongings:  Dentures: None  Vision - Corrective Lenses: None  Hearing Aid: None  Jewelry: None  Clothing: Footwear, Jacket / coat, Pants, Shirt, Undergarments (Comment), Other (Comment)(hat, belt)     Valuables sent home with patient. Patient oriented to surroundings and program expectations and copy of patient rights given. Received admission packet:  yes.   Consents reviewed, signed . Refused  Refused to sign. Patient verbalize understanding:  Patient refused all teaching. Patient education on precautions: yes.                     Winston Gallegos, RN

## 2021-03-01 NOTE — ED NOTES
ASSIGNED [id number] TO HANNA IN [address] [place] [address], [facility]  03/01/21 8521

## 2021-03-01 NOTE — ED NOTES
Pt presents to [facility] following being highly agitated in main ED. Pt was medicated prior to being brought to [facility]. Due to medication, pt is currently unable to remain alert to attempt assessment. Once able to remain alert, assessment will be completed.      PATEL Rosenbaum, [place]  03/01/21 6466
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
## 2021-03-01 NOTE — ED NOTES
Patient began to become combative and was threatening staff. ER doctor notified. Medications were ordered and patient was medicated.      Edel Ochoa RN  03/01/21 2077

## 2021-03-01 NOTE — ED NOTES
Attempted to obtain urine specimen from patient, pt walked to bathroom and came out with spit in cup. Pt with unsteady gait. Pt returned to bed and fell back asleep.       Pardeep Ortiz RN  03/01/21 1298

## 2021-03-01 NOTE — ED NOTES
Will attempt to obtain urine again when pt is able to remain more alert to follow commands appropriately.       Mimi Newman RN  03/01/21 8069
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
## 2021-03-02 PROCEDURE — 99221 1ST HOSP IP/OBS SF/LOW 40: CPT | Performed by: NURSE PRACTITIONER

## 2021-03-02 PROCEDURE — 6370000000 HC RX 637 (ALT 250 FOR IP): Performed by: NURSE PRACTITIONER

## 2021-03-02 PROCEDURE — 1240000000 HC EMOTIONAL WELLNESS R&B

## 2021-03-02 PROCEDURE — 6370000000 HC RX 637 (ALT 250 FOR IP): Performed by: PSYCHIATRY & NEUROLOGY

## 2021-03-02 RX ORDER — OLANZAPINE 10 MG/1
10 TABLET ORAL NIGHTLY
Status: DISCONTINUED | OUTPATIENT
Start: 2021-03-02 | End: 2021-03-05 | Stop reason: HOSPADM

## 2021-03-02 RX ORDER — DIVALPROEX SODIUM 250 MG/1
250 TABLET, DELAYED RELEASE ORAL EVERY 12 HOURS SCHEDULED
Status: DISCONTINUED | OUTPATIENT
Start: 2021-03-02 | End: 2021-03-05 | Stop reason: HOSPADM

## 2021-03-02 RX ADMIN — DIVALPROEX SODIUM 250 MG: 250 TABLET, DELAYED RELEASE ORAL at 20:23

## 2021-03-02 RX ADMIN — TRAZODONE HYDROCHLORIDE 50 MG: 50 TABLET ORAL at 20:23

## 2021-03-02 RX ADMIN — OLANZAPINE 10 MG: 10 TABLET, FILM COATED ORAL at 20:23

## 2021-03-02 RX ADMIN — DIVALPROEX SODIUM 250 MG: 250 TABLET, DELAYED RELEASE ORAL at 11:28

## 2021-03-02 ASSESSMENT — SLEEP AND FATIGUE QUESTIONNAIRES
DIFFICULTY FALLING ASLEEP: NO
RESTFUL SLEEP: YES

## 2021-03-02 ASSESSMENT — PAIN SCALES - GENERAL: PAINLEVEL_OUTOF10: 0

## 2021-03-02 NOTE — H&P
Department of Psychiatry  History and Physical - Adult       Patient personally seen and examined by me mental status examination performed. I agree the below assessment by the medical student. Psychomotor evaluation build no agitation retardation any abnormal moods. His eye contact is poor his speech is not offering any conversation. His mood is \"I am fine. \"  Affect is mood incongruent irritable easily agitated. was a patient is constricted unable to assess thought process thought contents appears internally stimulated and paranoid. He denies suicidal homicidal ideations intent or plans impulse control is poor his cognitive function is at baseline his insight judgment is poor unable to assess for orientation          CHIEF COMPLAINT:      Patient was seen after discussing with the treatment team and reviewing the chart    CIRCUMSTANCES OF ADMISSION:     HISTORY OF PRESENT ILLNESS:      The patient is a 30 y.o. male with significant past history of polysubstance abuse and bipolar disorder who presented to ED by police for bizarre behavior. Urine drug screen was positive for cocaine and fentanyl. In the ED patient became combative, agitated and reckless leading to being pink slipped and admitted to [address] [place] [address]. Patient was last discharged from hospital on 2/04/2020 for Bipolar affective disorder mixed with psychotic behavior. Patient has history of many hospitalizations. Following discharge, patient was prescribed Depakote and Zyprexa but did not take medications. Patient previously refused inpatient drug and alcohol rehab.  Patient was not cooperative during examination and refused to communicate.          PAST PSYCHIATRIC HISTORY:  History of bipolar disorder and at least 2 admissions in the past here once and then other places.  He was most recently discharged here from [address] [place] [address] January 2021 He was getting treatment at present with Depakote and Zyprexa.  After coming out of the [facility] he was noncompliant.        Personal family and social history:   Patient will not give me any information and left the room. Brinda Lund he was recently released from prison on parole. Jc Wynne with the mother. Jenifer Blandon has 1 brother. Jenifer Blandon will not give me any information. Jenifer Blandon denies any physical sexual or emotional abuse     Legal history  Very significant legal history currently on parole, went to prison for 5 years ago multiple parole violation in the record     SUBSTANCE ABUSE HISTORY:   Patient history of polysubstance abuse urine drug is positive for cocaine and fentanyl on admission    Past Medical History:    History reviewed. No pertinent past medical history. Medications Prior to Admission:   Medications Prior to Admission: divalproex (DEPAKOTE) 250 MG DR tablet, Take 1 tablet by mouth every 12 hours  OLANZapine (ZYPREXA) 10 MG tablet, Take 1 tablet by mouth nightly  divalproex (DEPAKOTE) 250 MG DR tablet, Take 1 tablet by mouth every 12 hours  nicotine polacrilex (NICORETTE) 2 MG gum, Take 1 each by mouth as needed for Smoking cessation  Multiple Vitamins-Minerals (THERAPEUTIC MULTIVITAMIN-MINERALS) tablet, Take 1 tablet by mouth daily    Past Surgical History:    History reviewed. No pertinent surgical history. Allergies:   Haloperidol and related    Family History  Family History   Problem Relation Age of Onset    No Known Problems Mother     No Known Problems Father              EXAMINATION:    REVIEW OF SYSTEMS:    ROS:  [x] All negative/unchanged except if checked.  Explain positive(checked items) below:  [] Constitutional  [] Eyes  [] Ear/Nose/Mouth/Throat  [] Respiratory  [] CV  [] GI  []   [] Musculoskeletal  [] Skin/Breast  [] Neurological  [] Endocrine  [] Heme/Lymph  [] Allergic/Immunologic    Explanation:     Vitals:  /60   Pulse 81   Temp 98.2 °F (36.8 °C) (Oral)   Resp 18   Ht 5' 8\" (1.727 m)   Wt 150 lb (68 kg)   SpO2 95%   BMI 22.81 kg/m²      Physical Examination:   Head: x Atraumatic: x normocephalic  Skin and Mucosa        Moist x  Dry   Pale  x Normal   Neck:  Thyroid  Palpable   x  Not palpable   venus distention   adenopathy   Chest: x Clear   Rhonchi     Wheezing   CV:  xS1   xS2    xNo murmer   Abdomen:  x  Soft    Tender    Viceromegaly   Extremities:  x No Edema     Edema     Cranial Nerves Examination:   CN II:   xPupils are reactive to light  Pupils are non reactive to light  CN III, IV, VI:  xNo eye deviation    No diplopia or ptosis   CN V:    xFacial Sensation is intact     Facial Sensation is not intact   CN IIIV:   x Hearing is normal to rubbing fingers   CN IX, X:     xNormal gag reflex and phonation   CN XI:   xShoulder shrug and neck rotation is normal  CNXII:    xTongue is midline no deviation or atrophy    Mental Status Examination:    Level of consciousness:  somnolent   Appearance:  ill-appearing  Behavior/Motor:  no abnormalities noted  Attitude toward examiner:  withdrawn  Speech:  poverty of speech   Mood: decreased range  Affect:  mood congruent  Thought processes:  coherent   Thought content:  N/A  Cognition:  oriented to person, place, and time   Concentration distractible  Memory intact  Insight poor   Judgement poor   Fund of Knowledge limited      DIAGNOSIS:  Bipolar affective disorder mixed with psychotic behavior.   Polysubstance abuse  Antisocial personality disorder          LABS: REVIEWED TODAY:  Recent Labs     03/01/21  0556   WBC 4.3*   HGB 12.1*        Recent Labs     03/01/21  0556      K 3.8      CO2 26   BUN 10   CREATININE 1.1   GLUCOSE 91     Recent Labs     03/01/21  0556   BILITOT 0.3   ALKPHOS 80   AST 39   ALT 44*     Lab Results   Component Value Date    LABAMPH NOT DETECTED 03/01/2021    BARBSCNU NOT DETECTED 03/01/2021    LABBENZ NOT DETECTED 03/01/2021    LABMETH NOT DETECTED 03/01/2021    OPIATESCREENURINE NOT DETECTED 03/01/2021    PHENCYCLIDINESCREENURINE NOT DETECTED 03/01/2021    ETOH <10 03/01/2021     Lab Results   Component Value Date    TSH 8.920 02/02/2021     No results found for: LITHIUM  Lab Results   Component Value Date    VALPROATE 3 (L) 01/09/2021     Lab Results   Component Value Date    VALPROATE 3 01/09/2021         Radiology No results found. TREATMENT PLAN:    Risk Management: Based on the diagnosis and assessment biopsychosocial treatment model was presented to the patient and was given the opportunity to ask any question. The patient was agreeable to the plan and all the patient's questions were answered to the patient's satisfaction. I discussed with the patient the risk, benefit, alternative and common side effects for the proposed medication treatment. The patient is consenting to this treatment. Collateral Information:  Will obtain collateral information from the family or friends. Will obtain medical records as appropriate from out patient providers  Will consult the hospitalist for a physical exam to rule out any co-morbid physical condition. Home medication Reconciled       New Medications started during this admission :    Consulted with Dr. Timbo Goodman and team.    Prn Haldol 5mg and Vistaril 50mg q6hr for extreme agitation. Trazodone as ordered for insomnia  Vistaril as ordered for anxiety    We will start patient on prescribed home medications. We will begin therapy with Zyprexa 10mg daily. We will begin therapy with Depakote 250mg BID. We will continue to monitor and will evaluate patient again.        Psychotherapy:   Encourage participation in milieu and group therapy  Individual therapy as needed      Patient is counseled if he continues to abuse drugs or alcohol he could act out impulsively causing serious harm to himself or others even though may be unintentional.  He demonstrated understanding of this and has the capacity understand this    Patient is counseled that his mental health treatment was difficult optimized with ongoing use of drugs or alcohol    Patient is counseled that if he uses any amount of opiates after any amount of clean time he could have an unintentional overdose he demonstrated standing of this as the capacity understand this          [employer] Certification Upon Admission    I certify that this patient's inpatient psychiatric hospital admission is medically necessary for:    [x] (1) Treatment which could reasonably be expected to improve this patient's condition,       [] (2) Or for diagnostic study;     AND     [x](2) The inpatient psychiatric services are provided while the individual is under the care of a physician and are included in the individualized plan of care.     Estimated length of stay/service 3 to 5 days based on stability    Plan for post-hospital care substances treatment, outpatient psychiatric follow-up and counseling services    Electronically signed by LATASHA Maurer CNP on 6/9/7116 at 1:17 PM      Electronically signed by Awa Goldberg on 3/2/2021 at 12:55 PM

## 2021-03-02 NOTE — PROGRESS NOTES
`[facility]  Admission Note       Patient evasive and irritable. Poor eye contact. Most answers was yes/no. Dies suicidal and homicidal thoughts. Denies hallucinations. worried about his social security check since he not able to return to [facility] home. Does complain of anxiety. Has been able to maintain control. Admission Type:   Admission Type: Involuntary    Reason for admission:  Reason for Admission: \"i just don't want to talk\"    PATIENT STRENGTHS:  Strengths: No significant Physical Illness    Patient Strengths and Limitations:  Limitations: Multiple barriers to leisure interests, Difficulty problem solving/relies on others to help solve problems    Addictive Behavior:   Addictive Behavior  In the past 3 months, have you felt or has someone told you that you have a problem with:  : None  Do you have a history of Chemical Use?: Yes  Do you have a history of Alcohol Use?: No  Do you have a history of Street Drug Abuse?: Yes  Histroy of Prescripton Drug Abuse?: No    Medical Problems:   History reviewed. No pertinent past medical history.     Status EXAM:  Status and Exam  Normal: No  Facial Expression: Worried, Avoids Gaze, Flat, Sad, Expressionless  Affect: Blunt, Constricted  Level of Consciousness: Alert  Mood:Normal: No  Mood: Depressed, Anxious, Irritable  Motor Activity:Normal: No  Motor Activity: Decreased  Interview Behavior: Cooperative, Evasive, Irritable  Preception: [place] to Person, [place] to Time, [place] to Place, [place] to Situation  Attention:Normal: No  Attention: Distractible  Thought Processes: Circumstantial  Thought Content:Normal: No  Thought Content: Paranoia  Hallucinations: None  Delusions: Yes  Delusions: Persecution  Memory:Normal: No  Memory: Poor Recent  Insight and Judgment: No  Insight and Judgment: Poor Judgment, Unmotivated, Poor Insight, Unrealistic  Present Suicidal Ideation: No  Present Homicidal Ideation: No    Tobacco Screening:  Practical

## 2021-03-02 NOTE — PLAN OF CARE
Problem: Altered Mood, Deterioration in Function:  Goal: Ability to perform activities of daily living will improve  Description: Ability to perform activities of daily living will improve  Outcome: Ongoing     Problem: Altered Mood, Deterioration in Function:  Goal: Able to verbalize reality based thinking  Description: Able to verbalize reality based thinking  Outcome: Not Met This Shift     Problem: Altered Mood, Deterioration in Function:  Goal: Maintenance of adequate nutrition will improve  Description: Maintenance of adequate nutrition will improve  Outcome: Met This Shift     Problem: Altered Mood, Deterioration in Function:  Goal: Ability to tolerate increased activity will improve  Description: Ability to tolerate increased activity will improve  Outcome: Not Met This Shift     Problem: Altered Mood, Deterioration in Function:  Goal: Participates in care planning  Description: Participates in care planning  Outcome: Not Met This Shift     Problem: Altered Mood, Deterioration in Function:  Goal: Patient specific goal  Description: Patient specific goal  Outcome: Not Met This Shift     Problem: Substance Abuse:  Goal: Absence of drug withdrawal signs and symptoms  Description: Absence of drug withdrawal signs and symptoms  Outcome: Ongoing     Problem: Substance Abuse:  Goal: Participates in care planning  Description: Participates in care planning  Outcome: Ongoing     Problem: Substance Abuse:  Goal: Participates in care planning  Description: Participates in care planning  Outcome: Ongoing     Problem: Substance Abuse:  Goal: Patient specific goal  Description: Patient specific goal  Outcome: Not Met This Shift
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
## 2021-03-02 NOTE — PROGRESS NOTES
Psychoeducation assessment was not completed as pt was not willing to answer questions at this time as he was lethargic and somewhat agitated. Will retry when pt is more cooperative.

## 2021-03-02 NOTE — PROGRESS NOTES
Allowed VS to be taken. had eaten all of dinner and left tray in room. Went back to sleep. . Still groggy from injections given in ER . Not answering questions when asked, stated I need to go back to sleep. Unable to complete assessment.

## 2021-03-02 NOTE — PROGRESS NOTES
This note will not be viewable in [email or web address] for the following reason(s). This is a Psychotherapy Note.      Rested well this shift no distress

## 2021-03-03 PROCEDURE — 6370000000 HC RX 637 (ALT 250 FOR IP): Performed by: NURSE PRACTITIONER

## 2021-03-03 PROCEDURE — 6370000000 HC RX 637 (ALT 250 FOR IP): Performed by: PSYCHIATRY & NEUROLOGY

## 2021-03-03 PROCEDURE — 99232 SBSQ HOSP IP/OBS MODERATE 35: CPT | Performed by: NURSE PRACTITIONER

## 2021-03-03 PROCEDURE — 1240000000 HC EMOTIONAL WELLNESS R&B

## 2021-03-03 RX ADMIN — ACETAMINOPHEN 650 MG: 325 TABLET ORAL at 17:45

## 2021-03-03 RX ADMIN — HYDROXYZINE PAMOATE 50 MG: 25 CAPSULE ORAL at 20:32

## 2021-03-03 RX ADMIN — TRAZODONE HYDROCHLORIDE 50 MG: 50 TABLET ORAL at 20:32

## 2021-03-03 RX ADMIN — OLANZAPINE 10 MG: 10 TABLET, FILM COATED ORAL at 20:32

## 2021-03-03 RX ADMIN — DIVALPROEX SODIUM 250 MG: 250 TABLET, DELAYED RELEASE ORAL at 20:32

## 2021-03-03 RX ADMIN — DIVALPROEX SODIUM 250 MG: 250 TABLET, DELAYED RELEASE ORAL at 09:28

## 2021-03-03 ASSESSMENT — PAIN SCALES - GENERAL
PAINLEVEL_OUTOF10: 0

## 2021-03-03 NOTE — PROGRESS NOTES
This note will not be viewable in [employer] for the following reason(s). This is a Psychotherapy Note. Patient resting in bed with eyes closed at this time. No unit issues reported. Will continue to observe and support.

## 2021-03-03 NOTE — PROGRESS NOTES
This note will not be viewable in [employer] for the following reason(s). This is a Psychotherapy Note.  Rested well this shift no distress

## 2021-03-03 NOTE — PROGRESS NOTES
BEHAVIORAL HEALTH FOLLOW-UP NOTE     3/3/2021     Patient was seen and examined in person, Chart reviewed   Patient's case discussed with staff/team    Chief Complaint: Irritable and agitated    Interim History: Patient seen in his room he is isolative his room not attending groups or socialize with peers very irritable affect she does not offer any conversation does not answer questions does not want to go to any inpatient drug treatment program.  He has no insight and judgment to hospitalization need for treatment      Appetite:   [x] Normal/Unchanged  [] Increased  [] Decreased      Sleep:       [x] Normal/Unchanged  [] Fair       [] Poor              Energy:    [x] Normal/Unchanged  [] Increased  [] Decreased        SI [] Present  [x] Absent    HI  []Present  [x] Absent     Aggression:  [] yes  [x] no    Patient is [x] able  [] unable to CONTRACT FOR SAFETY     PAST MEDICAL/PSYCHIATRIC HISTORY:   History reviewed. No pertinent past medical history. FAMILY/SOCIAL HISTORY:  Family History   Problem Relation Age of Onset    No Known Problems Mother     No Known Problems Father      Social History     Socioeconomic History    Marital status: Single     Spouse name: Not on file    Number of children: 0    Years of education: 15    Highest education level: Not on file   Occupational History    Not on file   Social Needs    Financial resource strain: Not on file    Food insecurity     Worry: Not on file     Inability: Not on file   [employer] needs     Medical: Not on file     Non-medical: Not on file   Tobacco Use    Smoking status: Former Smoker     Packs/day: 1.00     Years: 15.00     Pack years: 15.00     Types: Cigarettes     Start date: 1/9/2004    Smokeless tobacco: Never Used   Substance and Sexual Activity    Alcohol use:  Yes     Alcohol/week: 3.0 standard drinks     Types: 3 Cans of beer per week     Comment: 3 tall boys and malt liquor/ qd    Drug use: Yes     Types: Cocaine Comment: 8 ball /wk    Sexual activity: Not Currently   Lifestyle    Physical activity     Days per week: Not on file     Minutes per session: Not on file    Stress: Not on file   Relationships    Social connections     Talks on phone: Not on file     Gets together: Not on file     Attends Scientologist service: Not on file     Active member of club or organization: Not on file     Attends meetings of clubs or organizations: Not on file     Relationship status: Not on file    Intimate partner violence     Fear of current or ex partner: Not on file     Emotionally abused: Not on file     Physically abused: Not on file     Forced sexual activity: Not on file   Other Topics Concern    Not on file   Social History Narrative    Not on file           ROS:  [x] All negative/unchanged except if checked.  Explain positive(checked items) below:  [] Constitutional  [] Eyes  [] Ear/Nose/Mouth/Throat  [] Respiratory  [] CV  [] GI  []   [] Musculoskeletal  [] Skin/Breast  [] Neurological  [] Endocrine  [] Heme/Lymph  [] Allergic/Immunologic    Explanation:     MEDICATIONS:    Current Facility-Administered Medications:     divalproex (DEPAKOTE) DR tablet 250 mg, 250 mg, Oral, 2 times per day, Lashanda Nails, LATASHA - CNP, 946 mg at 03/02/21 2023    OLANZapine (ZYPREXA) tablet 10 mg, 10 mg, Oral, Nightly, Mi David, LATASHA - CNP, 10 mg at 03/02/21 2023    acetaminophen (TYLENOL) tablet 650 mg, 650 mg, Oral, Q6H PRN, Nidhi Randall MD    magnesium hydroxide (MILK OF MAGNESIA) 400 MG/5ML suspension 30 mL, 30 mL, Oral, Daily PRN, Nidhi Randall MD    aluminum & magnesium hydroxide-simethicone (MAALOX) 200-200-20 MG/5ML suspension 30 mL, 30 mL, Oral, PRN, Nidhi Randall MD    hydrOXYzine (VISTARIL) capsule 50 mg, 50 mg, Oral, TID PRN, Nidhi Randall MD    traZODone (DESYREL) tablet 50 mg, 50 mg, Oral, Nightly PRN, Nidhi Randall MD, 50 mg at 03/02/21 2023    OLANZapine (ZYPREXA) injection 5 mg, 5 mg, Intramuscular, Q6H PRN, Giuliano Ly MD      Examination:  /60   Pulse 81   Temp 98.2 °F (36.8 °C) (Oral)   Resp 16   Ht 5' 8\" (1.727 m)   Wt 150 lb (68 kg)   SpO2 95%   BMI 22.81 kg/m²   Gait - steady  Medication side effects(SE): Denies    Mental Status Examination:    Level of consciousness:  within normal limits   Appearance:  poor grooming and poor hygiene  Behavior/Motor:  no abnormalities noted  Attitude toward examiner: Uncooperative  Speech: Not offer much conversation  Mood: Irritable   Affect:  mood congruent and easily agitated  Thought processes:  illogical    Thought content: Appears guarded and paranoid denies SI/HI intent or plan denies auditory visual hallucinations  Cognition:  oriented to person, place, and time   Concentration poor  Insight poor   Judgement poor     ASSESSMENT:   Patient symptoms are:  [] Well controlled  [] Improving  [] Worsening  [x] No change      Diagnosis:   Principal Problem:    Bipolar affective disorder, mixed, severe, with psychotic behavior ([facility]  Active Problems:    Polysubstance abuse ([facility]    Antisocial personality disorder ([facility]  Resolved Problems:    * No resolved hospital problems.  *      LABS:    Recent Labs     03/01/21  0556   WBC 4.3*   HGB 12.1*        Recent Labs     03/01/21  0556      K 3.8      CO2 26   BUN 10   CREATININE 1.1   GLUCOSE 91     Recent Labs     03/01/21  0556   BILITOT 0.3   ALKPHOS 80   AST 39   ALT 44*     Lab Results   Component Value Date    LABAMPH NOT DETECTED 03/01/2021    BARBSCNU NOT DETECTED 03/01/2021    LABBENZ NOT DETECTED 03/01/2021    LABMETH NOT DETECTED 03/01/2021    OPIATESCREENURINE NOT DETECTED 03/01/2021    PHENCYCLIDINESCREENURINE NOT DETECTED 03/01/2021    ETOH <10 03/01/2021     Lab Results   Component Value Date    TSH 8.920 02/02/2021     No results found for: LITHIUM  Lab Results   Component Value Date    VALPROATE 3 (L) 01/09/2021         Treatment Plan:  Reviewed current Medications with the patient. Risks, benefits, side effects, drug-to-drug interactions and alternatives to treatment were discussed. Collateral information:   CD evaluation  Encourage patient to attend group and other milieu activities.   Discharge planning discussed with the patient and treatment team.      Patient is counseled if he continues to abuse drugs or alcohol he could act out impulsively causing serious harm to himself or others even though may be unintentional.  He demonstrated understanding of this and has the capacity understand this     Patient is counseled that his mental health treatment was difficult optimized with ongoing use of drugs or alcohol     Patient is counseled that if he uses any amount of opiates after any amount of clean time he could have an unintentional overdose he demonstrated standing of this as the capacity understand this       PSYCHOTHERAPY/COUNSELING:  [x] Therapeutic interview  [x] Supportive  [] CBT  [] Ongoing  [] Other    [x] Patient continues to need, on a daily basis, active treatment furnished directly by or requiring the supervision of inpatient psychiatric personnel      Anticipated Length of stay: 3 to 5 days based on stability            Electronically signed by LATASHA Holliday CNP on 9/9/4862 at 9:01 AM

## 2021-03-03 NOTE — PLAN OF CARE
[address] [place] [address]  Day 3 Interdisciplinary Treatment Plan NOTE    Review Date & Time: 3/3/2021   10:12 AM      Patient was not in treatment team    Admission Type:   Admission Type: Involuntary    Reason for admission:  Reason for Admission: \"i just don't want to talk\"  Estimated Length of Stay Update:   3 days  Estimated Discharge Date Update:  3/6/21    PATIENT STRENGTHS:  Patient Strengths Strengths: No significant Physical Illness  Patient Strengths and Limitations:Limitations: Multiple barriers to leisure interests, Difficulty problem solving/relies on others to help solve problems  Addictive Behavior:Addictive Behavior  In the past 3 months, have you felt or has someone told you that you have a problem with:  : None  Do you have a history of Chemical Use?: Yes  Do you have a history of Alcohol Use?: No  Do you have a history of Street Drug Abuse?: Yes  Histroy of Prescripton Drug Abuse?: No  Medical Problems:History reviewed. No pertinent past medical history. Risk:  Fall RiskTotal: 73  William Scale William Scale Score: 22  BVC Total: 1  Change in scores no .  Changes to plan of Care  No     Status EXAM:   Status and Exam  Normal: No  Facial Expression: Avoids Gaze, Flat, Sad  Affect: Unstable  Level of Consciousness: Alert  Mood:Normal: No  Mood: Irritable  Motor Activity:Normal: No  Motor Activity: Decreased  Interview Behavior: Cooperative, Evasive, Irritable  Preception: [place] to Person, [place] to Time, [place] to Situation, [place] to Place  Attention:Normal: No  Attention: Distractible  Thought Processes: Circumstantial  Thought Content:Normal: No  Thought Content: Paranoia  Hallucinations: None  Delusions: Yes  Delusions: Persecution  Memory:Normal: No  Memory: Poor Recent  Insight and Judgment: No  Insight and Judgment: Poor Judgment, Poor Insight, Unmotivated  Present Suicidal Ideation: No  Present Homicidal Ideation: No    Daily Assessment Last Entry:   Daily Sleep (WDL): Within Defined Limits         Patient Currently in Pain: Denies  Daily Nutrition (WDL): Within Defined Limits    Patient Monitoring:  Frequency of Checks: 4 times per hour, close    Psychiatric Symptoms:   Depression Symptoms  Depression Symptoms: Impaired concentration, Loss of interest, Isolative  Anxiety Symptoms  Anxiety Symptoms: Generalized  Elisa Symptoms  Elisa Symptoms: Pressured speech     Psychosis Symptoms  Hallucination Type: No problems reported or observed. Delusion Type: No problems reported or observed. Suicide Risk CSSR-S:  1) Within the past month, have you wished you were dead or wished you could go to sleep and not wake up? : No  2) Have you actually had any thoughts of killing yourself? : No  3) Have you been thinking about how you might kill yourself? : No  5) Have you started to work out or worked out the details of how to kill yourself?  Do you intend to carry out this plan? : No  6) Have you ever done anything, started to do anything, or prepared to do anything to end your life?: No  Change in Result no  Change in Plan of care no       EDUCATION:   Learner Progress Toward Treatment Goals: Reviewed group plan and strategies    Method: Group    Outcome: No evidence of Learning and Refused Education    PATIENT GOALS:  refused    PLAN/TREATMENT RECOMMENDATIONS UPDATE: encourage daily goals and groups    GOALS UPDATE:   Time frame for Short-Term Goals:  By discharge      Chaz Gabriel RN

## 2021-03-04 VITALS
RESPIRATION RATE: 15 BRPM | OXYGEN SATURATION: 95 % | TEMPERATURE: 98.5 F | BODY MASS INDEX: 22.73 KG/M2 | SYSTOLIC BLOOD PRESSURE: 133 MMHG | HEART RATE: 80 BPM | DIASTOLIC BLOOD PRESSURE: 67 MMHG | WEIGHT: 150 LBS | HEIGHT: 68 IN

## 2021-03-04 PROCEDURE — 99232 SBSQ HOSP IP/OBS MODERATE 35: CPT | Performed by: NURSE PRACTITIONER

## 2021-03-04 PROCEDURE — 6370000000 HC RX 637 (ALT 250 FOR IP): Performed by: NURSE PRACTITIONER

## 2021-03-04 PROCEDURE — 6370000000 HC RX 637 (ALT 250 FOR IP): Performed by: PSYCHIATRY & NEUROLOGY

## 2021-03-04 PROCEDURE — 1240000000 HC EMOTIONAL WELLNESS R&B

## 2021-03-04 RX ADMIN — OLANZAPINE 10 MG: 10 TABLET, FILM COATED ORAL at 21:06

## 2021-03-04 RX ADMIN — DIVALPROEX SODIUM 250 MG: 250 TABLET, DELAYED RELEASE ORAL at 21:06

## 2021-03-04 RX ADMIN — DIVALPROEX SODIUM 250 MG: 250 TABLET, DELAYED RELEASE ORAL at 09:55

## 2021-03-04 RX ADMIN — TRAZODONE HYDROCHLORIDE 50 MG: 50 TABLET ORAL at 21:06

## 2021-03-04 RX ADMIN — ACETAMINOPHEN 650 MG: 325 TABLET ORAL at 17:53

## 2021-03-04 NOTE — PROGRESS NOTES
BEHAVIORAL HEALTH FOLLOW-UP NOTE     3/4/2021     Patient was seen and examined in person, Chart reviewed   Patient's case discussed with staff/team    Chief Complaint: Irritable and agitated    Interim History: Patient seen in his room he is isolative his room not attending groups or socialize with peers very irritable affect he does not offer any conversation does not answer questions does not want to go to any inpatient drug treatment program.  He is disheveled poor grooming and hygiene room is disheveled. Easily agitated and impulsive. He has no insight and judgment to hospitalization need for treatment      Appetite:   [x] Normal/Unchanged  [] Increased  [] Decreased      Sleep:       [x] Normal/Unchanged  [] Fair       [] Poor              Energy:    [x] Normal/Unchanged  [] Increased  [] Decreased        SI [] Present  [x] Absent    HI  []Present  [x] Absent     Aggression:  [] yes  [x] no    Patient is [x] able  [] unable to CONTRACT FOR SAFETY     PAST MEDICAL/PSYCHIATRIC HISTORY:   History reviewed. No pertinent past medical history. FAMILY/SOCIAL HISTORY:  Family History   Problem Relation Age of Onset    No Known Problems Mother     No Known Problems Father      Social History     Socioeconomic History    Marital status: Single     Spouse name: Not on file    Number of children: 0    Years of education: 15    Highest education level: Not on file   Occupational History    Not on file   Social Needs    Financial resource strain: Not on file    Food insecurity     Worry: Not on file     Inability: Not on file   [employer] needs     Medical: Not on file     Non-medical: Not on file   Tobacco Use    Smoking status: Former Smoker     Packs/day: 1.00     Years: 15.00     Pack years: 15.00     Types: Cigarettes     Start date: 1/9/2004    Smokeless tobacco: Never Used   Substance and Sexual Activity    Alcohol use:  Yes     Alcohol/week: 3.0 standard drinks     Types: 3 Cans of beer per week     Comment: 3 tall boys and malt liquor/ qd    Drug use: Yes     Types: Cocaine     Comment: 8 ball /wk    Sexual activity: Not Currently   Lifestyle    Physical activity     Days per week: Not on file     Minutes per session: Not on file    Stress: Not on file   Relationships    Social connections     Talks on phone: Not on file     Gets together: Not on file     Attends Worship service: Not on file     Active member of club or organization: Not on file     Attends meetings of clubs or organizations: Not on file     Relationship status: Not on file    Intimate partner violence     Fear of current or ex partner: Not on file     Emotionally abused: Not on file     Physically abused: Not on file     Forced sexual activity: Not on file   Other Topics Concern    Not on file   Social History Narrative    Not on file           ROS:  [x] All negative/unchanged except if checked.  Explain positive(checked items) below:  [] Constitutional  [] Eyes  [] Ear/Nose/Mouth/Throat  [] Respiratory  [] CV  [] GI  []   [] Musculoskeletal  [] Skin/Breast  [] Neurological  [] Endocrine  [] Heme/Lymph  [] Allergic/Immunologic    Explanation:     MEDICATIONS:    Current Facility-Administered Medications:     divalproex (DEPAKOTE) DR tablet 250 mg, 250 mg, Oral, 2 times per day, Hilliard Cowden, APRN - CNP, 501 mg at 03/04/21 0955    OLANZapine (ZYPREXA) tablet 10 mg, 10 mg, Oral, Nightly, Jazlyn David, APRN - CNP, 10 mg at 03/03/21 2032    acetaminophen (TYLENOL) tablet 650 mg, 650 mg, Oral, Q6H PRN, Horace Wellington MD, 650 mg at 03/03/21 1745    magnesium hydroxide (MILK OF MAGNESIA) 400 MG/5ML suspension 30 mL, 30 mL, Oral, Daily PRN, Horace Wellington MD    aluminum & magnesium hydroxide-simethicone (MAALOX) 200-200-20 MG/5ML suspension 30 mL, 30 mL, Oral, PRN, Horace Wellington MD    hydrOXYzine (VISTARIL) capsule 50 mg, 50 mg, Oral, TID PRN, Horace Wellington MD, 50 mg at 03/03/21 2032    traZODone ([facility]) tablet 50 mg, 50 mg, Oral, Nightly PRN, Tom Chávez MD, 50 mg at 03/03/21 2032    OLANZapine (ZYPREXA) injection 5 mg, 5 mg, Intramuscular, Q6H PRN, Tom Chávez MD      Examination:  /84   Pulse 112   Temp 97.8 °F (36.6 °C) (Temporal)   Resp 20   Ht 5' 8\" (1.727 m)   Wt 150 lb (68 kg)   SpO2 95%   BMI 22.81 kg/m²   Gait - steady  Medication side effects(SE): Denies    Mental Status Examination:    Level of consciousness:  within normal limits   Appearance:  poor grooming and poor hygiene  Behavior/Motor:  no abnormalities noted  Attitude toward examiner: Uncooperative  Speech: Not offer much conversation  Mood: Irritable   Affect:  mood congruent and easily agitated  Thought processes:  illogical    Thought content: Appears guarded and paranoid denies SI/HI intent or plan denies auditory visual hallucinations  Cognition:  oriented to person, place, and time   Concentration poor  Insight poor   Judgement poor     ASSESSMENT:   Patient symptoms are:  [] Well controlled  [] Improving  [] Worsening  [x] No change      Diagnosis:   Principal Problem:    Bipolar affective disorder, mixed, severe, with psychotic behavior ([facility] [address]  Active Problems:    Polysubstance abuse ([facility] [address]    [facility]is[facility]  Resolved Problems:    * No resolved hospital problems. *      LABS:    No results for input(s): WBC, HGB, PLT in the last 72 hours. No results for input(s): NA, K, CL, CO2, BUN, CREATININE, GLUCOSE in the last 72 hours. No results for input(s): BILITOT, ALKPHOS, AST, ALT in the last 72 hours.   Lab Results   Component Value Date    LABAMPH NOT DETECTED 03/01/2021    BARBSCNU NOT DETECTED 03/01/2021    LABBENZ NOT DETECTED 03/01/2021    LABMETH NOT DETECTED 03/01/2021    OPIATESCREENURINE NOT DETECTED 03/01/2021    PHENCYCLIDINESCREENURINE NOT DETECTED 03/01/2021    ETOH <10 03/01/2021     Lab Results   Component Value Date    TSH 8.920 02/02/2021     No results found for: LITHIUM  Lab Results   Component Value Date    VALPROATE 3 (L) 01/09/2021         Treatment Plan:  Reviewed current Medications with the patient. Risks, benefits, side effects, drug-to-drug interactions and alternatives to treatment were discussed. Collateral information:   CD evaluation  Encourage patient to attend group and other milieu activities.   Discharge planning discussed with the patient and treatment team.      Patient is counseled if he continues to abuse drugs or alcohol he could act out impulsively causing serious harm to himself or others even though may be unintentional.  He demonstrated understanding of this and has the capacity understand this     Patient is counseled that his mental health treatment was difficult optimized with ongoing use of drugs or alcohol     Patient is counseled that if he uses any amount of opiates after any amount of clean time he could have an unintentional overdose he demonstrated standing of this as the capacity understand this       PSYCHOTHERAPY/COUNSELING:  [x] Therapeutic interview  [x] Supportive  [] CBT  [] Ongoing  [] Other    [x] Patient continues to need, on a daily basis, active treatment furnished directly by or requiring the supervision of inpatient psychiatric personnel      Anticipated Length of stay: 3 to 5 days based on stability            Electronically signed by LATASHA Vanegas CNP on 9/3/7679 at 2:15 PM

## 2021-03-04 NOTE — PLAN OF CARE
Problem: Altered Mood, Deterioration in Function:  Goal: Ability to perform activities of daily living will improve  Description: Ability to perform activities of daily living will improve  3/4/2021 0947 by Vivian Padilla RN  Outcome: Ongoing  3/4/2021 0439 by Augustine Smith RN  Outcome: Ongoing  Goal: Able to verbalize reality based thinking  Description: Able to verbalize reality based thinking  3/4/2021 0947 by Vivian Padilla RN  Outcome: Ongoing  3/4/2021 0439 by Augustine Smith RN  Outcome: Ongoing  Goal: Skin appearance normal  Description: Skin appearance normal  3/4/2021 0947 by Vivian Padilla RN  Outcome: Ongoing  3/4/2021 0439 by Augustine Smith RN  Outcome: Ongoing  Goal: Maintenance of adequate nutrition will improve  Description: Maintenance of adequate nutrition will improve  3/4/2021 0947 by Vivian Padilla RN  Outcome: Ongoing  3/4/2021 0439 by Augustine Smith RN  Outcome: Ongoing  Goal: Ability to tolerate increased activity will improve  Description: Ability to tolerate increased activity will improve  3/4/2021 0947 by Vivian Padilla RN  Outcome: Ongoing  3/4/2021 0439 by Augustine Smith RN  Outcome: Ongoing  Goal: Participates in care planning  Description: Participates in care planning  3/4/2021 [address] by Vivian Padilla RN  Outcome: Ongoing  3/4/2021 0439 by Augustine Smith RN  Outcome: Ongoing  Goal: Patient specific goal  Description: Patient specific goal  Outcome: Ongoing

## 2021-03-04 NOTE — PROGRESS NOTES
Patient is isolating in his room, is irritable with nurse. Patient denies thoughts to harm self or others, denies hallucinations, patient is compliant with medications, was encouraged to attend groups. Patient behavior is in control.

## 2021-03-05 PROCEDURE — 6370000000 HC RX 637 (ALT 250 FOR IP): Performed by: PSYCHIATRY & NEUROLOGY

## 2021-03-05 PROCEDURE — 6370000000 HC RX 637 (ALT 250 FOR IP): Performed by: NURSE PRACTITIONER

## 2021-03-05 PROCEDURE — 99239 HOSP IP/OBS DSCHRG MGMT >30: CPT | Performed by: NURSE PRACTITIONER

## 2021-03-05 RX ORDER — OLANZAPINE 10 MG/1
10 TABLET ORAL NIGHTLY
Qty: 30 TABLET | Refills: 0 | Status: SHIPPED | OUTPATIENT
Start: 2021-03-05 | End: 2021-03-12

## 2021-03-05 RX ORDER — M-VIT,TX,IRON,MINS/CALC/FOLIC 27MG-0.4MG
1 TABLET ORAL DAILY
Qty: 30 TABLET | Refills: 0 | Status: SHIPPED | OUTPATIENT
Start: 2021-03-05 | End: 2021-07-03 | Stop reason: ALTCHOICE

## 2021-03-05 RX ORDER — DIVALPROEX SODIUM 250 MG/1
250 TABLET, DELAYED RELEASE ORAL EVERY 12 HOURS SCHEDULED
Qty: 60 TABLET | Refills: 0 | Status: SHIPPED | OUTPATIENT
Start: 2021-03-05 | End: 2021-07-03 | Stop reason: ALTCHOICE

## 2021-03-05 RX ADMIN — DIVALPROEX SODIUM 250 MG: 250 TABLET, DELAYED RELEASE ORAL at 09:16

## 2021-03-05 RX ADMIN — ACETAMINOPHEN 650 MG: 325 TABLET ORAL at 09:41

## 2021-03-05 ASSESSMENT — PAIN SCALES - GENERAL: PAINLEVEL_OUTOF10: 0

## 2021-03-05 NOTE — PLAN OF CARE
Problem: Substance Abuse:  Goal: Absence of drug withdrawal signs and symptoms  Description: Absence of drug withdrawal signs and symptoms  3/5/2021 1048 by Consuelo Ivan RN  Outcome: Ongoing     Problem: Substance Abuse:  Goal: Participates in care planning  Description: Participates in care planning  3/5/2021 1048 by Junior Emilie RN  Outcome: Ongoing

## 2021-03-05 NOTE — DISCHARGE SUMMARY
DISCHARGE SUMMARY      Patient ID:  Licha Quintana  [id number]  30 y.o.  1995    Admit date: 3/1/2021    Discharge date and time: 3/5/2021    Admitting Physician: Shavon Au MD     Discharge Physician: Dr Stacy Blanc MD    Discharge Diagnoses:   Patient Active Problem List   Diagnosis    Polysubstance abuse ([facility]    [facility]oaffective [facility], [facility] type [facility]    Antisocial personality disorder ([facility]    Major depression, single episode    Bipolar affective disorder, mixed, severe, with psychotic behavior ([facility]       Admission Condition: poor    Discharged Condition: stable    Admission Circumstance: Presented to the ED after he was found psychotic lying in middle the road positive for cocaine and fentanyl      PAST MEDICAL/PSYCHIATRIC HISTORY:   History reviewed. No pertinent past medical history. FAMILY/SOCIAL HISTORY:  Family History   Problem Relation Age of Onset    No Known Problems Mother     No Known Problems Father      Social History     Socioeconomic History    Marital status: Single     Spouse name: Not on file    Number of children: 0    Years of education: 15    Highest education level: Not on file   Occupational History    Not on file   Social Needs    Financial resource strain: Not on file    Food insecurity     Worry: Not on file     Inability: Not on file   [employer] needs     Medical: Not on file     Non-medical: Not on file   Tobacco Use    Smoking status: Former Smoker     Packs/day: 1.00     Years: 15.00     Pack years: 15.00     Types: Cigarettes     Start date: 1/9/2004    Smokeless tobacco: Never Used   Substance and Sexual Activity    Alcohol use:  Yes     Alcohol/week: 3.0 standard drinks     Types: 3 Cans of beer per week     Comment: 3 tall boys and malt liquor/ qd    Drug use: Yes     Types: Cocaine     Comment: 8 ball /wk    Sexual activity: Not Currently   Lifestyle    Physical activity     Days per week: Not on file     Minutes per session: Not on file  Stress: Not on file   Relationships    Social connections     Talks on phone: Not on file     Gets together: Not on file     Attends Jainism service: Not on file     Active member of club or organization: Not on file     Attends meetings of clubs or organizations: Not on file     Relationship status: Not on file    Intimate partner violence     Fear of current or ex partner: Not on file     Emotionally abused: Not on file     Physically abused: Not on file     Forced sexual activity: Not on file   Other Topics Concern    Not on file   Social History Narrative    Not on file       MEDICATIONS:    Current Facility-Administered Medications:     divalproex (DEPAKOTE) DR tablet 250 mg, 250 mg, Oral, 2 times per day, Lasha Groves APRN - CNP, 809 mg at 03/05/21 0916    OLANZapine (ZYPREXA) tablet 10 mg, 10 mg, Oral, Nightly, Nighat David APRN - CNP, 10 mg at 03/04/21 2106    acetaminophen (TYLENOL) tablet 650 mg, 650 mg, Oral, Q6H PRN, Karlyne Primrose, MD, 650 mg at 03/05/21 0941    magnesium hydroxide (MILK OF MAGNESIA) 400 MG/5ML suspension 30 mL, 30 mL, Oral, Daily PRN, Karlyne Primrose, MD    aluminum & magnesium hydroxide-simethicone (MAALOX) 200-200-20 MG/5ML suspension 30 mL, 30 mL, Oral, PRN, Karlyne Primrose, MD    hydrOXYzine (VISTARIL) capsule 50 mg, 50 mg, Oral, TID PRN, Karlyne Primrose, MD, 50 mg at 03/03/21 2032    traZODone (DESYREL) tablet 50 mg, 50 mg, Oral, Nightly PRN, Karlyne Primrose, MD, 50 mg at 03/04/21 2106    OLANZapine (ZYPREXA) injection 5 mg, 5 mg, Intramuscular, Q6H PRN, Karlyne Primrose, MD    Current Outpatient Medications:     divalproex (DEPAKOTE) 250 MG DR tablet, Take 1 tablet by mouth every 12 hours, Disp: 60 tablet, Rfl: 0    Multiple Vitamins-Minerals (THERAPEUTIC MULTIVITAMIN-MINERALS) tablet, Take 1 tablet by mouth daily, Disp: 30 tablet, Rfl: 0    nicotine polacrilex (NICORETTE) 2 MG gum, Take 1 each by mouth as needed for Smoking cessation, Disp: 110 each, Rfl: 3    OLANZapine (ZYPREXA) 10 MG tablet, Take 1 tablet by mouth nightly, Disp: 30 tablet, Rfl: 0    Examination:  /67   Pulse 80   Temp 98.5 °F (36.9 °C) (Oral)   Resp 15   Ht 5' 8\" (1.727 m)   Wt 150 lb (68 kg)   SpO2 95%   BMI 22.81 kg/m²   Gait - steady    HOSPITAL COURSE[de-identified]  Patient is admitted to the unit on 3/1/2020 was closely monitored for psychosis. He was evaluated and was treated with Depakote 250 mg twice daily and Zyprexa 10 mg at bedtime. Medical instrument significant patient continued to improve on the floor. He start coming of his room was more visible in the milieu. He never made any suicidal statements or any suicidal gestures while in the unit. He was seen by his  for collateral information who actually visited him while in the unit and assisted to social workers and discharge planning to the rescue mission. He was set up with an outpatient mental health agency for outpatient follow-up services as well as substance abuse treatment. Treatment team felt the patient had an oxen benefit from his hospitalization. The time of discharge patient not shown impulsive behavior. He vehemently denied any suicidal or homicidal ideations intent or plan. He was eating well sleeping well there are no neurovegetative signs symptoms of depression. He denied any auditory visualizations or no overt overt signs psychosis. He was appreciative of the the received here. This patient no longer meets criteria for inpatient hospitalization              No AVH or paranoid thoughts  No hopeless or worthless feeling  No active SI/HI  Appetite:  [x] Normal  [] Increased  [] Decreased    Sleep:       [x] Normal  [] Fair       [] Poor            Energy:    [x] Normal  [] Increased  [] Decreased     SI [] Present  [x] Absent  HI  []Present  [x] Absent   Aggression:  [] yes  [x] no  Patient is [x] able  [] unable to CONTRACT FOR SAFETY   Medication side effects(SE):  [x] None(Psych. Meds.) [] Other      Mental Status Examination on discharge:    Level of consciousness:  within normal limits   Appearance:  well-appearing  Behavior/Motor:  no abnormalities noted  Attitude toward examiner:  attentive and good eye contact  Speech:  spontaneous, normal rate and normal volume   Mood: \" I am good. \"  Affect: Appropriate pleasant  Thought processes: Linear without flight of ideas loose associations  Thought content: Devoid of any auditory visualizations delusions or other perceptual normalities. Denies SI/HI intent or plan  Cognition:  oriented to person, place, and time   Concentration intact  Memory intact  Insight good   Judgement fair   Fund of Knowledge adequate      ASSESSMENT:  Patient symptoms are:  [x] Well controlled  [x] Improving  [] Worsening  [] No change    Reason for more than one antipsychotic:  [] N/A  [] 3 Failed Monotherapy attempts (Drugs tried:)  [] Crossover to a new antipsychotic  [] Taper to Monotherapy from Polypharmacy  [] Augmentation of clozapine therapy due to treatment resistance to single therapy    Diagnosis:  Principal Problem:    Bipolar affective disorder, mixed, severe, with psychotic behavior ([facility]  Active Problems:    Polysubstance abuse ([facility]    Antisocial personality disorder ([facility]  Resolved Problems:    * No resolved hospital problems. *      LABS:    No results for input(s): WBC, HGB, PLT in the last 72 hours. No results for input(s): NA, K, CL, CO2, BUN, CREATININE, GLUCOSE in the last 72 hours. No results for input(s): BILITOT, ALKPHOS, AST, ALT in the last 72 hours.   Lab Results   Component Value Date    LABAMPH NOT DETECTED 03/01/2021    BARBSCNU NOT DETECTED 03/01/2021    LABBENZ NOT DETECTED 03/01/2021    LABMETH NOT DETECTED 03/01/2021    OPIATESCREENURINE NOT DETECTED 03/01/2021    PHENCYCLIDINESCREENURINE NOT DETECTED 03/01/2021    ETOH <10 03/01/2021     Lab Results   Component Value Date    TSH 8.920 02/02/2021     No results found for: LITHIUM  Lab Results   Component Value Date    VALPROATE 3 (L) 01/09/2021       RISK ASSESSMENT AT DISCHARGE: Low risk for suicide and homicide. Treatment Plan:  Reviewed current Medications with the patient. Education provided on the complaince with treatment. Risks, benefits, side effects, drug-to-drug interactions and alternatives to treatment were discussed. Encourage patient to attend outpatient follow up appointment and therapy. Patient was advised to call the outpatient provider, visit the nearest ED or call 911 if symptoms are not manageable. Patient's family member was contacted prior to the discharge. Medication List      CHANGE how you take these medications    divalproex 250 MG DR tablet  Commonly known as: DEPAKOTE  Take 1 tablet by mouth every 12 hours  What changed: Another medication with the same name was removed. Continue taking this medication, and follow the directions you see here.         CONTINUE taking these medications    nicotine polacrilex 2 MG gum  Commonly known as: NICORETTE  Take 1 each by mouth as needed for Smoking cessation     OLANZapine 10 MG tablet  Commonly known as: ZYPREXA  Take 1 tablet by mouth nightly     therapeutic multivitamin-minerals tablet  Take 1 tablet by mouth daily           Where to Get Your Medications      These medications were sent to [facility] [address]    Phone: [phone]   · divalproex 250 MG DR tablet  · nicotine polacrilex 2 MG gum  · OLANZapine 10 MG tablet  · therapeutic multivitamin-minerals tablet       Patient is counseled if he continues to abuse drugs or alcohol he could act out impulsively causing serious harm to himself or others even though may be unintentional.  He demonstrated understanding of this and has the capacity understand this    Patient is counseled that his medical treatment be difficult to optimize with ongoing use of drugs or alcohol he demonstrated understanding of this and has capacity understand this    Patient counseled that if he uses any amount of opiates after cleaning time he could have an unintentional overdose and he demonstrated understanding of this as the capacity understand this.       Counselor most been compliant with all medications outpatient follow-up appointments    TIME SPEND - 35 MINUTES TO COMPLETE THE EVALUATION, DISCHARGE SUMMARY, MEDICATION RECONCILIATION AND FOLLOW UP CARE     Signed:  Tanvi Tracy  1/2/6904  3:69 PM

## 2021-03-05 NOTE — CARE COORDINATION
SW called [employer] and spoke with Ruben ( [facility]'s Department) . Pt was approved to reside at their facility. SW proceeded to contact [address] [place] [address] at [phone]. Officer Kenny Barton stated she is in agreement for pt to reside at [facility] upon discharge. SW was informed pt does not own an ID but his hospital bracelet should be sufficient in order to reside at the [facility].      Officer Kenny Barton stated she would contact [facility] to verify  Prior to her scheduled meeting with pt at 1:15pm.

## 2021-03-05 NOTE — CARE COORDINATION
In order to ensure appropriate transition and discharge planning is in place, the following documents have been transmitted to [facility] , as the new outpatient provider:     The d/c diagnosis was transmitted to the next care provider   The reason for hospitalization was transmitted to the next care provider   The d/c medications (dosage and indication) were transmitted to the next care provider    The continuing care plan was transmitted to the next care provider

## 2021-03-11 ENCOUNTER — HOSPITAL ENCOUNTER (INPATIENT)
Age: 26
LOS: 1 days | Discharge: HOME | DRG: 383 | End: 2021-03-11
Attending: EMERGENCY MEDICINE | Admitting: INTERNAL MEDICINE
Payer: COMMERCIAL

## 2021-03-11 ENCOUNTER — APPOINTMENT (OUTPATIENT)
Dept: ULTRASOUND IMAGING | Age: 26
End: 2021-03-11
Payer: COMMERCIAL

## 2021-03-11 ENCOUNTER — APPOINTMENT (OUTPATIENT)
Dept: GENERAL RADIOLOGY | Age: 26
End: 2021-03-11
Payer: COMMERCIAL

## 2021-03-11 ENCOUNTER — HOSPITAL ENCOUNTER (EMERGENCY)
Age: 26
Discharge: LEFT BEFORE DISCHARGE | End: 2021-03-11
Attending: EMERGENCY MEDICINE
Payer: COMMERCIAL

## 2021-03-11 VITALS
DIASTOLIC BLOOD PRESSURE: 67 MMHG | HEIGHT: 68 IN | BODY MASS INDEX: 22.73 KG/M2 | TEMPERATURE: 100.1 F | RESPIRATION RATE: 18 BRPM | OXYGEN SATURATION: 97 % | SYSTOLIC BLOOD PRESSURE: 142 MMHG | HEART RATE: 89 BPM | WEIGHT: 150 LBS

## 2021-03-11 VITALS
DIASTOLIC BLOOD PRESSURE: 80 MMHG | HEART RATE: 101 BPM | TEMPERATURE: 97.1 F | SYSTOLIC BLOOD PRESSURE: 117 MMHG | WEIGHT: 180 LBS | BODY MASS INDEX: 27.37 KG/M2 | RESPIRATION RATE: 16 BRPM | OXYGEN SATURATION: 96 %

## 2021-03-11 DIAGNOSIS — A41.9 SEPSIS DUE TO CELLULITIS (HCC): Primary | ICD-10-CM

## 2021-03-11 DIAGNOSIS — F19.10 IV DRUG ABUSE (HCC): ICD-10-CM

## 2021-03-11 DIAGNOSIS — F19.10 IV DRUG ABUSE (HCC): Primary | ICD-10-CM

## 2021-03-11 DIAGNOSIS — S40.021A TRAUMATIC HEMATOMA OF RIGHT UPPER ARM, INITIAL ENCOUNTER: ICD-10-CM

## 2021-03-11 DIAGNOSIS — L03.90 SEPSIS DUE TO CELLULITIS (HCC): Primary | ICD-10-CM

## 2021-03-11 PROBLEM — L03.114 LEFT ARM CELLULITIS: Status: ACTIVE | Noted: 2021-03-11

## 2021-03-11 LAB
ANION GAP SERPL CALCULATED.3IONS-SCNC: 11 MMOL/L (ref 7–16)
ANION GAP SERPL CALCULATED.3IONS-SCNC: 7 MMOL/L (ref 7–16)
ANTISTREPTOLYSIN-O: 43 IU/ML (ref 0–200)
BASOPHILS ABSOLUTE: 0.05 E9/L (ref 0–0.2)
BASOPHILS ABSOLUTE: 0.06 E9/L (ref 0–0.2)
BASOPHILS RELATIVE PERCENT: 0.4 % (ref 0–2)
BASOPHILS RELATIVE PERCENT: 0.4 % (ref 0–2)
BUN BLDV-MCNC: 11 MG/DL (ref 6–20)
BUN BLDV-MCNC: 15 MG/DL (ref 6–20)
C-REACTIVE PROTEIN: 3.6 MG/DL (ref 0–0.4)
C-REACTIVE PROTEIN: 7.2 MG/DL (ref 0–0.4)
CALCIUM SERPL-MCNC: 8.6 MG/DL (ref 8.6–10.2)
CALCIUM SERPL-MCNC: 8.9 MG/DL (ref 8.6–10.2)
CHLORIDE BLD-SCNC: 102 MMOL/L (ref 98–107)
CHLORIDE BLD-SCNC: 102 MMOL/L (ref 98–107)
CO2: 22 MMOL/L (ref 22–29)
CO2: 28 MMOL/L (ref 22–29)
CREAT SERPL-MCNC: 0.9 MG/DL (ref 0.7–1.2)
CREAT SERPL-MCNC: 1 MG/DL (ref 0.7–1.2)
EOSINOPHILS ABSOLUTE: 0.07 E9/L (ref 0.05–0.5)
EOSINOPHILS ABSOLUTE: 0.11 E9/L (ref 0.05–0.5)
EOSINOPHILS RELATIVE PERCENT: 0.5 % (ref 0–6)
EOSINOPHILS RELATIVE PERCENT: 0.9 % (ref 0–6)
GFR AFRICAN AMERICAN: >60
GFR AFRICAN AMERICAN: >60
GFR NON-AFRICAN AMERICAN: >60 ML/MIN/1.73
GFR NON-AFRICAN AMERICAN: >60 ML/MIN/1.73
GLUCOSE BLD-MCNC: 102 MG/DL (ref 74–99)
GLUCOSE BLD-MCNC: 92 MG/DL (ref 74–99)
HCT VFR BLD CALC: 40 % (ref 37–54)
HCT VFR BLD CALC: 41.5 % (ref 37–54)
HEMOGLOBIN: 13.1 G/DL (ref 12.5–16.5)
HEMOGLOBIN: 13.6 G/DL (ref 12.5–16.5)
IMMATURE GRANULOCYTES #: 0.06 E9/L
IMMATURE GRANULOCYTES #: 0.08 E9/L
IMMATURE GRANULOCYTES %: 0.5 % (ref 0–5)
IMMATURE GRANULOCYTES %: 0.6 % (ref 0–5)
LACTIC ACID, SEPSIS: 0.8 MMOL/L (ref 0.5–1.9)
LACTIC ACID, SEPSIS: 1.2 MMOL/L (ref 0.5–1.9)
LYMPHOCYTES ABSOLUTE: 2.09 E9/L (ref 1.5–4)
LYMPHOCYTES ABSOLUTE: 2.16 E9/L (ref 1.5–4)
LYMPHOCYTES RELATIVE PERCENT: 14.5 % (ref 20–42)
LYMPHOCYTES RELATIVE PERCENT: 16.9 % (ref 20–42)
MCH RBC QN AUTO: 28.9 PG (ref 26–35)
MCH RBC QN AUTO: 29.2 PG (ref 26–35)
MCHC RBC AUTO-ENTMCNC: 32.8 % (ref 32–34.5)
MCHC RBC AUTO-ENTMCNC: 32.8 % (ref 32–34.5)
MCV RBC AUTO: 88.3 FL (ref 80–99.9)
MCV RBC AUTO: 89.3 FL (ref 80–99.9)
MONOCYTES ABSOLUTE: 1.38 E9/L (ref 0.1–0.95)
MONOCYTES ABSOLUTE: 1.44 E9/L (ref 0.1–0.95)
MONOCYTES RELATIVE PERCENT: 11.3 % (ref 2–12)
MONOCYTES RELATIVE PERCENT: 9.6 % (ref 2–12)
NEUTROPHILS ABSOLUTE: 10.73 E9/L (ref 1.8–7.3)
NEUTROPHILS ABSOLUTE: 8.97 E9/L (ref 1.8–7.3)
NEUTROPHILS RELATIVE PERCENT: 70 % (ref 43–80)
NEUTROPHILS RELATIVE PERCENT: 74.4 % (ref 43–80)
PDW BLD-RTO: 13.2 FL (ref 11.5–15)
PDW BLD-RTO: 13.2 FL (ref 11.5–15)
PLATELET # BLD: 303 E9/L (ref 130–450)
PLATELET # BLD: 327 E9/L (ref 130–450)
PMV BLD AUTO: 8 FL (ref 7–12)
PMV BLD AUTO: 8.1 FL (ref 7–12)
POTASSIUM REFLEX MAGNESIUM: 3.8 MMOL/L (ref 3.5–5)
POTASSIUM SERPL-SCNC: 3.9 MMOL/L (ref 3.5–5)
RBC # BLD: 4.48 E12/L (ref 3.8–5.8)
RBC # BLD: 4.7 E12/L (ref 3.8–5.8)
SEDIMENTATION RATE, ERYTHROCYTE: 5 MM/HR (ref 0–15)
SEDIMENTATION RATE, ERYTHROCYTE: 9 MM/HR (ref 0–15)
SODIUM BLD-SCNC: 135 MMOL/L (ref 132–146)
SODIUM BLD-SCNC: 137 MMOL/L (ref 132–146)
WBC # BLD: 12.8 E9/L (ref 4.5–11.5)
WBC # BLD: 14.4 E9/L (ref 4.5–11.5)

## 2021-03-11 PROCEDURE — 90715 TDAP VACCINE 7 YRS/> IM: CPT | Performed by: EMERGENCY MEDICINE

## 2021-03-11 PROCEDURE — 85651 RBC SED RATE NONAUTOMATED: CPT

## 2021-03-11 PROCEDURE — 96365 THER/PROPH/DIAG IV INF INIT: CPT

## 2021-03-11 PROCEDURE — 73610 X-RAY EXAM OF ANKLE: CPT

## 2021-03-11 PROCEDURE — 6360000002 HC RX W HCPCS: Performed by: EMERGENCY MEDICINE

## 2021-03-11 PROCEDURE — 85025 COMPLETE CBC W/AUTO DIFF WBC: CPT

## 2021-03-11 PROCEDURE — 83605 ASSAY OF LACTIC ACID: CPT

## 2021-03-11 PROCEDURE — 86140 C-REACTIVE PROTEIN: CPT

## 2021-03-11 PROCEDURE — 86060 ANTISTREPTOLYSIN O TITER: CPT

## 2021-03-11 PROCEDURE — 73030 X-RAY EXAM OF SHOULDER: CPT

## 2021-03-11 PROCEDURE — 80048 BASIC METABOLIC PNL TOTAL CA: CPT

## 2021-03-11 PROCEDURE — G0378 HOSPITAL OBSERVATION PER HR: HCPCS

## 2021-03-11 PROCEDURE — 93971 EXTREMITY STUDY: CPT

## 2021-03-11 PROCEDURE — 93971 EXTREMITY STUDY: CPT | Performed by: RADIOLOGY

## 2021-03-11 PROCEDURE — 1200000000 HC SEMI PRIVATE

## 2021-03-11 PROCEDURE — 2580000003 HC RX 258: Performed by: EMERGENCY MEDICINE

## 2021-03-11 PROCEDURE — 96375 TX/PRO/DX INJ NEW DRUG ADDON: CPT

## 2021-03-11 PROCEDURE — 99222 1ST HOSP IP/OBS MODERATE 55: CPT | Performed by: INTERNAL MEDICINE

## 2021-03-11 PROCEDURE — 99283 EMERGENCY DEPT VISIT LOW MDM: CPT

## 2021-03-11 PROCEDURE — 6370000000 HC RX 637 (ALT 250 FOR IP): Performed by: EMERGENCY MEDICINE

## 2021-03-11 PROCEDURE — 99282 EMERGENCY DEPT VISIT SF MDM: CPT

## 2021-03-11 PROCEDURE — 96374 THER/PROPH/DIAG INJ IV PUSH: CPT

## 2021-03-11 PROCEDURE — 90471 IMMUNIZATION ADMIN: CPT | Performed by: EMERGENCY MEDICINE

## 2021-03-11 PROCEDURE — 87040 BLOOD CULTURE FOR BACTERIA: CPT

## 2021-03-11 RX ORDER — 0.9 % SODIUM CHLORIDE 0.9 %
1000 INTRAVENOUS SOLUTION INTRAVENOUS ONCE
Status: COMPLETED | OUTPATIENT
Start: 2021-03-11 | End: 2021-03-11

## 2021-03-11 RX ORDER — ZIPRASIDONE MESYLATE 20 MG/ML
INJECTION, POWDER, LYOPHILIZED, FOR SOLUTION INTRAMUSCULAR
Status: DISCONTINUED
Start: 2021-03-11 | End: 2021-03-11 | Stop reason: HOSPADM

## 2021-03-11 RX ORDER — TRAMADOL HYDROCHLORIDE 50 MG/1
50 TABLET ORAL PRN
Status: DISCONTINUED | OUTPATIENT
Start: 2021-03-11 | End: 2021-03-11 | Stop reason: HOSPADM

## 2021-03-11 RX ORDER — DIVALPROEX SODIUM 250 MG/1
250 TABLET, DELAYED RELEASE ORAL EVERY 12 HOURS SCHEDULED
Status: DISCONTINUED | OUTPATIENT
Start: 2021-03-11 | End: 2021-03-11 | Stop reason: HOSPADM

## 2021-03-11 RX ORDER — SODIUM CHLORIDE 0.9 % (FLUSH) 0.9 %
10 SYRINGE (ML) INJECTION EVERY 12 HOURS SCHEDULED
Status: DISCONTINUED | OUTPATIENT
Start: 2021-03-11 | End: 2021-03-11 | Stop reason: HOSPADM

## 2021-03-11 RX ORDER — CLONIDINE HYDROCHLORIDE 0.1 MG/1
0.1 TABLET ORAL PRN
Status: DISCONTINUED | OUTPATIENT
Start: 2021-03-11 | End: 2021-03-11 | Stop reason: HOSPADM

## 2021-03-11 RX ORDER — ACETAMINOPHEN 325 MG/1
650 TABLET ORAL EVERY 6 HOURS PRN
Status: DISCONTINUED | OUTPATIENT
Start: 2021-03-11 | End: 2021-03-11 | Stop reason: HOSPADM

## 2021-03-11 RX ORDER — KETOROLAC TROMETHAMINE 30 MG/ML
30 INJECTION, SOLUTION INTRAMUSCULAR; INTRAVENOUS ONCE
Status: COMPLETED | OUTPATIENT
Start: 2021-03-11 | End: 2021-03-11

## 2021-03-11 RX ORDER — DIPHENHYDRAMINE HYDROCHLORIDE 50 MG/ML
50 INJECTION INTRAMUSCULAR; INTRAVENOUS ONCE
Status: DISCONTINUED | OUTPATIENT
Start: 2021-03-11 | End: 2021-03-11 | Stop reason: HOSPADM

## 2021-03-11 RX ORDER — PROMETHAZINE HYDROCHLORIDE 25 MG/1
12.5 TABLET ORAL EVERY 6 HOURS PRN
Status: DISCONTINUED | OUTPATIENT
Start: 2021-03-11 | End: 2021-03-11 | Stop reason: HOSPADM

## 2021-03-11 RX ORDER — TRAZODONE HYDROCHLORIDE 50 MG/1
50 TABLET ORAL NIGHTLY PRN
Status: DISCONTINUED | OUTPATIENT
Start: 2021-03-11 | End: 2021-03-11 | Stop reason: HOSPADM

## 2021-03-11 RX ORDER — FENTANYL CITRATE 50 UG/ML
75 INJECTION, SOLUTION INTRAMUSCULAR; INTRAVENOUS ONCE
Status: DISCONTINUED | OUTPATIENT
Start: 2021-03-11 | End: 2021-03-11

## 2021-03-11 RX ORDER — ZIPRASIDONE MESYLATE 20 MG/ML
20 INJECTION, POWDER, LYOPHILIZED, FOR SOLUTION INTRAMUSCULAR ONCE
Status: DISCONTINUED | OUTPATIENT
Start: 2021-03-11 | End: 2021-03-11 | Stop reason: HOSPADM

## 2021-03-11 RX ORDER — SODIUM CHLORIDE 0.9 % (FLUSH) 0.9 %
10 SYRINGE (ML) INJECTION PRN
Status: DISCONTINUED | OUTPATIENT
Start: 2021-03-11 | End: 2021-03-11 | Stop reason: HOSPADM

## 2021-03-11 RX ORDER — LORAZEPAM 2 MG/ML
2 INJECTION INTRAMUSCULAR ONCE
Status: DISCONTINUED | OUTPATIENT
Start: 2021-03-11 | End: 2021-03-11 | Stop reason: HOSPADM

## 2021-03-11 RX ORDER — OLANZAPINE 10 MG/1
10 TABLET ORAL NIGHTLY
Status: DISCONTINUED | OUTPATIENT
Start: 2021-03-11 | End: 2021-03-11 | Stop reason: HOSPADM

## 2021-03-11 RX ORDER — ACETAMINOPHEN 650 MG/1
650 SUPPOSITORY RECTAL EVERY 6 HOURS PRN
Status: DISCONTINUED | OUTPATIENT
Start: 2021-03-11 | End: 2021-03-11 | Stop reason: HOSPADM

## 2021-03-11 RX ORDER — 0.9 % SODIUM CHLORIDE 0.9 %
1000 INTRAVENOUS SOLUTION INTRAVENOUS ONCE
Status: DISCONTINUED | OUTPATIENT
Start: 2021-03-11 | End: 2021-03-11

## 2021-03-11 RX ORDER — LORAZEPAM 2 MG/ML
INJECTION INTRAMUSCULAR
Status: DISCONTINUED
Start: 2021-03-11 | End: 2021-03-11 | Stop reason: HOSPADM

## 2021-03-11 RX ORDER — TRAZODONE HYDROCHLORIDE 50 MG/1
50 TABLET ORAL NIGHTLY PRN
COMMUNITY
End: 2021-03-12

## 2021-03-11 RX ORDER — ONDANSETRON 2 MG/ML
4 INJECTION INTRAMUSCULAR; INTRAVENOUS EVERY 6 HOURS PRN
Status: DISCONTINUED | OUTPATIENT
Start: 2021-03-11 | End: 2021-03-11 | Stop reason: HOSPADM

## 2021-03-11 RX ORDER — OXYCODONE HYDROCHLORIDE AND ACETAMINOPHEN 5; 325 MG/1; MG/1
2 TABLET ORAL ONCE
Status: COMPLETED | OUTPATIENT
Start: 2021-03-11 | End: 2021-03-11

## 2021-03-11 RX ORDER — DIPHENHYDRAMINE HYDROCHLORIDE 50 MG/ML
INJECTION INTRAMUSCULAR; INTRAVENOUS
Status: DISCONTINUED
Start: 2021-03-11 | End: 2021-03-11 | Stop reason: HOSPADM

## 2021-03-11 RX ORDER — POLYETHYLENE GLYCOL 3350 17 G/17G
17 POWDER, FOR SOLUTION ORAL DAILY PRN
Status: DISCONTINUED | OUTPATIENT
Start: 2021-03-11 | End: 2021-03-11 | Stop reason: HOSPADM

## 2021-03-11 RX ADMIN — OXYCODONE HYDROCHLORIDE AND ACETAMINOPHEN 2 TABLET: 5; 325 TABLET ORAL at 18:54

## 2021-03-11 RX ADMIN — SODIUM CHLORIDE 1000 ML: 9 INJECTION, SOLUTION INTRAVENOUS at 10:25

## 2021-03-11 RX ADMIN — Medication 2000 MG: at 11:15

## 2021-03-11 RX ADMIN — PIPERACILLIN SODIUM AND TAZOBACTAM SODIUM 4500 MG: 4; .5 INJECTION, POWDER, LYOPHILIZED, FOR SOLUTION INTRAVENOUS at 18:14

## 2021-03-11 RX ADMIN — TETANUS TOXOID, REDUCED DIPHTHERIA TOXOID AND ACELLULAR PERTUSSIS VACCINE, ADSORBED 0.5 ML: 5; 2.5; 8; 8; 2.5 SUSPENSION INTRAMUSCULAR at 19:30

## 2021-03-11 RX ADMIN — HYDROMORPHONE HYDROCHLORIDE 1 MG: 1 INJECTION, SOLUTION INTRAMUSCULAR; INTRAVENOUS; SUBCUTANEOUS at 11:34

## 2021-03-11 RX ADMIN — KETOROLAC TROMETHAMINE 30 MG: 30 INJECTION, SOLUTION INTRAMUSCULAR; INTRAVENOUS at 18:14

## 2021-03-11 RX ADMIN — HYDROMORPHONE HYDROCHLORIDE 1 MG: 1 INJECTION, SOLUTION INTRAMUSCULAR; INTRAVENOUS; SUBCUTANEOUS at 18:14

## 2021-03-11 RX ADMIN — HYDROMORPHONE HYDROCHLORIDE 1 MG: 1 INJECTION, SOLUTION INTRAMUSCULAR; INTRAVENOUS; SUBCUTANEOUS at 10:37

## 2021-03-11 ASSESSMENT — ENCOUNTER SYMPTOMS
SHORTNESS OF BREATH: 0
ABDOMINAL PAIN: 0
SORE THROAT: 0
EYE REDNESS: 0
EYE DISCHARGE: 0
VOMITING: 0
DIARRHEA: 0
SINUS PRESSURE: 0
WHEEZING: 0
NAUSEA: 0
EYE PAIN: 0
COUGH: 0
BACK PAIN: 0

## 2021-03-11 ASSESSMENT — PAIN SCALES - GENERAL
PAINLEVEL_OUTOF10: 10

## 2021-03-11 ASSESSMENT — PAIN DESCRIPTION - ORIENTATION
ORIENTATION: LEFT

## 2021-03-11 ASSESSMENT — PAIN DESCRIPTION - PAIN TYPE
TYPE: ACUTE PAIN

## 2021-03-11 ASSESSMENT — PAIN DESCRIPTION - ONSET: ONSET: SUDDEN

## 2021-03-11 ASSESSMENT — PAIN DESCRIPTION - LOCATION
LOCATION: ARM
LOCATION: ANKLE
LOCATION: ARM
LOCATION: ARM

## 2021-03-11 ASSESSMENT — PAIN DESCRIPTION - DESCRIPTORS
DESCRIPTORS: ACHING
DESCRIPTORS: BURNING;THROBBING
DESCRIPTORS: ACHING

## 2021-03-11 ASSESSMENT — PAIN DESCRIPTION - FREQUENCY
FREQUENCY: CONTINUOUS
FREQUENCY: CONTINUOUS

## 2021-03-11 NOTE — ED NOTES
Patient continues to yell in room, states that we are not doing anything to help his pain. Patient states dilaudid not strong enough. Patient threatening to leave. Police officers called to bedside. Dr. Michele Bucio at bedside to speak with the patient. Patient to [address] [facility].       Amber Morales RN  03/11/21 5234

## 2021-03-11 NOTE — ED NOTES
Pt screaming,beligerent to staff. Reports does not want to stay, AMA paperwork signed and escorted to the door for safety of other patients. Patient reports that he will not stay unless given pain medication and given pain medication every half n hour and at request. Also that he's \"not fucking staying in the hospital\" that staff \"can fuck off\".      Fallon Smith, RN  03/11/21 2263

## 2021-03-11 NOTE — ED PROVIDER NOTES
vomiting. Genitourinary: Negative for dysuria and frequency. Musculoskeletal: Negative for arthralgias and back pain. Skin: Positive for rash. Negative for wound. Neurological: Negative for weakness and headaches. Hematological: Negative for adenopathy. All other systems reviewed and are negative. Physical Exam  Constitutional:       Appearance: Normal appearance. HENT:      Head: Normocephalic and atraumatic. Eyes:      Extraocular Movements: Extraocular movements intact. Pupils: Pupils are equal, round, and reactive to light. Cardiovascular:      Rate and Rhythm: Normal rate and regular rhythm. Pulses: Normal pulses. Heart sounds: Normal heart sounds. Pulmonary:      Effort: Pulmonary effort is normal.      Breath sounds: Normal breath sounds. Abdominal:      General: Abdomen is flat. Bowel sounds are normal. There is no distension. Palpations: Abdomen is soft. Tenderness: There is no abdominal tenderness. There is no guarding. Musculoskeletal: Normal range of motion. Left elbow: He exhibits swelling. Skin:     Findings: Erythema and wound present. Neurological:      Mental Status: He is alert and oriented to person, place, and time. Procedures     MDM  Number of Diagnoses or Management Options  Diagnosis management comments: Patient seen and examined. Labs and imaging were ordered and reviewed from previous work-up at [facility] which showed possible abscess in left arm. Patient has redness he is able to flex and there is some mild limitation to extension of the elbow. Work-up reveals leukocytosis of 12.8. He does have a temp of 100.1. Anti-inflammatories pain meds were ordered. Patient was ordered Vanco and cefepime for concern for cellulitis of the arm. No crepitus is felt patient has good pulses good color and good skin temperature is felt unlikely that this is necrotizing fasciitis.   Work-up reveals patient warrants admission for further evaluation given his IV drug abuse and patient was admitted to the hospitalist service.             --------------------------------------------- PAST HISTORY ---------------------------------------------  Past Medical History:  has no past medical history on file. Past Surgical History:  has no past surgical history on file. Social History:  reports that he has quit smoking. His smoking use included cigarettes. He started smoking about 17 years ago. He has a 15.00 pack-year smoking history. He has never used smokeless tobacco. He reports current alcohol use of about 3.0 standard drinks of alcohol per week. He reports current drug use. Drug: Cocaine. Family History: family history includes No Known Problems in his father and mother. The patients home medications have been reviewed.     Allergies: Haloperidol and related    -------------------------------------------------- RESULTS -------------------------------------------------    LABS:  Results for orders placed or performed during the hospital encounter of 03/11/21   CBC Auto Differential   Result Value Ref Range    WBC 12.8 (H) 4.5 - 11.5 E9/L    RBC 4.48 3.80 - 5.80 E12/L    Hemoglobin 13.1 12.5 - 16.5 g/dL    Hematocrit 40.0 37.0 - 54.0 %    MCV 89.3 80.0 - 99.9 fL    MCH 29.2 26.0 - 35.0 pg    MCHC 32.8 32.0 - 34.5 %    RDW 13.2 11.5 - 15.0 fL    Platelets 290 376 - 758 E9/L    MPV 8.1 7.0 - 12.0 fL    Neutrophils % 70.0 43.0 - 80.0 %    Immature Granulocytes % 0.5 0.0 - 5.0 %    Lymphocytes % 16.9 (L) 20.0 - 42.0 %    Monocytes % 11.3 2.0 - 12.0 %    Eosinophils % 0.9 0.0 - 6.0 %    Basophils % 0.4 0.0 - 2.0 %    Neutrophils Absolute 8.97 (H) 1.80 - 7.30 E9/L    Immature Granulocytes # 0.06 E9/L    Lymphocytes Absolute 2.16 1.50 - 4.00 E9/L    Monocytes Absolute 1.44 (H) 0.10 - 0.95 E9/L    Eosinophils Absolute 0.11 0.05 - 0.50 E9/L    Basophils Absolute 0.05 0.00 - 0.20 E9/L   Sedimentation Rate   Result Value Views)    Result Date: 3/11/2021  EXAMINATION: THREE XRAY VIEWS OF THE LEFT ANKLE; THREE XRAY VIEWS OF THE LEFT SHOULDER 3/11/2021 10:59 am COMPARISON: Left shoulder radiographs 2021. Left foot radiographs 2021. HISTORY: ORDERING SYSTEM PROVIDED HISTORY: pain after trauma TECHNOLOGIST PROVIDED HISTORY: Reason for exam:->pain after trauma What reading provider will be dictating this exam?->CRC Left ankle pain after rolling it on sidewalk. Acute onset left shoulder pain 2 days ago. No recent fall or trauma to left shoulder. FINDINGS: Left shoulder: There is no radiographic evidence of definite acute fracture or dislocation. No evidence of focal intra-osseous lesion. Joint spaces are maintained. There is no evidence of significant degenerative arthrosis. No definite radiographically visible acute skeletal pathology. Left ankle: There is nonspecific soft tissue swelling in the lateral malleolus region. This could represent contusion and hematoma but could also reflect underlying ligamentous and or tendinous injury. There is no definite evidence of acute fracture, dislocation, or misalignment. IMPRESSION: No radiographically visible acute skeletal pathology. Us Dup Upper Extremity Left Venous    Result Date: 3/11/2021  Patient MRN:  [id number] : 1995 Age: 30 years Gender: Male Order Date:  3/11/2021 12:03 PM EXAM: US DUP UPPER EXTREMITY LEFT VENOUS NUMBER OF IMAGES:  29 INDICATION:  missed IV drug use, now cellulitis missed IV drug use, now cellulitis What reading provider will be dictating this exam?->[facility] COMPARISON: None Within the visualized vessels, there is no evidence for deep venous thrombosis There is good compressibility, there is good augmentation, there is good color flow. There is a 2.9 x 1.7 x 2.5 cm mixed echogenic region in the left antecubital fossa. A hematoma could give this appearance and abscess could give this appearance.     Within the visualized vessels there is no evidence for deep venous thrombosis Abscess versus hematoma in the left antecubital fossa     ------------------------- NURSING NOTES AND VITALS REVIEWED ---------------------------  Date / Time Roomed:  3/11/2021  5:31 PM  ED Bed Assignment:  5256/1420-A    The nursing notes within the ED encounter and vital signs as below have been reviewed. Patient Vitals for the past 24 hrs:   BP Temp Temp src Pulse Resp SpO2 Height Weight   03/11/21 1729 (!) 142/67 100.1 °F (37.8 °C) Temporal 89 18 97 % 5' 8\" (1.727 m) 150 lb (68 kg)       Oxygen Saturation Interpretation: Normal    ------------------------------------------ PROGRESS NOTES ------------------------------------------  Counseling:  I have spoken with the patient and discussed todays results, in addition to providing specific details for the plan of care and counseling regarding the diagnosis and prognosis. Their questions are answered at this time and they are agreeable with the plan of admission.    --------------------------------- ADDITIONAL PROVIDER NOTES ---------------------------------  This patient's ED course included: a personal history and physicial examination, re-evaluation prior to disposition, multiple bedside re-evaluations, IV medications, cardiac monitoring and continuous pulse oximetry    This patient has remained hemodynamically stable during their ED course. Diagnosis:  1. Sepsis due to cellulitis ([facility]    2. IV drug abuse ([facility]        Disposition:  Patient's disposition: Admit to med/surg floor  Patient's condition is fair.            Marc Dobbs DO  03/12/21 7774

## 2021-03-11 NOTE — ED NOTES
FIRST PROVIDER CONTACT ASSESSMENT NOTE      Department of Emergency Medicine   3/11/21  5:29 PM EST    Chief Complaint: Wound Check (left arm redness, pain, and swelling x1 week where injected heroin)    History of Present Illness:   Stephany Sands is a 30 y.o. male who presents to the ED for abscess to left antecubital region of arm. Patient reports injecting heroin last week. He was seen at [facility] ED this afternoon and left AMA because \"they weren't controlling my pain. \" Patient given Ancef prior to arrival     Focused Physical Exam:  VS:  BP (!) 142/67   Pulse 89   Temp 100.1 °F (37.8 °C) (Temporal)   Resp 18   Ht 5' 8\" (1.727 m)   Wt 150 lb (68 kg)   SpO2 97%   BMI 22.81 kg/m²      General: Alert and in no apparent distress     Medical History:  has no past medical history on file. Surgical History:  has no past surgical history on file. Social History:  reports that he has quit smoking. His smoking use included cigarettes. He started smoking about 17 years ago. He has a 15.00 pack-year smoking history. He has never used smokeless tobacco. He reports current alcohol use of about 3.0 standard drinks of alcohol per week. He reports current drug use. Drug: Cocaine. Family History: family history includes No Known Problems in his father and mother.     *ALLERGIES*     Haloperidol and related     Initial Plan of Care:  Initiate Treatment-Testing, Proceed toTreatment Area When Bed Available for ED Attending/MLP to Continue Care    -----------------END OF FIRST PROVIDER CONTACT ASSESSMENT NOTE--------------  Electronically signed by Bronwyn Augustine PA-C   DD: 3/11/21         Bronwyn Augustine PA-C  03/11/21 [address] [facility], [facility]  03/11/21 [id number]
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
## 2021-03-11 NOTE — ED NOTES
Bed: 18B-18  Expected date:   Expected time:   Means of arrival:   Comments:  triage     Ancil Gosselin, RN  03/11/21 1000

## 2021-03-11 NOTE — ED NOTES
Patient swearing and belligerant to staff and this RN. Threatening to leave and pull iv out.  Dr Jay Bennett informed     Ellen Petties, RN  03/11/21 5343

## 2021-03-12 ENCOUNTER — HOSPITAL ENCOUNTER (INPATIENT)
Age: 26
LOS: 3 days | Discharge: LEFT BEFORE DISCHARGE | DRG: 383 | End: 2021-03-15
Attending: EMERGENCY MEDICINE | Admitting: FAMILY MEDICINE
Payer: COMMERCIAL

## 2021-03-12 DIAGNOSIS — L02.91 ABSCESS: Primary | ICD-10-CM

## 2021-03-12 DIAGNOSIS — F19.90 IVDU (INTRAVENOUS DRUG USER): ICD-10-CM

## 2021-03-12 DIAGNOSIS — L03.114 CELLULITIS OF LEFT ARM: ICD-10-CM

## 2021-03-12 PROBLEM — L02.419 ABSCESS OF ANTECUBITAL FOSSA: Status: ACTIVE | Noted: 2021-03-12

## 2021-03-12 PROBLEM — Z91.199 NONCOMPLIANCE BY REFUSING SERVICE: Status: ACTIVE | Noted: 2021-03-12

## 2021-03-12 LAB
ALBUMIN SERPL-MCNC: 3.6 G/DL (ref 3.5–5.2)
ALP BLD-CCNC: 101 U/L (ref 40–129)
ALT SERPL-CCNC: 14 U/L (ref 0–40)
ANION GAP SERPL CALCULATED.3IONS-SCNC: 9 MMOL/L (ref 7–16)
AST SERPL-CCNC: 16 U/L (ref 0–39)
BASOPHILS ABSOLUTE: 0.06 E9/L (ref 0–0.2)
BASOPHILS RELATIVE PERCENT: 0.5 % (ref 0–2)
BILIRUB SERPL-MCNC: 0.3 MG/DL (ref 0–1.2)
BUN BLDV-MCNC: 10 MG/DL (ref 6–20)
CALCIUM SERPL-MCNC: 8.9 MG/DL (ref 8.6–10.2)
CHLORIDE BLD-SCNC: 106 MMOL/L (ref 98–107)
CO2: 25 MMOL/L (ref 22–29)
CREAT SERPL-MCNC: 0.9 MG/DL (ref 0.7–1.2)
EOSINOPHILS ABSOLUTE: 0.24 E9/L (ref 0.05–0.5)
EOSINOPHILS RELATIVE PERCENT: 1.9 % (ref 0–6)
GFR AFRICAN AMERICAN: >60
GFR NON-AFRICAN AMERICAN: >60 ML/MIN/1.73
GLUCOSE BLD-MCNC: 105 MG/DL (ref 74–99)
HCT VFR BLD CALC: 39.4 % (ref 37–54)
HEMOGLOBIN: 12.8 G/DL (ref 12.5–16.5)
IMMATURE GRANULOCYTES #: 0.06 E9/L
IMMATURE GRANULOCYTES %: 0.5 % (ref 0–5)
LACTIC ACID, SEPSIS: 1.1 MMOL/L (ref 0.5–1.9)
LACTIC ACID, SEPSIS: 2.1 MMOL/L (ref 0.5–1.9)
LYMPHOCYTES ABSOLUTE: 1.23 E9/L (ref 1.5–4)
LYMPHOCYTES RELATIVE PERCENT: 9.9 % (ref 20–42)
MCH RBC QN AUTO: 29.3 PG (ref 26–35)
MCHC RBC AUTO-ENTMCNC: 32.5 % (ref 32–34.5)
MCV RBC AUTO: 90.2 FL (ref 80–99.9)
MONOCYTES ABSOLUTE: 1.19 E9/L (ref 0.1–0.95)
MONOCYTES RELATIVE PERCENT: 9.6 % (ref 2–12)
NEUTROPHILS ABSOLUTE: 9.63 E9/L (ref 1.8–7.3)
NEUTROPHILS RELATIVE PERCENT: 77.6 % (ref 43–80)
PDW BLD-RTO: 13.2 FL (ref 11.5–15)
PLATELET # BLD: 280 E9/L (ref 130–450)
PMV BLD AUTO: 8.4 FL (ref 7–12)
POTASSIUM REFLEX MAGNESIUM: 4.1 MMOL/L (ref 3.5–5)
RBC # BLD: 4.37 E12/L (ref 3.8–5.8)
SODIUM BLD-SCNC: 140 MMOL/L (ref 132–146)
TOTAL PROTEIN: 6.6 G/DL (ref 6.4–8.3)
WBC # BLD: 12.4 E9/L (ref 4.5–11.5)

## 2021-03-12 PROCEDURE — 1200000000 HC SEMI PRIVATE

## 2021-03-12 PROCEDURE — 83605 ASSAY OF LACTIC ACID: CPT

## 2021-03-12 PROCEDURE — 96361 HYDRATE IV INFUSION ADD-ON: CPT

## 2021-03-12 PROCEDURE — 87040 BLOOD CULTURE FOR BACTERIA: CPT

## 2021-03-12 PROCEDURE — APPSS30 APP SPLIT SHARED TIME 16-30 MINUTES: Performed by: NURSE PRACTITIONER

## 2021-03-12 PROCEDURE — 6360000002 HC RX W HCPCS: Performed by: INTERNAL MEDICINE

## 2021-03-12 PROCEDURE — 80053 COMPREHEN METABOLIC PANEL: CPT

## 2021-03-12 PROCEDURE — 2580000003 HC RX 258: Performed by: NURSE PRACTITIONER

## 2021-03-12 PROCEDURE — 2580000003 HC RX 258: Performed by: EMERGENCY MEDICINE

## 2021-03-12 PROCEDURE — 6360000002 HC RX W HCPCS: Performed by: EMERGENCY MEDICINE

## 2021-03-12 PROCEDURE — 6370000000 HC RX 637 (ALT 250 FOR IP): Performed by: EMERGENCY MEDICINE

## 2021-03-12 PROCEDURE — 99222 1ST HOSP IP/OBS MODERATE 55: CPT | Performed by: INTERNAL MEDICINE

## 2021-03-12 PROCEDURE — 96365 THER/PROPH/DIAG IV INF INIT: CPT

## 2021-03-12 PROCEDURE — 6370000000 HC RX 637 (ALT 250 FOR IP): Performed by: NURSE PRACTITIONER

## 2021-03-12 PROCEDURE — 85025 COMPLETE CBC W/AUTO DIFF WBC: CPT

## 2021-03-12 PROCEDURE — 6360000002 HC RX W HCPCS

## 2021-03-12 PROCEDURE — 2580000003 HC RX 258: Performed by: SPECIALIST

## 2021-03-12 PROCEDURE — 6360000002 HC RX W HCPCS: Performed by: SPECIALIST

## 2021-03-12 PROCEDURE — 99283 EMERGENCY DEPT VISIT LOW MDM: CPT

## 2021-03-12 PROCEDURE — 96375 TX/PRO/DX INJ NEW DRUG ADDON: CPT

## 2021-03-12 RX ORDER — OXYCODONE HYDROCHLORIDE AND ACETAMINOPHEN 5; 325 MG/1; MG/1
1 TABLET ORAL ONCE
Status: COMPLETED | OUTPATIENT
Start: 2021-03-12 | End: 2021-03-12

## 2021-03-12 RX ORDER — KETOROLAC TROMETHAMINE 30 MG/ML
15 INJECTION, SOLUTION INTRAMUSCULAR; INTRAVENOUS ONCE
Status: COMPLETED | OUTPATIENT
Start: 2021-03-12 | End: 2021-03-12

## 2021-03-12 RX ORDER — SODIUM CHLORIDE 9 MG/ML
INJECTION, SOLUTION INTRAVENOUS CONTINUOUS
Status: DISCONTINUED | OUTPATIENT
Start: 2021-03-12 | End: 2021-03-15 | Stop reason: HOSPADM

## 2021-03-12 RX ORDER — POLYETHYLENE GLYCOL 3350 17 G/17G
17 POWDER, FOR SOLUTION ORAL DAILY PRN
Status: DISCONTINUED | OUTPATIENT
Start: 2021-03-12 | End: 2021-03-15 | Stop reason: HOSPADM

## 2021-03-12 RX ORDER — NICOTINE 21 MG/24HR
1 PATCH, TRANSDERMAL 24 HOURS TRANSDERMAL DAILY
Status: DISCONTINUED | OUTPATIENT
Start: 2021-03-12 | End: 2021-03-15 | Stop reason: HOSPADM

## 2021-03-12 RX ORDER — HYDROXYZINE PAMOATE 25 MG/1
50 CAPSULE ORAL EVERY 8 HOURS PRN
Status: DISCONTINUED | OUTPATIENT
Start: 2021-03-12 | End: 2021-03-15 | Stop reason: HOSPADM

## 2021-03-12 RX ORDER — TRAMADOL HYDROCHLORIDE 50 MG/1
50 TABLET ORAL PRN
Status: DISCONTINUED | OUTPATIENT
Start: 2021-03-12 | End: 2021-03-15

## 2021-03-12 RX ORDER — TRAZODONE HYDROCHLORIDE 50 MG/1
50 TABLET ORAL NIGHTLY PRN
Status: DISCONTINUED | OUTPATIENT
Start: 2021-03-12 | End: 2021-03-15 | Stop reason: HOSPADM

## 2021-03-12 RX ORDER — ONDANSETRON 2 MG/ML
4 INJECTION INTRAMUSCULAR; INTRAVENOUS EVERY 6 HOURS PRN
Status: DISCONTINUED | OUTPATIENT
Start: 2021-03-12 | End: 2021-03-15 | Stop reason: HOSPADM

## 2021-03-12 RX ORDER — 0.9 % SODIUM CHLORIDE 0.9 %
1000 INTRAVENOUS SOLUTION INTRAVENOUS ONCE
Status: COMPLETED | OUTPATIENT
Start: 2021-03-12 | End: 2021-03-12

## 2021-03-12 RX ORDER — SODIUM CHLORIDE 0.9 % (FLUSH) 0.9 %
10 SYRINGE (ML) INJECTION PRN
Status: DISCONTINUED | OUTPATIENT
Start: 2021-03-12 | End: 2021-03-15 | Stop reason: HOSPADM

## 2021-03-12 RX ORDER — SODIUM CHLORIDE 9 MG/ML
INJECTION, SOLUTION INTRAVENOUS EVERY 8 HOURS
Status: DISCONTINUED | OUTPATIENT
Start: 2021-03-12 | End: 2021-03-15

## 2021-03-12 RX ORDER — ACETAMINOPHEN 325 MG/1
650 TABLET ORAL EVERY 6 HOURS PRN
Status: DISCONTINUED | OUTPATIENT
Start: 2021-03-12 | End: 2021-03-15 | Stop reason: HOSPADM

## 2021-03-12 RX ORDER — ACETAMINOPHEN 650 MG/1
650 SUPPOSITORY RECTAL EVERY 6 HOURS PRN
Status: DISCONTINUED | OUTPATIENT
Start: 2021-03-12 | End: 2021-03-15 | Stop reason: HOSPADM

## 2021-03-12 RX ORDER — DIVALPROEX SODIUM 250 MG/1
250 TABLET, DELAYED RELEASE ORAL EVERY 12 HOURS SCHEDULED
Status: DISCONTINUED | OUTPATIENT
Start: 2021-03-12 | End: 2021-03-15 | Stop reason: HOSPADM

## 2021-03-12 RX ORDER — CLONIDINE HYDROCHLORIDE 0.1 MG/1
0.1 TABLET ORAL PRN
Status: DISCONTINUED | OUTPATIENT
Start: 2021-03-12 | End: 2021-03-15 | Stop reason: HOSPADM

## 2021-03-12 RX ORDER — M-VIT,TX,IRON,MINS/CALC/FOLIC 27MG-0.4MG
1 TABLET ORAL DAILY
Status: DISCONTINUED | OUTPATIENT
Start: 2021-03-12 | End: 2021-03-15 | Stop reason: HOSPADM

## 2021-03-12 RX ORDER — PROMETHAZINE HYDROCHLORIDE 25 MG/1
12.5 TABLET ORAL EVERY 6 HOURS PRN
Status: DISCONTINUED | OUTPATIENT
Start: 2021-03-12 | End: 2021-03-15 | Stop reason: HOSPADM

## 2021-03-12 RX ORDER — SODIUM CHLORIDE 0.9 % (FLUSH) 0.9 %
10 SYRINGE (ML) INJECTION EVERY 12 HOURS SCHEDULED
Status: DISCONTINUED | OUTPATIENT
Start: 2021-03-12 | End: 2021-03-15 | Stop reason: HOSPADM

## 2021-03-12 RX ADMIN — TRAMADOL HYDROCHLORIDE 50 MG: 50 TABLET, FILM COATED ORAL at 20:20

## 2021-03-12 RX ADMIN — SODIUM CHLORIDE: 9 INJECTION, SOLUTION INTRAVENOUS at 22:37

## 2021-03-12 RX ADMIN — ACETAMINOPHEN 650 MG: 325 TABLET ORAL at 21:43

## 2021-03-12 RX ADMIN — HYDROXYZINE PAMOATE 50 MG: 25 CAPSULE ORAL at 21:43

## 2021-03-12 RX ADMIN — KETOROLAC TROMETHAMINE 15 MG: 30 INJECTION, SOLUTION INTRAMUSCULAR; INTRAVENOUS at 13:48

## 2021-03-12 RX ADMIN — OXYCODONE HYDROCHLORIDE AND ACETAMINOPHEN 1 TABLET: 5; 325 TABLET ORAL at 13:48

## 2021-03-12 RX ADMIN — SODIUM CHLORIDE 1000 ML: 9 INJECTION, SOLUTION INTRAVENOUS at 13:47

## 2021-03-12 RX ADMIN — CLONIDINE HYDROCHLORIDE 0.1 MG: 0.1 TABLET ORAL at 20:20

## 2021-03-12 RX ADMIN — Medication 0.5 MG: at 14:54

## 2021-03-12 RX ADMIN — VANCOMYCIN HYDROCHLORIDE 1750 MG: 5 INJECTION, POWDER, LYOPHILIZED, FOR SOLUTION INTRAVENOUS at 16:01

## 2021-03-12 RX ADMIN — PIPERACILLIN SODIUM AND TAZOBACTAM SODIUM 4500 MG: 4; .5 INJECTION, POWDER, LYOPHILIZED, FOR SOLUTION INTRAVENOUS at 14:55

## 2021-03-12 RX ADMIN — SODIUM CHLORIDE: 9 INJECTION, SOLUTION INTRAVENOUS at 18:32

## 2021-03-12 RX ADMIN — CEFEPIME HYDROCHLORIDE 2000 MG: 2 INJECTION, POWDER, FOR SOLUTION INTRAVENOUS at 18:31

## 2021-03-12 RX ADMIN — HYDROMORPHONE HYDROCHLORIDE 0.5 MG: 1 INJECTION, SOLUTION INTRAMUSCULAR; INTRAVENOUS; SUBCUTANEOUS at 14:54

## 2021-03-12 RX ADMIN — KETOROLAC TROMETHAMINE 15 MG: 30 INJECTION, SOLUTION INTRAMUSCULAR; INTRAVENOUS at 22:33

## 2021-03-12 RX ADMIN — TRAMADOL HYDROCHLORIDE 50 MG: 50 TABLET, FILM COATED ORAL at 18:20

## 2021-03-12 RX ADMIN — CLONIDINE HYDROCHLORIDE 0.1 MG: 0.1 TABLET ORAL at 18:20

## 2021-03-12 ASSESSMENT — PAIN DESCRIPTION - ORIENTATION
ORIENTATION: LEFT

## 2021-03-12 ASSESSMENT — PAIN - FUNCTIONAL ASSESSMENT
PAIN_FUNCTIONAL_ASSESSMENT: PREVENTS OR INTERFERES SOME ACTIVE ACTIVITIES AND ADLS

## 2021-03-12 ASSESSMENT — PAIN DESCRIPTION - PAIN TYPE
TYPE: ACUTE PAIN

## 2021-03-12 ASSESSMENT — PAIN DESCRIPTION - PROGRESSION: CLINICAL_PROGRESSION: NOT CHANGED

## 2021-03-12 ASSESSMENT — PAIN DESCRIPTION - ONSET
ONSET: ON-GOING

## 2021-03-12 ASSESSMENT — PAIN SCALES - GENERAL
PAINLEVEL_OUTOF10: 10

## 2021-03-12 ASSESSMENT — PAIN DESCRIPTION - FREQUENCY
FREQUENCY: CONTINUOUS
FREQUENCY: CONTINUOUS

## 2021-03-12 ASSESSMENT — PAIN DESCRIPTION - DESCRIPTORS: DESCRIPTORS: CONSTANT;PRESSURE;TIGHTNESS

## 2021-03-12 NOTE — ED PROVIDER NOTES
HPI:  3/12/21,   Time: 12:56 PM EDUARD Grijalva is a 30 y.o. male presenting to the ED for L arm swelling and redness, beginning 4 days ago. The complaint has been persistent and moderate in severity, and worsened by movement. Patient reluctantly admits IV drug use in the past couple of weeks. He states that about 4 days ago he tried to inject into the left antecubital area and thinks he missed the vein. He was seen in the emergency room dental [facility] with [facility] yesterday. Plan is for admission surgical consult for any drainage and IV antibiotics. Patient signed out AMA not wanting to be admitted. He did receive a dose of Ancef before discharge. He then came to this hospital in the ED were seen started on IV antibiotics and admitted he made it all the way up to the floor and then signed out AMA from the floor stating he was not getting enough pain medications and left [address] He now presents now stating today that the pain is getting worse and his arm the redness and swelling has gotten worse. He has not had any fevers or chills. He did have a fever yesterday in the ED. He states he has a lot more pain with movement of the left elbow but is still able to range it. Streaking down his left arm has increased. He states he left yesterday because needed to [facility] to his mom about things\". He notes that he is willing to stay but needs sufficient pain medication for it. He does seem to be exhibiting some drug-seeking behavior but is for the most part cooperative in the emergency department. Denies any nausea vomiting denies any numbness or weakness that the left arm denies any chest pain or shortness of breath. No fevers or chills today.     Review of Systems:   Pertinent positives and negatives are stated within HPI, all other systems reviewed and are negative.          --------------------------------------------- PAST HISTORY ---------------------------------------------  Past Medical History:  has a past medical history of Hepatitis C virus carrier state ([address] Past Surgical History:  has no past surgical history on file. Social History:  reports that he has quit smoking. His smoking use included cigarettes. He started smoking about 17 years ago. He has a 15.00 pack-year smoking history. He has never used smokeless tobacco. He reports current alcohol use of about 3.0 standard drinks of alcohol per week. He reports current drug use. Drug: Cocaine. Family History: Family history is unknown by patient. The patients home medications have been reviewed. Allergies: Haloperidol and related        ---------------------------------------------------PHYSICAL EXAM--------------------------------------    Constitutional/General: Alert and oriented x3, disheveled and slightly agitated appearing, non toxic in NAD  Head: Normocephalic and atraumatic  Eyes: PERRL, EOMI, conjunctive normal, sclera non icteric  Mouth: Oropharynx clear, handling secretions, no trismus, no asymmetry of the posterior oropharynx or uvular edema  Neck: Supple, full ROM, non tender to palpation in the midline, no stridor, no crepitus, no meningeal signs  Respiratory: Lungs clear to auscultation bilaterally, no wheezes, rales, or rhonchi. Not in respiratory distress  Cardiovascular:  Regular rate. Regular rhythm. No murmurs, gallops, or rubs. 2+ distal pulses  Chest: No chest wall tenderness  GI:  Abdomen Soft, Non tender, Non distended. +BS. No organomegaly, no palpable masses,  No rebound, guarding, or rigidity. Musculoskeletal: Moves all extremities x 4. Warm and well perfused, no clubbing, cyanosis, or edema. Capillary refill <3 seconds; patient does have an area of increased swelling and erythema nonfluctuating abscess of the left antecubital area. He does have streaking erythema and slightly increased warmth down the left forearm.   He has slightly limited range of motion upon flexion of the left forearm due to the abscess. Normal neurovascular exam to the left lower extremity no signs of crepitus or discoloration to the extremity. Normal strength and sensation to the entire left arm and hand. No open drainage from the abscess in left antecubital area. Normal distal pulses right both radial and ulnar to the left arm. Integument: skin warm and dry. No rashes. Lymphatic: no lymphadenopathy noted  Neurologic: GCS 15, no focal deficits, symmetric strength 5/5 in the upper and lower extremities bilaterally  Psychiatric: Agitated affect    -------------------------------------------------- RESULTS -------------------------------------------------  I have personally reviewed all laboratory and imaging results for this patient. Results are listed below.      LABS:  Results for orders placed or performed during the hospital encounter of 03/12/21   Lactate, Sepsis   Result Value Ref Range    Lactic Acid, Sepsis 2.1 (H) 0.5 - 1.9 mmol/L   Lactate, Sepsis   Result Value Ref Range    Lactic Acid, Sepsis 1.1 0.5 - 1.9 mmol/L   Comprehensive Metabolic Panel w/ Reflex to MG   Result Value Ref Range    Sodium 140 132 - 146 mmol/L    Potassium reflex Magnesium 4.1 3.5 - 5.0 mmol/L    Chloride 106 98 - 107 mmol/L    CO2 25 22 - 29 mmol/L    Anion Gap 9 7 - 16 mmol/L    Glucose 105 (H) 74 - 99 mg/dL    BUN 10 6 - 20 mg/dL    CREATININE 0.9 0.7 - 1.2 mg/dL    GFR Non-African American >60 >=60 mL/min/1.73    GFR African American >60     Calcium 8.9 8.6 - 10.2 mg/dL    Total Protein 6.6 6.4 - 8.3 g/dL    Albumin 3.6 3.5 - 5.2 g/dL    Total Bilirubin 0.3 0.0 - 1.2 mg/dL    Alkaline Phosphatase 101 40 - 129 U/L    ALT 14 0 - 40 U/L    AST 16 0 - 39 U/L   CBC auto differential   Result Value Ref Range    WBC 12.4 (H) 4.5 - 11.5 E9/L    RBC 4.37 3.80 - 5.80 E12/L    Hemoglobin 12.8 12.5 - 16.5 g/dL    Hematocrit 39.4 37.0 - 54.0 %    MCV 90.2 80.0 - 99.9 fL    MCH 29.3 26.0 - 35.0 pg    MCHC 32.5 32.0 - 34.5 %    RDW 13.2 11.5 - 15.0 fL    Platelets 645 247 - 600 E9/L    MPV 8.4 7.0 - 12.0 fL    Neutrophils % 77.6 43.0 - 80.0 %    Immature Granulocytes % 0.5 0.0 - 5.0 %    Lymphocytes % 9.9 (L) 20.0 - 42.0 %    Monocytes % 9.6 2.0 - 12.0 %    Eosinophils % 1.9 0.0 - 6.0 %    Basophils % 0.5 0.0 - 2.0 %    Neutrophils Absolute 9.63 (H) 1.80 - 7.30 E9/L    Immature Granulocytes # 0.06 E9/L    Lymphocytes Absolute 1.23 (L) 1.50 - 4.00 E9/L    Monocytes Absolute 1.19 (H) 0.10 - 0.95 E9/L    Eosinophils Absolute 0.24 0.05 - 0.50 E9/L    Basophils Absolute 0.06 0.00 - 0.20 E9/L       RADIOLOGY:  Interpreted by Radiologist.  No orders to display           ------------------------- NURSING NOTES AND VITALS REVIEWED ---------------------------   The nursing notes within the ED encounter and vital signs as below have been reviewed by myself. BP (!) 121/58   Pulse 72   Temp 98.5 °F (36.9 °C) (Oral)   Resp 18   Ht 5' 8\" (1.727 m)   Wt 152 lb 6.4 oz (69.1 kg)   SpO2 99%   BMI 23.17 kg/m²   Oxygen Saturation Interpretation: Pulse ox analysis:normal    The patients available past medical records and past encounters were reviewed.         ------------------------------ ED COURSE/MEDICAL DECISION MAKING----------------------  Medications   divalproex (DEPAKOTE) DR tablet 250 mg (250 mg Oral Not Given 3/13/21 0914)   therapeutic multivitamin-minerals 1 tablet (1 tablet Oral Not Given 3/13/21 0913)   sodium chloride flush 0.9 % injection 10 mL (10 mLs Intravenous Not Given 3/13/21 0914)   sodium chloride flush 0.9 % injection 10 mL (has no administration in time range)   promethazine (PHENERGAN) tablet 12.5 mg (has no administration in time range)     Or   ondansetron (ZOFRAN) injection 4 mg (has no administration in time range)   polyethylene glycol (GLYCOLAX) packet 17 g (has no administration in time range)   acetaminophen (TYLENOL) tablet 650 mg (650 mg Oral Not Given 3/13/21 4050)     Or who presents with 4 days of increased swelling and fluctuation to an abscess develop in the left antecubital area. He did have ultrasound evaluation done at [facility] yesterday that did show focal area of abscess in the left antecubital area. He signed out AMA from the hospital and came here received first round of IV antibiotics and signed out AMA from the floor here. He now says he is willing to stay. He had fever yesterday but no fever today. Increased swelling redness to the left arm area. Increased pain with range of motion. No signs of sepsis at this time. Patient had lab work here including repeat blood cultures. IV fluids were given. Initial lactic acid was 2.1. Patient was given pain medications in the ED, as well. He did exhibit some pain seeking behavior at times. Chemistry was normal white count was 12. 4. Which is similar to white count yesterday. Patient was started on IV Zosyn and vancomycin and spoke to the hospitalist service who will admit this patient. Surgery and ID will be consulted in house. This patient has remained hemodynamically stable while in the ED. Re-Evaluations:             Re-evaluation. Patients symptoms show no change. Re-examination  3/12/21   12:56 PM EST          Vital Signs:   Vitals:    03/12/21 1745 03/12/21 2015 03/13/21 0442 03/13/21 0845   BP: 126/72   (!) 121/58   Pulse: 68 89  72   Resp: 18 18  18   Temp: 98.5 °F (36.9 °C)      TempSrc: Oral      SpO2: 100% 99%     Weight:   152 lb 6.4 oz (69.1 kg)    Height:                 Consultations; I spoke to the hospitalist, Dr. Tasia Koyanagi. She agreed admit the patient to their service. They will consult surgery for evaluation and possible I&D in house. They will also consider consult infectious disease as needed. Counseling:    The emergency provider has spoken with the patient and discussed todays results, in addition to providing specific details for the plan of care and counseling regarding the diagnosis and prognosis. Questions are answered at this time and they are agreeable with the plan.       --------------------------------- IMPRESSION AND DISPOSITION ---------------------------------    IMPRESSION  1. Abscess    2. Cellulitis of left arm    3. IVDU (intravenous drug user)        DISPOSITION  Disposition:admit to med/surg  Patient condition is fair    NOTE: This report was transcribed using voice recognition software.  Every effort was made to ensure accuracy; however, inadvertent computerized transcription errors may be present        Fred Duong MD  03/13/21 2838

## 2021-03-12 NOTE — PROGRESS NOTES
Database complete. Medications reconciled. Care plans and education initiated. Pt states \"I'm not taking care of myself\".

## 2021-03-12 NOTE — ED NOTES
Pt refuses to allow IV fluids to be infused. Offered to start new IV. Does not want that either. Now is requesting dilaudid that he earlier refused.      Juwan Dumont RN  03/12/21 3001
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
## 2021-03-12 NOTE — H&P
[facility] History and Physical      CHIEF COMPLAINT: L arm cellulitis     History of Present Illness: Patient is a 69-year-old male with a past medical history of heroine abuse was presented to the emergency room for left brachial abscess. Patient went to [facility] ER earlier today for the same reason, left AMA before treatment. He he has been injecting IV heroin to his left arm couple of days ago, noticed left arm became more swelling and erythema. It was very painful. Denies any discharge, numbness tingling of hands, chest pain, shortness of breath. In the ER, he was febrile temp 100.1, labs showed CRP 7.2, leukocytosis 12.8, blood culture has been sent, ultrasound showed abscess versus hematoma in the left artecubital fossa. He was given Vanco and Zosyn and also received Dilaudid, Toradol, Percocet for pain. Patient does plan to admitted to hospital for further work-up. General surgery has been consulted    Informant(s) for H&P: Limited, patient was not cooperative    REVIEW OF SYSTEMS:  A comprehensive review of systems was negative except for: what is in the HPI    PMH:  No past medical history on file. Surgical History:  No past surgical history on file. Medications Prior to Admission:    Prior to Admission medications    Medication Sig Start Date End Date Taking? Authorizing Provider   traZODone (DESYREL) 50 MG tablet Take 50 mg by mouth nightly as needed for Sleep    Historical Provider, MD   divalproex (DEPAKOTE) 250 MG DR tablet Take 1 tablet by mouth every 12 hours 3/5/21 3/0/42  LATASHA Linares CNP   Multiple Vitamins-Minerals (THERAPEUTIC MULTIVITAMIN-MINERALS) tablet Take 1 tablet by mouth daily 3/5/21 9/8/08  LATASHA Linares CNP   OLANZapine (ZYPREXA) 10 MG tablet Take 1 tablet by mouth nightly 3/5/21 8/6/66  LATASHA Mahan CNP       Allergies:    Haloperidol and related    Social History:    reports that he has quit smoking.  His smoking use included cigarettes. He started smoking about 17 years ago. He has a 15.00 pack-year smoking history. He has never used smokeless tobacco. He reports current alcohol use of about 3.0 standard drinks of alcohol per week. He reports current drug use. Drug: Cocaine. Family History:   family history includes No Known Problems in his father and mother. PHYSICAL EXAM:  Vitals:  BP (!) 142/67   Pulse 89   Temp 100.1 °F (37.8 °C) (Temporal)   Resp 18   Ht 5' 8\" (1.727 m)   Wt 150 lb (68 kg)   SpO2 97%   BMI 22.81 kg/m²     General Appearance: alert and oriented to person, place and time and in no acute distress  Skin: warm and dry  Head: normocephalic and atraumatic  Eyes: pupils equal, round, and reactive to light, extraocular eye movements intact, conjunctivae normal  Neck: neck supple and non tender without mass   Pulmonary/Chest: clear to auscultation bilaterally- no wheezes, rales or rhonchi, normal air movement, no respiratory distress  Cardiovascular: normal rate, normal S1 and S2 and no carotid bruits  Abdomen: soft, non-tender, non-distended, normal bowel sounds, no masses or organomegaly  Extremities: Left brachial erythema, edema, palpable lump/abscess, painful to palpate  Neurologic: no cranial nerve deficit and speech normal        LABS:  Recent Labs     03/11/21  1020 03/11/21  1757    137   K 3.8 3.9    102   CO2 22 28   BUN 11 15   CREATININE 0.9 1.0   GLUCOSE 92 102*   CALCIUM 8.9 8.6       Recent Labs     03/11/21  1020 03/11/21  1757   WBC 14.4* 12.8*   RBC 4.70 4.48   HGB 13.6 13.1   HCT 41.5 40.0   MCV 88.3 89.3   MCH 28.9 29.2   MCHC 32.8 32.8   RDW 13.2 13.2    303   MPV 8.0 8.1       No results for input(s): POCGLU in the last 72 hours.     CBC with Differential:    Lab Results   Component Value Date    WBC 12.8 03/11/2021    RBC 4.48 03/11/2021    HGB 13.1 03/11/2021    HCT 40.0 03/11/2021     03/11/2021    MCV 89.3 03/11/2021    MCH 29.2 03/11/2021    MCHC 32.8 03/11/2021    RDW 13.2 03/11/2021    METASPCT 0.9 03/01/2021    LYMPHOPCT 16.9 03/11/2021    MONOPCT 11.3 03/11/2021    BASOPCT 0.4 03/11/2021    MONOSABS 1.44 03/11/2021    LYMPHSABS 2.16 03/11/2021    EOSABS 0.11 03/11/2021    BASOSABS 0.05 03/11/2021     CMP:    Lab Results   Component Value Date     03/11/2021    K 3.9 03/11/2021    K 3.8 03/11/2021     03/11/2021    CO2 28 03/11/2021    BUN 15 03/11/2021    CREATININE 1.0 03/11/2021    GFRAA >60 03/11/2021    LABGLOM >60 03/11/2021    GLUCOSE 102 03/11/2021    PROT 6.3 03/01/2021    LABALBU 3.8 03/01/2021    CALCIUM 8.6 03/11/2021    BILITOT 0.3 03/01/2021    ALKPHOS 80 03/01/2021    AST 39 03/01/2021    ALT 44 03/01/2021       Radiology:   No orders to display       EKG:     ASSESSMENT:      Active Problems:    Left arm cellulitis    Cellulitis  Resolved Problems:    * No resolved hospital problems. *    Left arm abscess secondary to IV drug use  History of IV heroine abuse  Pain medication seeking behavior    PLAN:    1. General surgery consult for possible I&D  2. Follow blood culture  3. Continue vancomycin IV  4. Cows protocol for possible opioid withdrawal symptoms  5. Educate about quit drug abuse    Code Status: Full  DVT prophylaxis: Lovenox SQ      NOTE: This report was transcribed using voice recognition software. Every effort was made to ensure accuracy; however, inadvertent computerized transcription errors may be present. Electronically signed by Sam Castañeda MD on 3/11/2021 at 11:26 PM       [facility]  Internal Medicine Clinic    Attending Physician Statement:  Roger Hussein M.D., F.A.C.P. I have discussed the case, including pertinent history and exam findings with the resident/NP. I agree with the resident ROS, PMHx, PSHx, meds reviewed and assessment, plan and orders as documented by the resident/NP      Hospital charts reviewed, including other providers notes, relevant labs and imaging.    Left arm

## 2021-03-12 NOTE — ED NOTES
Pt had issues with dilaudid yesterday. Does not want again today. Does specify what meds he would like now. Dr updated and places new orders. Pt not happy with new orders. Wants fentanyl.      Jessica Christian RN  03/12/21 5553

## 2021-03-12 NOTE — PROGRESS NOTES
Patient complaining of pain and wants more than Tramadol. Patient is asking to leave with oral antibiotics. Also refusing to allow RN to perform full head-to-toe/skin assessment.     Electronically signed by Danni Espinoza RN on 3/12/2021 at 6:04 PM

## 2021-03-12 NOTE — PROGRESS NOTES
Patient was threatening to leave AMA as soon as he arrived to the floor. He did agree to stay tonight until seen by ID tomorrow. Refusing 1815 labs ordered at this time, stating he is in too much pain.      Electronically signed by Wong Vidal RN on 3/12/2021 at 6:44 PM

## 2021-03-12 NOTE — PROGRESS NOTES
Police waiting for doctor to come to floor. Doctor Nydia Ansari recalled. Explained to doctor that police want to talk to her about patient and patient wants to see doctor to see about some issues to be resolved. Explained to doctor the issues. Doctor Nydia Ansari states she does not know what she can do and if the patient wants to leave, he can leave.

## 2021-03-12 NOTE — PROGRESS NOTES
Dr. Katie Healy here to see patient and police. Patient not accepting to doctor's orders and is leaving AMA. Patient refused to sign AMA form. Patient stating we are refusing to take care of him because he is a felon. IV site removed and police escorting him out.

## 2021-03-12 NOTE — ED NOTES
Pt has red swollen area on inner aspect of left elbow. Also co pain finger on left hand.      Jessica Christian RN  03/12/21 [id number]

## 2021-03-12 NOTE — PROGRESS NOTES
Patient has arrived to floor and is requesting pain medication right now. Patient had pain medicine in ER before coming to floor. He had Dilaudid 1mg at 1814, Toradol 30mg at 1814, and Percocet (2) 5-325 at 1854. Patient is continuously talking stating that ER is recording him and he wants the name of a . Advised patient of admission process. Started asking questions for admission and patient states he is not answering anymore questions until he gets pain medicine. Refusing assessment, telemetry monitor, and vital signs. Advised patient to let me know when he is ready to continue admission process as patient is trying to argue. This nurse left patient's room and will keep checking on patient. Advised Dr. Penelope Salgado of the above and she states she will put in some orders. Patient also has his home meds, which he is refusing to turn over.

## 2021-03-12 NOTE — H&P
[facility]   History and Physical      CHIEF COMPLAINT: Left brachial abscess/ cellulitis    History of Present Illness:  30 y.o. male with a history of heroin abuse presents with left brachial abscess/cellulitis. Patient was seen at [facility] 3/11/2021 ultrasound of left antecubital area concern for infected hematoma/abscess. Patient received Ancef at that time. Plan was to admit, but patient left AMA. Patient returned to [facility] ED in the evening of 3/11/2021 with temp of 100.1. Leukocytosis 12.8. CRP 7.2. Patient was given vancomycin/Zosyn/Dilaudid/Toradol/Percocet in ED course. General surgery was consulted. Patient was admitted 7:08 PM.  1 hour following admission patient refusing assessment, telemetry monitor, and vital signs per nursing staff. Patient became belligerent. Was given tramadol for possible opioid withdrawal.  Was educated on reason for admission and concern for possible I&D of his abscess. Patient insisted on leaving. Patient left AMA 11:26 PM.  3/12/2021 patient returns to [facility] ED. Leukocytosis noted 12.4. Patient refusing IVF hydration and initiation of IV per ED note. Patient sitting up on end of stretcher. Vancomycin infusing. Patient states he is having pain and needs pain medicine. Nursing at bedside. Patient admits to tobacco use, alcohol use, and drug use. States he uses heroin/cocaine and has recently tried methamphetamines. Left arm swelling with erythema at the [facility]. Patient states ED physician was going to write him a prescription for antibiotics and allow him to leave. Patient denies chills, CP, S OB, diarrhea, N/V. Patient will be admitted for further evaluation and treatment. Informant(s) for H&P: Patient and EMR    REVIEW OF SYSTEMS:  Admits to left upper extremity pain and swelling.   No fevers, chills, cp, sob, n/v, ha, vision/hearing changes, wt changes, hot/cold flashes, other open skin lesions, diarrhea, constipation, dysuria/hematuria unless noted in HPI. Complete ROS performed with the patient and is otherwise negative. PMH:  No past medical history on file. Surgical History:  No past surgical history on file. Medications Prior to Admission:    Prior to Admission medications    Medication Sig Start Date End Date Taking? Authorizing Provider   traZODone (DESYREL) 50 MG tablet Take 50 mg by mouth nightly as needed for Sleep    Historical Provider, MD   divalproex (DEPAKOTE) 250 MG DR tablet Take 1 tablet by mouth every 12 hours 3/5/21 5/3/94  LATASHA Vanegas CNP   Multiple Vitamins-Minerals (THERAPEUTIC MULTIVITAMIN-MINERALS) tablet Take 1 tablet by mouth daily 3/5/21 8/6/98  LATASHA Vanegas CNP   OLANZapine (ZYPREXA) 10 MG tablet Take 1 tablet by mouth nightly 3/5/21 5/3/61  LATASHA Garcia CNP       Allergies:    Haloperidol and related    Social History:    reports that he has quit smoking. His smoking use included cigarettes. He started smoking about 17 years ago. He has a 15.00 pack-year smoking history. He has never used smokeless tobacco. He reports current alcohol use of about 3.0 standard drinks of alcohol per week. He reports current drug use. Drug: Cocaine. Family History:   family history includes No Known Problems in his father and mother. Reviewed with patient.     PHYSICAL EXAM:  Vitals:  BP (!) 148/93   Pulse 93   Temp 98 °F (36.7 °C)   Resp 18   Ht 5' 8\" (1.727 m)   Wt 150 lb (68 kg)   SpO2 98%   BMI 22.81 kg/m²     General Appearance: alert and oriented to person, place and time and in no acute distress  Skin: warm and dry  Head: normocephalic and atraumatic  Eyes: pupils equal, round, and reactive to light, extraocular eye movements intact, conjunctivae normal  Neck: neck supple and non tender without mass   Pulmonary/Chest: clear to auscultation bilaterally- no wheezes, rales or rhonchi, normal air movement, no respiratory distress  Cardiovascular: normal rate, normal S1 and S2 and no rubs, gallops, murmur  Abdomen: soft, non-tender, non-distended, normal bowel sounds, no rebound, guarding, rigidity   extremities: no cyanosis, no clubbing and LUE edema and erythema. Neurologic: no cranial nerve deficit and speech normal    LABS:  Recent Labs     03/11/21  1020 03/11/21  1757 03/12/21  1306    137 140   K 3.8 3.9 4.1    102 106   CO2 22 28 25   BUN 11 15 10   CREATININE 0.9 1.0 0.9   GLUCOSE 92 102* 105*   CALCIUM 8.9 8.6 8.9       Recent Labs     03/11/21  1020 03/11/21  1757 03/12/21  1306   WBC 14.4* 12.8* 12.4*   RBC 4.70 4.48 4.37   HGB 13.6 13.1 12.8   HCT 41.5 40.0 39.4   MCV 88.3 89.3 90.2   MCH 28.9 29.2 29.3   MCHC 32.8 32.8 32.5   RDW 13.2 13.2 13.2    303 280   MPV 8.0 8.1 8.4       No results for input(s): POCGLU in the last 72 hours. Radiology: No results found. EKG: None    ASSESSMENT:      Active Problems:    Abscess of antecubital fossa  Resolved Problems:    * No resolved hospital problems. *      PLAN:    1. Left brachial abscess/cellulitis3/11/2021 LUE US no evidence of DVT. Abscess versus hematoma in the left antecubital fossa. 2.9 x 1.7 x 2.5 cm. Received 1 dose of Ancef at . Vancomycin/Zosyn given in present ED course. ?I&D. ID/general surgery input appreciated. 2. Leukocytosis-2/2 above. Trending down WBC 14.4> 12.8> 12.4. Blood cultures negative so far. IVF hydration received vancomycin/Zosyn in ED. ID input appreciated. 3. Heroin/cocaine/methamphetamine abuse patient admits drug use. States he is unsure when he last used, but recently tried methamphetamine.  cessation. COWS for withdrawal.  4. Tobacco usecounseled cessation. Nicotine patch. Code Status: Full  DVT prophylaxis: SCDs    NOTE: This report was transcribed using voice recognition software.  Every effort was made to ensure accuracy; however, inadvertent computerized transcription errors may be present.     Electronically signed by Tanja Qureshi - CNP on 3/12/2021 at 3:44 PM

## 2021-03-12 NOTE — ED NOTES
ROSLYNAR faxed to floor. Spoke to nurse on floor who stated she received it. Pt chimed.      Kristy Wheeler, RN  03/12/21 1640

## 2021-03-13 ENCOUNTER — ANESTHESIA EVENT (OUTPATIENT)
Dept: OPERATING ROOM | Age: 26
End: 2021-03-13

## 2021-03-13 PROCEDURE — 99254 IP/OBS CNSLTJ NEW/EST MOD 60: CPT | Performed by: SURGERY

## 2021-03-13 PROCEDURE — 99232 SBSQ HOSP IP/OBS MODERATE 35: CPT | Performed by: INTERNAL MEDICINE

## 2021-03-13 PROCEDURE — 6360000002 HC RX W HCPCS: Performed by: SPECIALIST

## 2021-03-13 PROCEDURE — 6360000002 HC RX W HCPCS: Performed by: INTERNAL MEDICINE

## 2021-03-13 PROCEDURE — 1200000000 HC SEMI PRIVATE

## 2021-03-13 PROCEDURE — 6370000000 HC RX 637 (ALT 250 FOR IP): Performed by: INTERNAL MEDICINE

## 2021-03-13 PROCEDURE — 2580000003 HC RX 258: Performed by: NURSE PRACTITIONER

## 2021-03-13 PROCEDURE — 6370000000 HC RX 637 (ALT 250 FOR IP): Performed by: NURSE PRACTITIONER

## 2021-03-13 PROCEDURE — 2580000003 HC RX 258: Performed by: SPECIALIST

## 2021-03-13 RX ORDER — LINEZOLID 600 MG/1
600 TABLET, FILM COATED ORAL EVERY 12 HOURS SCHEDULED
Status: DISCONTINUED | OUTPATIENT
Start: 2021-03-13 | End: 2021-03-15

## 2021-03-13 RX ORDER — LIDOCAINE HYDROCHLORIDE AND EPINEPHRINE 10; 10 MG/ML; UG/ML
20 INJECTION, SOLUTION INFILTRATION; PERINEURAL ONCE
Status: DISCONTINUED | OUTPATIENT
Start: 2021-03-13 | End: 2021-03-15 | Stop reason: HOSPADM

## 2021-03-13 RX ORDER — IBUPROFEN 600 MG/1
600 TABLET ORAL ONCE
Status: COMPLETED | OUTPATIENT
Start: 2021-03-13 | End: 2021-03-13

## 2021-03-13 RX ORDER — KETOROLAC TROMETHAMINE 30 MG/ML
15 INJECTION, SOLUTION INTRAMUSCULAR; INTRAVENOUS EVERY 8 HOURS PRN
Status: DISCONTINUED | OUTPATIENT
Start: 2021-03-13 | End: 2021-03-15 | Stop reason: HOSPADM

## 2021-03-13 RX ADMIN — TRAMADOL HYDROCHLORIDE 50 MG: 50 TABLET, FILM COATED ORAL at 09:13

## 2021-03-13 RX ADMIN — CEFEPIME HYDROCHLORIDE 2000 MG: 2 INJECTION, POWDER, FOR SOLUTION INTRAVENOUS at 02:33

## 2021-03-13 RX ADMIN — CLONIDINE HYDROCHLORIDE 0.1 MG: 0.1 TABLET ORAL at 12:54

## 2021-03-13 RX ADMIN — SODIUM CHLORIDE: 9 INJECTION, SOLUTION INTRAVENOUS at 05:40

## 2021-03-13 RX ADMIN — SODIUM CHLORIDE: 9 INJECTION, SOLUTION INTRAVENOUS at 05:08

## 2021-03-13 RX ADMIN — TRAMADOL HYDROCHLORIDE 50 MG: 50 TABLET, FILM COATED ORAL at 16:46

## 2021-03-13 RX ADMIN — HYDROMORPHONE HYDROCHLORIDE 1 MG: 1 INJECTION, SOLUTION INTRAMUSCULAR; INTRAVENOUS; SUBCUTANEOUS at 13:11

## 2021-03-13 RX ADMIN — TRAMADOL HYDROCHLORIDE 50 MG: 50 TABLET, FILM COATED ORAL at 18:25

## 2021-03-13 RX ADMIN — CEFEPIME HYDROCHLORIDE 2000 MG: 2 INJECTION, POWDER, FOR SOLUTION INTRAVENOUS at 10:30

## 2021-03-13 RX ADMIN — SODIUM CHLORIDE: 9 INJECTION, SOLUTION INTRAVENOUS at 18:17

## 2021-03-13 RX ADMIN — TRAMADOL HYDROCHLORIDE 50 MG: 50 TABLET, FILM COATED ORAL at 02:32

## 2021-03-13 RX ADMIN — TRAZODONE HYDROCHLORIDE 50 MG: 50 TABLET ORAL at 23:24

## 2021-03-13 RX ADMIN — CEFEPIME HYDROCHLORIDE 2000 MG: 2 INJECTION, POWDER, FOR SOLUTION INTRAVENOUS at 18:17

## 2021-03-13 RX ADMIN — HYDROMORPHONE HYDROCHLORIDE 1 MG: 1 INJECTION, SOLUTION INTRAMUSCULAR; INTRAVENOUS; SUBCUTANEOUS at 19:27

## 2021-03-13 RX ADMIN — TRAMADOL HYDROCHLORIDE 50 MG: 50 TABLET, FILM COATED ORAL at 12:54

## 2021-03-13 RX ADMIN — KETOROLAC TROMETHAMINE 15 MG: 30 INJECTION, SOLUTION INTRAMUSCULAR; INTRAVENOUS at 14:23

## 2021-03-13 RX ADMIN — CLONIDINE HYDROCHLORIDE 0.1 MG: 0.1 TABLET ORAL at 23:22

## 2021-03-13 RX ADMIN — SODIUM CHLORIDE: 9 INJECTION, SOLUTION INTRAVENOUS at 16:46

## 2021-03-13 RX ADMIN — SODIUM CHLORIDE: 9 INJECTION, SOLUTION INTRAVENOUS at 10:33

## 2021-03-13 RX ADMIN — CLONIDINE HYDROCHLORIDE 0.1 MG: 0.1 TABLET ORAL at 16:46

## 2021-03-13 RX ADMIN — TRAMADOL HYDROCHLORIDE 50 MG: 50 TABLET, FILM COATED ORAL at 05:06

## 2021-03-13 RX ADMIN — CLONIDINE HYDROCHLORIDE 0.1 MG: 0.1 TABLET ORAL at 09:25

## 2021-03-13 RX ADMIN — CLONIDINE HYDROCHLORIDE 0.1 MG: 0.1 TABLET ORAL at 18:25

## 2021-03-13 RX ADMIN — TRAMADOL HYDROCHLORIDE 50 MG: 50 TABLET, FILM COATED ORAL at 23:23

## 2021-03-13 RX ADMIN — TRAMADOL HYDROCHLORIDE 50 MG: 50 TABLET, FILM COATED ORAL at 00:08

## 2021-03-13 RX ADMIN — IBUPROFEN 600 MG: 600 TABLET, FILM COATED ORAL at 05:39

## 2021-03-13 ASSESSMENT — PAIN DESCRIPTION - PAIN TYPE
TYPE: ACUTE PAIN

## 2021-03-13 ASSESSMENT — PAIN DESCRIPTION - ORIENTATION
ORIENTATION: LEFT
ORIENTATION: LEFT

## 2021-03-13 ASSESSMENT — PAIN DESCRIPTION - DESCRIPTORS
DESCRIPTORS: CONSTANT;THROBBING
DESCRIPTORS: SHARP;SHOOTING;SORE

## 2021-03-13 ASSESSMENT — PAIN - FUNCTIONAL ASSESSMENT
PAIN_FUNCTIONAL_ASSESSMENT: PREVENTS OR INTERFERES SOME ACTIVE ACTIVITIES AND ADLS

## 2021-03-13 ASSESSMENT — PAIN SCALES - GENERAL
PAINLEVEL_OUTOF10: 10

## 2021-03-13 ASSESSMENT — PAIN DESCRIPTION - ONSET
ONSET: ON-GOING

## 2021-03-13 ASSESSMENT — LIFESTYLE VARIABLES: SMOKING_STATUS: 1

## 2021-03-13 ASSESSMENT — PAIN DESCRIPTION - LOCATION
LOCATION: ARM

## 2021-03-13 ASSESSMENT — PAIN DESCRIPTION - FREQUENCY
FREQUENCY: CONTINUOUS

## 2021-03-13 ASSESSMENT — PAIN DESCRIPTION - PROGRESSION
CLINICAL_PROGRESSION: NOT CHANGED

## 2021-03-13 NOTE — PROGRESS NOTES
RN attempted to get consent sign for I&D tomorrow. Patient got really irritated and verbally aggressive, and states will not sign it until I have more information. Information printed and given to patient.

## 2021-03-13 NOTE — CONSULTS
Surgery History and Physical/Consult Note    CC:    Left arm abscess    HPI:  This is a 30 y.o. male with PMH IV drug use admitted 3/12/2021 with several days of pain and swelling to the left antecubital fossa. Does have history of IV drug use. Apparently he has left AMA the last couple days previously as well as threatened to leave AMA this admission. Complains of pain and tenderness at the site. No spontaneous drainage. .  These had issues in this area before and he was \"seen at [facility], but I was turned away because it was close to an artery. \"  He says at that time it was smaller and had gotten better. Unclear exactly when that was. PMH:  Past Medical History:   Diagnosis Date    Hepatitis C virus carrier state ([facility] [address]        PSH:  History reviewed. No pertinent surgical history. Family History:  Family History   Family history unknown: Yes       Social History:   reports that he has quit smoking. His smoking use included cigarettes. He started smoking about 17 years ago. He has a 15.00 pack-year smoking history. He has never used smokeless tobacco. He reports current alcohol use of about 3.0 standard drinks of alcohol per week. He reports current drug use. Drug: Cocaine. Review of Systems:  A 14pt complete review of systems was performed, and all pertinent positives and negatives are listed in the HPI. All other systems are negative. Allergies: Allergies   Allergen Reactions    Haloperidol And Related        Medications:  Home medications:   Prior to Admission medications    Medication Sig Start Date End Date Taking?  Authorizing Provider   divalproex (DEPAKOTE) 250 MG DR tablet Take 1 tablet by mouth every 12 hours 3/5/21 0/9/99 Yes Hilliard Cowden, APRN - CNP   Multiple Vitamins-Minerals (THERAPEUTIC MULTIVITAMIN-MINERALS) tablet Take 1 tablet by mouth daily 3/5/21 7/2/89 Yes Hilliard Cowden, APRN - CNP       Scheduled medications:   lidocaine-EPINEPHrine  20 mL Intradermal Once    divalproex  250 mg Oral 2 times per day    therapeutic multivitamin-minerals  1 tablet Oral Daily    sodium chloride flush  10 mL Intravenous 2 times per day    nicotine  1 patch Transdermal Daily    cefepime  2,000 mg Intravenous Q8H       PRN medications: sodium chloride flush, promethazine **OR** ondansetron, polyethylene glycol, acetaminophen **OR** acetaminophen, traZODone, traMADol **AND** cloNIDine, hydrOXYzine    Objective:    Physical Examination:  Vitals:    03/12/21 1745 03/12/21 2015 03/13/21 0442 03/13/21 0845   BP: 126/72   (!) 121/58   Pulse: 68 89  72   Resp: 18 18  18   Temp: 98.5 °F (36.9 °C)      TempSrc: Oral      SpO2: 100% 99%     Weight:   152 lb 6.4 oz (69.1 kg)    Height:           General - alert  HEENT - Normocephalic, Atraumatic. Neck - supple, trachea midline  Respiratory - Breathing comfortably, no respiratory distress  Heart - Regular Rate  Abdomen - soft, without rebound or guarding, nondistended  Neurological - alert, oriented x 3  Psych - affect normal  Musculoskeletal - moves all extremities, no gross deficit  Skin -left antecubital fossa there is approximately 3 cm abscess. No spontaneous drainage. There is tenderness. There is no indication for compartment syndrome as the extremity is soft. Labs:    CBC  Recent Labs     03/12/21  1306   WBC 12.4*   HGB 12.8   HCT 39.4        BMP  Recent Labs     03/12/21  1306      K 4.1      CO2 25   BUN 10   CREATININE 0.9   CALCIUM 8.9     Liver Function  Recent Labs     03/12/21  1306   BILITOT 0.3   AST 16   ALT 14   ALKPHOS 101   PROT 6.6   LABALBU 3.6     No results for input(s): LACTATE in the last 72 hours. No results for input(s): INR, PTT in the last 72 hours. Invalid input(s): PT    Imaging:    No results found.           ASSESSMENT & PLAN:    I have examined the patient and performed key aspects of physical exam, reviewed the record (including all pertinent and new radiology images and laboratory findings), and discussed the case with the surgical team.  I agree with the assessment and plan with the following additions, corrections, and changes. This is a 30 y.o. male admitted 3/12/2021 with left AC abscess. Patient is refusing drainage at the bedside saying that he has \"to be put out for it because of the pain. \"  He ate today. He also says he has to talk to his mom before anything is done - I have been to the room twice and have already asked him to call his mom in between visits which he has not done. We will plan for tomorrow drainage in the OR. ID has been consulted for antibiotics. Can continue warm compresses in the meantime. Celestino Dillon   3/13/2021   9:19 AM    NOTE: This report, in part or full, may have been transcribed using voice recognition software. Every effort was made to ensure accuracy; however, inadvertent computerized transcription errors may be present. Please excuse any transcriptional grammatical or spelling errors that may have escaped my editorial review.

## 2021-03-13 NOTE — PROGRESS NOTES
Patient complaining about pain level being 10/10 and \"the pill\" ultram not working. Pain is radiating from left index finger up to left elbow. Patient is becoming more agitated. Dr. Ricardo Bear notified of patient's condition one time dose toradol IV ordered.

## 2021-03-13 NOTE — PROGRESS NOTES
Patient requesting pain medication. Requesting to try ibuprofen instead of tylenol. Call placed to house order received.

## 2021-03-13 NOTE — PROGRESS NOTES
Patient stating he can not move. Patient screaming and carring on about his pain being worst. Patient moving in bed. Patient wanting more pain medications. Instructed patient that the doctors have been called multiple times through out the night per his request for new pain medication. The doctors will NOT give him anything else for pain. Patient screaming at staff. Conversation ended and door closed.

## 2021-03-13 NOTE — PROGRESS NOTES
Went to give patient his IV toradol. Patient at first refusing stating that drug does nothing for him. He wants something different. Patient then complaining that he is going to die from the pain he is having and that no one is doing anything about his infected arm. Tried educating the patient that the antibiotics he is currently receiving will help fight the infection in his arm. Patient non receptive to education. Patient stating he wants education material on toradol. Education material from [employer]). Patient took one time dose of toradol.

## 2021-03-13 NOTE — PROGRESS NOTES
Printed patient education provided regarding abscesses, antibiotics, and his specific antibiotic cefepime.

## 2021-03-13 NOTE — PROGRESS NOTES
RN went into room to give patient toradol IV, but patient refused. Patient states \"that doesn't help, I want IV opioids\". Patient insist RN to call  For IV opioids.

## 2021-03-13 NOTE — PLAN OF CARE
Problem: Pain:  Goal: Pain level will decrease  Description: Pain level will decrease  Outcome: Met This Shift  Goal: Control of acute pain  Description: Control of acute pain  Outcome: Met This Shift  Goal: Control of chronic pain  Description: Control of chronic pain  Outcome: Met This Shift     Problem: Falls - Risk of:  Goal: Will remain free from falls  Description: Will remain free from falls  Outcome: Met This Shift  Goal: Absence of physical injury  Description: Absence of physical injury  Outcome: Met This Shift     Problem: Skin Integrity - Impaired:  Goal: Skin will be intact without erythema or breakdown  Outcome: Met This Shift     Problem: BH POTENTIAL FOR SUBSTANCE WITHDRAWAL  Goal: Free of withdrawal symptoms  Outcome: Met This Shift
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
## 2021-03-13 NOTE — PROGRESS NOTES
[facility]   Progress Note    Admitting Date and Time: 3/12/2021 12:28 PM  Admit Dx: Abscess of antecubital fossa [L02.419]     Sen for follow on left antecub abscess / cellulitis    Subjective:  He is constantly demanding opiates , loud at bedside, says give me fentanyl, Dilaudid, morphine, increase my dose etc.. Discussed with him in details that this is not possible. We are here to manage her pain and infection. He might withdraw from opiates because of his chronic use. Toradol/tramadol continued. Will add Dilaudid 1 mg every 6 as needed. Denies any chest pain or short of breath. No nausea vomiting or abdominal pain. Discussed with nursing. ROS: denies fever, chills, cp, sob, n/v, HA unless stated above.  lidocaine-EPINEPHrine  20 mL Intradermal Once    linezolid  600 mg Oral 2 times per day    divalproex  250 mg Oral 2 times per day    therapeutic multivitamin-minerals  1 tablet Oral Daily    sodium chloride flush  10 mL Intravenous 2 times per day    nicotine  1 patch Transdermal Daily    cefepime  2,000 mg Intravenous Q8H     ketorolac, 15 mg, Q8H PRN  sodium chloride flush, 10 mL, PRN  promethazine, 12.5 mg, Q6H PRN    Or  ondansetron, 4 mg, Q6H PRN  polyethylene glycol, 17 g, Daily PRN  acetaminophen, 650 mg, Q6H PRN    Or  acetaminophen, 650 mg, Q6H PRN  traZODone, 50 mg, Nightly PRN  traMADol, 50 mg, PRN    And  cloNIDine, 0.1 mg, PRN  hydrOXYzine, 50 mg, Q8H PRN         Objective:    /88   Pulse 88   Temp 98.5 °F (36.9 °C) (Oral)   Resp 18   Ht 5' 8\" (1.727 m)   Wt 152 lb 6.4 oz (69.1 kg)   SpO2 99%   BMI 23.17 kg/m²   General Appearance: alert and oriented to person, place and time,in no acute distress, demanding dilaudid in increased dose , feels only that will work.   Skin: warm and dry  Head: normocephalic and atraumatic  Eyes: pupils equal, round, and reactive to light, extraocular eye movements intact, conjunctivae normal  Neck: supple and non-tender without mass  Pulmonary/Chest: clear to auscultation bilaterally  Cardiovascular: normal rate, regular rhythm, normal S1 and S2  Abdomen: soft, non-tender, non-distended, normal bowel sounds, no masses or organomegaly  Extremities: no cyanosis, clubbing or edema  Musculoskeletal: normal range of motion,left antecub with swelling ,erythme ,pain- tenderness. Neurologic:  no cranial nerve deficit,  speech normal      Recent Labs     03/11/21  1020 03/11/21  1757 03/12/21  1306    137 140   K 3.8 3.9 4.1    102 106   CO2 22 28 25   BUN 11 15 10   CREATININE 0.9 1.0 0.9   GLUCOSE 92 102* 105*   CALCIUM 8.9 8.6 8.9       Recent Labs     03/11/21  1020 03/11/21  1757 03/12/21  1306   WBC 14.4* 12.8* 12.4*   RBC 4.70 4.48 4.37   HGB 13.6 13.1 12.8   HCT 41.5 40.0 39.4   MCV 88.3 89.3 90.2   MCH 28.9 29.2 29.3   MCHC 32.8 32.8 32.5   RDW 13.2 13.2 13.2    303 280   MPV 8.0 8.1 8.4       Labs and images reviewed     Radiology:   No orders to display       Assessment:    Active Problems:    Abscess of antecubital fossa  Resolved Problems:    * No resolved hospital problems. *      Plan:  Left antecubital abscess/cellulitis with hx of IVDU-ultrasound reviewed with possible abscess/hematoma, has elevated white count. He is a IV drug user. Received Ancef  at [facility] . Vancomycin plus Zosyn given in ER. ID and general surgery consulted. As per ID he is on Linezolid plus cefepime. He is scheduled for I&D tomorrow. Continue current supportive treatment. Continue to follow. Heroin /cocaine /methamphetamine abuse - counseled for cessation. Monitor. Tobacco use - counseled for cessation . Nicotine patch .     On scds for dvt prophy    Full code     Electronically signed by Piero Pedraza MD on 3/13/2021 at 1:32 PM

## 2021-03-13 NOTE — ANESTHESIA PRE PROCEDURE
Department of Anesthesiology  Preprocedure Note       Name:  Nilam Yang   Age:  30 y.o.  :  1995                                          MRN:  [id number]         Date:  3/13/2021      Surgeon: Refugio Lowe):  Myra Frankel, MD    Procedure: Procedure(s):  ARM INCISION AND DRAINAGE    Medications prior to admission:   Prior to Admission medications    Medication Sig Start Date End Date Taking?  Authorizing Provider   divalproex (DEPAKOTE) 250 MG DR tablet Take 1 tablet by mouth every 12 hours 3/5/21 1/4/18 Yes LATASHA Sanford CNP   Multiple Vitamins-Minerals (THERAPEUTIC MULTIVITAMIN-MINERALS) tablet Take 1 tablet by mouth daily 3/5/21 0/3/71 Yes LATASHA Sanford CNP       Current medications:    Current Facility-Administered Medications   Medication Dose Route Frequency Provider Last Rate Last Admin    lidocaine-EPINEPHrine 1 [id number] injection 20 mL  20 mL Intradermal Once Myra Frankel, MD   Stopped at 21 0946    ketorolac (TORADOL) injection 15 mg  15 mg Intravenous Q8H PRN Wes Aguilar MD   15 mg at 21 1423    linezolid (ZYVOX) tablet 600 mg  600 mg Oral 2 times per day Yair Medina MD        HYDROmorphone (DILAUDID) injection 1 mg  1 mg Intravenous Q6H PRN Wes Aguilar MD        divalproex (DEPAKOTE) DR tablet 250 mg  250 mg Oral 2 times per day LATASHA Campbell CNP        therapeutic multivitamin-minerals 1 tablet  1 tablet Oral Daily Pascual Aden APRN - CNP        sodium chloride flush 0.9 % injection 10 mL  10 mL Intravenous 2 times per day Pascual Aden APRN - CNP        sodium chloride flush 0.9 % injection 10 mL  10 mL Intravenous PRN LATASHA Campbell - TAINA        promethazine (PHENERGAN) tablet 12.5 mg  12.5 mg Oral Q6H PRN LATASHA Campbell CNP        Or    ondansetron [facility]) injection 4 mg  4 mg Intravenous Q6H PRN LATASHA Campbell CNP        polyethylene glycol (GLYCOLAX) packet 17 g  17 g Oral Daily PRN Deryl Priestly, APRN - CNP        acetaminophen (TYLENOL) tablet 650 mg  650 mg Oral Q6H PRN Deryl Priestly, APRN - CNP   650 mg at 03/12/21 2143    Or    acetaminophen (TYLENOL) suppository 650 mg  650 mg Rectal Q6H PRN Deryl Priestly, APRN - CNP        0.9 % sodium chloride infusion   Intravenous Continuous Deryl Priestly, APRN - CNP 75 mL/hr at 03/13/21 0508 New Bag at 03/13/21 0508    traZODone (DESYREL) tablet 50 mg  50 mg Oral Nightly PRN Deryl Priestly, APRN - CNP        traMADol [place]) tablet 50 mg  50 mg Oral PRN Deryl Priestly, APRN - CNP   50 mg at 03/13/21 1254    And    cloNIDine (CATAPRES) tablet 0.1 mg  0.1 mg Oral PRN Deryl Priestly, APRN - CNP   0.1 mg at 03/13/21 1254    hydrOXYzine (VISTARIL) capsule 50 mg  50 mg Oral Q8H PRN Deryl Priestly, APRN - CNP   50 mg at 03/12/21 2143    nicotine (NICODERM CQ) 14 MG/24HR 1 patch  1 patch Transdermal Daily Deryl Priestly, APRN - CNP   1 patch at 03/12/21 2010    cefepime (MAXIPIME) 2000 mg IVPB extended (mini-bag)  2,000 mg Intravenous Q8H Christine Rushing MD   Stopped at 03/13/21 1458    0.9 % sodium chloride infusion (for use AFTER cefepime)   Intravenous Q8H Christine Rushing MD 12.5 mL/hr at 03/13/21 1033 New Bag at 03/13/21 1033       Allergies:     Allergies   Allergen Reactions    Haloperidol And Related        Problem List:    Patient Active Problem List   Diagnosis Code    Polysubstance abuse (HCC) F19.10    Schizoaffective disorder, bipolar type ([address] F25.0    Antisocial personality disorder ([address] F60.2    Major depression, single episode F32.9    Bipolar affective disorder, mixed, severe, with psychotic behavior ([address] F31.64    Left arm cellulitis L03.114    Cellulitis L03.90    Noncompliance by refusing service Z53.29    Abscess of antecubital fossa L02.419    IVDU (intravenous drug user) F19.90       Past Medical History:        Diagnosis Date    Hepatitis C virus carrier state ([facility] Past Surgical History:  History reviewed. No pertinent surgical history. Social History:    Social History     Tobacco Use    Smoking status: Former Smoker     Packs/day: 1.00     Years: 15.00     Pack years: 15.00     Types: Cigarettes     Start date: 1/9/2004    Smokeless tobacco: Never Used    Tobacco comment: states quit cannot tell me when   Substance Use Topics    Alcohol use: Yes     Alcohol/week: 3.0 standard drinks     Types: 3 Cans of beer per week     Comment:  tall boys and malt liquor/ qd states he drinks whatever he can                                Counseling given: Not Answered  Comment: states quit cannot tell me when      Vital Signs (Current):   Vitals:    03/12/21 2015 03/13/21 0442 03/13/21 0845 03/13/21 1254   BP:   (!) 121/58 138/88   Pulse: 89  72 88   Resp: 18  18    Temp:       TempSrc:       SpO2: 99%      Weight:  152 lb 6.4 oz (69.1 kg)     Height:                                                  BP Readings from Last 3 Encounters:   03/13/21 138/88   03/11/21 (!) 142/67   03/11/21 117/80       NPO Status:                                                                                 BMI:   Wt Readings from Last 3 Encounters:   03/13/21 152 lb 6.4 oz (69.1 kg)   03/11/21 150 lb (68 kg)   03/11/21 180 lb (81.6 kg)     Body mass index is 23.17 kg/m².     CBC:   Lab Results   Component Value Date    WBC 12.4 03/12/2021    RBC 4.37 03/12/2021    HGB 12.8 03/12/2021    HCT 39.4 03/12/2021    MCV 90.2 03/12/2021    RDW 13.2 03/12/2021     03/12/2021       CMP:   Lab Results   Component Value Date     03/12/2021    K 4.1 03/12/2021     03/12/2021    CO2 25 03/12/2021    BUN 10 03/12/2021    CREATININE 0.9 03/12/2021    GFRAA >60 03/12/2021    LABGLOM >60 03/12/2021    GLUCOSE 105 03/12/2021    PROT 6.6 03/12/2021    CALCIUM 8.9 03/12/2021    BILITOT 0.3 03/12/2021    ALKPHOS 101 03/12/2021    AST 16 03/12/2021    ALT 14 03/12/2021       POC Tests: No results for input(s): POCGLU, POCNA, POCK, POCCL, POCBUN, POCHEMO, POCHCT in the last 72 hours. Coags: No results found for: PROTIME, INR, APTT    HCG (If Applicable): No results found for: PREGTESTUR, PREGSERUM, HCG, HCGQUANT     ABGs: No results found for: PHART, PO2ART, XXA5BJQ, WQG1YBC, BEART, B0SFIOAJ     Type & Screen (If Applicable):  No results found for: LABABO, LABRH    Drug/Infectious Status (If Applicable):  No results found for: HIV, HEPCAB    COVID-19 Screening (If Applicable):   Lab Results   Component Value Date    COVID19 Not Detected 03/01/2021    COVID19 Not Detected 09/10/2020           Anesthesia Evaluation  Patient summary reviewed  Airway:        Comment: Unable to assess. Dental:          Pulmonary:   (+) current smoker                           Cardiovascular:          ECG reviewed                        Neuro/Psych:   (+) psychiatric history (Bipolar. Schizophrenia.):            GI/Hepatic/Renal:   (+) hepatitis: C, liver disease:,           Endo/Other:                      ROS comment: Hx of intravenous drug use. Hx of cocaine and heroin use. Abdominal:           Vascular:                                  Anesthesia Plan      MAC and general     ASA 3     (Chart reviewed. Patient was screaming and would not allow me to examine him. He only admitted to IV drug use--not willing /able to tell me when they were used last. He refused to provide me with any other history--has a schizoaffective disorder as well. Will attempt further evaluation on the day of surgery. Will proceed with [facility] or general as deemed necessary.)  Induction: intravenous.                       Sangeeta Broussard MD   3/13/2021

## 2021-03-14 ENCOUNTER — ANESTHESIA (OUTPATIENT)
Dept: OPERATING ROOM | Age: 26
End: 2021-03-14

## 2021-03-14 PROCEDURE — 6360000002 HC RX W HCPCS: Performed by: SPECIALIST

## 2021-03-14 PROCEDURE — 1200000000 HC SEMI PRIVATE

## 2021-03-14 PROCEDURE — 6370000000 HC RX 637 (ALT 250 FOR IP): Performed by: NURSE PRACTITIONER

## 2021-03-14 PROCEDURE — 99232 SBSQ HOSP IP/OBS MODERATE 35: CPT | Performed by: INTERNAL MEDICINE

## 2021-03-14 PROCEDURE — 2580000003 HC RX 258: Performed by: SPECIALIST

## 2021-03-14 PROCEDURE — 6360000002 HC RX W HCPCS: Performed by: INTERNAL MEDICINE

## 2021-03-14 PROCEDURE — 87205 SMEAR GRAM STAIN: CPT

## 2021-03-14 PROCEDURE — 87070 CULTURE OTHR SPECIMN AEROBIC: CPT

## 2021-03-14 PROCEDURE — 2580000003 HC RX 258: Performed by: NURSE PRACTITIONER

## 2021-03-14 PROCEDURE — 6370000000 HC RX 637 (ALT 250 FOR IP): Performed by: SPECIALIST

## 2021-03-14 RX ADMIN — SODIUM CHLORIDE: 9 INJECTION, SOLUTION INTRAVENOUS at 05:04

## 2021-03-14 RX ADMIN — SODIUM CHLORIDE: 9 INJECTION, SOLUTION INTRAVENOUS at 17:44

## 2021-03-14 RX ADMIN — TRAMADOL HYDROCHLORIDE 50 MG: 50 TABLET, FILM COATED ORAL at 03:02

## 2021-03-14 RX ADMIN — TRAMADOL HYDROCHLORIDE 50 MG: 50 TABLET, FILM COATED ORAL at 18:51

## 2021-03-14 RX ADMIN — CLONIDINE HYDROCHLORIDE 0.1 MG: 0.1 TABLET ORAL at 20:15

## 2021-03-14 RX ADMIN — CEFEPIME HYDROCHLORIDE 2000 MG: 2 INJECTION, POWDER, FOR SOLUTION INTRAVENOUS at 17:42

## 2021-03-14 RX ADMIN — CLONIDINE HYDROCHLORIDE 0.1 MG: 0.1 TABLET ORAL at 13:03

## 2021-03-14 RX ADMIN — SODIUM CHLORIDE, PRESERVATIVE FREE 10 ML: 5 INJECTION INTRAVENOUS at 07:55

## 2021-03-14 RX ADMIN — CLONIDINE HYDROCHLORIDE 0.1 MG: 0.1 TABLET ORAL at 18:51

## 2021-03-14 RX ADMIN — SODIUM CHLORIDE, PRESERVATIVE FREE 10 ML: 5 INJECTION INTRAVENOUS at 21:50

## 2021-03-14 RX ADMIN — KETOROLAC TROMETHAMINE 15 MG: 30 INJECTION, SOLUTION INTRAMUSCULAR; INTRAVENOUS at 10:53

## 2021-03-14 RX ADMIN — TRAMADOL HYDROCHLORIDE 50 MG: 50 TABLET, FILM COATED ORAL at 20:14

## 2021-03-14 RX ADMIN — TRAMADOL HYDROCHLORIDE 50 MG: 50 TABLET, FILM COATED ORAL at 05:02

## 2021-03-14 RX ADMIN — HYDROMORPHONE HYDROCHLORIDE 1 MG: 1 INJECTION, SOLUTION INTRAMUSCULAR; INTRAVENOUS; SUBCUTANEOUS at 15:51

## 2021-03-14 RX ADMIN — TRAZODONE HYDROCHLORIDE 50 MG: 50 TABLET ORAL at 20:16

## 2021-03-14 RX ADMIN — SODIUM CHLORIDE, PRESERVATIVE FREE 10 ML: 5 INJECTION INTRAVENOUS at 15:51

## 2021-03-14 RX ADMIN — CLONIDINE HYDROCHLORIDE 0.1 MG: 0.1 TABLET ORAL at 05:01

## 2021-03-14 RX ADMIN — LINEZOLID 600 MG: 600 TABLET, FILM COATED ORAL at 20:17

## 2021-03-14 RX ADMIN — LINEZOLID 600 MG: 600 TABLET, FILM COATED ORAL at 10:53

## 2021-03-14 RX ADMIN — SODIUM CHLORIDE: 9 INJECTION, SOLUTION INTRAVENOUS at 17:42

## 2021-03-14 RX ADMIN — HYDROMORPHONE HYDROCHLORIDE 1 MG: 1 INJECTION, SOLUTION INTRAMUSCULAR; INTRAVENOUS; SUBCUTANEOUS at 07:55

## 2021-03-14 RX ADMIN — SODIUM CHLORIDE: 9 INJECTION, SOLUTION INTRAVENOUS at 04:00

## 2021-03-14 RX ADMIN — ACETAMINOPHEN 650 MG: 325 TABLET ORAL at 20:16

## 2021-03-14 RX ADMIN — SODIUM CHLORIDE, PRESERVATIVE FREE 10 ML: 5 INJECTION INTRAVENOUS at 10:53

## 2021-03-14 RX ADMIN — TRAMADOL HYDROCHLORIDE 50 MG: 50 TABLET, FILM COATED ORAL at 13:03

## 2021-03-14 RX ADMIN — CEFEPIME HYDROCHLORIDE 2000 MG: 2 INJECTION, POWDER, FOR SOLUTION INTRAVENOUS at 03:56

## 2021-03-14 RX ADMIN — CEFEPIME HYDROCHLORIDE 2000 MG: 2 INJECTION, POWDER, FOR SOLUTION INTRAVENOUS at 10:53

## 2021-03-14 RX ADMIN — HYDROMORPHONE HYDROCHLORIDE 1 MG: 1 INJECTION, SOLUTION INTRAMUSCULAR; INTRAVENOUS; SUBCUTANEOUS at 21:50

## 2021-03-14 RX ADMIN — HYDROXYZINE PAMOATE 50 MG: 25 CAPSULE ORAL at 20:15

## 2021-03-14 RX ADMIN — SODIUM CHLORIDE: 9 INJECTION, SOLUTION INTRAVENOUS at 11:00

## 2021-03-14 RX ADMIN — TRAMADOL HYDROCHLORIDE 50 MG: 50 TABLET, FILM COATED ORAL at 09:09

## 2021-03-14 RX ADMIN — CLONIDINE HYDROCHLORIDE 0.1 MG: 0.1 TABLET ORAL at 03:01

## 2021-03-14 RX ADMIN — HYDROMORPHONE HYDROCHLORIDE 1 MG: 1 INJECTION, SOLUTION INTRAMUSCULAR; INTRAVENOUS; SUBCUTANEOUS at 01:49

## 2021-03-14 RX ADMIN — KETOROLAC TROMETHAMINE 15 MG: 30 INJECTION, SOLUTION INTRAMUSCULAR; INTRAVENOUS at 20:19

## 2021-03-14 ASSESSMENT — PAIN DESCRIPTION - PROGRESSION
CLINICAL_PROGRESSION: NOT CHANGED

## 2021-03-14 ASSESSMENT — PAIN - FUNCTIONAL ASSESSMENT
PAIN_FUNCTIONAL_ASSESSMENT: PREVENTS OR INTERFERES SOME ACTIVE ACTIVITIES AND ADLS

## 2021-03-14 ASSESSMENT — PAIN SCALES - GENERAL
PAINLEVEL_OUTOF10: 10

## 2021-03-14 ASSESSMENT — PAIN DESCRIPTION - LOCATION
LOCATION: ARM

## 2021-03-14 ASSESSMENT — PAIN DESCRIPTION - ONSET: ONSET: ON-GOING

## 2021-03-14 ASSESSMENT — PAIN DESCRIPTION - DESCRIPTORS
DESCRIPTORS: SHOOTING;THROBBING
DESCRIPTORS: SHOOTING;THROBBING
DESCRIPTORS: SHOOTING;SHARP

## 2021-03-14 ASSESSMENT — PAIN DESCRIPTION - FREQUENCY
FREQUENCY: CONTINUOUS

## 2021-03-14 ASSESSMENT — PAIN DESCRIPTION - PAIN TYPE: TYPE: ACUTE PAIN

## 2021-03-14 ASSESSMENT — PAIN DESCRIPTION - ORIENTATION: ORIENTATION: LEFT

## 2021-03-14 NOTE — PROGRESS NOTES
Surgery:    Patient refused bedside I&D yesterday saying he needed to be done under anesthetic. He was scheduled for today in the operating room. He now has refused surgery. Case is canceled. Diet was given. We will be available as needed.       Rosie Schmid MD  3/14/21  9:50 AM

## 2021-03-14 NOTE — PROGRESS NOTES
This nurse went to resident's room per his request for pain medication. Just had Dilaudid at 1930. Patient refused to let nurse assess him or take his vital signs. Attempted to get BP and pulse for COWS scale to administer Clonidine and Ultram. Patient refused both meds. Said, \"Get me something stronger, or I'm not taking anything. \"

## 2021-03-14 NOTE — PLAN OF CARE
Problem: Pain:  Goal: Pain level will decrease  Description: Pain level will decrease  3/14/2021 0052 by Joel Gomez  Outcome: Not Met This Shift  3/13/2021 1144 by Michael Randall RN  Outcome: Met This Shift  Goal: Control of acute pain  Description: Control of acute pain  3/14/2021 0052 by Joel Gomez  Outcome: Not Met This Shift  3/13/2021 1144 by Michael Randall RN  Outcome: Met This Shift  Goal: Control of chronic pain  Description: Control of chronic pain  3/14/2021 0052 by Joel Gomez  Outcome: Not Met This Shift  3/13/2021 1144 by Michael Randall RN  Outcome: Met This Shift

## 2021-03-14 NOTE — PROGRESS NOTES
Message sent to Dr. Tamika Blunt to notify him that pt is not consenting to surgery at this time. Dr. Tamika Blunt said pt can have general diet and he will sign off.

## 2021-03-14 NOTE — PROGRESS NOTES
[address] [place] [address] [facility]  Progress Note    SUBJECTIVE:  Chief Complaint   Patient presents with    Cellulitis     left arm , left AMA yesterday from upstairs. IV drug use     The patient is still complaining of pain in the left upper extremity. The abscess opened up spontaneously. Accepting IV and oral antibiotics. Refusing most other medications except narcotics. Constantly asking for fentanyl. Review of systems:  As stated above in the chief complaint, otherwise negative. Medications:  Scheduled Meds:   lidocaine-EPINEPHrine  20 mL Intradermal Once    linezolid  600 mg Oral 2 times per day    divalproex  250 mg Oral 2 times per day    therapeutic multivitamin-minerals  1 tablet Oral Daily    sodium chloride flush  10 mL Intravenous 2 times per day    nicotine  1 patch Transdermal Daily    cefepime  2,000 mg Intravenous Q8H     Continuous Infusions:   sodium chloride 75 mL/hr at 21 0504    sodium chloride 12.5 mL/hr at 21 1100     PRN Meds:ketorolac, HYDROmorphone, sodium chloride flush, promethazine **OR** ondansetron, polyethylene glycol, acetaminophen **OR** acetaminophen, traZODone, traMADol **AND** cloNIDine, hydrOXYzine    OBJECTIVE:  BP (!) 100/50   Pulse 51   Temp 96 °F (35.6 °C) (Oral)   Resp 18   Ht 5' 8\" (1.727 m)   Wt 152 lb 6.4 oz (69.1 kg)   SpO2 100%   BMI 23.17 kg/m²   Temp  Av °F (35.6 °C)  Min: 96 °F (35.6 °C)  Max: 96 °F (35.6 °C)  Constitutional: The patient is awake, alert, and in no distress. Patient refused to be examined. Left elbow shows less erythema. There is a wound over the antecubital area. He would not allow me to take a culture. I instructed him to take the culture himself.     Laboratory and Tests Review:  Lab Results   Component Value Date    WBC 12.4 (H) 2021    WBC 12.8 (H) 2021    WBC 14.4 (H) 2021    HGB 12.8 2021    HCT 39.4 2021    MCV 90.2 2021     03/12/2021     Lab Results   Component Value Date    NEUTROABS 9.63 (H) 03/12/2021    NEUTROABS 8.97 (H) 03/11/2021    NEUTROABS 10.73 (H) 03/11/2021     No results found for: CRPHS  Lab Results   Component Value Date    ALT 14 03/12/2021    AST 16 03/12/2021    ALKPHOS 101 03/12/2021    BILITOT 0.3 03/12/2021     Lab Results   Component Value Date     03/12/2021    K 4.1 03/12/2021     03/12/2021    CO2 25 03/12/2021    BUN 10 03/12/2021    CREATININE 0.9 03/12/2021    CREATININE 1.0 03/11/2021    CREATININE 0.9 03/11/2021    GFRAA >60 03/12/2021    LABGLOM >60 03/12/2021    GLUCOSE 105 03/12/2021    PROT 6.6 03/12/2021    LABALBU 3.6 03/12/2021    CALCIUM 8.9 03/12/2021    BILITOT 0.3 03/12/2021    ALKPHOS 101 03/12/2021    AST 16 03/12/2021    ALT 14 03/12/2021     Lab Results   Component Value Date    CRP 7.2 (H) 03/11/2021    CRP 3.6 (H) 03/11/2021     Lab Results   Component Value Date    SEDRATE 9 03/11/2021    SEDRATE 5 03/11/2021     Radiology:      Microbiology:   Blood cultures 3/12/2021: Negative so far    ASSESSMENT:  · Left antecubital abscess  · Ongoing IV drug use    PLAN:  · Continue Cefepime and oral Linezolid  · Gram stain and culture left antecubital abscess    Cande Luciano  11:57 AM  3/14/2021

## 2021-03-14 NOTE — PROGRESS NOTES
[facility] Hospitalist   Progress Note    Admitting Date and Time: 3/12/2021 12:28 PM  Admit Dx: Abscess of antecubital fossa [L02.419]     Sen for follow on left antecub abscess / cellulitis    Subjective:  He continues to demand more & more opiates , which has been refused earlier as well as now , gets angry , explained him without surg - I & D it's not going to get better ,he has refused surg and we can't prescribe more opioids. Denies CP ,sob or abd pain. ROS: denies fever, chills, cp, sob, n/v, HA unless stated above.  lidocaine-EPINEPHrine  20 mL Intradermal Once    linezolid  600 mg Oral 2 times per day    divalproex  250 mg Oral 2 times per day    therapeutic multivitamin-minerals  1 tablet Oral Daily    sodium chloride flush  10 mL Intravenous 2 times per day    nicotine  1 patch Transdermal Daily    cefepime  2,000 mg Intravenous Q8H     ketorolac, 15 mg, Q8H PRN  HYDROmorphone, 1 mg, Q6H PRN  sodium chloride flush, 10 mL, PRN  promethazine, 12.5 mg, Q6H PRN    Or  ondansetron, 4 mg, Q6H PRN  polyethylene glycol, 17 g, Daily PRN  acetaminophen, 650 mg, Q6H PRN    Or  acetaminophen, 650 mg, Q6H PRN  traZODone, 50 mg, Nightly PRN  traMADol, 50 mg, PRN    And  cloNIDine, 0.1 mg, PRN  hydrOXYzine, 50 mg, Q8H PRN         Objective:    /60   Pulse 62   Temp 96 °F (35.6 °C) (Oral)   Resp 18   Ht 5' 8\" (1.727 m)   Wt 152 lb 6.4 oz (69.1 kg)   SpO2 100%   BMI 23.17 kg/m²   General Appearance: alert and oriented to person, place and time,in no acute distress, demanding dilaudid in increased dose , feels only that will work.   Skin: warm and dry  Head: normocephalic and atraumatic  Eyes: pupils equal, round, and reactive to light, extraocular eye movements intact, conjunctivae normal  Neck: supple and non-tender without mass  Pulmonary/Chest: clear to auscultation bilaterally  Cardiovascular: normal rate, regular rhythm, normal S1 and S2  Abdomen: soft, non-tender, non-distended, normal bowel sounds, no masses or organomegaly  Extremities: no cyanosis, clubbing or edema  Musculoskeletal: normal range of motion,left antecub with swelling ,erythema ,pain- tenderness. Neurologic:  no cranial nerve deficit,  speech normal      Recent Labs     03/11/21  1757 03/12/21  1306    140   K 3.9 4.1    106   CO2 28 25   BUN 15 10   CREATININE 1.0 0.9   GLUCOSE 102* 105*   CALCIUM 8.6 8.9       Recent Labs     03/11/21  1757 03/12/21  1306   WBC 12.8* 12.4*   RBC 4.48 4.37   HGB 13.1 12.8   HCT 40.0 39.4   MCV 89.3 90.2   MCH 29.2 29.3   MCHC 32.8 32.5   RDW 13.2 13.2    280   MPV 8.1 8.4       Labs and images reviewed     Radiology:   No orders to display       Assessment:    Active Problems:    Abscess of antecubital fossa  Resolved Problems:    * No resolved hospital problems. *      Plan:  Left antecubital abscess/cellulitis with hx of IVDU-ultrasound reviewed with possible abscess/hematoma, has elevated white count. He is a IV drug user. Received Ancef  at [facility] . Vancomycin plus Zosyn given in ER. ID and general surgery consulted. As per ID he is on Linezolid plus cefepime. He was scheduled for I&D today but he refused and is canceled . Surg sign off. Continue current supportive treatment. Continue to follow. Heroin /cocaine /methamphetamine abuse - counseled for cessation. Monitor. Tobacco use - counseled for cessation . Nicotine patch .     On scds for dvt prophy    Full code     Electronically signed by Jose De Jesus Pathak MD on 3/14/2021 at 1:38 PM

## 2021-03-15 ENCOUNTER — HOSPITAL ENCOUNTER (INPATIENT)
Age: 26
LOS: 1 days | Discharge: LEFT BEFORE DISCHARGE | DRG: 383 | End: 2021-03-16
Attending: EMERGENCY MEDICINE | Admitting: INTERNAL MEDICINE
Payer: COMMERCIAL

## 2021-03-15 VITALS
HEIGHT: 68 IN | WEIGHT: 152.4 LBS | BODY MASS INDEX: 23.1 KG/M2 | TEMPERATURE: 97.7 F | SYSTOLIC BLOOD PRESSURE: 129 MMHG | HEART RATE: 58 BPM | RESPIRATION RATE: 18 BRPM | OXYGEN SATURATION: 98 % | DIASTOLIC BLOOD PRESSURE: 69 MMHG

## 2021-03-15 VITALS
RESPIRATION RATE: 18 BRPM | HEART RATE: 68 BPM | HEIGHT: 68 IN | DIASTOLIC BLOOD PRESSURE: 89 MMHG | OXYGEN SATURATION: 99 % | SYSTOLIC BLOOD PRESSURE: 141 MMHG | TEMPERATURE: 97.6 F | WEIGHT: 145 LBS | BODY MASS INDEX: 21.98 KG/M2

## 2021-03-15 DIAGNOSIS — L02.414 ABSCESS OF LEFT ARM: Primary | ICD-10-CM

## 2021-03-15 DIAGNOSIS — F19.10 IV DRUG ABUSE (HCC): ICD-10-CM

## 2021-03-15 PROBLEM — L02.91 ABSCESS: Status: ACTIVE | Noted: 2021-03-15

## 2021-03-15 LAB
ANION GAP SERPL CALCULATED.3IONS-SCNC: 9 MMOL/L (ref 7–16)
BASOPHILS ABSOLUTE: 0.05 E9/L (ref 0–0.2)
BASOPHILS RELATIVE PERCENT: 0.7 % (ref 0–2)
BUN BLDV-MCNC: 18 MG/DL (ref 6–20)
CALCIUM SERPL-MCNC: 9.7 MG/DL (ref 8.6–10.2)
CHLORIDE BLD-SCNC: 99 MMOL/L (ref 98–107)
CO2: 29 MMOL/L (ref 22–29)
CREAT SERPL-MCNC: 1 MG/DL (ref 0.7–1.2)
EOSINOPHILS ABSOLUTE: 0.75 E9/L (ref 0.05–0.5)
EOSINOPHILS RELATIVE PERCENT: 10.3 % (ref 0–6)
GFR AFRICAN AMERICAN: >60
GFR NON-AFRICAN AMERICAN: >60 ML/MIN/1.73
GLUCOSE BLD-MCNC: 100 MG/DL (ref 74–99)
GRAM STAIN ORDERABLE: NORMAL
HCT VFR BLD CALC: 43.6 % (ref 37–54)
HEMOGLOBIN: 13.8 G/DL (ref 12.5–16.5)
IMMATURE GRANULOCYTES #: 0.06 E9/L
IMMATURE GRANULOCYTES %: 0.8 % (ref 0–5)
LYMPHOCYTES ABSOLUTE: 1.33 E9/L (ref 1.5–4)
LYMPHOCYTES RELATIVE PERCENT: 18.3 % (ref 20–42)
MAGNESIUM: 2.4 MG/DL (ref 1.6–2.6)
MCH RBC QN AUTO: 28.5 PG (ref 26–35)
MCHC RBC AUTO-ENTMCNC: 31.7 % (ref 32–34.5)
MCV RBC AUTO: 90.1 FL (ref 80–99.9)
MONOCYTES ABSOLUTE: 0.52 E9/L (ref 0.1–0.95)
MONOCYTES RELATIVE PERCENT: 7.2 % (ref 2–12)
NEUTROPHILS ABSOLUTE: 4.55 E9/L (ref 1.8–7.3)
NEUTROPHILS RELATIVE PERCENT: 62.7 % (ref 43–80)
PDW BLD-RTO: 12.6 FL (ref 11.5–15)
PLATELET # BLD: 351 E9/L (ref 130–450)
PMV BLD AUTO: 8.4 FL (ref 7–12)
POTASSIUM REFLEX MAGNESIUM: 4.2 MMOL/L (ref 3.5–5)
RBC # BLD: 4.84 E12/L (ref 3.8–5.8)
SODIUM BLD-SCNC: 137 MMOL/L (ref 132–146)
WBC # BLD: 7.3 E9/L (ref 4.5–11.5)

## 2021-03-15 PROCEDURE — 6370000000 HC RX 637 (ALT 250 FOR IP): Performed by: SPECIALIST

## 2021-03-15 PROCEDURE — 6360000002 HC RX W HCPCS: Performed by: SPECIALIST

## 2021-03-15 PROCEDURE — 80048 BASIC METABOLIC PNL TOTAL CA: CPT

## 2021-03-15 PROCEDURE — 99281 EMR DPT VST MAYX REQ PHY/QHP: CPT

## 2021-03-15 PROCEDURE — 6370000000 HC RX 637 (ALT 250 FOR IP): Performed by: STUDENT IN AN ORGANIZED HEALTH CARE EDUCATION/TRAINING PROGRAM

## 2021-03-15 PROCEDURE — 83735 ASSAY OF MAGNESIUM: CPT

## 2021-03-15 PROCEDURE — 2580000003 HC RX 258: Performed by: NURSE PRACTITIONER

## 2021-03-15 PROCEDURE — 6370000000 HC RX 637 (ALT 250 FOR IP): Performed by: INTERNAL MEDICINE

## 2021-03-15 PROCEDURE — 6360000002 HC RX W HCPCS: Performed by: INTERNAL MEDICINE

## 2021-03-15 PROCEDURE — 85025 COMPLETE CBC W/AUTO DIFF WBC: CPT

## 2021-03-15 PROCEDURE — 36415 COLL VENOUS BLD VENIPUNCTURE: CPT

## 2021-03-15 PROCEDURE — 1200000000 HC SEMI PRIVATE

## 2021-03-15 PROCEDURE — 99239 HOSP IP/OBS DSCHRG MGMT >30: CPT | Performed by: STUDENT IN AN ORGANIZED HEALTH CARE EDUCATION/TRAINING PROGRAM

## 2021-03-15 PROCEDURE — 6370000000 HC RX 637 (ALT 250 FOR IP): Performed by: NURSE PRACTITIONER

## 2021-03-15 PROCEDURE — 6360000002 HC RX W HCPCS: Performed by: STUDENT IN AN ORGANIZED HEALTH CARE EDUCATION/TRAINING PROGRAM

## 2021-03-15 PROCEDURE — 86140 C-REACTIVE PROTEIN: CPT

## 2021-03-15 PROCEDURE — 2580000003 HC RX 258: Performed by: SPECIALIST

## 2021-03-15 RX ORDER — DOXYCYCLINE HYCLATE 100 MG/1
100 CAPSULE ORAL EVERY 12 HOURS SCHEDULED
Status: DISCONTINUED | OUTPATIENT
Start: 2021-03-15 | End: 2021-03-15 | Stop reason: HOSPADM

## 2021-03-15 RX ORDER — FENTANYL CITRATE 50 UG/ML
50 INJECTION, SOLUTION INTRAMUSCULAR; INTRAVENOUS ONCE
Status: COMPLETED | OUTPATIENT
Start: 2021-03-15 | End: 2021-03-15

## 2021-03-15 RX ORDER — DIVALPROEX SODIUM 250 MG/1
250 TABLET, DELAYED RELEASE ORAL EVERY 8 HOURS SCHEDULED
Status: DISCONTINUED | OUTPATIENT
Start: 2021-03-15 | End: 2021-03-16 | Stop reason: HOSPADM

## 2021-03-15 RX ORDER — OXYCODONE HYDROCHLORIDE AND ACETAMINOPHEN 5; 325 MG/1; MG/1
1 TABLET ORAL EVERY 6 HOURS PRN
Status: DISCONTINUED | OUTPATIENT
Start: 2021-03-15 | End: 2021-03-15 | Stop reason: HOSPADM

## 2021-03-15 RX ORDER — LEVOFLOXACIN 750 MG/1
750 TABLET ORAL DAILY
Qty: 10 TABLET | Refills: 0 | Status: SHIPPED | OUTPATIENT
Start: 2021-03-16 | End: 2021-03-26

## 2021-03-15 RX ORDER — HYDROCODONE BITARTRATE AND ACETAMINOPHEN 7.5; 325 MG/1; MG/1
1 TABLET ORAL EVERY 6 HOURS PRN
Status: DISCONTINUED | OUTPATIENT
Start: 2021-03-15 | End: 2021-03-16 | Stop reason: HOSPADM

## 2021-03-15 RX ORDER — LINEZOLID 600 MG/1
600 TABLET, FILM COATED ORAL EVERY 12 HOURS SCHEDULED
Status: DISCONTINUED | OUTPATIENT
Start: 2021-03-15 | End: 2021-03-15

## 2021-03-15 RX ORDER — DOXYCYCLINE HYCLATE 100 MG/1
100 CAPSULE ORAL EVERY 12 HOURS SCHEDULED
Qty: 20 CAPSULE | Refills: 0 | Status: SHIPPED | OUTPATIENT
Start: 2021-03-15 | End: 2021-03-17 | Stop reason: ALTCHOICE

## 2021-03-15 RX ORDER — DOXYCYCLINE HYCLATE 100 MG/1
100 CAPSULE ORAL EVERY 12 HOURS SCHEDULED
Status: DISCONTINUED | OUTPATIENT
Start: 2021-03-15 | End: 2021-03-16 | Stop reason: HOSPADM

## 2021-03-15 RX ORDER — LORAZEPAM 0.5 MG/1
0.5 TABLET ORAL EVERY 6 HOURS PRN
Status: DISCONTINUED | OUTPATIENT
Start: 2021-03-15 | End: 2021-03-16 | Stop reason: HOSPADM

## 2021-03-15 RX ORDER — QUETIAPINE FUMARATE 25 MG/1
25 TABLET, FILM COATED ORAL 3 TIMES DAILY
Status: DISCONTINUED | OUTPATIENT
Start: 2021-03-15 | End: 2021-03-16 | Stop reason: HOSPADM

## 2021-03-15 RX ADMIN — HYDROCODONE BITARTRATE AND ACETAMINOPHEN 1 TABLET: 7.5; 325 TABLET ORAL at 22:50

## 2021-03-15 RX ADMIN — LEVOFLOXACIN 750 MG: 500 TABLET, FILM COATED ORAL at 14:02

## 2021-03-15 RX ADMIN — HYDROMORPHONE HYDROCHLORIDE 1 MG: 1 INJECTION, SOLUTION INTRAMUSCULAR; INTRAVENOUS; SUBCUTANEOUS at 21:24

## 2021-03-15 RX ADMIN — SODIUM CHLORIDE: 9 INJECTION, SOLUTION INTRAVENOUS at 10:03

## 2021-03-15 RX ADMIN — HYDROMORPHONE HYDROCHLORIDE 1 MG: 1 INJECTION, SOLUTION INTRAMUSCULAR; INTRAVENOUS; SUBCUTANEOUS at 03:52

## 2021-03-15 RX ADMIN — CLONIDINE HYDROCHLORIDE 0.1 MG: 0.1 TABLET ORAL at 05:51

## 2021-03-15 RX ADMIN — FENTANYL CITRATE 50 MCG: 50 INJECTION INTRAMUSCULAR; INTRAVENOUS at 20:58

## 2021-03-15 RX ADMIN — TRAMADOL HYDROCHLORIDE 50 MG: 50 TABLET, FILM COATED ORAL at 05:51

## 2021-03-15 RX ADMIN — HYDROMORPHONE HYDROCHLORIDE 1 MG: 1 INJECTION, SOLUTION INTRAMUSCULAR; INTRAVENOUS; SUBCUTANEOUS at 09:57

## 2021-03-15 RX ADMIN — ACETAMINOPHEN 650 MG: 325 TABLET ORAL at 01:01

## 2021-03-15 RX ADMIN — CEFEPIME HYDROCHLORIDE 2000 MG: 2 INJECTION, POWDER, FOR SOLUTION INTRAVENOUS at 03:52

## 2021-03-15 RX ADMIN — CLONIDINE HYDROCHLORIDE 0.1 MG: 0.1 TABLET ORAL at 01:01

## 2021-03-15 RX ADMIN — DOXYCYCLINE HYCLATE 100 MG: 100 CAPSULE ORAL at 23:08

## 2021-03-15 RX ADMIN — LINEZOLID 600 MG: 600 TABLET, FILM COATED ORAL at 09:57

## 2021-03-15 RX ADMIN — KETOROLAC TROMETHAMINE 15 MG: 30 INJECTION, SOLUTION INTRAMUSCULAR; INTRAVENOUS at 03:52

## 2021-03-15 RX ADMIN — SODIUM CHLORIDE, PRESERVATIVE FREE 10 ML: 5 INJECTION INTRAVENOUS at 03:53

## 2021-03-15 RX ADMIN — SODIUM CHLORIDE: 9 INJECTION, SOLUTION INTRAVENOUS at 03:57

## 2021-03-15 RX ADMIN — OXYCODONE HYDROCHLORIDE AND ACETAMINOPHEN 1 TABLET: 5; 325 TABLET ORAL at 16:24

## 2021-03-15 RX ADMIN — TRAMADOL HYDROCHLORIDE 50 MG: 50 TABLET, FILM COATED ORAL at 01:01

## 2021-03-15 RX ADMIN — HYDROMORPHONE HYDROCHLORIDE 1 MG: 1 INJECTION, SOLUTION INTRAMUSCULAR; INTRAVENOUS; SUBCUTANEOUS at 22:17

## 2021-03-15 ASSESSMENT — PAIN DESCRIPTION - ORIENTATION
ORIENTATION: LEFT
ORIENTATION: LEFT

## 2021-03-15 ASSESSMENT — ENCOUNTER SYMPTOMS
COUGH: 0
SHORTNESS OF BREATH: 0
SORE THROAT: 0
ABDOMINAL PAIN: 0
SINUS PRESSURE: 0
EYE DISCHARGE: 0
EYE REDNESS: 0
BACK PAIN: 0
VOMITING: 0
WHEEZING: 0
DIARRHEA: 0
NAUSEA: 0
EYE PAIN: 0

## 2021-03-15 ASSESSMENT — PAIN DESCRIPTION - PAIN TYPE
TYPE: ACUTE PAIN

## 2021-03-15 ASSESSMENT — PAIN SCALES - GENERAL
PAINLEVEL_OUTOF10: 10

## 2021-03-15 ASSESSMENT — PAIN DESCRIPTION - PROGRESSION: CLINICAL_PROGRESSION: NOT CHANGED

## 2021-03-15 ASSESSMENT — PAIN DESCRIPTION - DESCRIPTORS
DESCRIPTORS: SHOOTING;THROBBING
DESCRIPTORS: ACHING;THROBBING
DESCRIPTORS: ACHING

## 2021-03-15 ASSESSMENT — PAIN DESCRIPTION - ONSET
ONSET: ON-GOING

## 2021-03-15 ASSESSMENT — PAIN DESCRIPTION - LOCATION
LOCATION: ARM

## 2021-03-15 ASSESSMENT — PAIN - FUNCTIONAL ASSESSMENT
PAIN_FUNCTIONAL_ASSESSMENT: ACTIVITIES ARE NOT PREVENTED
PAIN_FUNCTIONAL_ASSESSMENT: ACTIVITIES ARE NOT PREVENTED
PAIN_FUNCTIONAL_ASSESSMENT: PREVENTS OR INTERFERES SOME ACTIVE ACTIVITIES AND ADLS

## 2021-03-15 NOTE — PROGRESS NOTES
SURGERY NOTE  Long discussion had with patient regarding risks of refusing treatment of antecubital abscess. Abscess has now drained and has a hole in the fossa draining pus. Area of induration and fluctuance with celllulitis surrounding it. Recommended surgical intervention on abscess, will also need some debridement and washout of the area. Thoroughly explained the risks, benefits, and alternatives; including risk of worsening infection, loss of limb, or death with refusing treatment. Patient now agreeable.     Plan for OR tomorrow  NPO after midnight  Needs IV access  Recommending continuing [address], DO  Resident, PGY-3  3/15/2021  1:12 PM

## 2021-03-15 NOTE — PROGRESS NOTES
Pt. Agreeable to sign consent. Signed consent per order with me witnessing. Pt. Is still refusing IV. Complains that he is in too much pain and can't sit still for me to do it. He wants more pain medication. Informed him that he has tylenol I could give him but he won't answer me as to whether or not he wants it.     Electronically signed by Christine Gabriel RN on 3/15/2021 at 2:06 PM

## 2021-03-15 NOTE — PROGRESS NOTES
Pt. Wants to know how he can heal his wound on his arm by himself. Educate him that he can't heal it on his own. Educated him that the surgeon wanted to clean it out and he refused the surgery. He said he doesn't trust it and he wants to know alternative options. Said something shady happened in the ED and he doesn't trust anyone. Now he is talking about going to "[facility]. He wants to know where the closest [employer] is because he wants to make some phone calls. Educated him that he can't leave the hospital.  Now he wants to know if there are cameras in his room - assured him there are not.     Electronically signed by Jose Hackett RN on 3/15/2021 at 9:59 AM

## 2021-03-15 NOTE — PROGRESS NOTES
Pt. Is very upset - yelling and flailing around his room. He states he is leaving so I told him that i'd call the doctor to let her know. He said he didn't care. Notified Dr. Roberto Garner of pt wanting to leave AMA. Went back down to pt room to remove IV and have him sign AMA paperwork. He refused to sign the paper but IV was removed. He is all over the place with whether he is leaving or staying. Before I left the room he said he was going to wait to see Dr. Roberto Garner. He wanted his pain meds changed and wants to be discharged with pain meds. Told him that the way the meds were ordered was it and he will not be discharged with pain medication. He was irate when I told him that. He is upset that a paper with phone numbers on it is lost.  He says he has no one to help him and no where to go. He said he is going to wander the streets.     Electronically signed by Bettina Sheppard RN on 3/15/2021 at 10:50 AM

## 2021-03-15 NOTE — PROGRESS NOTES
Asked pt if I could put IV in now. He is still refusing.   He wants to see if the Percocet I administered will \"kick in\"    Electronically signed by Anu Nation RN on 3/15/2021 at 4:27 PM

## 2021-03-15 NOTE — PROGRESS NOTES
Pt. Covered in blanket in room. Refusing to pull blanket back to allow me to do vitals, assess, or administer meds. I pulled back the blanket and he became verbally aggressive. Told him i'd be back soon.     Electronically signed by Mortimer Longest, RN on 3/15/2021 at 8:34 AM

## 2021-03-15 NOTE — PROGRESS NOTES
[facility] Progress Note    Admitting Date and Time: 3/12/2021 12:28 PM  Admit Dx: Abscess of antecubital fossa [L02.419]    Subjective:  Patient is being followed for Abscess of antecubital fossa [L02.419]   Pt is asking for IV pain medications, his pain medications were switched to p.o. this a.m. patient is refusing blood draw, is refusing IV catheter reinsertion and was also refusing surgical intervention, now agreed for I&D. Patient refused the writer to examine his wounds. Per RN: Pulled about IV catheter, was threatening to leave AMA. ROS: denies fever, chills, cp, sob, n/v, HA unless stated above.       lidocaine-EPINEPHrine  20 mL Intradermal Once    linezolid  600 mg Oral 2 times per day    divalproex  250 mg Oral 2 times per day    therapeutic multivitamin-minerals  1 tablet Oral Daily    sodium chloride flush  10 mL Intravenous 2 times per day    nicotine  1 patch Transdermal Daily    cefepime  2,000 mg Intravenous Q8H     ketorolac, 15 mg, Q8H PRN  HYDROmorphone, 1 mg, Q6H PRN  sodium chloride flush, 10 mL, PRN  promethazine, 12.5 mg, Q6H PRN    Or  ondansetron, 4 mg, Q6H PRN  polyethylene glycol, 17 g, Daily PRN  acetaminophen, 650 mg, Q6H PRN    Or  acetaminophen, 650 mg, Q6H PRN  traZODone, 50 mg, Nightly PRN  traMADol, 50 mg, PRN    And  cloNIDine, 0.1 mg, PRN  hydrOXYzine, 50 mg, Q8H PRN         Objective:    /64 Comment: manual  Pulse 62   Temp 98.2 °F (36.8 °C) (Oral)   Resp 18   Ht 5' 8\" (1.727 m)   Wt 152 lb 6.4 oz (69.1 kg)   SpO2 98%   BMI 23.17 kg/m²     General Appearance: alert and oriented to person, place and time and in no acute distress  Skin: warm and dry  Head: normocephalic and atraumatic  Eyes: pupils equal, round, and reactive to light, extraocular eye movements intact, conjunctivae normal  Neck: neck supple and non tender without mass   Pulmonary/Chest: clear to auscultation bilaterally- no wheezes, rales or rhonchi, normal air movement, no respiratory distress  Cardiovascular: normal rate, normal S1 and S2 and no carotid bruits  Abdomen: soft, non-tender, non-distended, normal bowel sounds, no masses or organomegaly  Extremities: no cyanosis, no clubbing and no edema, could not examine his wound as the patient refused to get his wound examined. Neurologic: no cranial nerve deficit and speech normal        Recent Labs     03/12/21  1306      K 4.1      CO2 25   BUN 10   CREATININE 0.9   GLUCOSE 105*   CALCIUM 8.9       Recent Labs     03/12/21  1306   WBC 12.4*   RBC 4.37   HGB 12.8   HCT 39.4   MCV 90.2   MCH 29.3   MCHC 32.5   RDW 13.2      MPV 8.4       Micro:  No components found for: [facility])    Radiology:   No results found. Assessment:    Active Problems:    Abscess of antecubital fossa  Resolved Problems:    * No resolved hospital problems. *      Plan:  1. Left antecubital abscess/cellulitis with hx of IVDU-ultrasound reviewed with possible abscess/hematoma, has elevated white count.  He is a IV drug user.  Received Ancef  at [facility] .  Vancomycin plus Zosyn given in ER. ID and general surgery consulted. As per ID he was on Linezolid plus cefepime, but switched to p.o. Levaquin and doxy for 10 days as the patient pulled his IV access. 3/14 -He was scheduled for I&D today but he refused and procedure was canceled. Surg sign off  3/15- Patient had concerns about his artery being close to incision site, on discussion with surgery consented for the procedure scheduled for tomorrow. Had a detailed discussion with the patient about risk of going in sepsis and importance of IV antibiotics. IV pain medicines changed to p.o.     2. Heroin /cocaine /methamphetamine abuse - counseled for cessation. Monitor.     3. Tobacco use - counseled for cessation . Nicotine patch . 4.  Paranoid delusions -per ID documentation, will consult psychiatry. Thinks someone cut his arm open & that's why the abscess is draining.  Also

## 2021-03-15 NOTE — PATIENT CARE CONFERENCE
P Quality Flow/Interdisciplinary Rounds Progress Note        Quality Flow Rounds held on March 15, 2021    Disciplines Attending:  Bedside Nurse, ,  and Nursing Unit Dean De Oliveira was admitted on 3/12/2021 12:28 PM    Anticipated Discharge Date:  Expected Discharge Date: 03/15/21    Disposition:    William Score:  William Scale Score: 20    Readmission Risk              Risk of Unplanned Readmission:        33           Discussed patient goal for the day, patient clinical progression, and barriers to discharge.   The following Goal(s) of the Day/Commitment(s) have been identified:  Labs - Report Results      Viki Dominguez  March 15, 2021

## 2021-03-15 NOTE — PROGRESS NOTES
[address] [place] [address] [facility]  TONIA  Progress Note    SUBJECTIVE:  Chief Complaint   Patient presents with    Cellulitis     left arm , left AMA yesterday from upstairs. IV drug use     Patient is upset that papers are lost with phone numbers on them. He is asking to call other hospitals & other doctors. He had the nurse take his IV out. He has talked about leaving AMA. He is asking for a script for pain medications. Review of systems:  As stated above in the chief complaint, otherwise negative. Medications:  Scheduled Meds:   lidocaine-EPINEPHrine  20 mL Intradermal Once    linezolid  600 mg Oral 2 times per day    divalproex  250 mg Oral 2 times per day    therapeutic multivitamin-minerals  1 tablet Oral Daily    sodium chloride flush  10 mL Intravenous 2 times per day    nicotine  1 patch Transdermal Daily    cefepime  2,000 mg Intravenous Q8H     Continuous Infusions:   sodium chloride 75 mL/hr at 21 1744    sodium chloride 12.5 mL/hr at 03/15/21 1003     PRN Meds:ketorolac, HYDROmorphone, sodium chloride flush, promethazine **OR** ondansetron, polyethylene glycol, acetaminophen **OR** acetaminophen, traZODone, traMADol **AND** cloNIDine, hydrOXYzine    OBJECTIVE:  /69   Pulse 58   Temp 97.7 °F (36.5 °C) (Oral)   Resp 18   Ht 5' 8\" (1.727 m)   Wt 152 lb 6.4 oz (69.1 kg)   SpO2 98%   BMI 23.17 kg/m²   Temp  Av °F (36.7 °C)  Min: 97.7 °F (36.5 °C)  Max: 98.2 °F (36.8 °C)  Constitutional: The patient is awake, alert. His thought process is manic. He keeps looking over his shoulder. He seems to be paranoid. Thinks someone cut his arm open & that's why the abscess is draining. He cannot be reasoned with. Patient refused to be examined.       Laboratory and Tests Review:  Lab Results   Component Value Date    WBC 12.4 (H) 2021    WBC 12.8 (H) 2021    WBC 14.4 (H) 2021    HGB 12.8 2021    HCT 39.4 2021    MCV 90.2 2021  03/12/2021     Lab Results   Component Value Date    NEUTROABS 9.63 (H) 03/12/2021    NEUTROABS 8.97 (H) 03/11/2021    NEUTROABS 10.73 (H) 03/11/2021     No results found for: [facility]  Lab Results   Component Value Date    ALT 14 03/12/2021    AST 16 03/12/2021    ALKPHOS 101 03/12/2021    BILITOT 0.3 03/12/2021     Lab Results   Component Value Date     03/12/2021    K 4.1 03/12/2021     03/12/2021    CO2 25 03/12/2021    BUN 10 03/12/2021    CREATININE 0.9 03/12/2021    CREATININE 1.0 03/11/2021    CREATININE 0.9 03/11/2021    GFRAA >60 03/12/2021    LABGLOM >60 03/12/2021    GLUCOSE 105 03/12/2021    PROT 6.6 03/12/2021    LABALBU 3.6 03/12/2021    CALCIUM 8.9 03/12/2021    BILITOT 0.3 03/12/2021    ALKPHOS 101 03/12/2021    AST 16 03/12/2021    ALT 14 03/12/2021     Lab Results   Component Value Date    CRP 7.2 (H) 03/11/2021    CRP 3.6 (H) 03/11/2021     Lab Results   Component Value Date    SEDRATE 9 03/11/2021    SEDRATE 5 03/11/2021     Radiology:  noted    Microbiology:   Blood cultures 3/12/2021: Negative so far  Arm culture 3/14/2021 - no organisms seen on gram stain. ASSESSMENT:  · Left antecubital abscess  · Ongoing IV drug use    PLAN:  · Continue Cefepime and oral Linezolid  · Patient had his IV removed so is likely not going to accept the cefepime. Maira Lomeli  10:57 AM  3/15/2021    Patient seen and examined. I had a face to face encounter with the patient. Agree with exam.  Assessment and plan as outlined above and directed by me. Addition and corrections were done as deemed appropriate. My exam and plan include: The patient is now having paranoid delusions. His IV was taken out at the patient's request.  He will be seen by psychiatry. He apparently was supposed to be on antipsychotics. These may potentially interact with Linezolid so I will go ahead and change this over to oral Doxycycline. I will also change Cefepime over to Levofloxacin.   He will need another 10 days of antibiotics. He can be discharged to an inpatient psychiatric facility from ID standpoint.     Dale Wilson  3/15/2021  1:27 PM

## 2021-03-15 NOTE — DISCHARGE SUMMARY
[facility] Physician Discharge Summary       No follow-up provider specified. Activity level: As tolerated     Dispo: AMA    Condition on discharge: Stable     Patient ID:  Stephany Sands  [id number]  14 y.o.  1995    Admit date: 3/12/2021    Discharge date and time:  3/15/2021  5:45 PM    Admission Diagnoses: Active Problems:    Abscess of antecubital fossa  Resolved Problems:    * No resolved hospital problems. *      Discharge Diagnoses: Active Problems:    Abscess of antecubital fossa  Resolved Problems:    * No resolved hospital problems. *      Consults:  IP CONSULT TO SOCIAL WORK  IP CONSULT TO GENERAL SURGERY  IP CONSULT TO INFECTIOUS DISEASES  IP CONSULT TO PSYCHIATRY    Procedures: As per hospital course    Hospital Course:   Patient Stephany Sands is a 30 y.o. presented with Abscess of antecubital fossa [L02.419]  Left antecubital abscess/cellulitis with hx of IVDU-ultrasound reviewed with possible abscess/hematoma, has elevated white count.  He is a IV drug user.  Received Ancef  at [facility] .  Vancomycin plus Zosyn given in ER.  ID and general surgery consulted.  As per ID he was on Linezolid plus cefepime, but switched to p.o. Levaquin and doxy for 10 days as the patient pulled his IV access.    3/14 -He was scheduled for I&D today but he refused and procedure was canceled. Surg sign off  3/15- Patient had concerns about his artery being close to incision site, on discussion with surgery consented for the procedure scheduled for tomorrow. Had a detailed discussion with the patient about risk of going in sepsis and importance of IV antibiotics. But patient left AMA since his IV pain medicines were changed to p.o his Percocet was not giving him the kick he was getting from his IV Dilaudid.     Discharge Exam:  General Appearance: alert and oriented to person, place and time and in no acute distress  Skin: warm and dry  Head: normocephalic and atraumatic  Eyes: pupils equal, round, and reactive to light, extraocular eye movements intact, conjunctivae normal  Neck: neck supple and non tender without mass   Pulmonary/Chest: clear to auscultation bilaterally- no wheezes, rales or rhonchi, normal air movement, no respiratory distress  Cardiovascular: normal rate, normal S1 and S2 and no carotid bruits  Abdomen: soft, non-tender, non-distended, normal bowel sounds, no masses or organomegaly  Extremities: no cyanosis, no clubbing and no edema  Neurologic: no cranial nerve deficit and speech normal    I/O last 3 completed shifts: In: 9926 [P.O.:660; I.V.:381]  Out: -   No intake/output data recorded. LABS:  No results for input(s): NA, K, CL, CO2, BUN, CREATININE, GLUCOSE, CALCIUM in the last 72 hours. No results for input(s): WBC, RBC, HGB, HCT, MCV, MCH, MCHC, RDW, PLT, MPV in the last 72 hours. No results for input(s): POCGLU in the last 72 hours. Imaging:  No results found.     Patient Instructions:      Medication List      ASK your doctor about these medications    divalproex 250 MG DR tablet  Commonly known as: DEPAKOTE  Take 1 tablet by mouth every 12 hours     therapeutic multivitamin-minerals tablet  Take 1 tablet by mouth daily              Note that more than 30 minutes was spent in preparing discharge papers, discussing discharge with patient, medication review, etc.    Signed:  Electronically signed by Valdo Paulino MD on 3/15/2021 at 5:45 PM

## 2021-03-15 NOTE — PROGRESS NOTES
Notified Dr. Chriss Drake that pt. Left AMA.     Electronically signed by Lewis Rich RN on 3/15/2021 at 5:47 PM

## 2021-03-15 NOTE — PROGRESS NOTES
Dr. Rudi Sam asked that surgery see the pt. One more time to go over risk factors. Message sent to surgical resident.     Electronically signed by Josue Chan RN on 3/15/2021 at 12:15 PM

## 2021-03-15 NOTE — PROGRESS NOTES
Went down to room because pt was upset that Percocet wasn't strong enough. I told him I could put his IV in and give him toradol for the pain. He refused. He signed AMA paperwork and left the floor. Dr. Mercedes Anguiano notified.     Electronically signed by Lewis Rich RN on 3/15/2021 at 5:40 PM

## 2021-03-15 NOTE — CARE COORDINATION
Social Work discharge [facility] [place] [facility] consult stating \"treatment options\" noted. SW noted pt has Mental health and substance abuse hx. Pt agreeing to surgery at the moment per Surgery's latest note today. Per ID today,  pt will be seen by psychiatry. However, the last psych consult order was from previous admit. YESY discussed with PALOMO Stewart.   Electronically signed by Tj Harris on 3/15/2021 at 2:40 PM

## 2021-03-15 NOTE — ED PROVIDER NOTES
Patient is a 78-year-old male who was admitted to the hospital 2 days ago for a left arm cellulitis and abscess. He was to go to go to the OR tomorrow but signed out AMA earlier tonight. He did not pick it past the parking lot when he decided change his mind come back to the emergency department get it readmitted so he could go back to the OR tomorrow. He rates the pain in his arm is 10 out of 10 in severity, worsened with movement and palpation, he has not taken anything for pain since he left the hospital, and the pain is sharp in nature. It is been ongoing for the last 4 days. Review of Systems   Constitutional: Negative for chills and fever. HENT: Negative for ear pain, sinus pressure and sore throat. Eyes: Negative for pain, discharge and redness. Respiratory: Negative for cough, shortness of breath and wheezing. Cardiovascular: Negative for chest pain. Gastrointestinal: Negative for abdominal pain, diarrhea, nausea and vomiting. Genitourinary: Negative for dysuria and frequency. Musculoskeletal: Positive for arthralgias and myalgias (Joint and muscle pain in his left elbow secondary to the abscess). Negative for back pain. Skin: Positive for wound (Left arm abscess popped and there is now a wound with a ulcerating lesion). Negative for rash. Neurological: Negative for weakness and headaches. Hematological: Negative for adenopathy. All other systems reviewed and are negative. Physical Exam  Vitals signs and nursing note reviewed. Constitutional:       Appearance: He is well-developed. HENT:      Head: Normocephalic and atraumatic. Eyes:      Conjunctiva/sclera: Conjunctivae normal.   Neck:      Musculoskeletal: Normal range of motion and neck supple. Cardiovascular:      Rate and Rhythm: Normal rate and regular rhythm. Heart sounds: Normal heart sounds. No murmur. Pulmonary:      Effort: Pulmonary effort is normal. No respiratory distress.       Breath ------------------------- NURSING NOTES AND VITALS REVIEWED ---------------------------  Date / Time Roomed:  3/15/2021  7:54 PM  ED Bed Assignment:  [id number]    The nursing notes within the ED encounter and vital signs as below have been reviewed. Patient Vitals for the past 24 hrs:   BP Temp Temp src Pulse Resp SpO2 Height Weight   03/15/21 2130 (!) 141/89 97.6 °F (36.4 °C) Oral 68 18 99 % -- --   03/15/21 2120 123/72 -- -- 73 16 99 % -- --   03/15/21 2104 (!) 151/86 98.2 °F (36.8 °C) Oral 77 16 98 % -- --   03/15/21 1829 128/62 -- -- -- -- -- -- --   03/15/21 1828 -- 98.2 °F (36.8 °C) -- 89 18 98 % 5' 8\" (1.727 m) 145 lb (65.8 kg)       Oxygen Saturation Interpretation: Normal    ------------------------------------------ PROGRESS NOTES ------------------------------------------  Re-evaluation(s):  Time: 21:13  Patients symptoms are worsening  Repeat physical examination is patient having worsening pain, night adequate pain control with fentanyl, he has a history of opiate abuse, so we will give 1 mg of Dilaudid to adequately control his pain    Counseling:  I have spoken with the patient and discussed todays results, in addition to providing specific details for the plan of care and counseling regarding the diagnosis and prognosis. Their questions are answered at this time and they are agreeable with the plan of admission.    --------------------------------- ADDITIONAL PROVIDER NOTES ---------------------------------  Consultations:  Time: 2025. Spoke with Dr. Ralston Gitelman. Discussed case. They will admit the patient. This patient's ED course included: a personal history and physicial examination, re-evaluation prior to disposition, multiple bedside re-evaluations and IV medications    This patient has remained hemodynamically stable during their ED course. Diagnosis:  1. Abscess of left arm    2.  IV drug abuse ([facility] [address]        Disposition:  Patient's disposition: Admit to med/surg floor  Patient's condition is stable.               Stewart Mathias MD  Resident  03/16/21 1800

## 2021-03-15 NOTE — PLAN OF CARE
Problem: Pain:  Goal: Pain level will decrease  Description: Pain level will decrease  Outcome: Not Met This Shift  Goal: Control of acute pain  Description: Control of acute pain  Outcome: Not Met This Shift  Goal: Control of chronic pain  Description: Control of chronic pain  Outcome: Not Met This Shift     Problem: Falls - Risk of:  Goal: Will remain free from falls  Description: Will remain free from falls  Outcome: Met This Shift  Goal: Absence of physical injury  Description: Absence of physical injury  Outcome: Met This Shift     Problem: Discharge Planning:  Goal: Discharged to appropriate level of care  Description: Discharged to appropriate level of care  Outcome: Not Met This Shift  Goal: Absence of hematoma at arterial access site  Description: Participation in substance abuse program  Outcome: Not Met This Shift     Problem: Coping - Ineffective, Individual:  Goal: Ability to identify and develop effective coping behavior will improve  Description: Ability to identify and develop effective coping behavior will improve  Outcome: Met This Shift     Problem: Health Maintenance - Impaired:  Goal: Ability to manage health-related needs will improve  Description: Ability to manage health-related needs will improve  Outcome: Met This Shift     Problem: Injury - Risk of, Substance Overdose:  Goal: No signs of physiological stress  Description: Absence of drug withdrawal signs and symptoms  Outcome: Met This Shift  Goal: Ability to remain free from injury will improve  Description: Ability to remain free from injury will improve  Outcome: Met This Shift     Problem: Mood - Altered:  Goal: Mood stable  Description: Mood stable  Outcome: Met This Shift     Problem: Violence - Risk of, Self/Other-Directed:  Goal: Knowledge of developmental care interventions  Description: Absence of violence  Outcome: Met This Shift     Problem: Skin Integrity - Impaired:  Goal: Skin will be intact without erythema or breakdown Outcome: Met This Shift  Goal: Will show no infection signs and symptoms  Description: Will show no infection signs and symptoms  Outcome: Met This Shift  Goal: Absence of new skin breakdown  Description: Absence of new skin breakdown  Outcome: Met This Shift     Problem:  Body Temperature - Imbalanced:  Goal: Ability to maintain a body temperature in the normal range will improve  Description: Ability to maintain a body temperature in the normal range will improve  Outcome: Met This Shift     Problem: Mobility - Impaired:  Goal: Mobility will improve to maximum level  Description: Mobility will improve to maximum level  Outcome: Met This Shift     Problem: Pain:  Goal: Pain level will decrease  Description: Pain level will decrease  Outcome: Not Met This Shift  Goal: Control of acute pain  Description: Control of acute pain  Outcome: Not Met This Shift  Goal: Control of chronic pain  Description: Control of chronic pain  Outcome: Not Met This Shift     Problem: BH POTENTIAL FOR SUBSTANCE WITHDRAWAL  Goal: Free of withdrawal symptoms  Outcome: Met This Shift

## 2021-03-16 LAB
BLOOD CULTURE, ROUTINE: NORMAL
C-REACTIVE PROTEIN: 4 MG/DL (ref 0–0.4)
CULTURE, BLOOD 2: NORMAL
WOUND/ABSCESS: NORMAL

## 2021-03-16 PROCEDURE — 99223 1ST HOSP IP/OBS HIGH 75: CPT | Performed by: INTERNAL MEDICINE

## 2021-03-16 PROCEDURE — 6360000002 HC RX W HCPCS: Performed by: INTERNAL MEDICINE

## 2021-03-16 PROCEDURE — 6370000000 HC RX 637 (ALT 250 FOR IP): Performed by: INTERNAL MEDICINE

## 2021-03-16 RX ADMIN — LORAZEPAM 0.5 MG: 0.5 TABLET ORAL at 02:20

## 2021-03-16 RX ADMIN — HYDROMORPHONE HYDROCHLORIDE 1 MG: 1 INJECTION, SOLUTION INTRAMUSCULAR; INTRAVENOUS; SUBCUTANEOUS at 01:19

## 2021-03-16 RX ADMIN — QUETIAPINE FUMARATE 25 MG: 25 TABLET ORAL at 02:20

## 2021-03-16 RX ADMIN — HYDROCODONE BITARTRATE AND ACETAMINOPHEN 1 TABLET: 7.5; 325 TABLET ORAL at 05:04

## 2021-03-16 ASSESSMENT — PAIN DESCRIPTION - PROGRESSION: CLINICAL_PROGRESSION: NOT CHANGED

## 2021-03-16 ASSESSMENT — PAIN DESCRIPTION - DESCRIPTORS: DESCRIPTORS: ACHING;CONSTANT

## 2021-03-16 ASSESSMENT — PAIN DESCRIPTION - PAIN TYPE: TYPE: ACUTE PAIN

## 2021-03-16 ASSESSMENT — PAIN DESCRIPTION - ONSET: ONSET: ON-GOING

## 2021-03-16 ASSESSMENT — PAIN DESCRIPTION - ORIENTATION: ORIENTATION: LEFT

## 2021-03-16 NOTE — PROGRESS NOTES
Went to patients room to reassess patient, patient and belongings both missing from room. Charge RN notified, code brown called. Received call from ED lobby that patient was down there and wanted to be \"readmitted with a new doctor. \"    3073- Patient brought back to room. Call placed to Dr. Kathy Mitchell to update, no new orders at this time. IV site removed because he is no longer receiving IV medications. Patient is agreeable to staying through the night for surgery in the morning.      Electronically signed by Jose Negron RN on 3/16/2021 at 1:45 AM

## 2021-03-16 NOTE — PROGRESS NOTES
Patient states he will not go to surgery today as his dad is not here and he does not want surgery. Patient states he \"wants to leave\" and is refusing to sign AMA paper at this time, grabbed belongings and left.      Electronically signed by Alycia Holden RN on 3/16/2021 at 7:15 AM

## 2021-03-16 NOTE — ED NOTES
SBAR report faxed to 5th floor, verified receipt with staff.        Yamileth Regan, KAELYN  03/15/21 3930

## 2021-03-16 NOTE — PROGRESS NOTES
Patient refusing head to toe and skin assessment at this time. Patient also refusing having his wound measured at this time. Patient states he needs to see a doctor before he will allow this.      Electronically signed by Rosy Parker RN on 3/15/2021 at 9:50 PM

## 2021-03-16 NOTE — ED NOTES
Received verbal ok from Dr. Kayode Saldana to administer Dilaudid at this time     [facility] Glory Driscoll RN  03/15/21 3702

## 2021-03-16 NOTE — PROGRESS NOTES
Pt seen getting on elevators, call placed to [facility]. Patient found outside Main Entrance smoking. Patient escorted back to his room. Presently refusing to allow this nurse to cover draining left arm abscess with dressing. Pt again instructed not to leave his room without dressing.

## 2021-03-16 NOTE — H&P
[facility] History and Physical        Chief Complaint:  \"I'm in pain 10/10-- you gotta give me 30mg norco etc.. \"    History of Present Illness   The patient is a 30 y.o. male  Patient has left AMA 4X now in past few days  And hasn't completed I and D and antecubital abscess. +schizophrenia or psych hx.    + history of heroin abuse presents with left brachial abscess/cellulitis. Patient was seen at [facility] 3/11/2021 ultrasound of left antecubital area concern for infected hematoma/abscess. Patient received Ancef at that time. Plan was to admit, but patient left AMA. Patient returned to [address] [place] [address] ED in the evening of 3/11/2021 with temp of 100.1. Leukocytosis 12.8. CRP 7.2. Patient was given vancomycin/Zosyn/Dilaudid/Toradol/Percocet in ED course. General surgery was consulted. Patient was admitted 7:08 PM.  1 hour following admission patient refusing assessment, telemetry monitor, and vital signs per nursing staff. Patient became belligerent. Was given tramadol for possible opioid withdrawal.  Was educated on reason for admission and concern for possible I&D of his abscess. Patient insisted on leaving. Patient left AMA 11:26 PM.      3/12/2021 patient returns to [address] [place] [address] ED. Leukocytosis noted 12.4. Patient refusing IVF hydration and initiation of IV per ED note. Patient sitting up on end of stretcher. Vancomycin infusing. Patient states he is having pain and needs pain medicine. Nursing at bedside. Patient admits to tobacco use, alcohol use, and drug use. States he uses heroin/cocaine and has recently tried methamphetamines. Left arm swelling with erythema at the [facility]. Patient states ED physician was \"going to write him a prescription for antibiotics and allow him to leave\"  This isn't so. He was advised to be admitted.     During this last admission-- over past couple days then just left hours ago   Noted admission from 3/12:  Hospital Course:     He frequently refused a.m. patient is refusing blood draw, is refusing IV catheter reinsertion and was also refusing surgical intervention, Patient refused the writer to examine his wounds. Per RN: Pulled about IV catheter, was threatening to leave AMA. \"    Heroin /cocaine /methamphetamine abuse - counseled for cessation. Monitor.    \"I'm in pain 10/10-- you gotta give me 30mg norco etc.. \"  Noted pupils dilated, uncertain if opiod withdrawals or cocaine/methamphetamine use  Tobacco use - counseled for cessation . Nicotine patch     - hx taken from the patient and med records  REVIEW OF SYSTEMS:  no fevers, chills, cp, sob, n/v, ha, vision/hearing changes, wt changes, hot/cold flashes, other open skin lesions, diarrhea, constipation, dysuria/hematuria unless noted in HPI. Complete ROS performed with the patient and is otherwise negative. Past Medical History:      Diagnosis Date    Hepatitis C virus carrier state [facility])        Past Surgical History:    No past surgical history on file. Home Medications:  Prior to Admission medications    Medication Sig Start Date End Date Taking? Authorizing Provider   levoFLOXacin (LEVAQUIN) 750 MG tablet Take 1 tablet by mouth daily for 10 days 3/16/21 3/26/21  Avelino Tinajero MD   doxycycline hyclate (VIBRAMYCIN) 100 MG capsule Take 1 capsule by mouth every 12 hours for 10 days 3/15/21 3/25/21  Avelino Tinajero MD   divalproex (DEPAKOTE) 250 MG DR tablet Take 1 tablet by mouth every 12 hours 3/5/21 3/6/74  LATASHA Gandhi CNP   Multiple Vitamins-Minerals (THERAPEUTIC MULTIVITAMIN-MINERALS) tablet Take 1 tablet by mouth daily 3/5/21 3/5/20  LATASHA Grubbs - CNP       Allergies:  Haloperidol and related    Social History:   TOBACCO:   reports that he has quit smoking. His smoking use included cigarettes. He started smoking about 17 years ago. He has a 15.00 pack-year smoking history.  He has never used smokeless tobacco.  ETOH:   reports current alcohol use of about 3.0 standard drinks of No results found for: PROTIME, INR  U/A:   Lab Results   Component Value Date    LEUKOCYTESUR Negative 02/03/2021    PHUR 6.0 02/03/2021    WBCUA NONE 12/24/2020    RBCUA NONE 12/24/2020    BACTERIA NONE SEEN 12/24/2020    SPECGRAV <=1.005 02/03/2021    BLOODU Negative 02/03/2021    GLUCOSEU Negative 02/03/2021     ABG:  No results found for: PHART, PYT1NMR, PO2ART, O4TIWLDT, FXC8TZC, BEART  TSH:    Lab Results   Component Value Date    TSH 8.920 02/02/2021     Cardiac Enzymes:   Lab Results   Component Value Date    TROPONINI <0.01 12/24/2020       Radiology: No results found. Assessment & Plan   ACTIVE hospital problems being addressed/reassessed for this admission:  Principal Problem:    Abscess of antecubital fossa  Active Problems:    Polysubstance abuse (HCC)    Schizoaffective disorder, bipolar type ([address]    Left arm cellulitis    Noncompliance by refusing service    Abscess  Resolved Problems:    * No resolved hospital problems. *    Code status/DVT prophylaxis and PLAN --see orders   Note extensive time spent coordinating care between ER docs, ER and floor nurses, and transitioning care over to day providers  Plan of care/ clinical impressions/communication specifics detailed below:  30 y.o. male  Patient has left AMA 4X now in past few days  And hasn't completed I and D and antecubital abscess. +schizophrenia or psych hx.    + history of heroin abuse presents with left brachial abscess/cellulitis. Patient was seen at [facility] 3/11/2021 ultrasound of left antecubital area concern for infected hematoma/abscess. Patient received Ancef at that time. Plan was to admit, but patient left AMA. Patient returned to [address] [place] [address] ED in the evening of 3/11/2021 with temp of 100.1. Leukocytosis 12.8. CRP 7.2. Patient was given vancomycin/Zosyn/Dilaudid/Toradol/Percocet in ED course. General surgery was consulted.   Patient was admitted 7:08 PM.  1 hour following admission patient refusing assessment, telemetry monitor, and vital signs per nursing staff. Patient became belligerent. Was given tramadol for possible opioid withdrawal.  Was educated on reason for admission and concern for possible I&D of his abscess. Patient insisted on leaving. Patient left AMA 11:26 PM.      3/12/2021 patient returns to [facility] ED. Leukocytosis noted 12.4. Patient refusing IVF hydration and initiation of IV per ED note. Patient sitting up on end of stretcher. Vancomycin infusing. Patient states he is having pain and needs pain medicine. Nursing at bedside. Patient admits to tobacco use, alcohol use, and drug use. States he uses heroin/cocaine and has recently tried methamphetamines. Left arm swelling with erythema at the [facility]. Patient states ED physician was \"going to write him a prescription for antibiotics and allow him to leave\"  This isn't so. He was advised to be admitted. During this last admission-- over past couple days then just left hours ago   Noted admission from 3/12:  Hospital Course:     He frequently refused treatments:  \"3/14 -He was scheduled for I&D today but he refused and procedure was canceled. Surg sign off  3/15- Patient had concerns about his artery being close to incision site, on discussion with surgery consented for the procedure scheduled for tomorrow. Had a detailed discussion with the patient about risk of going in sepsis and importance of IV antibiotics. But patient left AMA since his IV pain medicines were changed to p.o his Percocet was not giving him the kick he was getting from his IV Dilaudid. \"    Now he returned to ER again tonight  They are again requesting we admit tonight again   past the parking lot when he decided change his mind come back to the emergency department get it readmitted so he could go back to the OR tomorrow.   He rates the pain in his arm is 10 out of 10 in severity, worsened with movement and palpation     Given IV pain meds in ER --   Ongoing cellulitis  Left antecubital abscess/cellulitis with hx of IVDU-  Noted ultrasound--with possible abscess/hematoma, has elevated white count.  He is a IV drug user.          Initially Received Ancef  at [facility]  Then  Vancomycin plus Zosyn   As per ID switched to Linezolid plus cefepime. Then later switched to doxy and levaquin      Reportedly -seen by psychiatry. He apparently was supposed to be on antipsychotics- may potentially interact with Linezolid so I will go ahead and change this over to oral Doxycycline. I will also change Cefepime over to Levofloxacin. He will need another 10 days of antibiotics-- per ID        On last admissiond general surgery consulted. Seen today on  3/15-- then he again left AMA      Per gen surg note on 3/15:  \"Plan  I&D tomorrow. Recommended surgical intervention on abscess, will also need some debridement and washout of the area. \"     will reconsult them    Per IM note when admitted and seen earlier today:  \"Pt is asking for IV pain medications, his pain medications were switched to p.o. this a.m. patient is refusing blood draw, is refusing IV catheter reinsertion and was also refusing surgical intervention, Patient refused the writer to examine his wounds. Per RN: Pulled about IV catheter, was threatening to leave AMA. \"    Heroin /cocaine /methamphetamine abuse - counseled for cessation. Monitor.    \"I'm in pain 10/10-- you gotta give me 30mg norco etc.. \"  Noted pupils dilated, uncertain if opiod withdrawals or cocaine/methamphetamine use  Tobacco use - counseled for cessation . Nicotine patch       Left antecubital abscess/cellulitis with hx of IVDU-ultrasound reviewed with possible abscess/hematoma, has elevated white count.  He is a IV drug user.  Received Ancef  at [facility] .  Vancomycin plus Zosyn given in ER. ID and general surgery consulted. As per ID he is on Linezolid plus cefepime. He is scheduled for I&D tomorrow.    For now resume levaquin and doxy     Heroin /cocaine /methamphetamine abuse - counseled for cessation. Monitor.     Tobacco use - counseled for cessation . Nicotine patch       Ongoing pain issues:  3/11 - L shoulder xray - No radiographically visible acute skeletal pathology. 3/11 - L ankle xray - No definite radiographically visible acute skeletal pathology. There is nonspecific soft tissue swelling in the lateral malleolus region. This could represent contusion and hematoma but could also reflect underlying ligamentous and or tendinous injury   3/11 - US LUE - Within the visualized vessels there is no evidence for deep venous Thrombosis.  Abscess versus hematoma in the left antecubital fossa      I have offered 2 doses of fentanyl IV but he doesn't want IV access  10mg norco-- but he wants 30mg as he stated above  Been to bedside to address his behavioural issues  He had \"code brown\" called  He left floor to \"got to ER\"  Then again threatenign  -- to leave AMA - although on schedule for surgery  8 am    [facility] MD   Night Officer, overnight admitting doctor at [facility] call day time doctor   for questions after 7:30am    Covering for [address] [employer]  If Qs please call [phone]  Electronically signed by Adela Valderrama MD on 3/15/2021 at 8:26 PM

## 2021-03-17 ENCOUNTER — HOSPITAL ENCOUNTER (EMERGENCY)
Age: 26
Discharge: HOME | End: 2021-03-17
Payer: COMMERCIAL

## 2021-03-17 VITALS
TEMPERATURE: 98 F | DIASTOLIC BLOOD PRESSURE: 73 MMHG | RESPIRATION RATE: 16 BRPM | HEART RATE: 97 BPM | SYSTOLIC BLOOD PRESSURE: 125 MMHG | OXYGEN SATURATION: 97 %

## 2021-03-17 DIAGNOSIS — Z00.8 MEDICAL CLEARANCE FOR INCARCERATION: Primary | ICD-10-CM

## 2021-03-17 LAB
BLOOD CULTURE, ROUTINE: NORMAL
CULTURE, BLOOD 2: NORMAL

## 2021-03-17 PROCEDURE — 99284 EMERGENCY DEPT VISIT MOD MDM: CPT

## 2021-03-17 RX ORDER — DOXYCYCLINE HYCLATE 100 MG
100 TABLET ORAL 2 TIMES DAILY
Qty: 20 TABLET | Refills: 0 | Status: SHIPPED | OUTPATIENT
Start: 2021-03-17 | End: 2021-03-27

## 2021-03-17 RX ORDER — ACETAMINOPHEN 500 MG
1000 TABLET ORAL EVERY 6 HOURS PRN
Qty: 120 TABLET | Refills: 0 | Status: ON HOLD | OUTPATIENT
Start: 2021-03-17 | End: 2021-08-12 | Stop reason: HOSPADM

## 2021-03-17 NOTE — ED PROVIDER NOTES
[address]  Department of Emergency Medicine   ED  Encounter Note  Admit Date/RoomTime: 3/17/2021  2:05 PM  ED Room: SAY/SAY    NAME: Lor Rodas  : 1995  MRN: [id number]     Chief Complaint:  Abscess (was here a couple days ago for abscess on L AC and was told he needed surgery but he left AMA, refused by retirement today)    History of Present Illness        Lor Rodas is a 30 y.o. old male who presents to the emergency department by private vehicle, for still present tender area on  Left upper arm, which occured several week(s) prior to arrival.  Since onset the symptoms have been stable, associated with spontaneous drainage and mild-moderate in severity. He has a history of Cutaneous abscess likely secondary to IV drug abuse. He was just admitted to [facility] and was supposed to have a surgical incision and drainage of this area however eloped or signed out  [address] He was subsequently arrested today and taken to retirement and refused for the retirement for concerns of the wound. He states that he does not want to be here does not want any treatment and  refuses evaluation. ROS   Pertinent positives and negatives are stated within HPI, all other systems reviewed and are negative. Past Medical History:  has a past medical history of Hepatitis C virus carrier state ([facility] [address] Surgical History:  has no past surgical history on file. Social History:  reports that he has quit smoking. His smoking use included cigarettes. He started smoking about 17 years ago. He has a 15.00 pack-year smoking history. He has never used smokeless tobacco. He reports current alcohol use of about 3.0 standard drinks of alcohol per week. He reports current drug use. Drug: Cocaine. Family History: Family history is unknown by patient.      Allergies: Haloperidol and related    Physical Exam   Oxygen Saturation Interpretation: Normal.        ED Triage Vitals   BP Temp result of their choice. They are alert, oriented, and competent at this time. They state that they are aware of the serious risks as explained, but they continue to wish to leave against medical advice. In light of their decision to leave against medical advice, follow-up has been arranged and they are aware of the importance of following up as instructed. They have been advised that they should return to the ED immediately if they change their mind at any time, or if their condition begins to change or worsen. ------------------------------------------------------------------------------------------      Plan of Care/Counseling:  Myself reviewed today's visit with the patient in addition to providing specific details for the plan of care and counseling regarding the diagnosis and prognosis. Questions are answered at this time and are agreeable with the plan. Assessment      1. Medical clearance for incarceration      Plan   Left Against Medical Advice  Patient condition is good    New Medications     Discharge Medication List as of 3/17/2021  3:31 PM      START taking these medications    Details   doxycycline hyclate (VIBRA-TABS) 100 MG tablet Take 1 tablet by mouth 2 times daily for 10 days, Disp-20 tablet, R-0Print      acetaminophen (TYLENOL) 500 MG tablet Take 2 tablets by mouth every 6 hours as needed for Pain Maximum dose- 8 tablets/24 hours. , Disp-120 tablet, R-0Print           Electronically signed by LATASHA Anthony CNP   DD: 3/17/21  **This report was transcribed using voice recognition software. Every effort was made to ensure accuracy; however, inadvertent computerized transcription errors may be present.   END OF ED PROVIDER NOTE      LATASHA Mcpherson CNP  03/17/21 1722

## 2021-07-01 ENCOUNTER — APPOINTMENT (OUTPATIENT)
Dept: GENERAL RADIOLOGY | Age: 26
End: 2021-07-01
Payer: COMMERCIAL

## 2021-07-01 ENCOUNTER — HOSPITAL ENCOUNTER (EMERGENCY)
Age: 26
Discharge: LEFT BEFORE DISCHARGE | End: 2021-07-01
Payer: COMMERCIAL

## 2021-07-01 VITALS
DIASTOLIC BLOOD PRESSURE: 86 MMHG | BODY MASS INDEX: 22.73 KG/M2 | HEART RATE: 112 BPM | HEIGHT: 68 IN | SYSTOLIC BLOOD PRESSURE: 106 MMHG | OXYGEN SATURATION: 98 % | RESPIRATION RATE: 16 BRPM | WEIGHT: 150 LBS | TEMPERATURE: 97.7 F

## 2021-07-01 DIAGNOSIS — F19.10 POLYSUBSTANCE ABUSE (HCC): ICD-10-CM

## 2021-07-01 DIAGNOSIS — T23.252A PARTIAL THICKNESS BURN OF PALM OF LEFT HAND, INITIAL ENCOUNTER: Primary | ICD-10-CM

## 2021-07-01 LAB
ACETAMINOPHEN LEVEL: <5 MCG/ML (ref 10–30)
ALBUMIN SERPL-MCNC: 4.5 G/DL (ref 3.5–5.2)
ALP BLD-CCNC: 99 U/L (ref 40–129)
ALT SERPL-CCNC: 28 U/L (ref 0–40)
AMPHETAMINE SCREEN, URINE: POSITIVE
ANION GAP SERPL CALCULATED.3IONS-SCNC: 13 MMOL/L (ref 7–16)
AST SERPL-CCNC: 40 U/L (ref 0–39)
BARBITURATE SCREEN URINE: NOT DETECTED
BASOPHILS ABSOLUTE: 0.04 E9/L (ref 0–0.2)
BASOPHILS RELATIVE PERCENT: 0.6 % (ref 0–2)
BENZODIAZEPINE SCREEN, URINE: NOT DETECTED
BILIRUB SERPL-MCNC: 0.8 MG/DL (ref 0–1.2)
BILIRUBIN URINE: ABNORMAL
BLOOD, URINE: NEGATIVE
BUN BLDV-MCNC: 19 MG/DL (ref 6–20)
CALCIUM SERPL-MCNC: 9.4 MG/DL (ref 8.6–10.2)
CANNABINOID SCREEN URINE: POSITIVE
CHLORIDE BLD-SCNC: 101 MMOL/L (ref 98–107)
CLARITY: CLEAR
CO2: 25 MMOL/L (ref 22–29)
COCAINE METABOLITE SCREEN URINE: POSITIVE
COLOR: YELLOW
CREAT SERPL-MCNC: 1.1 MG/DL (ref 0.7–1.2)
EOSINOPHILS ABSOLUTE: 0.62 E9/L (ref 0.05–0.5)
EOSINOPHILS RELATIVE PERCENT: 8.9 % (ref 0–6)
ETHANOL: <10 MG/DL (ref 0–0.08)
FENTANYL SCREEN, URINE: POSITIVE
GFR AFRICAN AMERICAN: >60
GFR NON-AFRICAN AMERICAN: >60 ML/MIN/1.73
GLUCOSE BLD-MCNC: 104 MG/DL (ref 74–99)
GLUCOSE URINE: NEGATIVE MG/DL
HCT VFR BLD CALC: 43.8 % (ref 37–54)
HEMOGLOBIN: 15 G/DL (ref 12.5–16.5)
IMMATURE GRANULOCYTES #: 0.01 E9/L
IMMATURE GRANULOCYTES %: 0.1 % (ref 0–5)
KETONES, URINE: 40 MG/DL
LEUKOCYTE ESTERASE, URINE: NEGATIVE
LYMPHOCYTES ABSOLUTE: 1.98 E9/L (ref 1.5–4)
LYMPHOCYTES RELATIVE PERCENT: 28.5 % (ref 20–42)
Lab: ABNORMAL
MCH RBC QN AUTO: 29.7 PG (ref 26–35)
MCHC RBC AUTO-ENTMCNC: 34.2 % (ref 32–34.5)
MCV RBC AUTO: 86.7 FL (ref 80–99.9)
METHADONE SCREEN, URINE: NOT DETECTED
MONOCYTES ABSOLUTE: 0.78 E9/L (ref 0.1–0.95)
MONOCYTES RELATIVE PERCENT: 11.2 % (ref 2–12)
NEUTROPHILS ABSOLUTE: 3.52 E9/L (ref 1.8–7.3)
NEUTROPHILS RELATIVE PERCENT: 50.7 % (ref 43–80)
NITRITE, URINE: NEGATIVE
OPIATE SCREEN URINE: NOT DETECTED
OXYCODONE URINE: NOT DETECTED
PDW BLD-RTO: 13.1 FL (ref 11.5–15)
PH UA: 6 (ref 5–9)
PHENCYCLIDINE SCREEN URINE: NOT DETECTED
PLATELET # BLD: 265 E9/L (ref 130–450)
PMV BLD AUTO: 8.8 FL (ref 7–12)
POTASSIUM SERPL-SCNC: 4.3 MMOL/L (ref 3.5–5)
PROTEIN UA: NEGATIVE MG/DL
RBC # BLD: 5.05 E12/L (ref 3.8–5.8)
SALICYLATE, SERUM: <0.3 MG/DL (ref 0–30)
SODIUM BLD-SCNC: 139 MMOL/L (ref 132–146)
SPECIFIC GRAVITY UA: >=1.03 (ref 1–1.03)
TOTAL PROTEIN: 7.5 G/DL (ref 6.4–8.3)
TRICYCLIC ANTIDEPRESSANTS SCREEN SERUM: NEGATIVE NG/ML
UROBILINOGEN, URINE: 0.2 E.U./DL
WBC # BLD: 7 E9/L (ref 4.5–11.5)

## 2021-07-01 PROCEDURE — 85025 COMPLETE CBC W/AUTO DIFF WBC: CPT

## 2021-07-01 PROCEDURE — 80179 DRUG ASSAY SALICYLATE: CPT

## 2021-07-01 PROCEDURE — 6370000000 HC RX 637 (ALT 250 FOR IP): Performed by: NURSE PRACTITIONER

## 2021-07-01 PROCEDURE — 99284 EMERGENCY DEPT VISIT MOD MDM: CPT

## 2021-07-01 PROCEDURE — 80143 DRUG ASSAY ACETAMINOPHEN: CPT

## 2021-07-01 PROCEDURE — 80053 COMPREHEN METABOLIC PANEL: CPT

## 2021-07-01 PROCEDURE — 80307 DRUG TEST PRSMV CHEM ANLYZR: CPT

## 2021-07-01 PROCEDURE — 73120 X-RAY EXAM OF HAND: CPT

## 2021-07-01 PROCEDURE — 82077 ASSAY SPEC XCP UR&BREATH IA: CPT

## 2021-07-01 PROCEDURE — 81003 URINALYSIS AUTO W/O SCOPE: CPT

## 2021-07-01 RX ORDER — IBUPROFEN 800 MG/1
800 TABLET ORAL ONCE
Status: COMPLETED | OUTPATIENT
Start: 2021-07-01 | End: 2021-07-01

## 2021-07-01 RX ORDER — IBUPROFEN 800 MG/1
800 TABLET ORAL EVERY 8 HOURS PRN
Qty: 21 TABLET | Refills: 0 | Status: SHIPPED | OUTPATIENT
Start: 2021-07-01 | End: 2021-07-08

## 2021-07-01 RX ORDER — CEPHALEXIN 500 MG/1
500 CAPSULE ORAL ONCE
Status: COMPLETED | OUTPATIENT
Start: 2021-07-01 | End: 2021-07-01

## 2021-07-01 RX ORDER — DIAPER,BRIEF,INFANT-TODD,DISP
EACH MISCELLANEOUS ONCE
Status: COMPLETED | OUTPATIENT
Start: 2021-07-01 | End: 2021-07-01

## 2021-07-01 RX ORDER — CEPHALEXIN 500 MG/1
500 CAPSULE ORAL 4 TIMES DAILY
Qty: 40 CAPSULE | Refills: 0 | Status: ON HOLD | OUTPATIENT
Start: 2021-07-01 | End: 2022-04-05 | Stop reason: HOSPADM

## 2021-07-01 RX ADMIN — IBUPROFEN 800 MG: 800 TABLET, FILM COATED ORAL at 21:42

## 2021-07-01 RX ADMIN — CEPHALEXIN 500 MG: 500 CAPSULE ORAL at 21:42

## 2021-07-01 RX ADMIN — BACITRACIN ZINC: 500 OINTMENT TOPICAL at 21:41

## 2021-07-01 ASSESSMENT — PAIN SCALES - GENERAL: PAINLEVEL_OUTOF10: 10
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
## 2021-07-02 NOTE — ED NOTES
Patient refused to sign ama \"If you guys ain't doing anything I'm leaving\" previously spoke with Debbi George NP patient's comments about leaving. Reviewed prescriptions with patient \"man, fuck you guys\" patient requested blanket and pillow to take with him \"since I'm fucking homeless\" explained he is unable to take hospital property.        Lee Sunshine RN  07/01/21 9900

## 2021-07-03 ENCOUNTER — HOSPITAL ENCOUNTER (EMERGENCY)
Age: 26
Discharge: HOME | End: 2021-07-03
Attending: EMERGENCY MEDICINE
Payer: COMMERCIAL

## 2021-07-03 VITALS
SYSTOLIC BLOOD PRESSURE: 124 MMHG | TEMPERATURE: 97.6 F | RESPIRATION RATE: 14 BRPM | OXYGEN SATURATION: 100 % | BODY MASS INDEX: 22.73 KG/M2 | HEIGHT: 68 IN | WEIGHT: 150 LBS | HEART RATE: 74 BPM | DIASTOLIC BLOOD PRESSURE: 71 MMHG

## 2021-07-03 DIAGNOSIS — T23.292A PARTIAL THICKNESS BURN OF MULTIPLE SITES OF LEFT HAND, INITIAL ENCOUNTER: Primary | ICD-10-CM

## 2021-07-03 PROCEDURE — 99284 EMERGENCY DEPT VISIT MOD MDM: CPT

## 2021-07-03 PROCEDURE — 6370000000 HC RX 637 (ALT 250 FOR IP): Performed by: EMERGENCY MEDICINE

## 2021-07-03 RX ORDER — ACETAMINOPHEN 500 MG
1000 TABLET ORAL ONCE
Status: COMPLETED | OUTPATIENT
Start: 2021-07-03 | End: 2021-07-03

## 2021-07-03 RX ADMIN — ACETAMINOPHEN 1000 MG: 500 TABLET ORAL at 05:02

## 2021-07-03 ASSESSMENT — PAIN SCALES - GENERAL
PAINLEVEL_OUTOF10: 10
PAINLEVEL_OUTOF10: 7

## 2021-07-03 ASSESSMENT — PAIN DESCRIPTION - LOCATION: LOCATION: FOOT

## 2021-07-03 ASSESSMENT — PAIN DESCRIPTION - FREQUENCY: FREQUENCY: CONTINUOUS

## 2021-07-03 ASSESSMENT — PAIN DESCRIPTION - ORIENTATION: ORIENTATION: RIGHT;LEFT

## 2021-07-03 ASSESSMENT — PAIN DESCRIPTION - DESCRIPTORS: DESCRIPTORS: THROBBING

## 2021-07-03 ASSESSMENT — PAIN DESCRIPTION - PAIN TYPE: TYPE: ACUTE PAIN

## 2021-07-03 NOTE — ED NOTES
Bed: 17  Expected date:   Expected time:   Means of arrival:   Comments:  EMS     Bertha Chase, KAELYN  07/03/21 0028
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
## 2021-07-07 ENCOUNTER — HOSPITAL ENCOUNTER (EMERGENCY)
Age: 26
Discharge: HOME | End: 2021-07-07
Attending: EMERGENCY MEDICINE
Payer: COMMERCIAL

## 2021-07-07 VITALS
HEIGHT: 69 IN | WEIGHT: 140 LBS | SYSTOLIC BLOOD PRESSURE: 132 MMHG | TEMPERATURE: 97.3 F | DIASTOLIC BLOOD PRESSURE: 78 MMHG | RESPIRATION RATE: 18 BRPM | HEART RATE: 68 BPM | OXYGEN SATURATION: 97 % | BODY MASS INDEX: 20.73 KG/M2

## 2021-07-07 DIAGNOSIS — T30.0 BURN: Primary | ICD-10-CM

## 2021-07-07 PROCEDURE — 99285 EMERGENCY DEPT VISIT HI MDM: CPT

## 2021-07-07 PROCEDURE — 6370000000 HC RX 637 (ALT 250 FOR IP): Performed by: EMERGENCY MEDICINE

## 2021-07-07 RX ORDER — GINSENG 100 MG
CAPSULE ORAL ONCE
Status: COMPLETED | OUTPATIENT
Start: 2021-07-07 | End: 2021-07-07

## 2021-07-07 RX ORDER — ACETAMINOPHEN 325 MG/1
650 TABLET ORAL ONCE
Status: COMPLETED | OUTPATIENT
Start: 2021-07-07 | End: 2021-07-07

## 2021-07-07 RX ORDER — BACITRACIN ZINC AND POLYMYXIN B SULFATE 500; 1000 [USP'U]/G; [USP'U]/G
OINTMENT TOPICAL
Qty: 1 TUBE | Refills: 1 | Status: SHIPPED | OUTPATIENT
Start: 2021-07-07 | End: 2021-07-14

## 2021-07-07 RX ADMIN — ACETAMINOPHEN 650 MG: 325 TABLET ORAL at 18:37

## 2021-07-07 RX ADMIN — BACITRACIN 28 G: 500 OINTMENT TOPICAL at 18:37

## 2021-07-07 ASSESSMENT — PAIN SCALES - GENERAL
PAINLEVEL_OUTOF10: 7
PAINLEVEL_OUTOF10: 8

## 2021-07-07 ASSESSMENT — PAIN DESCRIPTION - DESCRIPTORS: DESCRIPTORS: THROBBING

## 2021-07-07 ASSESSMENT — PAIN - FUNCTIONAL ASSESSMENT: PAIN_FUNCTIONAL_ASSESSMENT: 0-10

## 2021-07-07 NOTE — CARE COORDINATION
Social Work/Transition of Care:    Pt is a 27year old male who presented to the emergency room secondary to Medical Issues. Worker was consulted to further assess after pt requesting information on outpatient resources. Pt reports he was dropped off in [place] by his  and returned to [place] today, pt reports he needs to follow up with [employer]. SW provided information on housing and community resources. Pt provided with a lunch box.     Electronically signed by Saint Chant on 0/3/2401 at 6:13 PM

## 2021-07-08 ENCOUNTER — HOSPITAL ENCOUNTER (EMERGENCY)
Age: 26
Discharge: LEFT BEFORE DISCHARGE | End: 2021-07-09
Attending: EMERGENCY MEDICINE
Payer: COMMERCIAL

## 2021-07-08 VITALS
RESPIRATION RATE: 18 BRPM | HEIGHT: 69 IN | OXYGEN SATURATION: 98 % | DIASTOLIC BLOOD PRESSURE: 78 MMHG | BODY MASS INDEX: 20.73 KG/M2 | SYSTOLIC BLOOD PRESSURE: 137 MMHG | HEART RATE: 86 BPM | WEIGHT: 140 LBS | TEMPERATURE: 97.2 F

## 2021-07-08 DIAGNOSIS — T40.1X1A ACCIDENTAL OVERDOSE OF HEROIN, INITIAL ENCOUNTER (HCC): Primary | ICD-10-CM

## 2021-07-08 LAB
ANION GAP SERPL CALCULATED.3IONS-SCNC: 11 MMOL/L (ref 7–16)
BUN BLDV-MCNC: 16 MG/DL (ref 6–20)
CALCIUM SERPL-MCNC: 9.1 MG/DL (ref 8.6–10.2)
CHLORIDE BLD-SCNC: 100 MMOL/L (ref 98–107)
CO2: 23 MMOL/L (ref 22–29)
CREAT SERPL-MCNC: 1.2 MG/DL (ref 0.7–1.2)
GFR AFRICAN AMERICAN: >60
GFR NON-AFRICAN AMERICAN: >60 ML/MIN/1.73
GLUCOSE BLD-MCNC: 62 MG/DL (ref 74–99)
POTASSIUM SERPL-SCNC: 4.1 MMOL/L (ref 3.5–5)
SODIUM BLD-SCNC: 134 MMOL/L (ref 132–146)

## 2021-07-08 PROCEDURE — 80179 DRUG ASSAY SALICYLATE: CPT

## 2021-07-08 PROCEDURE — 82550 ASSAY OF CK (CPK): CPT

## 2021-07-08 PROCEDURE — 80307 DRUG TEST PRSMV CHEM ANLYZR: CPT

## 2021-07-08 PROCEDURE — 36415 COLL VENOUS BLD VENIPUNCTURE: CPT

## 2021-07-08 PROCEDURE — 85025 COMPLETE CBC W/AUTO DIFF WBC: CPT

## 2021-07-08 PROCEDURE — 6370000000 HC RX 637 (ALT 250 FOR IP): Performed by: EMERGENCY MEDICINE

## 2021-07-08 PROCEDURE — 80143 DRUG ASSAY ACETAMINOPHEN: CPT

## 2021-07-08 PROCEDURE — 80048 BASIC METABOLIC PNL TOTAL CA: CPT

## 2021-07-08 PROCEDURE — 99284 EMERGENCY DEPT VISIT MOD MDM: CPT

## 2021-07-08 PROCEDURE — 82077 ASSAY SPEC XCP UR&BREATH IA: CPT

## 2021-07-08 RX ORDER — ACETAMINOPHEN 500 MG
1000 TABLET ORAL ONCE
Status: COMPLETED | OUTPATIENT
Start: 2021-07-08 | End: 2021-07-08

## 2021-07-08 RX ADMIN — ACETAMINOPHEN 1000 MG: 500 TABLET ORAL at 23:24

## 2021-07-08 ASSESSMENT — PAIN DESCRIPTION - DESCRIPTORS: DESCRIPTORS: BURNING

## 2021-07-08 ASSESSMENT — PAIN SCALES - GENERAL
PAINLEVEL_OUTOF10: 7
PAINLEVEL_OUTOF10: 7

## 2021-07-08 ASSESSMENT — PAIN DESCRIPTION - LOCATION: LOCATION: HAND

## 2021-07-08 ASSESSMENT — PAIN DESCRIPTION - ORIENTATION: ORIENTATION: RIGHT

## 2021-07-09 ENCOUNTER — HOSPITAL ENCOUNTER (EMERGENCY)
Age: 26
Discharge: NURSING, REHAB OR LONG-TERM CARE | End: 2021-07-09
Attending: EMERGENCY MEDICINE
Payer: COMMERCIAL

## 2021-07-09 VITALS
DIASTOLIC BLOOD PRESSURE: 59 MMHG | WEIGHT: 140 LBS | HEART RATE: 77 BPM | BODY MASS INDEX: 21.22 KG/M2 | OXYGEN SATURATION: 95 % | HEIGHT: 68 IN | TEMPERATURE: 97.7 F | SYSTOLIC BLOOD PRESSURE: 118 MMHG | RESPIRATION RATE: 18 BRPM

## 2021-07-09 DIAGNOSIS — F19.10 POLYSUBSTANCE ABUSE (HCC): Primary | ICD-10-CM

## 2021-07-09 LAB
ACETAMINOPHEN LEVEL: <5 MCG/ML (ref 10–30)
BASOPHILS ABSOLUTE: 0.03 E9/L (ref 0–0.2)
BASOPHILS RELATIVE PERCENT: 0.2 % (ref 0–2)
EOSINOPHILS ABSOLUTE: 0.07 E9/L (ref 0.05–0.5)
EOSINOPHILS RELATIVE PERCENT: 0.5 % (ref 0–6)
ETHANOL: <10 MG/DL (ref 0–0.08)
HCT VFR BLD CALC: 41.8 % (ref 37–54)
HEMOGLOBIN: 13.6 G/DL (ref 12.5–16.5)
IMMATURE GRANULOCYTES #: 0.07 E9/L
IMMATURE GRANULOCYTES %: 0.5 % (ref 0–5)
LYMPHOCYTES ABSOLUTE: 1.82 E9/L (ref 1.5–4)
LYMPHOCYTES RELATIVE PERCENT: 13 % (ref 20–42)
MCH RBC QN AUTO: 29.4 PG (ref 26–35)
MCHC RBC AUTO-ENTMCNC: 32.5 % (ref 32–34.5)
MCV RBC AUTO: 90.3 FL (ref 80–99.9)
MONOCYTES ABSOLUTE: 1.08 E9/L (ref 0.1–0.95)
MONOCYTES RELATIVE PERCENT: 7.7 % (ref 2–12)
NEUTROPHILS ABSOLUTE: 10.89 E9/L (ref 1.8–7.3)
NEUTROPHILS RELATIVE PERCENT: 78.1 % (ref 43–80)
PDW BLD-RTO: 13.5 FL (ref 11.5–15)
PLATELET # BLD: 178 E9/L (ref 130–450)
PMV BLD AUTO: 9.2 FL (ref 7–12)
RBC # BLD: 4.63 E12/L (ref 3.8–5.8)
SALICYLATE, SERUM: <0.3 MG/DL (ref 0–30)
SARS-COV-2, NAAT: NOT DETECTED
TOTAL CK: 671 U/L (ref 20–200)
TRICYCLIC ANTIDEPRESSANTS SCREEN SERUM: NEGATIVE NG/ML
WBC # BLD: 14 E9/L (ref 4.5–11.5)

## 2021-07-09 PROCEDURE — 87635 SARS-COV-2 COVID-19 AMP PRB: CPT

## 2021-07-09 ASSESSMENT — PAIN SCALES - GENERAL: PAINLEVEL_OUTOF10: 0

## 2021-07-09 NOTE — ED NOTES
In room to go see patient and he is no longer in the room. The pt walked out of the room to the waiting room again where  followed with his discharge papers so can can wait for the taxi to take him to his facility.            Favio Jeffery RN  07/09/21 8465
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
## 2021-07-09 NOTE — CARE COORDINATION
CELESTINO  Doing well  RH +  S/P Anatomy scan nl  1 hr glucose (24-28wks)to do after 26 wks with hiv  RTC q4wks Social Work / Discharge Planner:     Social work contacted [employer] at [phone] and scheduled transportation with Tonia Cortes for a pickup time at 2:00 pm. Patient will be transported to [place] [address].  Electronically signed by LJ Alatorre on 7/9/21 at 12:24 PM EDT

## 2021-07-09 NOTE — ED NOTES
In room to assess patient and he is not in the room. Checked the department and outside the ER entrance and not able to find the patient.  Notified charge nurse of this     Maria E Weathers RN  07/09/21 7700

## 2021-07-09 NOTE — ED PROVIDER NOTES
Surgical History:  has no past surgical history on file. Social History:  reports that he has been smoking cigarettes. He started smoking about 17 years ago. He has a 15.00 pack-year smoking history. He has never used smokeless tobacco. He reports current alcohol use of about 3.0 standard drinks of alcohol per week. He reports current drug use. Drugs: Cocaine, IV, Methamphetamines, and Opiates . Family History: Family history is unknown by patient. The patients home medications have been reviewed.     Allergies: Haloperidol and related    -------------------------------------------------- RESULTS -------------------------------------------------  All laboratory and radiology results have been personally reviewed by myself   LABS:  Results for orders placed or performed during the hospital encounter of 07/08/21   Serum Drug Screen   Result Value Ref Range    Ethanol Lvl <10 mg/dL    Acetaminophen Level <5.0 (L) 10.0 - 84.1 mcg/mL    Salicylate, Serum <8.1 0.0 - 30.0 mg/dL    TCA Scrn NEGATIVE Cutoff:300 ng/mL   CBC Auto Differential   Result Value Ref Range    WBC 14.0 (H) 4.5 - 11.5 E9/L    RBC 4.63 3.80 - 5.80 E12/L    Hemoglobin 13.6 12.5 - 16.5 g/dL    Hematocrit 41.8 37.0 - 54.0 %    MCV 90.3 80.0 - 99.9 fL    MCH 29.4 26.0 - 35.0 pg    MCHC 32.5 32.0 - 34.5 %    RDW 13.5 11.5 - 15.0 fL    Platelets 723 771 - 083 E9/L    MPV 9.2 7.0 - 12.0 fL    Neutrophils % 78.1 43.0 - 80.0 %    Immature Granulocytes % 0.5 0.0 - 5.0 %    Lymphocytes % 13.0 (L) 20.0 - 42.0 %    Monocytes % 7.7 2.0 - 12.0 %    Eosinophils % 0.5 0.0 - 6.0 %    Basophils % 0.2 0.0 - 2.0 %    Neutrophils Absolute 10.89 (H) 1.80 - 7.30 E9/L    Immature Granulocytes # 0.07 E9/L    Lymphocytes Absolute 1.82 1.50 - 4.00 E9/L    Monocytes Absolute 1.08 (H) 0.10 - 0.95 E9/L    Eosinophils Absolute 0.07 0.05 - 0.50 E9/L    Basophils Absolute 0.03 0.00 - 0.20 S0/I   Basic Metabolic Panel   Result Value Ref Range    Sodium 134 132 - 146 mmol/L Potassium 4.1 3.5 - 5.0 mmol/L    Chloride 100 98 - 107 mmol/L    CO2 23 22 - 29 mmol/L    Anion Gap 11 7 - 16 mmol/L    Glucose 62 (L) 74 - 99 mg/dL    BUN 16 6 - 20 mg/dL    CREATININE 1.2 0.7 - 1.2 mg/dL    GFR Non-African American >60 >=60 mL/min/1.73    GFR African American >60     Calcium 9.1 8.6 - 10.2 mg/dL   CK   Result Value Ref Range    Total  (H) 20 - 200 U/L       RADIOLOGY:  Interpreted by Radiologist.  No orders to display             ------------------------- NURSING NOTES AND VITALS REVIEWED ---------------------------   The nursing notes within the ED encounter and vital signs as below have been reviewed. /78   Pulse 86   Temp 97.2 °F (36.2 °C) (Oral)   Resp 18   Ht 5' 9\" (1.753 m)   Wt 140 lb (63.5 kg)   SpO2 98%   BMI 20.67 kg/m²   Oxygen Saturation Interpretation: Normal      ---------------------------------------------------PHYSICAL EXAM--------------------------------------    Constitutional/General: Alert and oriented x3, well appearing, non toxic in NAD  Head: Normocephalic and atraumatic  Eyes: Pupils equal,  miotic  EOMI, conjunctiva normal, sclera non icteric  Mouth: Oropharynx clear, handling secretions, no trismus, no asymmetry of the posterior oropharynx or uvular edema  Neck: Supple, full ROM, non tender to palpation in the midline, no stridor, no crepitus, no meningeal signs  Respiratory: Lungs clear to auscultation bilaterally, no wheezes, rales, or rhonchi. Not in respiratory distress  Cardiovascular:  Regular rate. Regular rhythm. No murmurs, gallops, or rubs. 2+ distal pulses  Chest: No chest wall tenderness  GI:  Abdomen Soft, Non tender, Non distended. +BS. No organomegaly, no palpable masses,  No rebound, guarding, or rigidity. Musculoskeletal: Moves all extremities x 4. Warm and well perfused, no clubbing, cyanosis, or edema. Capillary refill <3 seconds.  Left hand- blisters to the palm of his hand and fifth digit are intact, no  warmth, no streaking, strong radial pulse, normal range of motion. No wounds to patient's feet, DP and PT pulses intact, warm and well-perfused. Integument: skin warm and dry. No rashes. Neurologic: GCS 15, no focal deficits, symmetric strength 5/5 in the upper and lower extremities bilaterally  Psychiatric: flat Affect, disheveled       ------------------------------ ED COURSE/MEDICAL DECISION MAKING----------------------  Medications   acetaminophen (TYLENOL) tablet 1,000 mg (1,000 mg Oral Given 21)       ED Course as of Jul 10 0125   Thu  Found unresponsive in parking lot. 2mg nasal, 1mg IV    [MF]      ED Course User Index  [MF] Maria L Valle DO         Medical Decision Makin-year-old male with a history of multisubstance abuse presents after heroin overdose. Patient reports he took more than usual because he want to get really high denies attempt at self-harm. Patient with blisters to hands from previous burn already evaluated for. Patient department for a couple hours prior to patient eloping in stable condition    Counseling: The emergency provider has spoken with the patient and discussed todays results, in addition to providing specific details for the plan of care and counseling regarding the diagnosis and prognosis. Questions are answered at this time and they are agreeable with the plan.      --------------------------------- IMPRESSION AND DISPOSITION ---------------------------------    IMPRESSION  1. Accidental overdose of [facility], [facility] ([facility]        DISPOSITION  Disposition: Eloped  Patient condition is stable        NOTE: This report was transcribed using voice recognition software.  Every effort was made to ensure accuracy; however, inadvertent computerized transcription errors may be present           Maria L Valle DO  07/10/21 5989

## 2021-07-09 NOTE — CARE COORDINATION
Peer Recovery Support Note    Name: Dewey Cary  Date: 7/9/2021    Chief Complaint   Patient presents with    Addiction Problem     Patient wants to be admitted for Detox       Peer Support met with patient.   [] Support and education provided  [] Resources provided   [] Treatment referral:   [] Other:   [] Patient declined peer recovery services     Referred By: YESY Keys    Notes:     Signed: Franklyn Arenas, [id number] [address], 7/9/2021

## 2021-07-09 NOTE — ED NOTES
In to assess patient and update vitals, pt is sleeping.  Will continue to monitor and get vitals once awake     Jm Giron RN  07/09/21 1497

## 2021-07-09 NOTE — ED NOTES
Pt will be going to [employer] at 1400, will go by [employer] per      Adriano Lock RN  07/09/21 7335

## 2021-07-09 NOTE — CARE COORDINATION
Peer Recovery Support Note    Name: Vickie Osorio  Date: 7/9/2021    Chief Complaint   Patient presents with    Addiction Problem     Patient wants to be admitted for Detox       Peer Support met with patient. [x] Support and education provided  [x] Resources provided   [x] Treatment referral: [facility]  [] Other:   [] Patient declined peer recovery services     Referred By: Eva Smith    Notes: Patient has admissions appointment at [place] [address] in [facility] [employer] [facility], today at 1430.     Signed: Noemy Mckeon, [address] [address], 7/9/2021

## 2021-07-09 NOTE — CARE COORDINATION
Social Work/Transition of Care:    SW waited with pt until [employer] arrived, pt remorseful for his previous behavior to the staff, pt is hopeful to detox and get his life back in order.     Electronically signed by Evan Monreal on 2/1/5602 at 2:12 PM

## 2021-07-09 NOTE — CARE COORDINATION
Social Work / Discharge Planning:    Social work made referral to [facility] at [phone]. Patient will be seen within the hour.  Electronically signed by LJ Rowland on 7/9/21 at 10:43 AM EDT
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
## 2021-07-09 NOTE — ED NOTES
Pt is in the hallway, asked him to go to his room. States he was in the bathroom. He is given a breakfast tray at this time and is eating in his room.  Denies wanting vital signs, just wants his food     Lida Man RN  07/09/21 0817
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
## 2021-07-09 NOTE — ED NOTES
Pt refused EKG despite repeated attempts by multiple staff members. Per pt, \"I just can't. It will give me a complex. \" Pt was informed that refusal to allow an EKG could impact his ability to be accepted at another facility if admission is pursued. Pt acknowledged this.       Evelyn Underwood RN  07/09/21 9704

## 2021-07-09 NOTE — ED NOTES
Pt in room with door closed most of the shift, intermittently looking out of window. Pt refused multiple requests for recheck of vital signs.      Yahir Vega RN  07/09/21 0616

## 2021-07-10 ASSESSMENT — ENCOUNTER SYMPTOMS
COUGH: 0
ABDOMINAL PAIN: 0
EYE PAIN: 0
SHORTNESS OF BREATH: 0
WHEEZING: 0
SORE THROAT: 0
NAUSEA: 0
EYE DISCHARGE: 0
EYE REDNESS: 0
BACK PAIN: 0
DIARRHEA: 0
SINUS PRESSURE: 0
VOMITING: 0

## 2021-07-10 NOTE — ED PROVIDER NOTES
HPI:  7/9/21,   Time: 9:07 PM EDT       Jyothi Lovett is a 27 y.o. male presenting to the ED for rehab placement, beginning 1 day ago. The complaint has been persistent, moderate in severity, and worsened by nothing. The patient was seen earlier in the night by another provider for accidental overdose. The patient returned to the emergency department tonight because he would like rehab placement. Denies any SI HI or hallucinations. No chest pain shortness of breath. No nausea vomiting diarrhea. No abdominal pain. Review of Systems:   Pertinent positives and negatives are stated within HPI, all other systems reviewed and are negative.          --------------------------------------------- PAST HISTORY ---------------------------------------------  Past Medical History:  has a past medical history of Hepatitis C virus carrier state ([facility] [address] Past Surgical History:  has no past surgical history on file. Social History:  reports that he has been smoking cigarettes. He started smoking about 17 years ago. He has a 15.00 pack-year smoking history. He has never used smokeless tobacco. He reports current alcohol use of about 3.0 standard drinks of alcohol per week. He reports current drug use. Drugs: Cocaine, IV, Methamphetamines, and Opiates . Family History: Family history is unknown by patient. The patients home medications have been reviewed.     Allergies: Haloperidol and related        ---------------------------------------------------PHYSICAL EXAM--------------------------------------    Constitutional/General: Alert and oriented x3, well appearing, non toxic in NAD  Head: Normocephalic and atraumatic  Eyes: PERRL, EOMI, conjunctive normal, sclera non icteric  Mouth: Oropharynx clear, handling secretions, no trismus, no asymmetry of the posterior oropharynx or uvular edema  Neck: Supple, full ROM, non tender to palpation in the midline, no stridor, no crepitus, no meningeal signs  Respiratory: negative. Vital signs are stable. Patient medically cleared.  consulted. Disposition pending  evaluation. Patient currently denies any SI HI or hallucinations. I, Dr. Lizz Montes De Oca, am the primary provider for this encounter    This patient's ED course included: a personal history and physicial examination and re-evaluation prior to disposition    This patient has remained hemodynamically stable during their ED course. Re-Evaluations:             Re-evaluation. Patients symptoms show no change      Counseling: The emergency provider has spoken with the patient and discussed todays results, in addition to providing specific details for the plan of care and counseling regarding the diagnosis and prognosis. Questions are answered at this time and they are agreeable with the plan.       --------------------------------- IMPRESSION AND DISPOSITION ---------------------------------    IMPRESSION  1. Polysubstance abuse ([facility] [address]        DISPOSITION  Disposition: Per   Patient condition is stable    NOTE: This report was transcribed using voice recognition software.  Every effort was made to ensure accuracy; however, inadvertent computerized transcription errors may be present        Jess Campo DO  07/09/21 7055
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
## 2021-07-13 ENCOUNTER — HOSPITAL ENCOUNTER (EMERGENCY)
Age: 26
Discharge: OTHER INSTITUTION | End: 2021-07-14
Attending: EMERGENCY MEDICINE
Payer: COMMERCIAL

## 2021-07-13 VITALS
HEART RATE: 103 BPM | HEIGHT: 68 IN | SYSTOLIC BLOOD PRESSURE: 131 MMHG | WEIGHT: 150 LBS | BODY MASS INDEX: 22.73 KG/M2 | TEMPERATURE: 98.8 F | RESPIRATION RATE: 18 BRPM | DIASTOLIC BLOOD PRESSURE: 83 MMHG | OXYGEN SATURATION: 100 %

## 2021-07-13 DIAGNOSIS — T40.1X1A ACCIDENTAL OVERDOSE OF HEROIN, INITIAL ENCOUNTER (HCC): Primary | ICD-10-CM

## 2021-07-13 PROCEDURE — 99284 EMERGENCY DEPT VISIT MOD MDM: CPT

## 2021-07-14 NOTE — ED NOTES
Bed: 15  Expected date:   Expected time:   Means of arrival:   Comments:  EMS     Marisa Wu RN  07/13/21 2231

## 2021-07-14 NOTE — ED PROVIDER NOTES
HPI:  7/13/21,   Time: 11:52 PM EDT         Khushboo Mahmood is a 27 y.o. male presenting to the ED for heroin overdose and given Narcan with full recovery, beginning states he used about 2 hours  ago. The complaint has been constant, moderate in severity, and worsened by nothing. No shortness of breath or chest pain and patient admits also to using cocaine    ROS:   Pertinent positives and negatives are stated within HPI, all other systems reviewed and are negative.  --------------------------------------------- PAST HISTORY ---------------------------------------------  Past Medical History:  has a past medical history of Hepatitis C virus carrier state ([facility] [address] Past Surgical History:  has no past surgical history on file. Social History:  reports that he has been smoking cigarettes. He started smoking about 17 years ago. He has a 15.00 pack-year smoking history. He has never used smokeless tobacco. He reports current alcohol use of about 3.0 standard drinks of alcohol per week. He reports current drug use. Drugs: Cocaine, IV, Methamphetamines, and Opiates . Family History: Family history is unknown by patient. The patients home medications have been reviewed. Allergies: Haloperidol and related    -------------------------------------------------- RESULTS -------------------------------------------------  All laboratory and radiology results have been personally reviewed by myself   LABS:  No results found for this visit on 07/13/21. RADIOLOGY:  Interpreted by Radiologist.  No orders to display       ------------------------- NURSING NOTES AND VITALS REVIEWED ---------------------------   The nursing notes within the ED encounter and vital signs as below have been reviewed. There were no vitals taken for this visit.   Oxygen Saturation Interpretation: Normal      ---------------------------------------------------PHYSICAL EXAM--------------------------------------      Constitutional/General: Alert and oriented x3, well appearing, non toxic in NAD  Head: NC/AT  Eyes: PERRL, EOMI  Mouth: Oropharynx clear, handling secretions, no trismus  Neck: Supple, full ROM, no meningeal signs  Pulmonary: Lungs clear to auscultation bilaterally, no wheezes, rales, or rhonchi. Not in respiratory distress  Cardiovascular:  Regular rate and rhythm, no murmurs, gallops, or rubs. 2+ distal pulses  Abdomen: Soft, non tender, non distended,   Extremities: Moves all extremities x 4. Warm and well perfused  Skin: warm and dry without rash  Neurologic: GCS 15,  Psych: Normal Affect      ------------------------------ ED COURSE/MEDICAL DECISION MAKING----------------------  Medications - No data to display      Medical Decision Making:    Heroin overdose but patient is fully recovered with no difficulty breathing and completely alert therefore to be discharged to [facility]    Counseling: The emergency provider has spoken with the patient and discussed todays results, in addition to providing specific details for the plan of care and counseling regarding the diagnosis and prognosis. Questions are answered at this time and they are agreeable with the plan.      --------------------------------- IMPRESSION AND DISPOSITION ---------------------------------    IMPRESSION  1.  Accidental overdose of [facility], [facility] Stable       DISPOSITION  Disposition: Discharge to [facility]  Patient condition is stable                  Hortencia Vazquez MD  07/13/21 2045

## 2021-08-06 ENCOUNTER — HOSPITAL ENCOUNTER (INPATIENT)
Age: 26
LOS: 6 days | Discharge: NURSING, REHAB OR LONG-TERM CARE | DRG: 753 | End: 2021-08-12
Attending: EMERGENCY MEDICINE | Admitting: PSYCHIATRY & NEUROLOGY
Payer: COMMERCIAL

## 2021-08-06 DIAGNOSIS — F32.9 CURRENT EPISODE OF MAJOR DEPRESSIVE DISORDER WITHOUT PRIOR EPISODE, UNSPECIFIED DEPRESSION EPISODE SEVERITY: Primary | ICD-10-CM

## 2021-08-06 LAB
ACETAMINOPHEN LEVEL: <5 MCG/ML (ref 10–30)
ALBUMIN SERPL-MCNC: 3.5 G/DL (ref 3.5–5.2)
ALP BLD-CCNC: 109 U/L (ref 40–129)
ALT SERPL-CCNC: 25 U/L (ref 0–40)
AMPHETAMINE SCREEN, URINE: POSITIVE
ANION GAP SERPL CALCULATED.3IONS-SCNC: 9 MMOL/L (ref 7–16)
AST SERPL-CCNC: 34 U/L (ref 0–39)
BARBITURATE SCREEN URINE: NOT DETECTED
BASOPHILS ABSOLUTE: 0.03 E9/L (ref 0–0.2)
BASOPHILS RELATIVE PERCENT: 0.5 % (ref 0–2)
BENZODIAZEPINE SCREEN, URINE: NOT DETECTED
BILIRUB SERPL-MCNC: 0.3 MG/DL (ref 0–1.2)
BUN BLDV-MCNC: 14 MG/DL (ref 6–20)
CALCIUM SERPL-MCNC: 8.4 MG/DL (ref 8.6–10.2)
CANNABINOID SCREEN URINE: NOT DETECTED
CHLORIDE BLD-SCNC: 104 MMOL/L (ref 98–107)
CO2: 26 MMOL/L (ref 22–29)
COCAINE METABOLITE SCREEN URINE: POSITIVE
CREAT SERPL-MCNC: 1 MG/DL (ref 0.7–1.2)
EOSINOPHILS ABSOLUTE: 0.29 E9/L (ref 0.05–0.5)
EOSINOPHILS RELATIVE PERCENT: 5 % (ref 0–6)
ETHANOL: <10 MG/DL (ref 0–0.08)
FENTANYL SCREEN, URINE: POSITIVE
GFR AFRICAN AMERICAN: >60
GFR NON-AFRICAN AMERICAN: >60 ML/MIN/1.73
GLUCOSE BLD-MCNC: 133 MG/DL (ref 74–99)
HCT VFR BLD CALC: 39.4 % (ref 37–54)
HEMOGLOBIN: 12.8 G/DL (ref 12.5–16.5)
IMMATURE GRANULOCYTES #: 0.01 E9/L
IMMATURE GRANULOCYTES %: 0.2 % (ref 0–5)
INFLUENZA A: NOT DETECTED
INFLUENZA B: NOT DETECTED
LYMPHOCYTES ABSOLUTE: 1.21 E9/L (ref 1.5–4)
LYMPHOCYTES RELATIVE PERCENT: 20.9 % (ref 20–42)
Lab: ABNORMAL
MAGNESIUM: 2 MG/DL (ref 1.6–2.6)
MCH RBC QN AUTO: 29.1 PG (ref 26–35)
MCHC RBC AUTO-ENTMCNC: 32.5 % (ref 32–34.5)
MCV RBC AUTO: 89.5 FL (ref 80–99.9)
METHADONE SCREEN, URINE: NOT DETECTED
MONOCYTES ABSOLUTE: 0.73 E9/L (ref 0.1–0.95)
MONOCYTES RELATIVE PERCENT: 12.6 % (ref 2–12)
NEUTROPHILS ABSOLUTE: 3.51 E9/L (ref 1.8–7.3)
NEUTROPHILS RELATIVE PERCENT: 60.8 % (ref 43–80)
OPIATE SCREEN URINE: NOT DETECTED
OXYCODONE URINE: NOT DETECTED
PDW BLD-RTO: 12.9 FL (ref 11.5–15)
PHENCYCLIDINE SCREEN URINE: NOT DETECTED
PLATELET # BLD: 250 E9/L (ref 130–450)
PMV BLD AUTO: 8.5 FL (ref 7–12)
POTASSIUM REFLEX MAGNESIUM: 3.3 MMOL/L (ref 3.5–5)
RBC # BLD: 4.4 E12/L (ref 3.8–5.8)
SALICYLATE, SERUM: <0.3 MG/DL (ref 0–30)
SARS-COV-2 RNA, RT PCR: NOT DETECTED
SODIUM BLD-SCNC: 139 MMOL/L (ref 132–146)
TOTAL PROTEIN: 6.2 G/DL (ref 6.4–8.3)
TRICYCLIC ANTIDEPRESSANTS SCREEN SERUM: NEGATIVE NG/ML
WBC # BLD: 5.8 E9/L (ref 4.5–11.5)

## 2021-08-06 PROCEDURE — 1240000000 HC EMOTIONAL WELLNESS R&B

## 2021-08-06 PROCEDURE — 80053 COMPREHEN METABOLIC PANEL: CPT

## 2021-08-06 PROCEDURE — 82077 ASSAY SPEC XCP UR&BREATH IA: CPT

## 2021-08-06 PROCEDURE — 87636 SARSCOV2 & INF A&B AMP PRB: CPT

## 2021-08-06 PROCEDURE — 93005 ELECTROCARDIOGRAM TRACING: CPT | Performed by: STUDENT IN AN ORGANIZED HEALTH CARE EDUCATION/TRAINING PROGRAM

## 2021-08-06 PROCEDURE — 99283 EMERGENCY DEPT VISIT LOW MDM: CPT

## 2021-08-06 PROCEDURE — 80307 DRUG TEST PRSMV CHEM ANLYZR: CPT

## 2021-08-06 PROCEDURE — 6370000000 HC RX 637 (ALT 250 FOR IP): Performed by: STUDENT IN AN ORGANIZED HEALTH CARE EDUCATION/TRAINING PROGRAM

## 2021-08-06 PROCEDURE — 85025 COMPLETE CBC W/AUTO DIFF WBC: CPT

## 2021-08-06 PROCEDURE — 80179 DRUG ASSAY SALICYLATE: CPT

## 2021-08-06 PROCEDURE — 83735 ASSAY OF MAGNESIUM: CPT

## 2021-08-06 PROCEDURE — 80143 DRUG ASSAY ACETAMINOPHEN: CPT

## 2021-08-06 RX ORDER — NICOTINE 21 MG/24HR
1 PATCH, TRANSDERMAL 24 HOURS TRANSDERMAL DAILY
Status: DISCONTINUED | OUTPATIENT
Start: 2021-08-07 | End: 2021-08-12 | Stop reason: HOSPADM

## 2021-08-06 RX ORDER — DICYCLOMINE HYDROCHLORIDE 10 MG/1
10 CAPSULE ORAL 3 TIMES DAILY PRN
Status: DISCONTINUED | OUTPATIENT
Start: 2021-08-06 | End: 2021-08-12 | Stop reason: HOSPADM

## 2021-08-06 RX ORDER — OLANZAPINE 10 MG/1
5 INJECTION, POWDER, LYOPHILIZED, FOR SOLUTION INTRAMUSCULAR EVERY 6 HOURS PRN
Status: DISCONTINUED | OUTPATIENT
Start: 2021-08-06 | End: 2021-08-12 | Stop reason: HOSPADM

## 2021-08-06 RX ORDER — MAGNESIUM HYDROXIDE/ALUMINUM HYDROXICE/SIMETHICONE 120; 1200; 1200 MG/30ML; MG/30ML; MG/30ML
30 SUSPENSION ORAL PRN
Status: DISCONTINUED | OUTPATIENT
Start: 2021-08-06 | End: 2021-08-12 | Stop reason: HOSPADM

## 2021-08-06 RX ORDER — OLANZAPINE 5 MG/1
5 TABLET ORAL EVERY 6 HOURS PRN
Status: DISCONTINUED | OUTPATIENT
Start: 2021-08-06 | End: 2021-08-12 | Stop reason: HOSPADM

## 2021-08-06 RX ORDER — IBUPROFEN 600 MG/1
600 TABLET ORAL 3 TIMES DAILY PRN
Status: DISCONTINUED | OUTPATIENT
Start: 2021-08-06 | End: 2021-08-12 | Stop reason: HOSPADM

## 2021-08-06 RX ORDER — ACETAMINOPHEN 325 MG/1
650 TABLET ORAL ONCE
Status: COMPLETED | OUTPATIENT
Start: 2021-08-06 | End: 2021-08-06

## 2021-08-06 RX ORDER — HYDROXYZINE PAMOATE 50 MG/1
50 CAPSULE ORAL 3 TIMES DAILY PRN
Status: DISCONTINUED | OUTPATIENT
Start: 2021-08-06 | End: 2021-08-12 | Stop reason: HOSPADM

## 2021-08-06 RX ORDER — POTASSIUM CHLORIDE 20 MEQ/1
20 TABLET, EXTENDED RELEASE ORAL
Status: DISCONTINUED | OUTPATIENT
Start: 2021-08-06 | End: 2021-08-12 | Stop reason: HOSPADM

## 2021-08-06 RX ORDER — ACETAMINOPHEN 325 MG/1
650 TABLET ORAL EVERY 6 HOURS PRN
Status: DISCONTINUED | OUTPATIENT
Start: 2021-08-06 | End: 2021-08-12 | Stop reason: HOSPADM

## 2021-08-06 RX ORDER — CLONIDINE HYDROCHLORIDE 0.1 MG/1
0.1 TABLET ORAL EVERY 4 HOURS PRN
Status: DISCONTINUED | OUTPATIENT
Start: 2021-08-06 | End: 2021-08-12 | Stop reason: HOSPADM

## 2021-08-06 RX ADMIN — ACETAMINOPHEN 650 MG: 325 TABLET ORAL at 12:23

## 2021-08-06 ASSESSMENT — ENCOUNTER SYMPTOMS
SHORTNESS OF BREATH: 0
COLOR CHANGE: 0
RHINORRHEA: 0
COUGH: 0
NAUSEA: 0
SORE THROAT: 0
ABDOMINAL PAIN: 0
DIARRHEA: 0
VOMITING: 0

## 2021-08-06 ASSESSMENT — PAIN - FUNCTIONAL ASSESSMENT: PAIN_FUNCTIONAL_ASSESSMENT: PREVENTS OR INTERFERES SOME ACTIVE ACTIVITIES AND ADLS

## 2021-08-06 ASSESSMENT — PAIN DESCRIPTION - PROGRESSION: CLINICAL_PROGRESSION: NOT CHANGED

## 2021-08-06 ASSESSMENT — PAIN SCALES - GENERAL
PAINLEVEL_OUTOF10: 10
PAINLEVEL_OUTOF10: 10

## 2021-08-06 ASSESSMENT — PAIN DESCRIPTION - FREQUENCY: FREQUENCY: CONTINUOUS

## 2021-08-06 ASSESSMENT — PAIN DESCRIPTION - LOCATION
LOCATION: BACK;FOOT
LOCATION: OTHER (COMMENT)

## 2021-08-06 ASSESSMENT — PAIN DESCRIPTION - ONSET: ONSET: GRADUAL

## 2021-08-06 NOTE — ED NOTES
The pt was accepted to [facility] bed 2450. Disposition called to Andrew Arredondo in admitting.      [facility]  08/06/21 3027

## 2021-08-06 NOTE — ED NOTES
2 bags of patient belongings placed in locker #27     [id number] [facility]  08/06/21 0502
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
## 2021-08-06 NOTE — ED NOTES
Emergency Department [facility]FFIL[facility] OF [facility] Biopsychosocial Assessment Note    Chief Complaint:     Patient is a 27year old, male presenting to ED for increased suicidal ideation - no plan, vague homicidal ideation - \"thinking a bunch of crazy stuff,\" and depression. Patient reports multiple life stressors - currently on parole, no support system, substance abuse, and off medications for 1 month. Patient reports that he was released from long-term and in the beginning of July and has been off of his psychiatric medications since then. Patient reports that he has been using heroin daily, last use was yesterday. During the assessment, patient thought blocking, distractible, poor eye contact, disheveled, and malodorous. Patient hands and feet are visibly soiled. MSE: Patient is alert & oriented x 4. Patient mood is depressed, blunted affect. Patient thought process is delayed. Speech low. Patient denies A/V hallucinations - however this is questionable due to patient staring off, distractible, and requires questions to be repeated. Clinical Summary/History:     Patient has a mental health hx of schizoaffective disorder, not actively connected to an outpatient provider - was referred to [facility] in the past but noncompliant; and patient last psychiatric hospitalization was 3/1/21 for acute psychosis. Patient reports poor sleep, ok appetite. No feelings of hopelessness/helplessness. Patient denies any hx of suicide attempts or self injurious behaviors. Patient admits to hx of substance abuse - pt reports that he uses heroin daily. Patient unable to recollect the date of his last known treatment for drug/alcohol. Gender  [x] Male [] Female [] Transgender  [] Other    Sexual Orientation    [] Heterosexual [] Homosexual [] Bisexual [] Other    N/A    Suicidal Behavioral: CSSR-S Complete.   [x] Reports:    [x] Past [x] Present   [] Denies    Homicidal/ Violent Behavior  [x] Reports:   [] Past [x] Present  Vague  [] Denies Hallucinations/Delusions   [] Reports:   [x] Denies (Questionable internal stimulation)    Substance Use/Alcohol Use/Addiction: SBIRT Screen Complete. [x] Reports: Heroin  [] Denies     Trauma History  [] Reports:  [x] Denies     Collateral Information:       Level of Care/Disposition Plan  [] Home:   [] Outpatient Provider:   [] Crisis Unit:   [x] Inpatient Psychiatric Unit:  [] Other:      Patient to be pink slipped for safety. SW will pursue inpatient admission for safety/stabilization.        Carmine Oseguera, PATEL, LSW  08/06/21 1024

## 2021-08-06 NOTE — ED NOTES
Patient  at bedside at this time. Patient resting comfortably in bed at this time. Water was provided for patient.       Marily Hurt RN  08/06/21 0822

## 2021-08-07 PROBLEM — F31.64 BIPOLAR DISORDER, CURR EPISODE MIXED, SEVERE, WITH PSYCHOTIC FEATURES (HCC): Status: ACTIVE | Noted: 2021-08-07

## 2021-08-07 PROBLEM — F19.20 POLYSUBSTANCE DEPENDENCE INCLUDING OPIOID DRUG WITH DAILY USE (HCC): Status: ACTIVE | Noted: 2021-08-07

## 2021-08-07 PROBLEM — F11.20 POLYSUBSTANCE DEPENDENCE INCLUDING OPIOID DRUG WITH DAILY USE (HCC): Status: ACTIVE | Noted: 2021-08-07

## 2021-08-07 PROCEDURE — 90792 PSYCH DIAG EVAL W/MED SRVCS: CPT | Performed by: PSYCHIATRY & NEUROLOGY

## 2021-08-07 PROCEDURE — 1240000000 HC EMOTIONAL WELLNESS R&B

## 2021-08-07 PROCEDURE — 6370000000 HC RX 637 (ALT 250 FOR IP): Performed by: PSYCHIATRY & NEUROLOGY

## 2021-08-07 RX ORDER — DIAPER,BRIEF,INFANT-TODD,DISP
EACH MISCELLANEOUS 2 TIMES DAILY
Status: DISCONTINUED | OUTPATIENT
Start: 2021-08-08 | End: 2021-08-12 | Stop reason: HOSPADM

## 2021-08-07 RX ORDER — RISPERIDONE 0.5 MG/1
0.5 TABLET, ORALLY DISINTEGRATING ORAL 2 TIMES DAILY
Status: DISCONTINUED | OUTPATIENT
Start: 2021-08-07 | End: 2021-08-07

## 2021-08-07 RX ORDER — DIVALPROEX SODIUM 500 MG/1
500 TABLET, DELAYED RELEASE ORAL 2 TIMES DAILY
Status: DISCONTINUED | OUTPATIENT
Start: 2021-08-07 | End: 2021-08-12 | Stop reason: HOSPADM

## 2021-08-07 RX ORDER — OLANZAPINE 5 MG/1
5 TABLET ORAL 2 TIMES DAILY
Status: DISCONTINUED | OUTPATIENT
Start: 2021-08-07 | End: 2021-08-12 | Stop reason: HOSPADM

## 2021-08-07 RX ADMIN — ACETAMINOPHEN 650 MG: 325 TABLET ORAL at 19:22

## 2021-08-07 RX ADMIN — DIVALPROEX SODIUM 500 MG: 250 TABLET, DELAYED RELEASE ORAL at 20:08

## 2021-08-07 RX ADMIN — OLANZAPINE 5 MG: 5 TABLET, FILM COATED ORAL at 20:08

## 2021-08-07 RX ADMIN — CLONIDINE HYDROCHLORIDE 0.1 MG: 0.1 TABLET ORAL at 20:06

## 2021-08-07 RX ADMIN — IBUPROFEN 600 MG: 600 TABLET ORAL at 20:09

## 2021-08-07 ASSESSMENT — PAIN DESCRIPTION - LOCATION: LOCATION: HAND

## 2021-08-07 ASSESSMENT — SLEEP AND FATIGUE QUESTIONNAIRES
DO YOU HAVE DIFFICULTY SLEEPING: NO
DO YOU USE A SLEEP AID: NO
AVERAGE NUMBER OF SLEEP HOURS: 6

## 2021-08-07 ASSESSMENT — PAIN SCALES - GENERAL
PAINLEVEL_OUTOF10: 10

## 2021-08-07 ASSESSMENT — PAIN DESCRIPTION - PROGRESSION: CLINICAL_PROGRESSION: RAPIDLY WORSENING

## 2021-08-07 ASSESSMENT — LIFESTYLE VARIABLES: HISTORY_ALCOHOL_USE: YES

## 2021-08-07 ASSESSMENT — PAIN DESCRIPTION - DIRECTION: RADIATING_TOWARDS: ALL OVER

## 2021-08-07 ASSESSMENT — PAIN DESCRIPTION - PAIN TYPE: TYPE: ACUTE PAIN

## 2021-08-07 ASSESSMENT — PAIN DESCRIPTION - FREQUENCY: FREQUENCY: CONTINUOUS

## 2021-08-07 ASSESSMENT — PAIN DESCRIPTION - ONSET: ONSET: PROGRESSIVE

## 2021-08-07 ASSESSMENT — PAIN DESCRIPTION - ORIENTATION: ORIENTATION: RIGHT

## 2021-08-07 ASSESSMENT — PAIN - FUNCTIONAL ASSESSMENT: PAIN_FUNCTIONAL_ASSESSMENT: PREVENTS OR INTERFERES WITH MANY ACTIVE NOT PASSIVE ACTIVITIES

## 2021-08-07 NOTE — CARE COORDINATION
Biopsychosocial Assessment Note    Social work met with patient to complete the biopsychosocial assessment and CSSR-S. Mental Status Exam: Pt alert and oriented x 4. Pt was somewhat cooperative with this initial assessment. Pt was evasive at times, however. Pt's thought process was preoccupied, speech was low and mumbled. Affect was flat. Chief Complaint: \"Patient is a 27year old, male presenting to ED for increased suicidal ideation - no plan, vague homicidal ideation - \"thinking a bunch of crazy stuff,\" and depression. Patient reports multiple life stressors - currently on parole, no support system, substance abuse, and off medications for 1 month. Patient reports that he was released from correction and in the beginning of July and has been off of his psychiatric medications since then. Patient reports that he has been using heroin daily, last use was yesterday. \"    Patient Report: Pt's last admission to this psychiatric facility was 3/1/21. Pt states that he used heroin and cocaine yesterday, and wants rehab. Pt states that he is sick of using daily, and wants to get help. Pt requesting suboxone, but when asked where he receives it from, he replied \"I don't. I haven't taken it in a long time. I'm just going through withdrawal bad. \" Pt denies suicide attempt history. Pt currently denying SI/ HI/ Hallucinations/ delusions. Pt admits to heroin and cocaine use daily. Pt denied trauma history.     Gender  [x] Male [] Female [] Transgender  [] Other    Sexual Orientation    [x] Heterosexual [] Homosexual [] Bisexual [] Other    Suicidal Ideation  [x] Past [] Present [] Denies     Homicidal Ideation  [] Past [] Present [x] Denies     Hallucinations/Delusions (Specify type)  [] Reports [x] Denies     Substance Use/Alcohol Use/Addiction  [x] Reports [] Denies     Tobacco Use (within the last 6 months)  [x] Reports [] Denies     Trauma History  [] Reports [x] Denies     Collateral Contact (GABO signed)  Name: Relationship:  Number:     Collateral Information: Pt denied signing a GABO       Access to Weapons per Collateral Contact: [] Reports [x] Denies       Follow up provider preference: Would like rehab      Plan for discharge  Location (where do they plan on discharging to?): Rehab    Transportation (who will pick them up at discharge?) Unsure if he has adequate funds    Medications (will they have money for copays at discharge?): Unsure if he has adequate funds

## 2021-08-07 NOTE — PROGRESS NOTES
Patient is not cooperative and refuses to speak with staff, is irritable regarding not getting suboxone.

## 2021-08-07 NOTE — PROGRESS NOTES
[facility]  Initial Interdisciplinary Treatment Plan NOTE    Review Date & Time: 08/07/2021 1000hrs    Patient was not in treatment team    Admission Type:   Admission Type: Involuntary    Reason for admission:  Reason for Admission: Pt relapsed on  crack cocaine and heroin. Stated suicidal but states hes not. Pt denies HI and AVH. Pt states he is on probation and will probablly be going back to [facility].       Estimated Length of Stay Update:  08/11/2021  Estimated Discharge Date Update: 08/11/2021    EDUCATION:   Learner Progress Toward Treatment Goals: Reviewed results and recommendations of this team, Reviewed group plan and strategies and Reviewed signs, symptoms and risk of self harm and violent behavior    Method: Individual    Outcome: Verbalized understanding and Demonstrated Understanding    PATIENT GOALS: no goal    PLAN/TREATMENT RECOMMENDATIONS UPDATE: Offer and encourage groups and provide emotional support    GOALS UPDATE:   Time frame for Short-Term Goals: one week    Hoa Morris RN

## 2021-08-07 NOTE — PROGRESS NOTES
Pt refusing to take any meds despite his complaints of withdrawal and old order from [address] as well. Pt is irritable and states, \"I just want to sleep\".
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
## 2021-08-07 NOTE — H&P
Department of Psychiatry  History and Physical - Adult     CHIEF COMPLAINT: \"Bad thoughts\"    Patient was seen in person after discussing with the treatment team and reviewing the chart      HISTORY OF PRESENT ILLNESS:      The patient is a 27 y.o. male with significant past history of Hepatitis C, polysubstance dependence, antisocial personality disorder and bipolar disorder presented to the ER requesting detox from drugs. Patient says that he has been using heroin cocaine and amphetamines. He was positive for fentanyl cocaine and amphetamines. Blood level was negative. Patient reported that he was having bad thoughts of suicidal homicidal ideations no plan. He has been noncompliant with treatment and medications at home. He was released from senior living 1 month ago currently homeless. Currently on parole for theft and burglary. He was last admitted to [facility] in March 2021 and discharged on Depakote and Zyprexa at that time he has been noncompliant with all of his outpatient treatment since that time. He says he needs to get back on his medications because he feels \"terrible\". Patient has been using heroin every day. Says that he is tired of using drugs and needs help. Patient is constantly requesting Suboxone however he does not have an active prescription. Patient says \"I need to detox with my Suboxone\". He is disorganized irritable hostile angry. He is a very poor historian the laying in bed with sheets covering his head and only asking about Suboxone refusing to answer any other questions being asked. Patient is impulsive loud volatile and irritable. Below history is limited because patient is a poor historian at this time and will not give information.     PAST PSYCHIATRIC HISTORY:  History of bipolar disorder and at least 3 admissions in the past here once and then other places.  He was most recently discharged here from [facility] March 2021 He was getting treatment at present with Depakote and Zyprexa.   He is noncompliant with medications and outpatient treatment        Personal family and social history:   Clearer patient lives will not give any other information     Legal history  Very significant legal history currently on parole, went to prison for 5 years ago multiple parole violation in the record. He recently left [facility] about 1 month ago where he was there for theft and burglary.     SUBSTANCE ABUSE HISTORY:   Patient history of polysubstance abuse urine drug is positive for cocaine, vitamins, fentanyl on admission. Very small amount of period of sobriety in the past patient reports using heroin daily    Past Medical History:        Diagnosis Date    Hepatitis C virus carrier state ([facility] [address]        Medications Prior to Admission:   Medications Prior to Admission: cephALEXin (KEFLEX) 500 MG capsule, Take 1 capsule by mouth 4 times daily  ibuprofen (IBU) 800 MG tablet, Take 1 tablet by mouth every 8 hours as needed for Pain  acetaminophen (TYLENOL) 500 MG tablet, Take 2 tablets by mouth every 6 hours as needed for Pain Maximum dose- 8 tablets/24 hours. Past Surgical History:    History reviewed. No pertinent surgical history. Allergies:   Haloperidol and related    Family History  Family History   Family history unknown: Yes       EXAMINATION:    REVIEW OF SYSTEMS:    ROS:  [x] All negative/unchanged except if checked.  Explain positive(checked items) below:  [] Constitutional  [] Eyes  [] Ear/Nose/Mouth/Throat  [] Respiratory  [] CV  [] GI  []   [] Musculoskeletal  [] Skin/Breast  [] Neurological  [] Endocrine  [] Heme/Lymph  [] Allergic/Immunologic    Explanation:     Vitals:  BP (!) 169/86   Pulse 61   Temp 98.9 °F (37.2 °C)   Resp 18   Ht 5' 8\" (1.727 m)   Wt 140 lb (63.5 kg)   SpO2 99%   BMI 21.29 kg/m²       Cranial Nerves Examination:   CN II:   xPupils are reactive to light  Pupils are non reactive to light  CN III, IV, VI:  xNo eye deviation    No diplopia or ptosis   CN V: to the patient and was given the opportunity to ask any question. The patient was agreeable to the plan and all the patient's questions were answered to the patient's satisfaction. I discussed with the patient the risk, benefit, alternative and common side effects for the proposed medication treatment. The patient is consenting to this treatment. Collateral Information:  Will obtain collateral information from the family or friends. Will obtain medical records as appropriate from out patient providers  Will consult the hospitalist for a physical exam to rule out any co-morbid physical condition. Home medication Reconciled     Psychotherapy:   Encourage participation in milieu and group therapy  Individual therapy as needed    Start Depakote 500 mg twice daily  Start Zyprexa 5 mg twice daily      Patient is counseled if he continues to abuse drugs or alcohol he could act out impulsively causing serious harm to himself or others even though may be unintentional.  He demonstrated understanding of this and has the capacity understand thisPatient is counseled that his mental health treatment was difficult optimized with ongoing use of drugs or alcoholPatient is counseled that if he uses any amount of opiates after any amount of clean time he could have an unintentional overdose he demonstrated standing of this as the capacity understand this            [employer] Certification Upon Admission    I certify that this patient's inpatient psychiatric hospital admission is medically necessary for:    [x] (1) Treatment which could reasonably be expected to improve this patient's condition,       [x] (2) Or for diagnostic study;     AND     [x](2) The inpatient psychiatric services are provided while the individual is under the care of a physician and are included in the individualized plan of care.     Estimated length of stay/service 3-7 days based on stability    Plan for post-hospital care outpatient treatment, case management, substance abuse treatment         Electronically signed by Chivo August MD on 8/7/2021 at 11:55 AM
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
## 2021-08-08 LAB
EKG ATRIAL RATE: 52 BPM
EKG P AXIS: 49 DEGREES
EKG P-R INTERVAL: 132 MS
EKG Q-T INTERVAL: 446 MS
EKG QRS DURATION: 80 MS
EKG QTC CALCULATION (BAZETT): 414 MS
EKG R AXIS: 100 DEGREES
EKG T AXIS: -28 DEGREES
EKG VENTRICULAR RATE: 52 BPM

## 2021-08-08 PROCEDURE — 6370000000 HC RX 637 (ALT 250 FOR IP): Performed by: FAMILY MEDICINE

## 2021-08-08 PROCEDURE — 6370000000 HC RX 637 (ALT 250 FOR IP): Performed by: PSYCHIATRY & NEUROLOGY

## 2021-08-08 PROCEDURE — 6370000000 HC RX 637 (ALT 250 FOR IP): Performed by: STUDENT IN AN ORGANIZED HEALTH CARE EDUCATION/TRAINING PROGRAM

## 2021-08-08 PROCEDURE — 99232 SBSQ HOSP IP/OBS MODERATE 35: CPT | Performed by: NURSE PRACTITIONER

## 2021-08-08 PROCEDURE — 1240000000 HC EMOTIONAL WELLNESS R&B

## 2021-08-08 RX ADMIN — OLANZAPINE 5 MG: 5 TABLET, FILM COATED ORAL at 20:16

## 2021-08-08 RX ADMIN — IBUPROFEN 600 MG: 600 TABLET ORAL at 11:02

## 2021-08-08 RX ADMIN — DIVALPROEX SODIUM 500 MG: 250 TABLET, DELAYED RELEASE ORAL at 20:16

## 2021-08-08 RX ADMIN — POTASSIUM CHLORIDE 20 MEQ: 20 TABLET, EXTENDED RELEASE ORAL at 16:00

## 2021-08-08 RX ADMIN — IBUPROFEN 600 MG: 600 TABLET ORAL at 18:45

## 2021-08-08 RX ADMIN — COLLAGENASE SANTYL: 250 OINTMENT TOPICAL at 01:02

## 2021-08-08 RX ADMIN — HYDROXYZINE PAMOATE 50 MG: 50 CAPSULE ORAL at 18:45

## 2021-08-08 RX ADMIN — HYDROCORTISONE: 1 CREAM TOPICAL at 11:01

## 2021-08-08 RX ADMIN — COLLAGENASE SANTYL: 250 OINTMENT TOPICAL at 11:02

## 2021-08-08 ASSESSMENT — PAIN SCALES - GENERAL
PAINLEVEL_OUTOF10: 4
PAINLEVEL_OUTOF10: 9
PAINLEVEL_OUTOF10: 0
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
## 2021-08-09 PROCEDURE — 1240000000 HC EMOTIONAL WELLNESS R&B

## 2021-08-09 PROCEDURE — 6370000000 HC RX 637 (ALT 250 FOR IP): Performed by: STUDENT IN AN ORGANIZED HEALTH CARE EDUCATION/TRAINING PROGRAM

## 2021-08-09 PROCEDURE — 99231 SBSQ HOSP IP/OBS SF/LOW 25: CPT | Performed by: NURSE PRACTITIONER

## 2021-08-09 PROCEDURE — 6370000000 HC RX 637 (ALT 250 FOR IP): Performed by: PSYCHIATRY & NEUROLOGY

## 2021-08-09 RX ADMIN — POTASSIUM CHLORIDE 20 MEQ: 20 TABLET, EXTENDED RELEASE ORAL at 16:24

## 2021-08-09 RX ADMIN — DIVALPROEX SODIUM 500 MG: 250 TABLET, DELAYED RELEASE ORAL at 21:36

## 2021-08-09 RX ADMIN — OLANZAPINE 5 MG: 5 TABLET, FILM COATED ORAL at 21:36

## 2021-08-09 RX ADMIN — OLANZAPINE 5 MG: 5 TABLET, FILM COATED ORAL at 09:20

## 2021-08-09 RX ADMIN — POTASSIUM CHLORIDE 20 MEQ: 20 TABLET, EXTENDED RELEASE ORAL at 09:20

## 2021-08-09 RX ADMIN — DIVALPROEX SODIUM 500 MG: 250 TABLET, DELAYED RELEASE ORAL at 09:20

## 2021-08-09 ASSESSMENT — PAIN SCALES - GENERAL
PAINLEVEL_OUTOF10: 0
PAINLEVEL_OUTOF10: 0

## 2021-08-09 NOTE — CARE COORDINATION
[facility] faxed referral documentation to [facility]. Currently awaiting decision.

## 2021-08-09 NOTE — PROGRESS NOTES
Attended afternoon meet and greet. Updated on staffing and evening expectations. Participated in game stations activity.

## 2021-08-09 NOTE — PLAN OF CARE
[facility]  Day 3 Interdisciplinary Treatment Plan NOTE    Review Date & Time: 8/9/21 1000    Patient was not in treatment team. PT. DECLINED.     Estimated Length of Stay Update:   5 DAYS  Estimated Discharge Date Update:  TOMORROW    EDUCATION:   Learner Progress Toward Treatment Goals: Reviewed results and recommendations of this team    Method: Individual    Outcome: Needs reinforcement    PATIENT GOALS: \"GET MY HEAD STRAIGHT\"    PLAN/TREATMENT RECOMMENDATIONS UPDATE: DISCHARGE DISPOSITIONS ASSIST, GROUPS AS TOLERATED, WOUND CARE, SUPPORTIVE CARE, ASSESS SUICIDE RISK, DISCHARGE PLANNING AND FOLLOW UP DAYS    GOALS UPDATE:   Time frame for Short-Term Goals: 5 DAYS      Forrest Farley RN

## 2021-08-09 NOTE — PLAN OF CARE
Problem: Depressive Behavior With or Without Suicide Precautions:  Goal: Ability to disclose and discuss suicidal ideas will improve  Description: Ability to disclose and discuss suicidal ideas will improve  Outcome: Met This Shift  Pt. Denies suicidal ideations. Goal: Absence of self-harm  Description: Absence of self-harm  Outcome: Met This Shift  No self harm. Pt. declined to attend treatment team. Pt. avoidant, but is I control. Ate meal in room.
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
## 2021-08-09 NOTE — PLAN OF CARE
Problem: Depressive Behavior With or Without Suicide Precautions:  Goal: Ability to disclose and discuss suicidal ideas will improve  Description: Ability to disclose and discuss suicidal ideas will improve  8/9/2021 1601 by Antoinette Eng RN  Outcome: Met This Shift     Problem: Depressive Behavior With or Without Suicide Precautions:  Goal: Able to verbalize and/or display a decrease in depressive symptoms  Description: Able to verbalize and/or display a decrease in depressive symptoms  Outcome: Ongoing     Problem: Substance Abuse:  Goal: Absence of drug withdrawal signs and symptoms  Description: Absence of drug withdrawal signs and symptoms  Outcome: Ongoing   Pt is withdrawn to his room much of the time. He did just come out on the unit and walked around briefly then went back to his room. On approach he avoids eye contact and presents as restless and anxious. He denies any depression, harmful ideations and hallucinations. He states \"I just came in here to detox. I'm just jonesing. \"  pt is avoidant of interacting.

## 2021-08-10 PROCEDURE — 6370000000 HC RX 637 (ALT 250 FOR IP): Performed by: PSYCHIATRY & NEUROLOGY

## 2021-08-10 PROCEDURE — 1240000000 HC EMOTIONAL WELLNESS R&B

## 2021-08-10 PROCEDURE — 6370000000 HC RX 637 (ALT 250 FOR IP): Performed by: STUDENT IN AN ORGANIZED HEALTH CARE EDUCATION/TRAINING PROGRAM

## 2021-08-10 PROCEDURE — 99232 SBSQ HOSP IP/OBS MODERATE 35: CPT | Performed by: NURSE PRACTITIONER

## 2021-08-10 RX ADMIN — OLANZAPINE 5 MG: 5 TABLET, FILM COATED ORAL at 12:06

## 2021-08-10 RX ADMIN — DIVALPROEX SODIUM 500 MG: 250 TABLET, DELAYED RELEASE ORAL at 12:06

## 2021-08-10 RX ADMIN — POTASSIUM CHLORIDE 20 MEQ: 20 TABLET, EXTENDED RELEASE ORAL at 12:06

## 2021-08-10 RX ADMIN — POTASSIUM CHLORIDE 20 MEQ: 20 TABLET, EXTENDED RELEASE ORAL at 16:44

## 2021-08-10 NOTE — PROGRESS NOTES
BEHAVIORAL HEALTH FOLLOW-UP NOTE     8/10/2021     Patient was seen and examined in person, Chart reviewed   Patient's case discussed with staff/team    Chief Complaint: \" I am fine. \"  Interim History: Patient seen in his room. He offers very little conversation is very flat blunted. He isolates to his room does not attend groups or socialize with peers offers very little information but he does deny SI/HI intent or plan denies auditory visualizations states he would like to go the rescue mission on discharge. Appetite:   [x] Normal/Unchanged  [] Increased  [] Decreased      Sleep:       [x] Normal/Unchanged  [] Fair       [] Poor              Energy:    [x] Normal/Unchanged  [] Increased  [] Decreased        SI [] Present  [x] Absent    HI  []Present  [x] Absent     Aggression:  [] yes  [x] no    Patient is [x] able  [] unable to CONTRACT FOR SAFETY     PAST MEDICAL/PSYCHIATRIC HISTORY:   Past Medical History:   Diagnosis Date    Hepatitis C virus carrier state ([facility]        FAMILY/SOCIAL HISTORY:  Family History   Family history unknown: Yes     Social History     Socioeconomic History    Marital status: Single     Spouse name: Not on file    Number of children: 0    Years of education: 15    Highest education level: Not on file   Occupational History    Not on file   Tobacco Use    Smoking status: Current Every Day Smoker     Packs/day: 1.00     Years: 15.00     Pack years: 15.00     Types: Cigarettes     Start date: 1/9/2004    Smokeless tobacco: Never Used    Tobacco comment: states quit cannot tell me when   Vaping Use    Vaping Use: Never used   Substance and Sexual Activity    Alcohol use:  Yes     Alcohol/week: 3.0 standard drinks     Types: 3 Cans of beer per week     Comment:  tall boys and malt liquor/ qd states he drinks whatever he can    Drug use: Yes     Types: Cocaine, IV, Methamphetamines, Opiates     Sexual activity: Not Currently     Comment: unknown   Other Topics Concern    Not on file   Social History Narrative    Not on file     Social Determinants of Health     Financial Resource Strain:     Difficulty of Paying Living Expenses:    Food Insecurity:     Worried About Running Out of Food in the Last Year:     [address] in the Last Year:    Transportation Needs:     Lack of Transportation (Medical):  Lack of Transportation (Non-Medical):    Physical Activity:     Days of Exercise per Week:     Minutes of Exercise per Session:    Stress:     Feeling of Stress :    Social Connections:     Frequency of Communication with Friends and Family:     Frequency of Social Gatherings with Friends and Family:     Attends Sabianist Services:     Active Member of Clubs or Organizations:     Attends Club or Organization Meetings:     Marital Status:    Intimate Partner Violence:     Fear of Current or Ex-Partner:     Emotionally Abused:     Physically Abused:     Sexually Abused:            ROS:  [x] All negative/unchanged except if checked.  Explain positive(checked items) below:  [] Constitutional  [] Eyes  [] Ear/Nose/Mouth/Throat  [] Respiratory  [] CV  [] GI  []   [] Musculoskeletal  [] Skin/Breast  [] Neurological  [] Endocrine  [] Heme/Lymph  [] Allergic/Immunologic    Explanation:     MEDICATIONS:    Current Facility-Administered Medications:     white petrolatum ointment, , Topical, BID, Mi David APRN - CNP    divalproex (DEPAKOTE) DR tablet 500 mg, 500 mg, Oral, BID, Jim Easton MD, 500 mg at 08/10/21 1206    OLANZapine (ZYPREXA) tablet 5 mg, 5 mg, Oral, BID, Jim Easton MD, 5 mg at 08/10/21 1206    collagenase ointment, , Topical, BID, Estefani Beltran MD, Given at 08/08/21 1102    hydrocortisone 1 % cream, , Topical, BID, Estefani Beltran MD, Given at 08/08/21 1101    potassium chloride (KLOR-CON M) extended release tablet 20 mEq, 20 mEq, Oral, TID WC, Merrick Chi MD, 20 mEq at 08/10/21 1206    acetaminophen (TYLENOL) tablet 650 mg, 650 mg, Oral, Q6H PRN, Sharon Croft MD, 650 mg at 08/07/21 1922    magnesium hydroxide (MILK OF MAGNESIA) 400 MG/5ML suspension 30 mL, 30 mL, Oral, Daily PRN, Sharon Croft MD    aluminum & magnesium hydroxide-simethicone (MAALOX) 200-200-20 MG/5ML suspension 30 mL, 30 mL, Oral, PRN, Sharon Croft MD    hydrOXYzine (VISTARIL) capsule 50 mg, 50 mg, Oral, TID PRN, Sharon Croft MD, 50 mg at 08/08/21 1845    nicotine (NICODERM CQ) 21 MG/24HR 1 patch, 1 patch, Transdermal, Daily, Sharon Croft MD    OLANZapine (ZYPREXA) tablet 5 mg, 5 mg, Oral, Q6H PRN **OR** OLANZapine (ZYPREXA) injection 5 mg, 5 mg, Intramuscular, Q6H PRN, Sharon Croft MD    sterile water injection 2.1 mL, 2.1 mL, Intramuscular, Q4H PRN, Sharon Croft MD    cloNIDine (CATAPRES) tablet 0.1 mg, 0.1 mg, Oral, Q4H PRN, Sharon Croft MD, 0.1 mg at 08/07/21 2006    dicyclomine (BENTYL) capsule 10 mg, 10 mg, Oral, TID PRN, Sharon Croft MD    ibuprofen (ADVIL;MOTRIN) tablet 600 mg, 600 mg, Oral, TID PRN, Sharon Croft MD, 600 mg at 08/08/21 1845      Examination:  /69   Pulse 68   Temp 97.9 °F (36.6 °C) (Oral)   Resp 16   Ht 5' 8\" (1.727 m)   Wt 140 lb (63.5 kg)   SpO2 99%   BMI 21.29 kg/m²   Gait - steady  Medication side effects(SE): Denies    Mental Status Examination:    Level of consciousness:  within normal limits   Appearance:  fair grooming and fair hygiene  Behavior/Motor:  no abnormalities noted  Attitude toward examiner:  cooperative  Speech:  spontaneous, normal rate and normal volume   Mood: \" I am okay. \"  Affect:  irritable easily agitated  Thought processes:  linear   Thought content: Devoid of any auditory visualizations delusions or perceptual denies.   Denies SI/HI intent or plan  Cognition:  oriented to person, place, and time   Concentration intact  Insight poor   Judgement poor     ASSESSMENT:  Patient symptoms are:  [] Well controlled  [x] Improving  [] Worsening  [] No change      Diagnosis:   Active Problems:    MDD (major depressive disorder), single episode    Bipolar disorder, curr episode mixed, severe, with psychotic features ([facility]    Polysubstance dependence including opioid drug with daily use ([facility]  Resolved Problems:    * No resolved hospital problems. *      LABS:    No results for input(s): WBC, HGB, PLT in the last 72 hours. No results for input(s): NA, K, CL, CO2, BUN, CREATININE, GLUCOSE in the last 72 hours. No results for input(s): BILITOT, ALKPHOS, AST, ALT in the last 72 hours. Lab Results   Component Value Date    [address] POSITIVE 08/06/2021    BARBSCNU NOT DETECTED 08/06/2021    LABBENZ NOT DETECTED 08/06/2021    LABMETH NOT DETECTED 08/06/2021    OPIATESCREENURINE NOT DETECTED 08/06/2021    PHENCYCLIDINESCREENURINE NOT DETECTED 08/06/2021    ETOH <10 08/06/2021     Lab Results   Component Value Date    TSH 8.920 02/02/2021     No results found for: LITHIUM  Lab Results   Component Value Date    VALPROATE 3 (L) 01/09/2021         Treatment Plan:  Reviewed current Medications with the patient. Risks, benefits, side effects, drug-to-drug interactions and alternatives to treatment were discussed. Collateral information:   CD evaluation  Encourage patient to attend group and other milieu activities.   Discharge planning discussed with the patient and treatment team.      Continue Depakote 500 mg twice daily for mood stabilization   continue Zyprexa 5 mg twice daily for psychosis      PSYCHOTHERAPY/COUNSELING:  [x] Therapeutic interview  [x] Supportive  [] CBT  [] Ongoing  [] Other    [x] Patient continues to need, on a daily basis, active treatment furnished directly by or requiring the supervision of inpatient psychiatric personnel      Anticipated Length of stay: 3 to 7 days based on stability            Electronically signed by LATASHA Snyder CNP on 0/50/3408 at 3:18 PM

## 2021-08-10 NOTE — CARE COORDINATION
Spoke with Kingston Campbell who reports that he hasnt reviewed the referral.  He will review it and follow up with  regarding acceptance.    Electronically signed by Enedina Nageotte, [employer] on 8/10/2021 at 9:08 AM

## 2021-08-10 NOTE — PROGRESS NOTES
Wound care: Attempted to assess left hand/fingers; not receptive for assessment/anxious regarding discharge. From what I observed they are dried scabbed at this point. Unable to assess ankle. Patient states difficult to bend fingers and hand to dry. Recommend Aquaphor twice a day. Patient requesting hand to be wrapped.  Man Preist RN

## 2021-08-10 NOTE — PLAN OF CARE
Problem: Depressive Behavior With or Without Suicide Precautions:  Goal: Able to verbalize and/or display a decrease in depressive symptoms  Description: Able to verbalize and/or display a decrease in depressive symptoms  8/10/2021 1643 by Christiana Zurita RN  Outcome: Met This Shift     Problem: Depressive Behavior With or Without Suicide Precautions:  Goal: Ability to disclose and discuss suicidal ideas will improve  Description: Ability to disclose and discuss suicidal ideas will improve  8/10/2021 1643 by Christiana Zurita RN  Outcome: Met This Shift   Pt continues to remain withdrawn to his room and awake in bed. He continues to deny any depression and suicidal ideations and verbalizes that he is just withdrawing from drugs. Mood is somewhat irritable.

## 2021-08-10 NOTE — CARE COORDINATION
Referred to [facility], [facility].    Electronically signed by Shiva Loo, [facility] on 8/10/2021 at 2:29 PM

## 2021-08-11 VITALS
HEART RATE: 68 BPM | DIASTOLIC BLOOD PRESSURE: 69 MMHG | OXYGEN SATURATION: 99 % | SYSTOLIC BLOOD PRESSURE: 127 MMHG | TEMPERATURE: 97.9 F | RESPIRATION RATE: 16 BRPM | BODY MASS INDEX: 21.22 KG/M2 | WEIGHT: 140 LBS | HEIGHT: 68 IN

## 2021-08-11 PROCEDURE — 99232 SBSQ HOSP IP/OBS MODERATE 35: CPT | Performed by: NURSE PRACTITIONER

## 2021-08-11 PROCEDURE — 6370000000 HC RX 637 (ALT 250 FOR IP): Performed by: PSYCHIATRY & NEUROLOGY

## 2021-08-11 PROCEDURE — 1240000000 HC EMOTIONAL WELLNESS R&B

## 2021-08-11 PROCEDURE — 6370000000 HC RX 637 (ALT 250 FOR IP): Performed by: STUDENT IN AN ORGANIZED HEALTH CARE EDUCATION/TRAINING PROGRAM

## 2021-08-11 RX ADMIN — DIVALPROEX SODIUM 500 MG: 250 TABLET, DELAYED RELEASE ORAL at 10:33

## 2021-08-11 RX ADMIN — OLANZAPINE 5 MG: 5 TABLET, FILM COATED ORAL at 10:33

## 2021-08-11 RX ADMIN — ACETAMINOPHEN 650 MG: 325 TABLET ORAL at 18:05

## 2021-08-11 RX ADMIN — POTASSIUM CHLORIDE 20 MEQ: 20 TABLET, EXTENDED RELEASE ORAL at 10:33

## 2021-08-11 ASSESSMENT — PAIN SCALES - GENERAL
PAINLEVEL_OUTOF10: 0
PAINLEVEL_OUTOF10: 10

## 2021-08-11 NOTE — CARE COORDINATION
Pt reported to SW he does not plan to go to rehab at discharge. Pt plans to go to the rescue mission. SW to contact rescue mission. Pt has restriction at the [facility] and will not be accepted. SW contacted [facility]. No beds available. SW to call back tomorrow to determine availability. Pt is irritable and is discharged focused.

## 2021-08-11 NOTE — PROGRESS NOTES
Attended morning community meeting. Updated on staffing and daily expectations.    Shared goal for the day as to get 1 more hour of rest.

## 2021-08-11 NOTE — PROGRESS NOTES
BEHAVIORAL HEALTH FOLLOW-UP NOTE     8/11/2021     Patient was seen and examined in person, Chart reviewed   Patient's case discussed with staff/team    Chief Complaint: \" I have stuff I have to take care of\"      Interim History: Patient upon the unit states he milligrams acute inpatient rehab because he is \"stuffy has to take care of. He has to be discharged to the homeless shelter. However per  patient is not able to discharge to homeless shelter. She denies SI/HI intent or plan he denies auditory visual hallucinations. Is extremely discharge focused      Appetite:   [x] Normal/Unchanged  [] Increased  [] Decreased      Sleep:       [x] Normal/Unchanged  [] Fair       [] Poor              Energy:    [x] Normal/Unchanged  [] Increased  [] Decreased        SI [] Present  [x] Absent    HI  []Present  [x] Absent     Aggression:  [] yes  [x] no    Patient is [x] able  [] unable to CONTRACT FOR SAFETY     PAST MEDICAL/PSYCHIATRIC HISTORY:   Past Medical History:   Diagnosis Date    Hepatitis C virus carrier state ([facility]        FAMILY/SOCIAL HISTORY:  Family History   Family history unknown: Yes     Social History     Socioeconomic History    Marital status: Single     Spouse name: Not on file    Number of children: 0    Years of education: 15    Highest education level: Not on file   Occupational History    Not on file   Tobacco Use    Smoking status: Current Every Day Smoker     Packs/day: 1.00     Years: 15.00     Pack years: 15.00     Types: Cigarettes     Start date: 1/9/2004    Smokeless tobacco: Never Used    Tobacco comment: states quit cannot tell me when   Vaping Use    Vaping Use: Never used   Substance and Sexual Activity    Alcohol use:  Yes     Alcohol/week: 3.0 standard drinks     Types: 3 Cans of beer per week     Comment:  tall boys and malt liquor/ qd states he drinks whatever he can    Drug use: Yes     Types: Cocaine, IV, Methamphetamines, Opiates     Sexual activity: Not Currently     Comment: unknown   Other Topics Concern    Not on file   Social History Narrative    Not on file     Social Determinants of Health     Financial Resource Strain:     Difficulty of Paying Living Expenses:    Food Insecurity:     Worried About Running Out of Food in the Last Year:     [address] in the Last Year:    Transportation Needs:     Lack of Transportation (Medical):  Lack of Transportation (Non-Medical):    Physical Activity:     Days of Exercise per Week:     Minutes of Exercise per Session:    Stress:     Feeling of Stress :    Social Connections:     Frequency of Communication with Friends and Family:     Frequency of Social Gatherings with Friends and Family:     Attends Lutheran Services:     Active Member of Clubs or Organizations:     Attends Club or Organization Meetings:     Marital Status:    Intimate Partner Violence:     Fear of Current or Ex-Partner:     Emotionally Abused:     Physically Abused:     Sexually Abused:            ROS:  [x] All negative/unchanged except if checked.  Explain positive(checked items) below:  [] Constitutional  [] Eyes  [] Ear/Nose/Mouth/Throat  [] Respiratory  [] CV  [] GI  []   [] Musculoskeletal  [] Skin/Breast  [] Neurological  [] Endocrine  [] Heme/Lymph  [] Allergic/Immunologic    Explanation:     MEDICATIONS:    Current Facility-Administered Medications:     white petrolatum ointment, , Topical, BID, Mi David, APRN - CNP    divalproex (DEPAKOTE) DR tablet 500 mg, 500 mg, Oral, BID, Prtoby Willis MD, 500 mg at 08/10/21 1206    OLANZapine (ZYPREXA) tablet 5 mg, 5 mg, Oral, BID, Prtoby Willis MD, 5 mg at 08/10/21 1206    collagenase ointment, , Topical, BID, Claribel Alarcon MD, Given at 08/08/21 1102    hydrocortisone 1 % cream, , Topical, BID, Claribel Alarcon MD, Given at 08/08/21 1101    potassium chloride (KLOR-CON M) extended release tablet 20 mEq, 20 mEq, Oral, TID WC, Antonietta Chicas MD, 20 mEq at 08/10/21 1644    acetaminophen (TYLENOL) tablet 650 mg, 650 mg, Oral, Q6H PRN, Lauren Ledesma MD, 650 mg at 08/07/21 1922    magnesium hydroxide (MILK OF MAGNESIA) 400 MG/5ML suspension 30 mL, 30 mL, Oral, Daily PRN, Lauren Ledesma MD    aluminum & magnesium hydroxide-simethicone (MAALOX) 200-200-20 MG/5ML suspension 30 mL, 30 mL, Oral, PRN, Lauren Ledesma MD    hydrOXYzine (VISTARIL) capsule 50 mg, 50 mg, Oral, TID PRN, Lauren Ledesma MD, 50 mg at 08/08/21 1845    nicotine (NICODERM CQ) 21 MG/24HR 1 patch, 1 patch, Transdermal, Daily, Lauren Ledesma MD    OLANZapine (ZYPREXA) tablet 5 mg, 5 mg, Oral, Q6H PRN **OR** OLANZapine (ZYPREXA) injection 5 mg, 5 mg, Intramuscular, Q6H PRN, Lauren Ledesma MD    sterile water injection 2.1 mL, 2.1 mL, Intramuscular, Q4H PRN, Lauren Ledesma MD    cloNIDine (CATAPRES) tablet 0.1 mg, 0.1 mg, Oral, Q4H PRN, Lauren Ledesma MD, 0.1 mg at 08/07/21 2006    dicyclomine (BENTYL) capsule 10 mg, 10 mg, Oral, TID PRN, Lauren Ledesma MD    ibuprofen (ADVIL;MOTRIN) tablet 600 mg, 600 mg, Oral, TID PRN, Lauren Ledesma MD, 600 mg at 08/08/21 1845      Examination:  /69   Pulse 68   Temp 97.9 °F (36.6 °C) (Oral)   Resp 16   Ht 5' 8\" (1.727 m)   Wt 140 lb (63.5 kg)   SpO2 99%   BMI 21.29 kg/m²   Gait - steady  Medication side effects(SE): Denies    Mental Status Examination:    Level of consciousness:  within normal limits   Appearance:  fair grooming and fair hygiene  Behavior/Motor:  no abnormalities noted  Attitude toward examiner:  cooperative  Speech:  spontaneous, normal rate and normal volume   Mood: \" I am okay. \"  Affect:  irritable easily agitated  Thought processes:  linear   Thought content: Devoid of any auditory visualizations delusions or perceptual denies.   Denies SI/HI intent or plan  Cognition:  oriented to person, place, and time   Concentration intact  Insight poor   Judgement poor     ASSESSMENT:  Patient symptoms are:  [] Well controlled  [x] Improving  [] Worsening  [] No change      Diagnosis:   Active Problems:    MDD (major depressive disorder), single episode    Bipolar disorder, curr episode mixed, severe, with psychotic features ([facility]    Polysubstance dependence including opioid drug with daily use ([facility]  Resolved Problems:    * No resolved hospital problems. *      LABS:    No results for input(s): WBC, HGB, PLT in the last 72 hours. No results for input(s): NA, K, CL, CO2, BUN, CREATININE, GLUCOSE in the last 72 hours. No results for input(s): BILITOT, ALKPHOS, AST, ALT in the last 72 hours. Lab Results   Component Value Date    [address] POSITIVE 08/06/2021    BARBSCNU NOT DETECTED 08/06/2021    LABBENZ NOT DETECTED 08/06/2021    LABMETH NOT DETECTED 08/06/2021    OPIATESCREENURINE NOT DETECTED 08/06/2021    PHENCYCLIDINESCREENURINE NOT DETECTED 08/06/2021    ETOH <10 08/06/2021     Lab Results   Component Value Date    TSH 8.920 02/02/2021     No results found for: LITHIUM  Lab Results   Component Value Date    VALPROATE 3 (L) 01/09/2021         Treatment Plan:  Reviewed current Medications with the patient. Risks, benefits, side effects, drug-to-drug interactions and alternatives to treatment were discussed. Collateral information:   CD evaluation  Encourage patient to attend group and other milieu activities.   Discharge planning discussed with the patient and treatment team.      Continue Depakote 500 mg twice daily for mood stabilization   continue Zyprexa 5 mg twice daily for psychosis      PSYCHOTHERAPY/COUNSELING:  [x] Therapeutic interview  [x] Supportive  [] CBT  [] Ongoing  [] Other    [x] Patient continues to need, on a daily basis, active treatment furnished directly by or requiring the supervision of inpatient psychiatric personnel      Anticipated Length of stay: 3 to 7 days based on stability            Electronically signed by LATASHA Carrillo CNP on 1/28/0488 at 10:04 AM

## 2021-08-11 NOTE — PLAN OF CARE
Denies SI/HI   denies hallucinations  isolative to room  out for meals then returns to room  Cooperative with meds  Does not attend group    Will continue to monitor

## 2021-08-12 PROCEDURE — 99239 HOSP IP/OBS DSCHRG MGMT >30: CPT | Performed by: NURSE PRACTITIONER

## 2021-08-12 PROCEDURE — 6370000000 HC RX 637 (ALT 250 FOR IP): Performed by: PSYCHIATRY & NEUROLOGY

## 2021-08-12 RX ORDER — OLANZAPINE 5 MG/1
5 TABLET ORAL 2 TIMES DAILY
Qty: 60 TABLET | Refills: 0 | Status: ON HOLD | OUTPATIENT
Start: 2021-08-12 | End: 2022-04-05

## 2021-08-12 RX ORDER — DIVALPROEX SODIUM 500 MG/1
500 TABLET, DELAYED RELEASE ORAL 2 TIMES DAILY
Qty: 60 TABLET | Refills: 0 | Status: ON HOLD | OUTPATIENT
Start: 2021-08-12 | End: 2022-04-05

## 2021-08-12 RX ORDER — NICOTINE 21 MG/24HR
1 PATCH, TRANSDERMAL 24 HOURS TRANSDERMAL DAILY
Qty: 30 PATCH | Refills: 0
Start: 2021-08-12 | End: 2021-09-11

## 2021-08-12 RX ADMIN — IBUPROFEN 600 MG: 600 TABLET ORAL at 17:02

## 2021-08-12 RX ADMIN — HYDROXYZINE PAMOATE 50 MG: 50 CAPSULE ORAL at 11:37

## 2021-08-12 ASSESSMENT — PAIN SCALES - GENERAL
PAINLEVEL_OUTOF10: 0
PAINLEVEL_OUTOF10: 10

## 2021-08-12 NOTE — PROGRESS NOTES
[facility]  Discharge Note    Pt discharged with followings belongings:   Dentures: None  Vision - Corrective Lenses: None  Hearing Aid: None  Jewelry: None  Body Piercings Removed: N/A  Clothing: Footwear, Pants, Shirt (pr of mismatched shoes, 2 tshirts, and a pr of jeans)  Were All Patient Medications Collected?: Not Applicable  Other Valuables: Money (Comment), Other (Comment) (.75 cents and 2 lighters)   Valuables sent home with pt. Patient education on aftercare instructions. Information faxed to [facility] by Eitan Cancino. Patient verbalize understanding of AVS.    Status EXAM upon discharge:  Status and Exam  Normal: Yes (Resting in bed apparently asleep)  Facial Expression: Worried  Affect: Congruent  Level of Consciousness: Alert  Mood:Normal: No  Mood: Labile, Anxious  Motor Activity:Normal: No  Motor Activity: Increased, Agitated  Interview Behavior: Irritable, Uncooperative/Withdrawn  Preception: Lorraine to Person, Sweta Mention to Time, Lorraine to Place, Lorraine to Situation  Attention:Normal: No  Attention: Distractible  Thought Processes: Flt.of Ideas  Thought Content:Normal: Yes  Thought Content: Obsessions  Hallucinations: None  Delusions: No  Memory:Normal: Yes  Memory: Poor Recent  Insight and Judgment: No  Insight and Judgment: Poor Judgment  Present Suicidal Ideation: No  Present Homicidal Ideation: No      Metabolic Screening:    Lab Results   Component Value Date    LABA1C 5.2 01/09/2021       Lab Results   Component Value Date    CHOL 181 01/09/2021     Lab Results   Component Value Date    TRIG 362 (H) 01/09/2021     Lab Results   Component Value Date    HDL 55 01/09/2021     No components found for: [facility]  Lab Results   Component Value Date    LABVLDL 72 01/09/2021       Mindi Bonilla RN

## 2021-08-12 NOTE — CARE COORDINATION
SW attempted to contact [facility] [place] to check bed availability. No answer. SW to call back.

## 2021-08-12 NOTE — PROGRESS NOTES
Very irritable this evening. Offered prn Vistaril and refused. Anxious about court hearing and states he needs to be able to contact family and see them before the hearing so he has some kind of support in place. Refusing meds.

## 2021-08-12 NOTE — DISCHARGE SUMMARY
DISCHARGE SUMMARY      Patient ID:  Heath Zhao  [id number]  27 y.o.  1995    Admit date: 8/6/2021    Discharge date and time: 8/12/2021    Admitting Physician: Jessica Rosa MD     Discharge Physician: Dr Carrie Anthony MD    Discharge Diagnoses:   Patient Active Problem List   Diagnosis    Polysubstance abuse ([facility]    Schizoaffective disorder, bipolar type ([facility]    Antisocial personality disorder ([facility]    Major depression, single episode    Bipolar affective disorder, mixed, severe, with psychotic behavior ([facility]    Left arm cellulitis    Cellulitis    Noncompliance by refusing service    Abscess of antecubital fossa    IVDU (intravenous drug user)    Abscess    MDD (major depressive disorder), single episode    Bipolar disorder, curr episode mixed, severe, with psychotic features ([facility]    Polysubstance dependence including opioid drug with daily use ([facility]       Admission Condition: poor    Discharged Condition: stable    Admission Circumstance: Patient presented to the ED positive for fentanyl, cocaine and amphetamines reporting suicidal ideations and being homeless after recently being released from [facility]      PAST MEDICAL/PSYCHIATRIC HISTORY:   Past Medical History:   Diagnosis Date    Hepatitis C virus carrier state ([facility]        FAMILY/SOCIAL HISTORY:  Family History   Family history unknown: Yes     Social History     Socioeconomic History    Marital status: Single     Spouse name: Not on file    Number of children: 0    Years of education: 15    Highest education level: Not on file   Occupational History    Not on file   Tobacco Use    Smoking status: Current Every Day Smoker     Packs/day: 1.00     Years: 15.00     Pack years: 15.00     Types: Cigarettes     Start date: 1/9/2004    Smokeless tobacco: Never Used    Tobacco comment: states quit cannot tell me when   Vaping Use    Vaping Use: Never used   Substance and Sexual Activity    Alcohol use:  Yes     Alcohol/week: 3.0 standard drinks     Types: 3 Cans of beer per week     Comment:  tall boys and malt liquor/ qd states he drinks whatever he can    Drug use: Yes     Types: Cocaine, IV, Methamphetamines, Opiates     Sexual activity: Not Currently     Comment: unknown   Other Topics Concern    Not on file   Social History Narrative    Not on file     Social Determinants of Health     Financial Resource Strain:     Difficulty of Paying Living Expenses:    Food Insecurity:     Worried About Running Out of Food in the Last Year:     Ran Out of Food in the Last Year:    Transportation Needs:     Lack of Transportation (Medical):      Lack of Transportation (Non-Medical):    Physical Activity:     Days of Exercise per Week:     Minutes of Exercise per Session:    Stress:     Feeling of Stress :    Social Connections:     Frequency of Communication with Friends and Family:     Frequency of Social Gatherings with Friends and Family:     Attends Jain Services:     Active Member of Clubs or Organizations:     Attends Club or Organization Meetings:     Marital Status:    Intimate Partner Violence:     Fear of Current or Ex-Partner:     Emotionally Abused:     Physically Abused:     Sexually Abused:        MEDICATIONS:    Current Facility-Administered Medications:     white petrolatum ointment, , Topical, BID, Mi David APRN - CNP    divalproex (DEPAKOTE) DR tablet 500 mg, 500 mg, Oral, BID, Roosevelt Angel MD, 500 mg at 08/11/21 1033    OLANZapine (ZYPREXA) tablet 5 mg, 5 mg, Oral, BID, Roosevelt Angel MD, 5 mg at 08/11/21 1033    collagenase ointment, , Topical, BID, Fly Rocha MD, Given at 08/08/21 1102    hydrocortisone 1 % cream, , Topical, BID, Fly Rocha MD, Given at 08/08/21 1101    potassium chloride (KLOR-CON M) extended release tablet 20 mEq, 20 mEq, Oral, TID WC, Layton Ramirez MD, 20 mEq at 08/11/21 1033    acetaminophen (TYLENOL) tablet 650 mg, 650 mg, Oral, Q6H PRN, Kristi Manzo MD, 650 mg at 08/11/21 1805    magnesium hydroxide (MILK OF MAGNESIA) 400 MG/5ML suspension 30 mL, 30 mL, Oral, Daily PRN, Amaris White MD    aluminum & magnesium hydroxide-simethicone (MAALOX) 200-200-20 MG/5ML suspension 30 mL, 30 mL, Oral, PRN, Amaris White MD    hydrOXYzine (VISTARIL) capsule 50 mg, 50 mg, Oral, TID PRN, Amaris White MD, 50 mg at 08/08/21 1845    nicotine (NICODERM CQ) 21 MG/24HR 1 patch, 1 patch, Transdermal, Daily, Amaris White MD    OLANZapine (ZYPREXA) tablet 5 mg, 5 mg, Oral, Q6H PRN **OR** OLANZapine (ZYPREXA) injection 5 mg, 5 mg, Intramuscular, Q6H PRN, Amaris White MD    sterile water injection 2.1 mL, 2.1 mL, Intramuscular, Q4H PRN, Amaris White MD    cloNIDine (CATAPRES) tablet 0.1 mg, 0.1 mg, Oral, Q4H PRN, Amaris White MD, 0.1 mg at 08/07/21 2006    dicyclomine (BENTYL) capsule 10 mg, 10 mg, Oral, TID PRN, Amaris White MD    ibuprofen (ADVIL;MOTRIN) tablet 600 mg, 600 mg, Oral, TID PRN, Amaris White MD, 600 mg at 08/08/21 1845    Examination:  /69   Pulse 68   Temp 97.9 °F (36.6 °C) (Oral)   Resp 16   Ht 5' 8\" (1.727 m)   Wt 140 lb (63.5 kg)   SpO2 99%   BMI 21.29 kg/m²   Gait - steady    HOSPITAL COURSE[de-identified]     Patient was admitted to the unit on 8/6/2021 was closely monitored for suicidal ideations. He was evaluated was treated with Depakote 500 g twice daily for mood stabilization and Zyprexa 5 mg twice daily for mood. He start coming out of his room he is attending groups to socializing with peers. He never made any suicidal statements or any suicidal gestures while in the unit. Social workers obtain collateral information from patient's mother who was able to voicing concerns that she had. She reported no safety concerns no access to any guns. Patient report that he wanted to go to inpatient rehab and social work assisted patient in making a referral and he was accepted now [facility].  Treatment team felt the patient obtain the maximum benefit from his hospitalization he was set up with an outpatient mental health agency for outpatient follow-up services. At the time of discharge patient did not show any impulsive behavior. He was up on the unit he was attending groups and socializing with peers. He vehemently denied any suicidal homicidal ideations intent or plan. He was eating well and sleeping well there are no neurovegetative signs or symptoms of depression he denied any auditory or visual hallucinations. There are no overt or covert signs of psychosis. He was appreciative of the help that he received here. This patient no longer meets criteria for inpatient hospitalization. No AVH or paranoid thoughts  No hopeless or worthless feeling  No active SI/HI  Appetite:  [x] Normal  [] Increased  [] Decreased    Sleep:       [x] Normal  [] Fair       [] Poor            Energy:    [x] Normal  [] Increased  [] Decreased     SI [] Present  [x] Absent  HI  []Present  [x] Absent   Aggression:  [] yes  [x] no  Patient is [x] able  [] unable to CONTRACT FOR SAFETY   Medication side effects(SE):  [x] None(Psych. Meds.) [] Other      Mental Status Examination on discharge:    Level of consciousness:  within normal limits   Appearance:  well-appearing  Behavior/Motor:  no abnormalities noted  Attitude toward examiner:  attentive and good eye contact  Speech:  spontaneous, normal rate and normal volume   Mood: ' I am fine. \"  Affect: Appropriate pleasant  Thought processes: Linear without flight of ideas loose associations  Thought content: Devoid of any auditory visual loose [place] delusions or any other perceptual normalities.  Denies SI/HI intent or plan  Cognition:  oriented to person, place, and time   Concentration intact  Memory intact  Insight good   Judgement fair   Fund of Knowledge adequate      ASSESSMENT:  Patient symptoms are:  [x] Well controlled  [x] Improving  [] Worsening  [] No change    Reason for more than one antipsychotic:  [x] N/A  [] 3 Failed Monotherapy attempts (Drugs tried:)  [] Crossover to a new antipsychotic  [] Taper to Monotherapy from Polypharmacy  [] Augmentation of clozapine therapy due to treatment resistance to single therapy    Diagnosis:  Principal Problem:    Bipolar disorder, curr episode mixed, severe, with psychotic features ([facility]  Active Problems:    MDD (major depressive disorder), single episode    Polysubstance dependence including opioid drug with daily use ([facility]  Resolved Problems:    * No resolved hospital problems. *      LABS:    No results for input(s): WBC, HGB, PLT in the last 72 hours. No results for input(s): NA, K, CL, CO2, BUN, CREATININE, GLUCOSE in the last 72 hours. No results for input(s): BILITOT, ALKPHOS, AST, ALT in the last 72 hours. Lab Results   Component Value Date    [address] POSITIVE 08/06/2021    BARBSCNU NOT DETECTED 08/06/2021    LABBENZ NOT DETECTED 08/06/2021    LABMETH NOT DETECTED 08/06/2021    OPIATESCREENURINE NOT DETECTED 08/06/2021    PHENCYCLIDINESCREENURINE NOT DETECTED 08/06/2021    ETOH <10 08/06/2021     Lab Results   Component Value Date    TSH 8.920 02/02/2021     No results found for: LITHIUM  Lab Results   Component Value Date    VALPROATE 3 (L) 01/09/2021       RISK ASSESSMENT AT DISCHARGE: Low risk for suicide and homicide. Treatment Plan:  Reviewed current Medications with the patient. Education provided on the complaince with treatment. Risks, benefits, side effects, drug-to-drug interactions and alternatives to treatment were discussed. Encourage patient to attend outpatient follow up appointment and therapy. Patient was advised to call the outpatient provider, visit the nearest ED or call 911 if symptoms are not manageable. Patient's family member was contacted prior to the discharge.          Medication List      START taking these medications    divalproex 500 MG DR tablet  Commonly known as: DEPAKOTE  Take 1 tablet by mouth 2 times daily     nicotine 21 MG/24HR  Commonly known as: NICODERM CQ  Place 1 patch onto the skin daily     OLANZapine 5 MG tablet  Commonly known as: ZYPREXA  Take 1 tablet by mouth 2 times daily        CONTINUE taking these medications    ibuprofen 800 MG tablet  Commonly known as: IBU  Take 1 tablet by mouth every 8 hours as needed for Pain        STOP taking these medications    acetaminophen 500 MG tablet  Commonly known as: TYLENOL        ASK your doctor about these medications    cephALEXin 500 MG capsule  Commonly known as: Keflex  Take 1 capsule by mouth 4 times daily           Where to Get Your Medications      These medications were sent to [facility] [address] [address]    Phone: [phone]   · divalproex 500 MG DR tablet  · OLANZapine 5 MG tablet     Information about where to get these medications is not yet available    Ask your nurse or doctor about these medications  · nicotine 21 MG/24HR       Patient is counseled if he continues to abuse drugs or alcohol he could act out impulsively causing serious harm to himself or others even though may be unintentional.  He demonstrated understanding of this and has the capacity understand this    Patient is counseled hisr mental health treatment will be difficult to optimize with ongoing use of drugs or alcohol he demonstrate understanding of this as the past understand this     Patient is counseled that he he uses any amount of opiates after any clean time he could have an unintentional overdose he demonstrated understanding of this and has the capacity to understand this    Patient is counseled he must remain compliant with all medications outpatient follow-up ointments    Patient is discharged [facility] in stable condition    TIME SPEND - [facility] [address], [address], MEDICATION RECONCILIATION AND FOLLOW UP CARE     Signed:  Gleda Kanner, APRN - CNP  7/78/2488  8:55 AM

## 2021-08-12 NOTE — SUICIDE SAFETY PLAN
SAFETY PLAN    A suicide Safety Plan is a document that supports someone when they are having thoughts of suicide. Warning Signs that indicate a suicidal crisis may be developing: What (situations, thoughts, feelings, body sensations, behaviors, etc.) do you experience that lets you know you are beginning to think about suicide? 1. none  2.   3. Internal Coping Strategies:  What things can I do (relaxation techniques, hobbies, physical activities, etc.) to take my mind off my problems without contacting another person? 1.none   2.   3.     People and social settings that provide distraction: Who can I call or where can I go to distract me? 1. Name: Ramin Sleight whom I can ask for help: Who can I call when I need help - for example, friends, family, clergy, someone else? 1. Name: no one                           Professionals or [address] [place], [place] [address]vd I can contact during a crisis: Who can I call for help - for example, my doctor, my psychiatrist, my psychologist, a mental health provider, a suicide hotline? 1. Clinician Name:    Phone:       Clinician Pager or Emergency Contact #:     2. Clinician Name:    Phone:           3. Suicide Prevention Lifeline: 8-308-079-TALK (3069)    4. [address] [place] [address] Emergency Services -  for example, [facility] suicide hotline,                Making the environment safe: How can I make my environment (house/apartment/living space) safer? For example, can I remove guns, medications, and other items?   1. nothing

## 2021-08-12 NOTE — CARE COORDINATION
In order to ensure appropriate transition and discharge planning is in place, the following documents have been transmitted to [facility] as the new outpatient provider:     The d/c diagnosis was transmitted to the next care provider   The reason for hospitalization was transmitted to the next care provider   The d/c medications (dosage and indication) were transmitted to the next care provider    The continuing care plan was transmitted to the next care provider  Electronically signed by Dany Gaitan [facility] on 8/12/2021 at 11:01 AM

## 2021-08-12 NOTE — CARE COORDINATION
In order to ensure appropriate transition and discharge planning is in place, the following documents have been transmitted to [facility] , as the new outpatient provider:     The d/c diagnosis was transmitted to the next care provider   The reason for hospitalization was transmitted to the next care provider   The d/c medications (dosage and indication) were transmitted to the next care provider    The continuing care plan was transmitted to the next care provider  Electronically signed by Enedina Nageotte, [facility] on 8/12/2021 at 9:42 AM

## 2021-08-12 NOTE — CARE COORDINATION
Pt is accepted at [facility] with bed availability today. SW attempted to discuss d/c to [facility] with pt. Pt asked for \"5 more minutes\" and asked SW to leave his room. Pt is irritable. Pt reported he is willing to d/c to [facility].

## 2021-08-12 NOTE — CARE COORDINATION
Insurance contacted. Estimated arrival time is 3:00PM for pt to be transported to [facility].      YESY spoke with Sofia, Confirmation # - [id number]  [phone]

## 2021-08-12 NOTE — PLAN OF CARE
Denies SI/HI   denies hallucinations  refused a.m. blood draw   isolative to room  mood is irritable    focused on discharge   states he does not want to go to rehab     will continue to monitor

## 2021-08-13 ENCOUNTER — HOSPITAL ENCOUNTER (EMERGENCY)
Age: 26
Discharge: HOME | End: 2021-08-13
Attending: EMERGENCY MEDICINE
Payer: COMMERCIAL

## 2021-08-13 VITALS
SYSTOLIC BLOOD PRESSURE: 103 MMHG | RESPIRATION RATE: 16 BRPM | HEIGHT: 68 IN | OXYGEN SATURATION: 96 % | TEMPERATURE: 98.4 F | DIASTOLIC BLOOD PRESSURE: 62 MMHG | WEIGHT: 140 LBS | HEART RATE: 113 BPM | BODY MASS INDEX: 21.22 KG/M2

## 2021-08-13 DIAGNOSIS — T50.901A ACCIDENTAL DRUG OVERDOSE, INITIAL ENCOUNTER: Primary | ICD-10-CM

## 2021-08-13 PROCEDURE — 99284 EMERGENCY DEPT VISIT MOD MDM: CPT

## 2021-08-13 RX ORDER — 0.9 % SODIUM CHLORIDE 0.9 %
1000 INTRAVENOUS SOLUTION INTRAVENOUS ONCE
Status: DISCONTINUED | OUTPATIENT
Start: 2021-08-13 | End: 2021-08-13 | Stop reason: HOSPADM

## 2021-08-13 NOTE — CARE COORDINATION
Social Work / Discharge Planner:    Social work received consult for Edgard Stephenson"    Patient presented to emergency department by EMS for Jos Goddard. Social work attempted to meet with patient and patient stated he wanted to [place] out AMA\". Patient stated he was recently in longterm, accidentally overdosed and has things that he needs to do today. ED PCP informed patient would like to sign out AMA. Patient provided a tshirt and sweatshirt.  Electronically signed by LJ Palm on 8/13/21 at 1:27 PM EDT

## 2021-08-13 NOTE — ED NOTES
Pt states he no longer wants to continue tx in ER; provider notified; Pt has declined any blood work, fluids, EKG, TDaP.      Ezra Worrell RN  08/13/21 4253

## 2021-08-13 NOTE — ED NOTES
Pt educated on risks of leaving ER against medical advice up to and including death. Pt instructed to return to ER for further evaluation if sx continue/worsen.        Ann Davis RN  08/13/21 9888
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
## 2021-08-13 NOTE — ED NOTES
Bed: RUIZ-  Expected date:   Expected time:   Means of arrival:   Comments:   AMMON Momin RN  08/13/21 [id number]

## 2022-04-01 ENCOUNTER — APPOINTMENT (OUTPATIENT)
Dept: CT IMAGING | Age: 27
DRG: 753 | End: 2022-04-01
Payer: COMMERCIAL

## 2022-04-01 ENCOUNTER — APPOINTMENT (OUTPATIENT)
Dept: GENERAL RADIOLOGY | Age: 27
DRG: 753 | End: 2022-04-01
Payer: COMMERCIAL

## 2022-04-01 ENCOUNTER — HOSPITAL ENCOUNTER (INPATIENT)
Age: 27
LOS: 4 days | Discharge: HOME | DRG: 753 | End: 2022-04-05
Attending: EMERGENCY MEDICINE | Admitting: PSYCHIATRY & NEUROLOGY
Payer: COMMERCIAL

## 2022-04-01 DIAGNOSIS — R45.851 DEPRESSION WITH SUICIDAL IDEATION: Primary | ICD-10-CM

## 2022-04-01 DIAGNOSIS — S93.401A SPRAIN OF RIGHT ANKLE, UNSPECIFIED LIGAMENT, INITIAL ENCOUNTER: ICD-10-CM
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
## 2022-04-01 DIAGNOSIS — F32.A DEPRESSION WITH SUICIDAL IDEATION: Primary | ICD-10-CM

## 2022-04-01 PROBLEM — F39 MOOD DISORDER (HCC): Status: ACTIVE | Noted: 2022-04-01

## 2022-04-01 LAB
ACETAMINOPHEN LEVEL: <5 MCG/ML (ref 10–30)
ALBUMIN SERPL-MCNC: 3.9 G/DL (ref 3.5–5.2)
ALP BLD-CCNC: 82 U/L (ref 40–129)
ALT SERPL-CCNC: 17 U/L (ref 0–40)
AMPHETAMINE SCREEN, URINE: NOT DETECTED
ANION GAP SERPL CALCULATED.3IONS-SCNC: 9 MMOL/L (ref 7–16)
AST SERPL-CCNC: 13 U/L (ref 0–39)
BACTERIA: NORMAL /HPF
BARBITURATE SCREEN URINE: NOT DETECTED
BASOPHILS ABSOLUTE: 0.02 E9/L (ref 0–0.2)
BASOPHILS RELATIVE PERCENT: 0.3 % (ref 0–2)
BENZODIAZEPINE SCREEN, URINE: NOT DETECTED
BILIRUB SERPL-MCNC: 0.4 MG/DL (ref 0–1.2)
BILIRUBIN URINE: NEGATIVE
BLOOD, URINE: NEGATIVE
BUN BLDV-MCNC: 14 MG/DL (ref 6–20)
CALCIUM SERPL-MCNC: 9 MG/DL (ref 8.6–10.2)
CANNABINOID SCREEN URINE: POSITIVE
CHLORIDE BLD-SCNC: 103 MMOL/L (ref 98–107)
CLARITY: CLEAR
CO2: 25 MMOL/L (ref 22–29)
COCAINE METABOLITE SCREEN URINE: POSITIVE
COLOR: YELLOW
CREAT SERPL-MCNC: 1 MG/DL (ref 0.7–1.2)
EKG ATRIAL RATE: 44 BPM
EKG P AXIS: 27 DEGREES
EKG P-R INTERVAL: 128 MS
EKG Q-T INTERVAL: 450 MS
EKG QRS DURATION: 72 MS
EKG QTC CALCULATION (BAZETT): 384 MS
EKG R AXIS: 90 DEGREES
EKG T AXIS: -7 DEGREES
EKG VENTRICULAR RATE: 44 BPM
EOSINOPHILS ABSOLUTE: 0.21 E9/L (ref 0.05–0.5)
EOSINOPHILS RELATIVE PERCENT: 3.2 % (ref 0–6)
ETHANOL: <10 MG/DL (ref 0–0.08)
FENTANYL SCREEN, URINE: POSITIVE
GFR AFRICAN AMERICAN: >60
GFR NON-AFRICAN AMERICAN: >60 ML/MIN/1.73
GLUCOSE BLD-MCNC: 111 MG/DL (ref 74–99)
GLUCOSE URINE: NEGATIVE MG/DL
HCT VFR BLD CALC: 41.6 % (ref 37–54)
HEMOGLOBIN: 13.5 G/DL (ref 12.5–16.5)
IMMATURE GRANULOCYTES #: 0.03 E9/L
IMMATURE GRANULOCYTES %: 0.5 % (ref 0–5)
INFLUENZA A: NOT DETECTED
INFLUENZA B: NOT DETECTED
KETONES, URINE: NEGATIVE MG/DL
LEUKOCYTE ESTERASE, URINE: NEGATIVE
LYMPHOCYTES ABSOLUTE: 1.82 E9/L (ref 1.5–4)
LYMPHOCYTES RELATIVE PERCENT: 28.1 % (ref 20–42)
Lab: ABNORMAL
MCH RBC QN AUTO: 30.1 PG (ref 26–35)
MCHC RBC AUTO-ENTMCNC: 32.5 % (ref 32–34.5)
MCV RBC AUTO: 92.9 FL (ref 80–99.9)
METHADONE SCREEN, URINE: NOT DETECTED
MONOCYTES ABSOLUTE: 0.75 E9/L (ref 0.1–0.95)
MONOCYTES RELATIVE PERCENT: 11.6 % (ref 2–12)
NEUTROPHILS ABSOLUTE: 3.64 E9/L (ref 1.8–7.3)
NEUTROPHILS RELATIVE PERCENT: 56.3 % (ref 43–80)
NITRITE, URINE: NEGATIVE
OPIATE SCREEN URINE: NOT DETECTED
OXYCODONE URINE: POSITIVE
PDW BLD-RTO: 13.1 FL (ref 11.5–15)
PH UA: 7.5 (ref 5–9)
PHENCYCLIDINE SCREEN URINE: NOT DETECTED
PLATELET # BLD: 227 E9/L (ref 130–450)
PMV BLD AUTO: 8.8 FL (ref 7–12)
POTASSIUM REFLEX MAGNESIUM: 4.4 MMOL/L (ref 3.5–5)
PROTEIN UA: NEGATIVE MG/DL
RBC # BLD: 4.48 E12/L (ref 3.8–5.8)
RBC UA: NORMAL /HPF (ref 0–2)
SALICYLATE, SERUM: <0.3 MG/DL (ref 0–30)
SARS-COV-2 RNA, RT PCR: NOT DETECTED
SODIUM BLD-SCNC: 137 MMOL/L (ref 132–146)
SPECIFIC GRAVITY UA: 1.01 (ref 1–1.03)
TOTAL PROTEIN: 6.9 G/DL (ref 6.4–8.3)
TRICYCLIC ANTIDEPRESSANTS SCREEN SERUM: NEGATIVE NG/ML
UROBILINOGEN, URINE: 0.2 E.U./DL
WBC # BLD: 6.5 E9/L (ref 4.5–11.5)
WBC UA: NORMAL /HPF (ref 0–5)

## 2022-04-01 PROCEDURE — 73590 X-RAY EXAM OF LOWER LEG: CPT

## 2022-04-01 PROCEDURE — 87636 SARSCOV2 & INF A&B AMP PRB: CPT

## 2022-04-01 PROCEDURE — 80143 DRUG ASSAY ACETAMINOPHEN: CPT

## 2022-04-01 PROCEDURE — 73562 X-RAY EXAM OF KNEE 3: CPT

## 2022-04-01 PROCEDURE — 93005 ELECTROCARDIOGRAM TRACING: CPT | Performed by: PHYSICIAN ASSISTANT

## 2022-04-01 PROCEDURE — 73610 X-RAY EXAM OF ANKLE: CPT

## 2022-04-01 PROCEDURE — 80307 DRUG TEST PRSMV CHEM ANLYZR: CPT

## 2022-04-01 PROCEDURE — 80053 COMPREHEN METABOLIC PANEL: CPT

## 2022-04-01 PROCEDURE — 80179 DRUG ASSAY SALICYLATE: CPT

## 2022-04-01 PROCEDURE — 82077 ASSAY SPEC XCP UR&BREATH IA: CPT

## 2022-04-01 PROCEDURE — 81001 URINALYSIS AUTO W/SCOPE: CPT

## 2022-04-01 PROCEDURE — 85025 COMPLETE CBC W/AUTO DIFF WBC: CPT

## 2022-04-01 PROCEDURE — 99284 EMERGENCY DEPT VISIT MOD MDM: CPT

## 2022-04-01 PROCEDURE — 36415 COLL VENOUS BLD VENIPUNCTURE: CPT

## 2022-04-01 PROCEDURE — 1240000000 HC EMOTIONAL WELLNESS R&B

## 2022-04-01 PROCEDURE — 6370000000 HC RX 637 (ALT 250 FOR IP): Performed by: EMERGENCY MEDICINE

## 2022-04-01 PROCEDURE — 93010 ELECTROCARDIOGRAM REPORT: CPT | Performed by: INTERNAL MEDICINE

## 2022-04-01 RX ORDER — IBUPROFEN 800 MG/1
800 TABLET ORAL ONCE
Status: COMPLETED | OUTPATIENT
Start: 2022-04-01 | End: 2022-04-01

## 2022-04-01 RX ADMIN — IBUPROFEN 800 MG: 800 TABLET, FILM COATED ORAL at 19:25

## 2022-04-01 ASSESSMENT — PAIN DESCRIPTION - LOCATION: LOCATION: ANKLE

## 2022-04-01 ASSESSMENT — PAIN DESCRIPTION - PAIN TYPE: TYPE: ACUTE PAIN

## 2022-04-01 ASSESSMENT — PAIN SCALES - GENERAL
PAINLEVEL_OUTOF10: 0
PAINLEVEL_OUTOF10: 0
PAINLEVEL_OUTOF10: 7

## 2022-04-01 ASSESSMENT — PAIN DESCRIPTION - ORIENTATION: ORIENTATION: RIGHT

## 2022-04-01 ASSESSMENT — PATIENT HEALTH QUESTIONNAIRE - PHQ9: SUM OF ALL RESPONSES TO PHQ QUESTIONS 1-9: 10

## 2022-04-01 NOTE — ED PROVIDER NOTES
HPI:  4/1/22,   Time: 10:23 AM EDT       Julianne Cortez is a 27 y.o. male presenting to the ED for depression, beginning weeks ago. The complaint has been persistent, moderate in severity, and worsened by emotional upset, stress. Bib private vehicle. Passive thought of suicide, no plan. No hi/hallucinations/cp/sob/n/v/d/abd pain/fever/chills/sweats. Recent fall yesterday, hit head with right knee and ankle pain. Nothing makes better    Review of Systems:   Pertinent positives and negatives are stated within HPI, all other systems reviewed and are negative.          --------------------------------------------- PAST HISTORY ---------------------------------------------  Past Medical History:  has a past medical history of Hepatitis C virus carrier state ([facility] [address] Past Surgical History:  has no past surgical history on file. Social History:  reports that he has been smoking cigarettes. He started smoking about 18 years ago. He has a 15.00 pack-year smoking history. He has never used smokeless tobacco. He reports current alcohol use of about 3.0 standard drinks of alcohol per week. He reports current drug use. Drugs: Cocaine, IV, Methamphetamines (Crystal Meth), and Opiates . Family History: Family history is unknown by patient. The patients home medications have been reviewed.     Allergies: Haloperidol and related        ---------------------------------------------------PHYSICAL EXAM--------------------------------------    Constitutional/General: Alert and oriented x3, well appearing, non toxic in NAD  Head: Normocephalic and abrasion to nose and left forehead  Eyes: PERRL, EOMI, conjunctive normal, sclera non icteric  Mouth: Oropharynx clear, handling secretions, no trismus, no asymmetry of the posterior oropharynx or uvular edema  Neck: Supple, full ROM, no ttp  Respiratory:  Not in respiratory distress  Cardiovascular:   2+ distal pulses  Chest: No chest wall tenderness  GI:  Abdomen Soft, Non tender, Non distended. +.   Musculoskeletal: Moves all extremities x 4. Warm and well perfused, no clubbing, cyanosis, or edema. Capillary refill <3 seconds, mild diffuse right knee ttp and right ankle swelling, nvi distal, no deformity  Integument: skin warm and dry. No rashes. Lymphatic: no lymphadenopathy noted  Neurologic: GCS 15, no focal deficits, symmetric strength 5/5 in the upper and lower extremities bilaterally  Psychiatric: Normal Affect    -------------------------------------------------- RESULTS -------------------------------------------------  I have personally reviewed all laboratory and imaging results for this patient. Results are listed below.      LABS:  Results for orders placed or performed during the hospital encounter of 04/01/22   COVID-19 & Influenza Combo    Specimen: Nasopharyngeal Swab   Result Value Ref Range    SARS-CoV-2 RNA, RT PCR NOT DETECTED NOT DETECTED    INFLUENZA A NOT DETECTED NOT DETECTED    INFLUENZA B NOT DETECTED NOT DETECTED   CBC with Auto Differential   Result Value Ref Range    WBC 6.5 4.5 - 11.5 E9/L    RBC 4.48 3.80 - 5.80 E12/L    Hemoglobin 13.5 12.5 - 16.5 g/dL    Hematocrit 41.6 37.0 - 54.0 %    MCV 92.9 80.0 - 99.9 fL    MCH 30.1 26.0 - 35.0 pg    MCHC 32.5 32.0 - 34.5 %    RDW 13.1 11.5 - 15.0 fL    Platelets 432 763 - 819 E9/L    MPV 8.8 7.0 - 12.0 fL    Neutrophils % 56.3 43.0 - 80.0 %    Immature Granulocytes % 0.5 0.0 - 5.0 %    Lymphocytes % 28.1 20.0 - 42.0 %    Monocytes % 11.6 2.0 - 12.0 %    Eosinophils % 3.2 0.0 - 6.0 %    Basophils % 0.3 0.0 - 2.0 %    Neutrophils Absolute 3.64 1.80 - 7.30 E9/L    Immature Granulocytes # 0.03 E9/L    Lymphocytes Absolute 1.82 1.50 - 4.00 E9/L    Monocytes Absolute 0.75 0.10 - 0.95 E9/L    Eosinophils Absolute 0.21 0.05 - 0.50 E9/L    Basophils Absolute 0.02 0.00 - 0.20 E9/L   Serum Drug Screen   Result Value Ref Range    Ethanol Lvl <10 mg/dL    Acetaminophen Level <5.0 (L) 10.0 - 43.8 mcg/mL    Salicylate, Serum <0.3 0.0 - 30.0 mg/dL    TCA Scrn NEGATIVE Cutoff:300 ng/mL   Urine Drug Screen   Result Value Ref Range    Amphetamine Screen, Urine NOT DETECTED Negative <1000 ng/mL    Barbiturate Screen, Ur NOT DETECTED Negative < 200 ng/mL    Benzodiazepine Screen, Urine NOT DETECTED Negative < 200 ng/mL    Cannabinoid Scrn, Ur POSITIVE (A) Negative < 50ng/mL    Cocaine Metabolite Screen, Urine POSITIVE (A) Negative < 300 ng/mL    Opiate Scrn, Ur NOT DETECTED Negative < 300ng/mL    PCP Screen, Urine NOT DETECTED Negative < 25 ng/mL    Methadone Screen, Urine NOT DETECTED Negative <300 ng/mL    Oxycodone Urine POSITIVE (A) Negative <100 ng/mL    FENTANYL SCREEN, URINE POSITIVE (A) Negative <1 ng/mL    Drug Screen Comment: see below    Urinalysis with Microscopic   Result Value Ref Range    Color, UA Yellow Straw/Yellow    Clarity, UA Clear Clear    Glucose, Ur Negative Negative mg/dL    Bilirubin Urine Negative Negative    Ketones, Urine Negative Negative mg/dL    Specific Gravity, UA 1.015 1.005 - 1.030    Blood, Urine Negative Negative    pH, UA 7.5 5.0 - 9.0    Protein, UA Negative Negative mg/dL    Urobilinogen, Urine 0.2 <2.0 E.U./dL    Nitrite, Urine Negative Negative    Leukocyte Esterase, Urine Negative Negative    WBC, UA NONE 0 - 5 /HPF    RBC, UA NONE 0 - 2 /HPF    Bacteria, UA NONE SEEN None Seen /HPF   Comprehensive Metabolic Panel w/ Reflex to MG   Result Value Ref Range    Sodium 137 132 - 146 mmol/L    Potassium reflex Magnesium 4.4 3.5 - 5.0 mmol/L    Chloride 103 98 - 107 mmol/L    CO2 25 22 - 29 mmol/L    Anion Gap 9 7 - 16 mmol/L    Glucose 111 (H) 74 - 99 mg/dL    BUN 14 6 - 20 mg/dL    CREATININE 1.0 0.7 - 1.2 mg/dL    GFR Non-African American >60 >=60 mL/min/1.73    GFR African American >60     Calcium 9.0 8.6 - 10.2 mg/dL    Total Protein 6.9 6.4 - 8.3 g/dL    Albumin 3.9 3.5 - 5.2 g/dL    Total Bilirubin 0.4 0.0 - 1.2 mg/dL    Alkaline Phosphatase 82 40 - 129 U/L    ALT 17 0 - 40 U/L    AST 13 0 - 39 U/L   EKG 12 Lead   Result Value Ref Range    Ventricular Rate 44 BPM    Atrial Rate 44 BPM    P-R Interval 128 ms    QRS Duration 72 ms    Q-T Interval 450 ms    QTc Calculation (Bazett) 384 ms    P Axis 27 degrees    R Axis 90 degrees    T Axis -7 degrees       RADIOLOGY:  Interpreted by Radiologist.  XR TIBIA FIBULA RIGHT (2 VIEWS)   Final Result   Unremarkable right knee and tib fib. XR KNEE RIGHT (3 VIEWS)   Final Result   Unremarkable right knee and tib fib. XR ANKLE RIGHT (MIN 3 VIEWS)   Final Result   Soft tissue swelling without acute fracture or dislocation. EKG:  This EKG is signed and interpreted by the EP. Time: 1228  Rate: 45  Rhythm: Sinus  Interpretation: non-specific EKG  Comparison: None      ------------------------- NURSING NOTES AND VITALS REVIEWED ---------------------------   The nursing notes within the ED encounter and vital signs as below have been reviewed by myself. /83   Pulse 58   Temp 98.2 °F (36.8 °C)   Resp 16   Ht 5' 9\" (1.753 m)   Wt 145 lb (65.8 kg)   SpO2 98%   BMI 21.41 kg/m²   Oxygen Saturation Interpretation: Normal    The patients available past medical records and past encounters were reviewed. ------------------------------ ED COURSE/MEDICAL DECISION MAKING----------------------  Medications   ibuprofen (ADVIL;MOTRIN) tablet 800 mg (has no administration in time range)         ED COURSE:       Medical Decision Making:    Medically clear, social work for psych admit      This patient's ED course included: a personal history and physicial examination    This patient has remained hemodynamically stable during their ED course. Re-Evaluations:             Re-evaluation. Patients symptoms show no change          Consultations:             Social work    Critical Care:         Counseling:    The emergency provider has spoken with the patient and discussed todays results, in addition to providing specific details for the plan of care and counseling regarding the diagnosis and prognosis. Questions are answered at this time and they are agreeable with the plan.       --------------------------------- IMPRESSION AND DISPOSITION ---------------------------------    IMPRESSION  1. Depression with suicidal ideation    2. Fall, initial encounter    3. Sprain of right ankle, unspecified ligament, initial encounter        DISPOSITION  Disposition: Admit to mental health unit - medically cleared for admission  Patient condition is stable    NOTE: This report was transcribed using voice recognition software.  Every effort was made to ensure accuracy; however, inadvertent computerized transcription errors may be present        Sherren Haff, MD  04/01/22 2755

## 2022-04-01 NOTE — ED NOTES
Emergency Department [facility] AN AFFILIATE OF [facility] Biopsychosocial Assessment Note    Chief Complaint: Depression, Suicidal, Psych Eval      MSE: Pt is alert and oriented x 4. Pt is cooperative. Pt mood is depressed and affect is flat. Pt presents with good eye contact, speech is very low. Pt thought content is worried. Thought Process is coherent and organized. Pt insight and judgement are fair. Pt reports SI with no plan or intent. Pt denies HI, plan, or intent. Pt denies AVH and does not present with paranoia or delusions. Clinical Summary/History:   Pt is a 27year old male who walked into the ER. Pt presents to the hospital with suicidal ideation, no plan or intent, and depression. Pt reports that he is having a difficult time transitioning into society since he was released from correction on March 5,2022. Pt reports being very depressed and that he has had an increase in arguments with his Father who he was residing with when he was released. Pt reports that he is unemployed and has no income. Pt is not  and reports that he has no children. Pt has a hx of psych admission. Last admission was to this hospital in 3/2021. Pt has mental health dx of schizoaffective disorder, bi-polar, anti-social personality disorder, polysubstance abuse. Pt could not recall which mental health provider he was active with. Pt reports that he has not been taking his medication for the last few weeks. Pt admits to current and hx of substance and alcohol use. Patient reported that his most recent drug use was yesterday and that he uses fentanyl, cocaine, and marijuana. Pt admits to use of alcohol and stated that his most recent use was a couple of days ago. Pt denies hx of violence. Pt is active with , but did not provide name or contact information.      Risk Factors: mental health dx., substance use, alcohol use, hx of trauma, limited friend/family support, lack of housing, lack of essential needs, recent conflict with father, hx of psych admission, trouble with the law      Protective Factors: Help-seeking behavior, goals and hope for the future      [x] Discussed protective and risk factors with RN and ED Physician     Gender  [x] Male [] Female [] Transgender  [] Other    Sexual Orientation    [x] Heterosexual [] Homosexual [] Bisexual [] Other    Suicidal Behavioral: CSSR-S Complete. [x] Reports:    [] Past [x] Present   [] Denies    Homicidal/ Violent Behavior  [] Reports:   [] Past [] Present   [x] Denies     Violence Risk Screenin. Have you ever engaged in a physical fight? []  Yes [x]  No  2. Have you ever had an order of protection taken out against you? []  Yes [x]  No  3. Have you ever been arrested due to violence? []  Yes [x]  No  4. Have you ever been cruel to animals? []  Yes [x]  No    If any of the above questions are affirmative, please complete these questions:  1. Have you ever thought about hurting someone? []  No  []  Yes (Ask the questions listed below)   When?  Did you follow through with the thoughts? []  No  []  Yes - What happened? 2.  Have you ever threatened anyone? []  No  []  Yes (Ask the questions listed below)   When and what happened?  Have you ever threatened someone with a gun, knife or other weapon? []  No  []  Yes - When and what happened? 3. Have you ever physically hurt someone? []  No  []  Yes (Ask the questions listed below)   When and what happened?  How many times have you physically hurt someone in the past?    Hallucinations/Delusions   [] Reports:   [x] Denies     Substance Use/Alcohol Use/Addiction: SBIRT Screen Complete.    [x] Reports:  Fentanyl, cocaine, marijuana, alcohol    [] Denies     Trauma History  [] Reports:  [x] Denies     Collateral Information:  N/A      Level of Care/Disposition Plan  [] Home:   [] Outpatient Provider:   [] Crisis Unit:   [x] Inpatient Psychiatric Unit: pt reports that he wants to get help for his mental health and

## 2022-04-01 NOTE — ED NOTES
Pt refusing blood labs at this time. \" I do not want to be stuck right now. Im figuring out things in my head and what I am going to do. \"     Edilma Vidal RN  04/01/22 [id number]

## 2022-04-01 NOTE — PROGRESS NOTES
This pt came to CT for HEAD CT.  Pt refusing CT scan at this time stating \"my head is fine\" Pt sent back to room via transport at 1140

## 2022-04-02 PROBLEM — F31.62 BIPOLAR MIXED AFFECTIVE DISORDER, MODERATE (HCC): Status: ACTIVE | Noted: 2022-04-02

## 2022-04-02 PROCEDURE — 6370000000 HC RX 637 (ALT 250 FOR IP): Performed by: PSYCHIATRY & NEUROLOGY

## 2022-04-02 PROCEDURE — 6370000000 HC RX 637 (ALT 250 FOR IP): Performed by: NURSE PRACTITIONER

## 2022-04-02 PROCEDURE — 99221 1ST HOSP IP/OBS SF/LOW 40: CPT | Performed by: NURSE PRACTITIONER

## 2022-04-02 PROCEDURE — 1240000000 HC EMOTIONAL WELLNESS R&B

## 2022-04-02 RX ORDER — IBUPROFEN 400 MG/1
400 TABLET ORAL EVERY 6 HOURS PRN
Status: DISCONTINUED | OUTPATIENT
Start: 2022-04-02 | End: 2022-04-05 | Stop reason: HOSPADM

## 2022-04-02 RX ORDER — DIVALPROEX SODIUM 500 MG/1
500 TABLET, DELAYED RELEASE ORAL 2 TIMES DAILY
Status: DISCONTINUED | OUTPATIENT
Start: 2022-04-02 | End: 2022-04-05 | Stop reason: HOSPADM

## 2022-04-02 RX ORDER — HALOPERIDOL 5 MG
5 TABLET ORAL EVERY 6 HOURS PRN
Status: DISCONTINUED | OUTPATIENT
Start: 2022-04-02 | End: 2022-04-05 | Stop reason: HOSPADM

## 2022-04-02 RX ORDER — CLONIDINE HYDROCHLORIDE 0.1 MG/1
0.1 TABLET ORAL EVERY 6 HOURS PRN
Status: DISCONTINUED | OUTPATIENT
Start: 2022-04-02 | End: 2022-04-05 | Stop reason: HOSPADM

## 2022-04-02 RX ORDER — OLANZAPINE 5 MG/1
5 TABLET ORAL 2 TIMES DAILY
Status: DISCONTINUED | OUTPATIENT
Start: 2022-04-02 | End: 2022-04-05 | Stop reason: HOSPADM

## 2022-04-02 RX ORDER — NICOTINE 21 MG/24HR
1 PATCH, TRANSDERMAL 24 HOURS TRANSDERMAL DAILY
Status: DISCONTINUED | OUTPATIENT
Start: 2022-04-02 | End: 2022-04-05 | Stop reason: HOSPADM

## 2022-04-02 RX ORDER — HYDROXYZINE PAMOATE 50 MG/1
50 CAPSULE ORAL 3 TIMES DAILY PRN
Status: DISCONTINUED | OUTPATIENT
Start: 2022-04-02 | End: 2022-04-05 | Stop reason: HOSPADM

## 2022-04-02 RX ORDER — ACETAMINOPHEN 325 MG/1
650 TABLET ORAL EVERY 6 HOURS PRN
Status: DISCONTINUED | OUTPATIENT
Start: 2022-04-02 | End: 2022-04-05 | Stop reason: HOSPADM

## 2022-04-02 RX ORDER — VENLAFAXINE HYDROCHLORIDE 75 MG/1
75 CAPSULE, EXTENDED RELEASE ORAL DAILY
Status: ON HOLD | COMMUNITY
End: 2022-04-05 | Stop reason: HOSPADM

## 2022-04-02 RX ORDER — DICYCLOMINE HYDROCHLORIDE 10 MG/1
10 CAPSULE ORAL 2 TIMES DAILY PRN
Status: DISCONTINUED | OUTPATIENT
Start: 2022-04-02 | End: 2022-04-05 | Stop reason: HOSPADM

## 2022-04-02 RX ORDER — LANOLIN ALCOHOL/MO/W.PET/CERES
3 CREAM (GRAM) TOPICAL NIGHTLY
Status: DISCONTINUED | OUTPATIENT
Start: 2022-04-02 | End: 2022-04-05 | Stop reason: HOSPADM

## 2022-04-02 RX ORDER — DIPHENHYDRAMINE HCL 25 MG
25 CAPSULE ORAL NIGHTLY PRN
Status: ON HOLD | COMMUNITY
End: 2022-04-05 | Stop reason: HOSPADM

## 2022-04-02 RX ORDER — HALOPERIDOL 5 MG/ML
5 INJECTION INTRAMUSCULAR EVERY 6 HOURS PRN
Status: DISCONTINUED | OUTPATIENT
Start: 2022-04-02 | End: 2022-04-05 | Stop reason: HOSPADM

## 2022-04-02 RX ORDER — MAGNESIUM HYDROXIDE/ALUMINUM HYDROXICE/SIMETHICONE 120; 1200; 1200 MG/30ML; MG/30ML; MG/30ML
30 SUSPENSION ORAL PRN
Status: DISCONTINUED | OUTPATIENT
Start: 2022-04-02 | End: 2022-04-05 | Stop reason: HOSPADM

## 2022-04-02 RX ADMIN — DIVALPROEX SODIUM 500 MG: 500 TABLET, DELAYED RELEASE ORAL at 22:34

## 2022-04-02 RX ADMIN — Medication 3 MG: at 22:34

## 2022-04-02 RX ADMIN — OLANZAPINE 5 MG: 5 TABLET, FILM COATED ORAL at 22:34

## 2022-04-02 ASSESSMENT — SLEEP AND FATIGUE QUESTIONNAIRES
DO YOU HAVE DIFFICULTY SLEEPING: NO
AVERAGE NUMBER OF SLEEP HOURS: 3
DO YOU USE A SLEEP AID: NO

## 2022-04-02 ASSESSMENT — LIFESTYLE VARIABLES: HISTORY_ALCOHOL_USE: NO

## 2022-04-02 ASSESSMENT — PATIENT HEALTH QUESTIONNAIRE - PHQ9: SUM OF ALL RESPONSES TO PHQ QUESTIONS 1-9: 10

## 2022-04-02 NOTE — ED NOTES
[facility] accepted patient for admission. No withdraws from alcohol or drugs noted.        James Triana, RN  04/01/22 6917

## 2022-04-02 NOTE — PLAN OF CARE
[facility]  Initial Interdisciplinary Treatment Plan NOTE    Review Date & Time:  4/2/2022    10:27 AM      Patient was not in treatment team    Admission Type:        Reason for admission:  Reason for Admission: my father and i were arguing and I got kicked out. I became depressed and started having thoughts of harming my self , \"i had no plan. \"      Estimated Length of Stay Update:   5 days  Estimated Discharge Date Update:  4/7/22    EDUCATION:   Learner Progress Toward Treatment Goals: Reviewed results and recommendations of this team    Method: Individual    Outcome: Verbalized understanding    PATIENT GOALS:      PLAN/TREATMENT RECOMMENDATIONS UPDATE: encourage daily goals and groups    GOALS UPDATE:   Time frame for Short-Term Goals:  By discharge    Isa Hamlin RN

## 2022-04-02 NOTE — PROGRESS NOTES
[facility]  Admission Note     Admission Type:   Patient arrived from [facility] AN [facility]FFILI[facility] OF [facility] via wheelchair with personal belongings. Was cooperative DEnies SI,HI or hallucinations at this time. Verbalizing only depressed do to was kicked out of his dad home and must make calls to see where he can go after discharge. Will continue to monitor and provide safe environment with continue rounds. Reason for admission:  Reason for Admission: my father and i were arguing and I got kicked out. I became depressed and started having thoughts of harming my self , \"i had no plan. \"    PATIENT STRENGTHS:  Strengths: No significant Physical Illness    Patient Strengths and Limitations:  Limitations: Difficulty problem solving/relies on others to help solve problems,Inappropriate/potentially harmful leisure interests    Addictive Behavior:   Addictive Behavior  In the past 3 months, have you felt or has someone told you that you have a problem with:  : None  Do you have a history of Alcohol Use?: No  Do you have a history of Street Drug Abuse?: Yes  Histroy of Prescripton Drug Abuse?: No    Medical Problems:   Past Medical History:   Diagnosis Date    Hepatitis C virus carrier state ([facility] [address]        Status EXAM:  Status and Exam  Facial Expression: Flat,Sad  Affect: Blunt  Level of Consciousness: Alert  Mood:Normal: No  Mood: Depressed  Motor Activity:Normal: No  Motor Activity: Decreased  Interview Behavior: Cooperative  Preception: [place] to Person,[place] to Time,[place] to Place,[place] to Situation  Attention:Normal: No  Attention: Distractible  Thought Processes: Blocking  Thought Content:Normal: Yes  Hallucinations: None  Delusions: No  Memory:Normal: No  Memory: Poor Recent,Poor Remote  Insight and Judgment: No  Insight and Judgment: Poor Judgment,Poor Insight,Unmotivated  Present Suicidal Ideation: No  Present Homicidal Ideation: No    Tobacco Screening:  Practical Counseling, on admission, kim X, if applicable and completed (first 3 are required if patient doesn't refuse):            ( )  Recognizing danger situations (included triggers and roadblocks)                    ( )  Coping skills (new ways to manage stress, exercise, relaxation techniques, changing routine, distraction)                                                           ( )  Basic information about quitting (benefits of quitting, techniques in how to quit, available resources  ( ) Referral for counseling faxed to [facility]                                           ( ) Patient refused counseling  ( x) Patient has not smoked in the last 30 days    Metabolic Screening:    Lab Results   Component Value Date    LABA1C 5.2 01/09/2021       Lab Results   Component Value Date    CHOL 181 01/09/2021     Lab Results   Component Value Date    TRIG 362 (H) 01/09/2021     Lab Results   Component Value Date    HDL 55 01/09/2021     No components found for: [facility] EVALUATION AND TREATMENT [place]  Lab Results   Component Value Date    LABVLDL 72 01/09/2021         Body mass index is 21.41 kg/m². BP Readings from Last 2 Encounters:   04/01/22 131/75   08/13/21 103/62           Pt admitted with followings belongings:  Dental Appliances: None  Vision - Corrective Lenses: None  Hearing Aid: None  Jewelry: None  Body Piercings Removed: N/A  Clothing: Footwear,Sweater,Pants,Shirt,Other (Comment),Jacket / coat (cloth belt, 4 shirts,)  Were All Patient Medications Collected?: Yes  Other Valuables: Other (Comment) (5-1$ bills, 2 lighters,)     Patient's home medications were *locked in med room in home medication drawer. Patient oriented to surroundings and program expectations and copy of patient rights given. Received admission packet: yes. Consents reviewed, signed yes Patient verbalize understanding:  yes. Patient education on precautions PIN number, how to call out on phone and hours available, 15 minute rounds, 24 hour help hot line.                  Eros Villalpando RN

## 2022-04-02 NOTE — BH NOTE
Patent comes out for meals only. Avoidant. Denies suicidal and homicidal thoughts. Denies hallucinations.

## 2022-04-02 NOTE — PLAN OF CARE
Problem: Depressive Behavior With or Without Suicide Precautions:  Goal: Able to verbalize and/or display a decrease in depressive symptoms  Description: Able to verbalize and/or display a decrease in depressive symptoms  Outcome: Ongoing  Goal: Ability to disclose and discuss suicidal ideas will improve  Description: Ability to disclose and discuss suicidal ideas will improve  Outcome: Ongoing              Patient low profile and wanting to sleep. Avoidant with conversation. Flat and depressed. Denies suicidal and homicidal thoughts. Denies hallucinations.

## 2022-04-02 NOTE — ED NOTES
Report called to norm RN.  Pt calm and cooperative     Jim Barbara, [place]  04/01/22 6990

## 2022-04-02 NOTE — ED NOTES
The pt was accepted to [facility] room 3399. Disposition called to Andreia Adair in admitting.      [facility]  04/01/22 5278

## 2022-04-02 NOTE — H&P
Department of Psychiatry  History and Physical - Adult     CHIEF COMPLAINT:  \" I want Seroquel. \"    Patient was seen after discussing with the treatment team and reviewing the chart    CIRCUMSTANCES OF ADMISSION: presented the ED reporting depression and suicidal ideations. HISTORY OF PRESENT ILLNESS:      The patient is a 27 y.o. male with significant past history of disorder and polysubstance abuse presented the ED reporting depression and suicidal ideations. In ED patient reported he was just released from [facility] on 3/5/2022 he was living with his dad having arguments and was kicked out. He is active with a . In the ED his urine drug screen is positive for cannabis, cocaine and fentanyl he was medically cleared admitted 7 SE. adult psychiatric and for further psychiatric assessment stabilization and treatment upon evaluation today patient is lying in bed offers little conversation. When asked if he is going through any opiate withdrawals he states \"I should be. \"  He has very poor limit insight and judgment guarding circumstance of hospitalization need for treatment he states he was recently at some [facility] in [place] and states that he was diagnosed with bipolar disorder and they gave him Seroquel. He is very focused on getting his Seroquel reordered when asked if he still feeling suicidal he denies this and is indicated to staff that he was only upset because he was kicked out of his dad's house.   He is very poor limited insight and judgment regarding circumstance hospitalization need for treatment he is flat blunted makes no eye contact he is isolate to his room no insight or judgment into circumstance hospitalization and need for treatment he denies any auditory visual hallucinations delusions or the perceptual normalities he does not appear to be internally stimulated        PAST PSYCHIATRIC HISTORY:  History of bipolar disorder and psychiatric hospitalizations including here also [facility] insight to the [facility].  He was most recently discharged here from [address] [facility] March 2021 He was getting treatment at present with Depakote and Zyprexa.   He is noncompliant with medications and outpatient treatment        Personal family and social history:   Patient is very guarded with personal and social history he was living with his father kicked out he is currently homeless       Legal history  Very significant legal history currently on parole, went to prison for 5 years ago multiple parole violation in the record. He was just recently discharged from [facility] in March states his  is [address] HISTORY:   Patient history of polysubstance abuse urine drug is positive for cocaine,  cannabis, fentanyl on admission.         Past Medical History:        Diagnosis Date    Hepatitis C virus carrier state ([facility] [address]        Medications Prior to Admission:   Medications Prior to Admission: venlafaxine (EFFEXOR XR) 75 MG extended release capsule, Take 75 mg by mouth daily  diphenhydrAMINE (BENADRYL) 25 MG capsule, Take 25 mg by mouth nightly as needed for Itching  divalproex (DEPAKOTE) 500 MG DR tablet, Take 1 tablet by mouth 2 times daily  nicotine (NICODERM CQ) 21 MG/24HR, Place 1 patch onto the skin daily  OLANZapine (ZYPREXA) 5 MG tablet, Take 1 tablet by mouth 2 times daily  cephALEXin (KEFLEX) 500 MG capsule, Take 1 capsule by mouth 4 times daily  ibuprofen (IBU) 800 MG tablet, Take 1 tablet by mouth every 8 hours as needed for Pain    Past Surgical History:    History reviewed. No pertinent surgical history. Allergies:   Haloperidol and related    Family History  Family History   Family history unknown: Yes             EXAMINATION:    REVIEW OF SYSTEMS:    ROS:  [x] All negative/unchanged except if checked.  Explain positive(checked items) below:  [] Constitutional  [] Eyes  [] Ear/Nose/Mouth/Throat  [] Respiratory  [] CV  [] GI  []   [] Musculoskeletal  [] Skin/Breast  [] Neurological  [] Endocrine  [] Heme/Lymph  [] Allergic/Immunologic    Explanation:     Vitals:  /63   Pulse (!) 48   Temp 98.6 °F (37 °C) (Temporal)   Resp 14   Ht 5' 9\" (1.753 m)   Wt 145 lb (65.8 kg)   SpO2 98%   BMI 21.41 kg/m²      Physical Examination:   Head: x  Atraumatic: x normocephalic  Skin and Mucosa        Moist x  Dry   Pale  x Normal   Neck:  Thyroid  Palpable   x  Not palpable   venus distention   adenopathy   Chest: x Clear   Rhonchi     Wheezing   CV:  xS1   xS2    xNo murmer   Abdomen:  x  Soft    Tender    Viceromegaly   Extremities:  x No Edema     Edema     Cranial Nerves Examination:   CN II:   xPupils are reactive to light  Pupils are non reactive to light  CN III, IV, VI:  xNo eye deviation    No diplopia or ptosis   CN V:    xFacial Sensation is intact     Facial Sensation is not intact   CN IIIV:   x Hearing is normal to rubbing fingers   CN IX, X:     xNormal gag reflex and phonation   CN XI:   xShoulder shrug and neck rotation is normal  CNXII:    xTongue is midline no deviation or atrophy    Mental Status Examination:    Level of consciousness:  within normal limits   Appearance:  well-appearing  Behavior/Motor:  no abnormalities noted  Attitude toward examiner:  cooperative  Speech:  \" I am okay. \"  Mood: I am depressed  Affect: Irritable thought processes: Linear without flight of ideas loose associations  Thought content: Devoid of any auditory visual loose Nations delusions or the perceptual abnormalities.   Denies SI/HI intent or plan  Cognition:  oriented to person, place, and time   Concentration intact  Memory intact  Insight poor   Judgement poor   Fund of Knowledge limited      DIAGNOSIS:  Bipolar mixed affective disorder moderate  Polysubstance abuse  Antisocial personality disorder        LABS: REVIEWED TODAY:  Recent Labs     04/01/22  1236   WBC 6.5   HGB 13.5      Recent Labs     04/01/22  1800      K 4.4      CO2 25   BUN 14   CREATININE 1.0   GLUCOSE 111*     Recent Labs     04/01/22  1800   BILITOT 0.4   ALKPHOS 82   AST 13   ALT 17     Lab Results   Component Value Date    LABAMPH NOT DETECTED 04/01/2022    BARBSCNU NOT DETECTED 04/01/2022    LABBENZ NOT DETECTED 04/01/2022    LABMETH NOT DETECTED 04/01/2022    OPIATESCREENURINE NOT DETECTED 04/01/2022    PHENCYCLIDINESCREENURINE NOT DETECTED 04/01/2022    ETOH <10 04/01/2022     Lab Results   Component Value Date    TSH 8.920 02/02/2021     No results found for: LITHIUM  Lab Results   Component Value Date    VALPROATE 3 (L) 01/09/2021     Lab Results   Component Value Date    VALPROATE 3 01/09/2021         Radiology XR KNEE RIGHT (3 VIEWS)    Result Date: 4/1/2022  EXAMINATION: THREE XRAY VIEWS OF THE RIGHT KNEE; 2 XRAY VIEWS OF THE RIGHT TIBIA AND FIBULA 4/1/2022 10:31 am COMPARISON: None. HISTORY: ORDERING SYSTEM PROVIDED HISTORY: fall/rt knee pain TECHNOLOGIST PROVIDED HISTORY: Reason for exam:->fall/rt knee pain What reading provider will be dictating this exam?->CRC FINDINGS: There is no evidence of acute fracture. There is normal alignment. No acute joint abnormality. No focal osseous lesion. No focal soft tissue abnormality. Unremarkable right knee and tib fib. XR TIBIA FIBULA RIGHT (2 VIEWS)    Result Date: 4/1/2022  EXAMINATION: THREE XRAY VIEWS OF THE RIGHT KNEE; 2 XRAY VIEWS OF THE RIGHT TIBIA AND FIBULA 4/1/2022 10:31 am COMPARISON: None. HISTORY: ORDERING SYSTEM PROVIDED HISTORY: fall/rt knee pain TECHNOLOGIST PROVIDED HISTORY: Reason for exam:->fall/rt knee pain What reading provider will be dictating this exam?->CRC FINDINGS: There is no evidence of acute fracture. There is normal alignment. No acute joint abnormality. No focal osseous lesion. No focal soft tissue abnormality. Unremarkable right knee and tib fib.      XR ANKLE RIGHT (MIN 3 VIEWS)    Result Date: 4/1/2022  EXAMINATION: THREE XRAY VIEWS OF THE RIGHT ANKLE 4/1/2022 11:31 am COMPARISON: None. HISTORY: ORDERING SYSTEM PROVIDED HISTORY: fall/ankle pain TECHNOLOGIST PROVIDED HISTORY: Reason for exam:->fall/ankle pain What reading provider will be dictating this exam?->CRC FINDINGS: AP, lateral, and oblique views of the right ankle were obtained. There is soft tissue swelling, most pronounced laterally. Bone mineralization appears to be within normal limits. There is no acute fracture or dislocation. The ankle mortise is radiographically stable. No definite ankle joint effusion. Soft tissue swelling without acute fracture or dislocation. TREATMENT PLAN:    Risk Management: Based on the diagnosis and assessment biopsychosocial treatment model was presented to the patient and was given the opportunity to ask any question. The patient was agreeable to the plan and all the patient's questions were answered to the patient's satisfaction. I discussed with the patient the risk, benefit, alternative and common side effects for the proposed medication treatment. The patient is consenting to this treatment. Collateral Information:  Will obtain collateral information from the family or friends. Will obtain medical records as appropriate from out patient providers  Will consult the hospitalist for a physical exam to rule out any co-morbid physical condition. Patient's diagnosis, treatment plan, medication management was formulated at the end of evaluation and after reviewing relevant documentation. Patient was seen directly by myself and Dr. Carrillo Graves  Start back on Zyprexa 5 mg twice daily  Depakote 500 mg twice daily        Can discontinue constant observer.   Patient is deemed to be moderate risk for suicide based on productive risk factors as well as risk mitigation  Risk Factors: mental health dx., substance use, alcohol use, hx of trauma, limited friend/family support, lack of housing, lack of essential needs, recent conflict with father, hx of psych admission, trouble with the law        Protective Factors: Help-seeking behavior, goals and hope for the future           Prn Haldol 5mg and Vistaril 50mg q6hr for extreme agitation. Trazodone as ordered for insomnia  Vistaril as ordered for anxiety      Psychotherapy:   Encourage participation in milieu and group therapy  Individual therapy as needed        Behavioral Services  Medicare Certification Upon Admission    I certify that this patient's inpatient psychiatric hospital admission is medically necessary for:    [x] (1) Treatment which could reasonably be expected to improve this patient's condition,       [] (2) Or for diagnostic study;     AND     [x](2) The inpatient psychiatric services are provided while the individual is under the care of a physician and are included in the individualized plan of care.     Estimated length of stay/service 3-7 days based on stability    Plan for post-hospital care outpatient psychiatric and counseling services      Electronically signed by LATASHA Graham CNP on 9/4/4856 at 8:29 AM

## 2022-04-03 VITALS
OXYGEN SATURATION: 98 % | SYSTOLIC BLOOD PRESSURE: 116 MMHG | HEART RATE: 65 BPM | RESPIRATION RATE: 18 BRPM | BODY MASS INDEX: 21.48 KG/M2 | HEIGHT: 69 IN | WEIGHT: 145 LBS | DIASTOLIC BLOOD PRESSURE: 84 MMHG | TEMPERATURE: 97.8 F

## 2022-04-03 PROCEDURE — 1240000000 HC EMOTIONAL WELLNESS R&B

## 2022-04-03 PROCEDURE — 99232 SBSQ HOSP IP/OBS MODERATE 35: CPT | Performed by: NURSE PRACTITIONER

## 2022-04-03 NOTE — BH NOTE
Patient is verbalizing that he needs housing,food, and help getting his social security card. referred him to talk with  and attend treatment team tomorrow. He feels everyone is against him. He is not getting any help. \" they won't give me Seroquel\". Patient wants to get back on his suboxone because \" I know that helps and Seroquel\". Patient has poor eye contact during conversation. Reports he doesn't like to be around people.

## 2022-04-03 NOTE — PROGRESS NOTES
I went to assess patient this morning and he states \"I am not talking. \"  He refuses to answer any assessment questions just lies in bed and keeps his head covered.   Per the chart patient is been refusing ordered medications he has been yelling and agitated with staff he bell disheveled malodorous       unable to perform any type of mental status examination as patient refuses to cooperate this time    Diagnosis  Bipolar mixed affective disorder moderate  Antisocial personality disorder  Polysubstance dependence      Plans and recommendations:  Continue Depakote 500 mg twice daily  Continue Zyprexa 5 mg twice daily  Obtain confirmation patient's  regarding discharge disposition

## 2022-04-03 NOTE — PLAN OF CARE
Problem: Depressive Behavior With or Without Suicide Precautions:  Goal: Able to verbalize and/or display a decrease in depressive symptoms  Description: Able to verbalize and/or display a decrease in depressive symptoms  Outcome: Ongoing  Goal: Ability to disclose and discuss suicidal ideas will improve  Description: Ability to disclose and discuss suicidal ideas will improve  Outcome: Ongoing       Irritable, nasty and avoidant. Poor eye contact. Complains of withdrawal symptoms but does elaborate. \"THEY DO DETOX WITH SUBOXONE! \", angrily. Refusing what is ordered . Refused all medications this morning . Odorous and disheveled. Denies suicidal and homicidal thoughts. Denies hallucinations.

## 2022-04-03 NOTE — PROGRESS NOTES
Pt alert, irritable. Resting in bed. States he was \"Just getting ready to go to sleep\" as if to say I was interrupting his plan. Pt is guarded. States he doesn't get along with people and would like to be left alone. \"I called my step dad and asked him if I can come stay with him. I need to be alone. I havent had any alone time since I got out of jail\". Pt doesn't feel his drug use is an issue. Upset that his Dad kicked him out as well and stated that his Mom didn't even answer his phone call today. Pt is sarcastic when answering questions about his injuries to his face, hand, and ankle. Per report, they may order xrays in the morning. Encouraged pt to attend groups and to seek help if hes in pain.  Will continue to monitor

## 2022-04-04 PROCEDURE — 1240000000 HC EMOTIONAL WELLNESS R&B

## 2022-04-04 PROCEDURE — 6370000000 HC RX 637 (ALT 250 FOR IP): Performed by: NURSE PRACTITIONER

## 2022-04-04 PROCEDURE — 99232 SBSQ HOSP IP/OBS MODERATE 35: CPT | Performed by: NURSE PRACTITIONER

## 2022-04-04 PROCEDURE — 6370000000 HC RX 637 (ALT 250 FOR IP): Performed by: PSYCHIATRY & NEUROLOGY

## 2022-04-04 RX ADMIN — IBUPROFEN 400 MG: 400 TABLET, FILM COATED ORAL at 21:38

## 2022-04-04 RX ADMIN — DIVALPROEX SODIUM 500 MG: 500 TABLET, DELAYED RELEASE ORAL at 21:38

## 2022-04-04 RX ADMIN — OLANZAPINE 5 MG: 5 TABLET, FILM COATED ORAL at 21:38

## 2022-04-04 RX ADMIN — ACETAMINOPHEN 650 MG: 325 TABLET ORAL at 21:39

## 2022-04-04 RX ADMIN — IBUPROFEN 400 MG: 400 TABLET, FILM COATED ORAL at 12:16

## 2022-04-04 RX ADMIN — ACETAMINOPHEN 650 MG: 325 TABLET ORAL at 12:16

## 2022-04-04 RX ADMIN — Medication 3 MG: at 21:38

## 2022-04-04 RX ADMIN — MAGNESIUM HYDROXIDE 30 ML: 1200 LIQUID ORAL at 12:21

## 2022-04-04 ASSESSMENT — PAIN SCALES - GENERAL
PAINLEVEL_OUTOF10: 10
PAINLEVEL_OUTOF10: 0
PAINLEVEL_OUTOF10: 10

## 2022-04-04 NOTE — PLAN OF CARE
[facility]  Day 3 Interdisciplinary Treatment Plan NOTE    Review Date & Time: 4/4/22 0900    Patient was not in treatment team.  PT. REFUSED. Estimated Length of Stay Update:  TBD  Estimated Discharge Date Update:  TBD    EDUCATION:   Learner Progress Toward Treatment Goals: Reviewed results and recommendations of this team    Method: Individual    Outcome: Needs reinforcement    PATIENT GOALS: DECLINED TO GIVE    PLAN/TREATMENT RECOMMENDATIONS UPDATE: AOD REFERRAL, CONSULT DR. Elgin Garcia TOMORROW FOR SUBOXONE, SUPPORTIVE CARE AND ENCOURAGE COMPLIANCE TO TREATMENT, ASSIST DISCHARGE DISPOSITION    GOALS UPDATE:   Time frame for Short-Term Goals:  5 DAYS    PT. IS UP FOR MEALS, THEN RETURNS TO ROOM. PT. DECLINED SCHEDULED MEDICATION AND TREATMENT TEAM.   PT. DECLINED FULL ASSESSMENT, AVOIDANT AND IRRITABLE THIS A.M.  PT. DID VERBALLY DENY SUICIDAL IDEATIONS, HOMICIDAL IDEATIONS AND HALLUCINATIONS. PT. VOICED NO DELUSIONS ON A.M. ASSESSMENT.        Marily Anton RN

## 2022-04-04 NOTE — PROGRESS NOTES
BEHAVIORAL HEALTH FOLLOW-UP NOTE     4/4/2022     Patient was seen and examined in person, Chart reviewed   Patient's case discussed with staff/team    Chief Complaint: \" I want to take Suboxone. \"    Interim History: Patient seen in his room states that he wants to take Suboxone. He remains irritable does not attend groups not socialize with peers he is refusing to take psychotropic medications he is refusing to cooperate with staff. He denies SI/HI intent or plan denies auditory visual hallucinations he does not appear to be internally stimulated he continues to display goal-directed behavior      Appetite:   [x] Normal/Unchanged  [] Increased  [] Decreased      Sleep:       [x] Normal/Unchanged  [] Fair       [] Poor              Energy:    [x] Normal/Unchanged  [] Increased  [] Decreased        SI [] Present  [x] Absent    HI  []Present  [x] Absent     Aggression:  [] yes  [x] no    Patient is [x] able  [] unable to CONTRACT FOR SAFETY     PAST MEDICAL/PSYCHIATRIC HISTORY:   Past Medical History:   Diagnosis Date    [facility]atitis C virus carrier [facility] ([facility]        FAMILY/SOCIAL HISTORY:  Family History   Family history unknown: Yes     Social History     Socioeconomic History    Marital status: Single     Spouse name: Not on file    Number of children: 0    Years of education: 15    Highest education level: Not on file   Occupational History    Not on file   Tobacco Use    Smoking status: Current Every Day Smoker     Packs/day: 1.00     Years: 15.00     Pack years: 15.00     Types: Cigarettes     Start date: 1/9/2004    Smokeless tobacco: Never Used    Tobacco comment: states quit cannot tell me when   Vaping Use    Vaping Use: Never used   Substance and Sexual Activity    Alcohol use:  Yes     Alcohol/week: 3.0 standard drinks     Types: 3 Cans of beer per week     Comment:  tall boys and malt liquor/ qd states he drinks whatever he can    Drug use: Yes     Types: Cocaine, IV, Methamphetamines (Crystal Meth), Opiates     Sexual activity: Not Currently     Comment: unknown   Other Topics Concern    Not on file   Social History Narrative    Not on file     Social Determinants of Health     Financial Resource Strain:     Difficulty of Paying Living Expenses: Not on file   Food Insecurity:     Worried About Running Out of Food in the Last Year: Not on file    Soha of Food in the Last Year: Not on file   Transportation Needs:     Lack of Transportation (Medical): Not on file    Lack of Transportation (Non-Medical): Not on file   Physical Activity:     Days of Exercise per Week: Not on file    Minutes of Exercise per Session: Not on file   Stress:     Feeling of Stress : Not on file   Social Connections:     Frequency of Communication with Friends and Family: Not on file    Frequency of Social Gatherings with Friends and Family: Not on file    Attends Yazidism Services: Not on file    Active Member of [address] [place] [address] [employer] or Organizations: Not on file    Attends Club or Organization Meetings: Not on file    Marital Status: Not on file   Intimate Partner Violence:     Fear of Current or Ex-Partner: Not on file    Emotionally Abused: Not on file    Physically Abused: Not on file    Sexually Abused: Not on file   Housing Stability:     Unable to Pay for Housing in the Last Year: Not on file    Number of [place] in the Last Year: Not on file    Unstable Housing in the Last Year: Not on file           ROS:  [x] All negative/unchanged except if checked.  Explain positive(checked items) below:  [] Constitutional  [] Eyes  [] Ear/Nose/Mouth/Throat  [] Respiratory  [] CV  [] GI  []   [] Musculoskeletal  [] Skin/Breast  [] Neurological  [] Endocrine  [] Heme/Lymph  [] Allergic/Immunologic    Explanation:     MEDICATIONS:    Current Facility-Administered Medications:     acetaminophen (TYLENOL) tablet 650 mg, 650 mg, Oral, Q6H PRN, Kristi Manzo MD, 650 mg at 04/04/22 1216    magnesium hydroxide (MILK OF MAGNESIA) 400 MG/5ML suspension 30 mL, 30 mL, Oral, Daily PRN, Karen Boss MD, 30 mL at 04/04/22 1221    nicotine (NICODERM CQ) 21 MG/24HR 1 patch, 1 patch, TransDERmal, Daily, Karen Boss MD    aluminum & magnesium hydroxide-simethicone (MAALOX) 200-200-20 MG/5ML suspension 30 mL, 30 mL, Oral, PRN, Karen Boss MD    hydrOXYzine (VISTARIL) capsule 50 mg, 50 mg, Oral, TID PRN, Karen Boss MD    haloperidol (HALDOL) tablet 5 mg, 5 mg, Oral, Q6H PRN **OR** haloperidol lactate (HALDOL) injection 5 mg, 5 mg, IntraMUSCular, Q6H PRN, Karen Boss MD    melatonin tablet 3 mg, 3 mg, Oral, Nightly, Karen Boss MD, 3 mg at 04/02/22 2234    divalproex (DEPAKOTE) DR tablet 500 mg, 500 mg, Oral, BID, Ruthine Girt, APRN - CNP, 702 mg at 04/02/22 2234    OLANZapine (ZYPREXA) tablet 5 mg, 5 mg, Oral, BID, Ruthine Girt, APRN - CNP, 5 mg at 04/02/22 2234    cloNIDine (CATAPRES) tablet 0.1 mg, 0.1 mg, Oral, U2Z PRN, Shine Marizolens Dellick, APRN - CNP    dicyclomine (BENTYL) capsule 10 mg, 10 mg, Oral, BID PRN, Shine Xenia Dellick, APRN - CNP    ibuprofen (ADVIL;MOTRIN) tablet 400 mg, 400 mg, Oral, H1K PRN, Shine Cullens Dellick, APRN - CNP, 330 mg at 04/04/22 1216      Examination:  /84   Pulse 65   Temp 97.8 °F (36.6 °C)   Resp 18   Ht 5' 9\" (1.753 m)   Wt 145 lb (65.8 kg)   SpO2 98%   BMI 21.41 kg/m²   Gait - steady  Medication side effects(SE): denes    Mental Status Examination:    Level of consciousness:  within normal limits   Appearance: Disheveled  Behavior/Motor:  no abnormalities noted  Attitude toward examiner: Uncooperative  Speech:  spontaneous, normal rate and normal volume   Mood: \"I am fine explanation point\"  Affect:  {Irritable easily agitated  Thought processes: Linear goal-directed  Thought content: Devoid of any auditory visualizations delusions or the perceptual abnormalities.   Denies SI/HI intent or plan  Cognition:  oriented to person, place, and time   Concentration poor  Insight poor   Judgement poor     ASSESSMENT:   Patient symptoms are:  [] Well controlled  [] Improving  [] Worsening  [x] No change      Diagnosis:   Principal Problem:    Bipolar mixed affective disorder, moderate (HCC)  Active Problems:    Antisocial personality disorder (HCC)    Polysubstance dependence including opioid drug with daily use ([facility] [address]  Resolved Problems:    * No resolved hospital problems. *      LABS:    No results for input(s): WBC, HGB, PLT in the last 72 hours. Recent Labs     04/01/22  1800      K 4.4      CO2 25   BUN 14   CREATININE 1.0   GLUCOSE 111*     Recent Labs     04/01/22  1800   BILITOT 0.4   ALKPHOS 82   AST 13   ALT 17     Lab Results   Component Value Date    LABAMPH NOT DETECTED 04/01/2022    BARBSCNU NOT DETECTED 04/01/2022    LABBENZ NOT DETECTED 04/01/2022    LABMETH NOT DETECTED 04/01/2022    OPIATESCREENURINE NOT DETECTED 04/01/2022    PHENCYCLIDINESCREENURINE NOT DETECTED 04/01/2022    ETOH <10 04/01/2022     Lab Results   Component Value Date    TSH 8.920 02/02/2021     No results found for: LITHIUM  Lab Results   Component Value Date    VALPROATE 3 (L) 01/09/2021           Treatment Plan:  Reviewed current Medications with the patient. Risks, benefits, side effects, drug-to-drug interactions and alternatives to treatment were discussed. Collateral information:   CD evaluation  Encourage patient to attend group and other milieu activities.   Discharge planning discussed with the patient and treatment team.    Depakote 500 mg twice daily  Continue Zyprexa 5 mg twice daily    PSYCHOTHERAPY/COUNSELING:  [x] Therapeutic interview  [x] Supportive  [] CBT  [] Ongoing  [] Other    [x] Patient continues to need, on a daily basis, active treatment furnished directly by or requiring the supervision of inpatient psychiatric personnel      Anticipated Length of stay: 3-7 days based on stability            Electronically signed by LATASHA Snyder CNP on 4/4/2022 at 4:30 PM

## 2022-04-04 NOTE — PROGRESS NOTES
Pt alert, dismissive. Poor eye contact. Denies SI, HI, and AVH. Pt is upset that we keep waking him up to talk. Pt concerned with needing a place to live when he is discharged. Per report, other RN had told him to talk to SW. Per notes, SW has attempted 4 times to do his assessment and pt continues to turn them away. Advised he make time tomorrow if he wants the help. Pt was irritable, rolled over, and went back to sleep. Pt also refused his meds tonight. When asked why, pt replied, \"I dont need them\".  Will continue to montr

## 2022-04-04 NOTE — PLAN OF CARE
Problem: Depressive Behavior With or Without Suicide Precautions:  Goal: Ability to disclose and discuss suicidal ideas will improve  Description: Ability to disclose and discuss suicidal ideas will improve  Outcome: Met This Shift     Problem: Depressive Behavior With or Without Suicide Precautions:  Goal: Able to verbalize and/or display a decrease in depressive symptoms  Description: Able to verbalize and/or display a decrease in depressive symptoms  Outcome: Ongoing       PT. DENIES SUICIDAL IDEATIONS. PT. GETS UP FOR MEALS AND RETURNS TO BED. DECLINES MEDICATIONS, GROUPS AND COMPLETE ASSESSMENT.

## 2022-04-05 PROCEDURE — 99239 HOSP IP/OBS DSCHRG MGMT >30: CPT | Performed by: NURSE PRACTITIONER

## 2022-04-05 RX ORDER — DIVALPROEX SODIUM 500 MG/1
500 TABLET, DELAYED RELEASE ORAL 2 TIMES DAILY
Qty: 28 TABLET | Refills: 1 | Status: SHIPPED | OUTPATIENT
Start: 2022-04-05 | End: 2022-04-19

## 2022-04-05 RX ORDER — OLANZAPINE 5 MG/1
5 TABLET ORAL 2 TIMES DAILY
Qty: 28 TABLET | Refills: 1 | Status: SHIPPED | OUTPATIENT
Start: 2022-04-05 | End: 2022-04-19

## 2022-04-05 ASSESSMENT — SLEEP AND FATIGUE QUESTIONNAIRES
DO YOU USE A SLEEP AID: COMMENT
DO YOU HAVE DIFFICULTY SLEEPING: COMMENT

## 2022-04-05 ASSESSMENT — PAIN SCALES - GENERAL
PAINLEVEL_OUTOF10: 0
PAINLEVEL_OUTOF10: 0

## 2022-04-05 ASSESSMENT — LIFESTYLE VARIABLES: HISTORY_ALCOHOL_USE: YES

## 2022-04-05 NOTE — PLAN OF CARE
Problem: Depressive Behavior With or Without Suicide Precautions:  Goal: Ability to disclose and discuss suicidal ideas will improve  Description: Ability to disclose and discuss suicidal ideas will improve  4/5/2022 1008 by Hayden Ruiz RN  Outcome: Met This Shift  Pt. denies suicidal ideations. 4/4/2022 2047 by Prema Rendon RN  Outcome: Met This Shift     Problem: Depressive Behavior With or Without Suicide Precautions:  Goal: Able to verbalize and/or display a decrease in depressive symptoms  Description: Able to verbalize and/or display a decrease in depressive symptoms  4/5/2022 1008 by Hayden Ruiz RN  Outcome: Ongoing  Mood irritable at times, abrupt and evasive with assessments and interactions.    4/4/2022 2047 by Prema Rendon RN  Outcome: Ongoing

## 2022-04-05 NOTE — DISCHARGE SUMMARY
DISCHARGE SUMMARY      Patient ID:  Waldo Barrera  [id number]  27 y.o.  1995    Admit date: 4/1/2022    Discharge date and time: 4/5/2022    Admitting Physician: Lorenzo Banks MD     Discharge Physician: Dr Beatriz Gray MD    Discharge Diagnoses:   Patient Active Problem List   Diagnosis    Polysubstance abuse ([facility]    [facility]oaffective disorder, bipolar type ([facility]    Antisocial personality disorder ([facility]    Major depression, single episode    Bipolar affective disorder, mixed, severe, with psychotic behavior ([facility]    Left arm cellulitis    Cellulitis    Noncompliance by refusing service    Abscess of antecubital fossa    IVDU (intravenous drug user)    Abscess    MDD (major depressive disorder), single episode    Bipolar disorder, curr episode mixed, severe, with psychotic features ([facility]    Polysubstance dependence including opioid drug with daily use ([facility]    Bipolar mixed affective disorder, moderate ([facility]       Admission Condition: poor    Discharged Condition: stable    Admission Circumstance: presented the ED reporting depression and suicidal ideations.         PAST MEDICAL/PSYCHIATRIC HISTORY:   Past Medical History:   Diagnosis Date    Hepatitis C virus carrier state ([facility]        FAMILY/SOCIAL HISTORY:  Family History   Family history unknown: Yes     Social History     Socioeconomic History    Marital status: Single     Spouse name: Not on file    Number of children: 0    Years of education: 15    Highest education level: Not on file   Occupational History    Not on file   Tobacco Use    Smoking status: Current Every Day Smoker     Packs/day: 1.00     Years: 15.00     Pack years: 15.00     Types: Cigarettes     Start date: 1/9/2004    Smokeless tobacco: Never Used    Tobacco comment: states quit cannot tell me when   Vaping Use    Vaping Use: Never used   Substance and Sexual Activity    Alcohol use:  Yes     Alcohol/week: 3.0 standard drinks     Types: 3 Cans of beer per week Comment:  tall boys and malt liquor/ qd states he drinks whatever he can    Drug use: Yes     Types: Cocaine, IV, Methamphetamines (Crystal Meth), Opiates     Sexual activity: Not Currently     Comment: unknown   Other Topics Concern    Not on file   Social History Narrative    Not on file     Social Determinants of Health     Financial Resource Strain:     Difficulty of Paying Living Expenses: Not on file   Food Insecurity:     Worried About Running Out of Food in the Last Year: Not on file    Soha of Food in the Last Year: Not on file   Transportation Needs:     Lack of Transportation (Medical): Not on file    Lack of Transportation (Non-Medical):  Not on file   Physical Activity:     Days of Exercise per Week: Not on file    Minutes of Exercise per Session: Not on file   Stress:     Feeling of Stress : Not on file   Social Connections:     Frequency of Communication with Friends and Family: Not on file    Frequency of Social Gatherings with Friends and Family: Not on file    Attends Orthodoxy Services: Not on file    Active Member of [address] [place] [address] or Organizations: Not on file    Attends Club or Organization Meetings: Not on file    Marital Status: Not on file   Intimate Partner Violence:     Fear of Current or Ex-Partner: Not on file    Emotionally Abused: Not on file    Physically Abused: Not on file    Sexually Abused: Not on file   Housing Stability:     Unable to Pay for Housing in the Last Year: Not on file    Number of [place] in the Last Year: Not on file    Unstable Housing in the Last Year: Not on file       MEDICATIONS:    Current Facility-Administered Medications:     acetaminophen (TYLENOL) tablet 650 mg, 650 mg, Oral, Q6H PRN, Berta Wayne MD, 650 mg at 04/04/22 2139    magnesium hydroxide (MILK OF MAGNESIA) 400 MG/5ML suspension 30 mL, 30 mL, Oral, Daily PRN, Berta Wayne MD, 30 mL at 04/04/22 1221    nicotine (NICODERM CQ) 21 MG/24HR 1 patch, 1 patch, TransDERmal, Daily, Berta Wayne MD    aluminum & magnesium hydroxide-simethicone (MAALOX) 200-200-20 MG/5ML suspension 30 mL, 30 mL, Oral, PRN, Berta Wayne MD    hydrOXYzine (VISTARIL) capsule 50 mg, 50 mg, Oral, TID PRN, Berta Wayne MD    haloperidol (HALDOL) tablet 5 mg, 5 mg, Oral, Q6H PRN **OR** haloperidol lactate (HALDOL) injection 5 mg, 5 mg, IntraMUSCular, Q6H PRN, Berta Wayne MD    melatonin tablet 3 mg, 3 mg, Oral, Nightly, Berta Wayne MD, 3 mg at 04/04/22 2138    divalproex (DEPAKOTE) DR tablet 500 mg, 500 mg, Oral, BID, LATASHA Gill - CNP, 602 mg at 04/04/22 2138    OLANZapine (ZYPREXA) tablet 5 mg, 5 mg, Oral, BID, LATASHA Gill - CNP, 5 mg at 04/04/22 2138    cloNIDine (CATAPRES) tablet 0.1 mg, 0.1 mg, Oral, T4O PRN, LATASHA Gill - CNP    dicyclomine (BENTYL) capsule 10 mg, 10 mg, Oral, BID PRN, LATASHA Villanueva - TAINA    ibuprofen (ADVIL;MOTRIN) tablet 400 mg, 400 mg, Oral, D1T PRN, LATASHA Gill - CNP, 115 mg at 04/04/22 2138    Current Outpatient Medications:     divalproex (DEPAKOTE) 500 MG DR tablet, Take 1 tablet by mouth 2 times daily for 14 days, Disp: 28 tablet, Rfl: 1    OLANZapine (ZYPREXA) 5 MG tablet, Take 1 tablet by mouth 2 times daily for 14 days, Disp: 28 tablet, Rfl: 1    nicotine (NICODERM CQ) 21 MG/24HR, Place 1 patch onto the skin daily, Disp: 30 patch, Rfl: 0    ibuprofen (IBU) 800 MG tablet, Take 1 tablet by mouth every 8 hours as needed for Pain, Disp: 21 tablet, Rfl: 0    Examination:  /84   Pulse 65   Temp 97.8 °F (36.6 °C)   Resp 18   Ht 5' 9\" (1.753 m)   Wt 145 lb (65.8 kg)   SpO2 98%   BMI 21.41 kg/m²   Gait - steady    HOSPITAL COURSE[de-identified]     Patient was admitted to the unit on 4/1/22 was closely monitored for suicidal ideations.   He was evaluated was treated with Depakote 500 mg twice daily and Zyprexa 5 mg twice daily medical events were insignificant and patient continued to improve on the floor. He never made any suicidal statements or any suicidal gestures while in the unit. Social workers obtain collateral information from patient's  who was able to voicing concerns that she had. She reported the patient's family have protection orders out against the patient and that patient has a very severe drug addiction. Patient refused any inpatient drug rehabilitation services however he was interested in Suboxone resources and an appointment was made for patient to be seen by addiction specialist on discharge to be started on Suboxone. Patient is p.o. report the patient already to call her upon discharge as patient does have a warrant out for his arrest.  Treatment team felt the patient obtain the maximum benefit from his hospitalization he was set up with an outpatient mental health agency for outpatient follow-up services. At the time of discharge patient did not show any impulsive behavior. He vehemently denied any suicidal homicidal ideations intent or plan. He was eating well and sleeping well there are no neurovegetative signs or symptoms of depression he denied any auditory or visual hallucinations. He is future oriented wanting to meet with the addiction specialist in order to start Suboxone treatment. There are no overt or covert signs of psychosis. He was appreciative of the help that he received here. This patient no longer meets criteria for inpatient hospitalization. No AVH or paranoid thoughts  No hopeless or worthless feeling  No active SI/HI  Appetite:  [x] Normal  [] Increased  [] Decreased    Sleep:       [x] Normal  [] Fair       [] Poor            Energy:    [x] Normal  [] Increased  [] Decreased     SI [] Present  [x] Absent  HI  []Present  [x] Absent   Aggression:  [] yes  [x] no  Patient is [x] able  [] unable to CONTRACT FOR SAFETY   Medication side effects(SE):  [x] None(Psych.  Meds.) [] Other      Mental Status Examination on discharge: Level of consciousness:  within normal limits   Appearance:  well-appearing  Behavior/Motor:  no abnormalities noted  Attitude toward examiner:  attentive and good eye contact  Speech:  spontaneous, normal rate and normal volume   Mood: \" I am feeling better. \"  Affect: Appropriate and pleasant  Thought processes: Linear without flight of ideas loose associations  Thought content: Devoid of any auditory visualizations delusions or the perceptual abnormalities. Denies SI/HI intent or plan  Cognition:  oriented to person, place, and time   Concentration intact  Memory intact  Insight good   Judgement fair   Fund of Knowledge adequate      ASSESSMENT:  Patient symptoms are:  [x] Well controlled  [x] Improving  [] Worsening  [] No change    Reason for more than one antipsychotic:  [x] N/A  [] 3 Failed Monotherapy attempts (Drugs tried:)  [] Crossover to a new antipsychotic  [] Taper to Monotherapy from Polypharmacy  [] Augmentation of clozapine therapy due to treatment resistance to single therapy    Diagnosis:  Principal Problem:    Bipolar mixed affective disorder, moderate (HCC)  Active Problems:    Antisocial personality disorder ([facility]    Polysubstance dependence including opioid drug with daily use ([facility]  Resolved Problems:    * No resolved hospital problems. *      LABS:    No results for input(s): WBC, HGB, PLT in the last 72 hours. No results for input(s): NA, K, CL, CO2, BUN, CREATININE, GLUCOSE in the last 72 hours. No results for input(s): BILITOT, ALKPHOS, AST, ALT in the last 72 hours.   Lab Results   Component Value Date    LABAMPH NOT DETECTED 04/01/2022    BARBSCNU NOT DETECTED 04/01/2022    LABBENZ NOT DETECTED 04/01/2022    LABMETH NOT DETECTED 04/01/2022    OPIATESCREENURINE NOT DETECTED 04/01/2022    PHENCYCLIDINESCREENURINE NOT DETECTED 04/01/2022    ETOH <10 04/01/2022     Lab Results   Component Value Date    TSH 8.920 02/02/2021     No results found for: LITHIUM  Lab Results   Component Value this     Patient is counseled that he he uses any amount of opiates after any clean time he could have an unintentional overdose he demonstrated understanding of this and has the capacity to understand this    Patient is counseled he must remain compliant with all medications outpatient follow-up ointments    Patient is discharged home in stable condition    TIME SPEND - 35 MINUTES TO COMPLETE THE EVALUATION, DISCHARGE SUMMARY, MEDICATION RECONCILIATION AND FOLLOW UP CARE     Signed:  Verner Blumenthal, APRN - CNP  4/0/8168  3:28 PM

## 2022-04-05 NOTE — CARE COORDINATION
Peer Support met with pt in the ED. Pt said he might be interested in detox but then got up and walked to the bathroom. Peer support will wait to see what happens while pt is here.
SW met with patient to complete intake assessment. Patient presents as irritable, constricted, and not cooperative. Patient engaged about need to complete intake assessment. Patient pulled sheet off his head but was slow to respond. Patient states he would like to wait to talk to the doctor. SW explained that the SW team is a part of the treatment team and if he does not complete his intake assessment he will not be discharged. Patient response delayed but then agreed to allow SW to complete assessment. SW asked what had happened to bring patient into hospital. Patient then stated \"I don't want to talk right now, I want to wait to talk to [employer] the doctor\". SW explained the need to complete assessment and that she will be stopping in to complete throughout the day.
SW met with patient to discuss discharge. Patient presents as pressured, goal oriented, future focused, and in behavioral control. Patient denies SI/HI/AVH. Patient at time of meeting does not present a risk to self or others. Patient focused on resolving tasks (eg. Getting ID, social security card, getting on suboxone, and a haircut). Patient states he plans to stay with friends in [place] or his cousin but refused to allow SW to confirm. Patient was provided a list of shelters in the area if he is unable to stay with them. Patient states he does not want to stay at a shelter as \"theyre always in bad sections of town, staff there try to mouth off at you\". Patient states he will take the bus to where he is staying and is familiar with routes. Patient to follow up with [facility] and was provided with an appointment with Dr. Deon Munoz to begin suboxone. Patient was provided with hard prescriptions to bring to his pharmacy of choice at discharge.          In order to ensure appropriate transition and discharge planning is in place, the following documents have been transmitted to [facility], as the new outpatient provider:     The d/c diagnosis was transmitted to the next care provider   The reason for hospitalization was transmitted to the next care provider   The d/c medications (dosage and indication) were transmitted to the next care provider    The continuing care plan was transmitted to the next care provider
SW met with pt to discuss possible crisis unit placement. Pt stated that he does not want to go there. Pt reports still having SI, no plan, no intent. Pt reports still being depressed. Pt reports that he believes he will be going through withdrawals soon and stated, \"maybe I will just go to detox tomorrow. \" Staffed pt with ED doctor who pinked slipped the patient.
Sw and NP Connor Montes met with pt and pt agreed to sign GABO for PO Arti [phone], press 4 and press 5 to be connected with Arti. Arti reports that pt can have suboxone if we recommend that he receive it, reports that pt does not really want it and that he is just saying this. Arti also reports that they have a warrant out for pt's arrest and that she would like to be informed of a discharge date as soon as we know it as she is going to talk with her supervisor and that they will most likely pick pt up at discharge and transport to shelter.
Sw attempted for third time to assess pt. Pt continues to deny talking with sw, states \"I don't feel good\". Pt continues to lay in his room with blankets covering his head. Sw to attempt again at later time.
Sw attempted to assess pt again. Pt states \"I don't want to talk to you, go away\". Sw informed pt that his PO wants him to go see her at discharge. Pt did not respond to sw, continued to lay in bed with the blanket covering his head. Pt continues to be uncooperative, sw to attempt assessment again at later time.
Sw attempted to assess pt for the fourth time today, pt continues to state that he does not feel well and that he does not want to talk with sw at this time. Sw to attempt again at later time.
Sw attempted to assess pt, pt said that he is not feeling well and asked sw to come back. Sw to attempt again at a later time.
Sw attempted to assess pt. Pt states \"I'm still trying to get over my sickness\", asked sw to come back at later time. Sw spoke with Ana Sommers, she reports that she needs to talk with her supervisor and call sw back to discuss plan for pt. Arti called sw back and informed sw that she spoke with her supervisor and that they are not going to arrest pt as soon as he is discharged. She reports that pt does have a warrant out for his arrest, but that he is just supposed to report to them when he is discharged.
Sw attempted to assess pt. Pt states that he does not feel well and asked sw to come back at a later time. Sw to attempt again at later time.
Sw attempted to complete assessment with pt for a second time. Pt stated he 'cant do it right now,' declined to elaborate further.
Sw attempted to complete assessment with pt. Pt stated he doesn't feel good and needs some time to himself. Pt declined completing assessment at this time. Sw will try again later.
YESY called [facility] and secured patient's follow up appointment for 4/11/22 at 3pm    YESY reached out to patient's PO Coty [phone]. She was updated about patient's behavior on unit, his selective compliance with medications and pending discharge for today. S he stated an understanding. YESY stated that patient plans to stay with friends in [place]. She states patient does not have friends in [place], he has a brother or step brother in [place] but they have a protection order out on patient. She states all of patient's family have protection orders out on him and he has not source of supports. She states she is not allowed to arrest him upon discharge and he has to turn himself in. YESY states she will provide patient with Officer Coty's number. Officer [facility] states patient has Zia Staples very severe drug addiction\" and she expects he will use upon discharge.
    Following information gained from chart review:    Gender   [x] Male [] Female [] Transgender  [] Other    Sexual Orientation    [x] Heterosexual [] Homosexual [] Bisexual [] Other    Suicidal Ideation  [x] Past [] Present [] Denies     Homicidal Ideation  [] Past [] Present [x] Denies     Hallucinations/Delusions (Specify type)  [] Reports [x] Denies     Substance Use/Alcohol Use/Addiction  [x] Reports [] Denies     Tobacco Use (within the last 6 months)  [x] Reports [] Denies     Trauma History  [x] Reports [] Denies     Collateral Contact (GABO signed)  Name:  Officer Arti  Relationship:   Number: [phone]     Collateral Information: Refer to inpatient SW progress notes. Access to Weapons per Collateral Contact: [] Reports [x] Denies     Follow up provider preference: [facility]    Plan for discharge  Location (where do they plan on discharging to?): Pt's friends home in [address] [place] [address] (who will pick them up at discharge?) Pt reported that he will ride the bus    Medications (will they have money for copays at discharge?): Pt reported that he does not have money for prescriptions. Pt has [employer], which should cover prescription expenses.      PATEL Kamara, [place] Work Supervisor

## 2022-04-05 NOTE — PLAN OF CARE
Problem: Depressive Behavior With or Without Suicide Precautions:  Goal: Able to verbalize and/or display a decrease in depressive symptoms  Description: Able to verbalize and/or display a decrease in depressive symptoms  4/4/2022 2047 by Monique Ferreira RN  Outcome: Ongoing  4/4/2022 1020 by Nyoka Hodgkins, RN  Outcome: Ongoing  Goal: Ability to disclose and discuss suicidal ideas will improve  Description: Ability to disclose and discuss suicidal ideas will improve  4/4/2022 2047 by Monique Ferreira RN  Outcome: Met This Shift  4/4/2022 1020 by Nyoka Hodgkins, RN  Outcome: Met This Shift     Pt denies SI, HI and AVH. Pt out on the unit for meals. Pt presents sad, depressed and worried. Pt wanted an ice pack for swollen rt ankle. Ice pack given. PRNs given. Pt focused on getting help. Isolative to room. Avoids gaze. Irritable. Will continue to monitor.

## 2022-11-17 ENCOUNTER — APPOINTMENT (OUTPATIENT)
Dept: GENERAL RADIOLOGY | Age: 27
End: 2022-11-17
Payer: COMMERCIAL

## 2022-11-17 ENCOUNTER — HOSPITAL ENCOUNTER (EMERGENCY)
Age: 27
Discharge: HOME | End: 2022-11-17
Payer: COMMERCIAL

## 2022-11-17 VITALS
TEMPERATURE: 98 F | BODY MASS INDEX: 21.33 KG/M2 | HEIGHT: 69 IN | WEIGHT: 144 LBS | OXYGEN SATURATION: 98 % | RESPIRATION RATE: 20 BRPM | DIASTOLIC BLOOD PRESSURE: 88 MMHG | SYSTOLIC BLOOD PRESSURE: 112 MMHG | HEART RATE: 60 BPM

## 2022-11-17 DIAGNOSIS — M54.50 ACUTE BILATERAL LOW BACK PAIN WITHOUT SCIATICA: Primary | ICD-10-CM

## 2022-11-17 PROCEDURE — 72100 X-RAY EXAM L-S SPINE 2/3 VWS: CPT

## 2022-11-17 PROCEDURE — 99283 EMERGENCY DEPT VISIT LOW MDM: CPT

## 2022-11-17 PROCEDURE — 6370000000 HC RX 637 (ALT 250 FOR IP): Performed by: NURSE PRACTITIONER

## 2022-11-17 RX ORDER — KETOROLAC TROMETHAMINE 30 MG/ML
30 INJECTION, SOLUTION INTRAMUSCULAR; INTRAVENOUS ONCE
Status: DISCONTINUED | OUTPATIENT
Start: 2022-11-17 | End: 2022-11-17 | Stop reason: HOSPADM

## 2022-11-17 RX ORDER — MELOXICAM 7.5 MG/1
7.5 TABLET ORAL DAILY
Qty: 15 TABLET | Refills: 0 | Status: SHIPPED | OUTPATIENT
Start: 2022-11-17

## 2022-11-17 RX ORDER — CYCLOBENZAPRINE HCL 10 MG
10 TABLET ORAL ONCE
Status: COMPLETED | OUTPATIENT
Start: 2022-11-17 | End: 2022-11-17

## 2022-11-17 RX ORDER — CYCLOBENZAPRINE HCL 10 MG
10 TABLET ORAL 3 TIMES DAILY PRN
Qty: 21 TABLET | Refills: 0 | Status: SHIPPED | OUTPATIENT
Start: 2022-11-17 | End: 2022-11-27

## 2022-11-17 RX ADMIN — CYCLOBENZAPRINE 10 MG: 10 TABLET, FILM COATED ORAL at 20:24

## 2022-11-17 ASSESSMENT — PAIN SCALES - GENERAL
PAINLEVEL_OUTOF10: 10
PAINLEVEL_OUTOF10: 0

## 2022-11-17 ASSESSMENT — PAIN - FUNCTIONAL ASSESSMENT: PAIN_FUNCTIONAL_ASSESSMENT: 0-10

## 2022-11-18 NOTE — ED PROVIDER NOTES
[facility]       Department of Emergency Medicine   ED  Encounter Note  Admit Date/RoomTime: 2022  7:03 PM  ED Room: 34/34  NAME: Mirian Bain  : 1995  MRN: [id number]     Chief Complaint:  Back Pain    HISTORY OF PRESENT ILLNESS        Mirian Bain is a 32 y.o. male who presents to the ED via private vehicle for back pain for the last 2 to 3 days. States it is giving out and he is unable to sit still. States onset after being released from [facility]. He is taking ibuprofen without any relief. No bowel or bladder dysfunction. No radiation of pain into his leg. No leg weakness. No saddle anesthesia. Described as a weakness and giving out and moderate  ROS   Pertinent positives and negatives are stated within HPI, all other systems reviewed and are negative. Past Medical History:  has a past medical history of Hepatitis C virus carrier state ([facility] Surgical History:  has no past surgical history on file. Social History:  reports that he has been smoking cigarettes. He started smoking about 18 years ago. He has a 15.00 pack-year smoking history. He has never used smokeless tobacco. He reports current alcohol use of about 3.0 standard drinks per week. He reports current drug use. Drugs: Cocaine, IV, Methamphetamines (Crystal Meth), and Opiates . Family History: Family history is unknown by patient. Allergies: Haloperidol and related    PHYSICAL EXAM   Oxygen Saturation Interpretation: Normal on room air analysis. ED Triage Vitals [22 1900]   BP Temp Temp Source Heart Rate Resp SpO2 Height Weight   112/88 98 °F (36.7 °C) Oral 60 20 98 % 5' 9\" (1.753 m) 144 lb (65.3 kg)         Physical Exam  General: Awake alert and oriented. Well-appearing. Nontoxic. HEENT: Normocephalic, atraumatic. Pupils equal  Neck: Normal range of motion  Cardiac: Regular rate  Respiratory: Respirations even, unlabored.   No respiratory distress  Abdomen: Nondistended  Musculoskelatal: Moves all extremities x4  Back: Lumbar spine with diffuse paraspinous discomfort  Neuro: Nonlateralizing, 2+ patellar reflexes, negative slr, sensation intact 5 out of 5 strength bilateral lower extremities normal flexion extension ankles toes. Steady gait without any ataxia or limp  Skin: Flesh tone, warm, dry  Psych: anxious  Lab / Imaging Results   (All laboratory and radiology results have been personally reviewed by myself)  Labs:  No results found for this visit on 11/17/22. Imaging: All Radiology results interpreted by Radiologist unless otherwise noted. XR LUMBAR SPINE (2-3 VIEWS)   Final Result   Normal three-view lumbar spine with no fracture or subluxation. ED Course / Medical Decision Making     Medications   ketorolac (TORADOL) injection 30 mg (30 mg IntraMUSCular Not Given 11/17/22 1937)   cyclobenzaprine (FLEXERIL) tablet 10 mg (10 mg Oral Given 11/17/22 2024)            MDM:   Initially offered Toradol and refused because he is concerned with addiction despite our efforts to reassure him that this is not a narcotic. He is given a dose of Flexeril here. Lumbar spine x-ray is not demonstrating any evidence of osseous lesion. He will be discharged with muscle relaxers and PCP follow-up. He does not have anyone for follow-up and will be provided numbers to establish care. He is given strict return precautions. His exam is not representative of cauda equina or spinal abscess hematoma. He has no neurologic deficits diffuse paraspinal discomfort with a benign neurologic exam    Plan of Care/Counseling:  LATASHA Meyer CNP reviewed today's visit with the patient in addition to providing specific details for the plan of care and counseling regarding the diagnosis and prognosis. Questions are answered at this time and are agreeable with the plan. ASSESSMENT     1. Acute bilateral low back pain without sciatica      PLAN   Discharged home.   Patient condition is good    New Medications New Prescriptions    CYCLOBENZAPRINE (FLEXERIL) 10 MG TABLET    Take 1 tablet by mouth 3 times daily as needed for Muscle spasms    MELOXICAM (MOBIC) 7.5 MG TABLET    Take 1 tablet by mouth daily     Electronically signed by LATASHA Sen CNP   DD: 11/17/22  **This report was transcribed using voice recognition software. Every effort was made to ensure accuracy; however, inadvertent computerized transcription errors may be present.   END OF ED PROVIDER NOTE       LATASHA Sen CNP  11/17/22 9155

## 2022-11-20 ENCOUNTER — HOSPITAL ENCOUNTER (EMERGENCY)
Age: 27
Discharge: HOME | End: 2022-11-20
Attending: EMERGENCY MEDICINE
Payer: COMMERCIAL

## 2022-11-20 VITALS
HEART RATE: 117 BPM | SYSTOLIC BLOOD PRESSURE: 137 MMHG | WEIGHT: 144 LBS | RESPIRATION RATE: 18 BRPM | OXYGEN SATURATION: 98 % | BODY MASS INDEX: 21.27 KG/M2 | DIASTOLIC BLOOD PRESSURE: 90 MMHG | TEMPERATURE: 98 F

## 2022-11-20 DIAGNOSIS — T40.1X4A HEROIN OVERDOSE, UNDETERMINED INTENT, INITIAL ENCOUNTER (HCC): Primary | ICD-10-CM

## 2022-11-20 PROCEDURE — 99283 EMERGENCY DEPT VISIT LOW MDM: CPT

## 2022-11-20 RX ORDER — NALOXONE HYDROCHLORIDE 4 MG/.1ML
1 SPRAY NASAL PRN
Qty: 1 EACH | Refills: 0 | Status: SHIPPED | OUTPATIENT
Start: 2022-11-20

## 2022-11-20 ASSESSMENT — LIFESTYLE VARIABLES: HOW OFTEN DO YOU HAVE A DRINK CONTAINING ALCOHOL: MONTHLY OR LESS

## 2022-11-20 ASSESSMENT — PAIN - FUNCTIONAL ASSESSMENT: PAIN_FUNCTIONAL_ASSESSMENT: NONE - DENIES PAIN

## 2022-11-21 NOTE — ED PROVIDER NOTES
Department of Emergency Medicine   ED  Provider Note  Admit Date/RoomTime: 11/20/2022 10:28 PM  ED Room: [facility] [place]/              11/20/22  10:49 PM EST    History of Present Illness:   Rosa Oliveira is a 32 y.o. male presenting to the ED for opiate OD. Provoking factors-patient was brought in by EMS for opiate OD, he was given Narcan in the field. Patient admits to opiate abuse and states he was not trying to harm himself and was only trying to get high. Review of Systems:   A complete review of systems was performed and pertinent positives and negatives are stated within HPI, all other systems reviewed and are negative.          --------------------------------------------- PAST HISTORY ---------------------------------------------  Past Medical History:  has a past medical history of Hepatitis C virus carrier state ([facility] [address] Past Surgical History:  has no past surgical history on file. Social History:  reports that he has been smoking cigarettes. He started smoking about 18 years ago. He has a 15.00 pack-year smoking history. He has never used smokeless tobacco. He reports current alcohol use of about 3.0 standard drinks per week. He reports current drug use. Drugs: Cocaine, IV, Methamphetamines (Crystal Meth), and Opiates . Family History: Family history is unknown by patient. Unless otherwise noted, family history is non contributory    The patients home medications have been reviewed. Allergies: Haloperidol and related    -------------------------------------------------- RESULTS -------------------------------------------------  All laboratory and radiology results have been personally reviewed by myself   LABS:  No results found for this visit on 11/20/22.     RADIOLOGY:  Interpreted by Radiologist.  No orders to display       ------------------------- NURSING NOTES AND VITALS REVIEWED ---------------------------   The nursing notes within the ED encounter and vital signs as below have been reviewed. BP (!) 137/90   Pulse (!) 117   Temp 98 °F (36.7 °C) (Oral)   Resp 18   Wt 144 lb (65.3 kg)   SpO2 98%   BMI 21.27 kg/m²   Oxygen Saturation Interpretation: Normal      ---------------------------------------------------PHYSICAL EXAM--------------------------------------    Constitutional/General: Alert and oriented x3, well appearing, non toxic in NAD  Head: Normocephalic and atraumatic  Eyes: PERRL, EOMI, conjunctiva normal, sclera non icteric  Mouth: Oropharynx clear, handling secretions, no trismus, no asymmetry of the posterior oropharynx or uvular edema  Neck: Supple, full ROM, non tender to palpation in the midline, no stridor, no crepitus, no meningeal signs  Respiratory: Lungs clear to auscultation bilaterally, no wheezes, rales, or rhonchi. Not in respiratory distress  Cardiovascular:  Regular rate. Regular rhythm. No murmurs, gallops, or rubs. 2+ distal pulses  Chest: No chest wall tenderness  GI:  Abdomen Soft, Non tender, Non distended. +BS. No organomegaly, no palpable masses,  No rebound, guarding, or rigidity. Musculoskeletal: Moves all extremities x 4. Warm and well perfused, no clubbing, cyanosis, or edema. Capillary refill <3 seconds  Integument: skin warm and dry. No rashes. Lymphatic: no lymphadenopathy noted  Neurologic: GCS 15, no focal deficits, symmetric strength 5/5 in the upper and lower extremities bilaterally  Psychiatric: Normal Affect      ------------------------------ ED COURSE/MEDICAL DECISION MAKING----------------------  Medications   NALOXONE OPIATE OVERDOSE KIT 1 each (has no administration in time range)         Medical Decision Making:    Patient was seen and examined. Patient admits to opiate abuse. Patient was given Narcan in the field and is awake alert oriented x3. Patient was given Narcan kit for home. Patient will be discharged home and given rehab information. Counseling:    The emergency provider has spoken with the patient and discussed todays results, in addition to providing specific details for the plan of care and counseling regarding the diagnosis and prognosis. Questions are answered at this time and they are agreeable with the plan.      --------------------------------- IMPRESSION AND DISPOSITION ---------------------------------    IMPRESSION  1.  Heroin overdose, undetermined intent, initial encounter [facility])        DISPOSITION  Disposition: Discharge to home  Patient condition is stable               Tiffany Qiu MD  11/30/22 1124

## 2022-12-23 ENCOUNTER — HOSPITAL ENCOUNTER (EMERGENCY)
Age: 27
Discharge: HOME | End: 2022-12-23
Attending: EMERGENCY MEDICINE
Payer: COMMERCIAL

## 2022-12-23 VITALS
SYSTOLIC BLOOD PRESSURE: 151 MMHG | DIASTOLIC BLOOD PRESSURE: 92 MMHG | RESPIRATION RATE: 18 BRPM | HEART RATE: 72 BPM | HEIGHT: 69 IN | WEIGHT: 150 LBS | TEMPERATURE: 97.7 F | BODY MASS INDEX: 22.22 KG/M2 | OXYGEN SATURATION: 100 %

## 2022-12-23 DIAGNOSIS — R21 RASH AND OTHER NONSPECIFIC SKIN ERUPTION: Primary | ICD-10-CM

## 2022-12-23 DIAGNOSIS — T23.209A PARTIAL THICKNESS BURN OF HAND, UNSPECIFIED LATERALITY, UNSPECIFIED SITE OF HAND, INITIAL ENCOUNTER: ICD-10-CM

## 2022-12-23 PROCEDURE — 6370000000 HC RX 637 (ALT 250 FOR IP): Performed by: PHYSICIAN ASSISTANT

## 2022-12-23 PROCEDURE — 99283 EMERGENCY DEPT VISIT LOW MDM: CPT

## 2022-12-23 RX ORDER — BACITRACIN ZINC AND POLYMYXIN B SULFATE 500; 1000 [USP'U]/G; [USP'U]/G
OINTMENT TOPICAL
Qty: 30 G | Refills: 0 | Status: SHIPPED | OUTPATIENT
Start: 2022-12-23 | End: 2022-12-30

## 2022-12-23 RX ORDER — BACITRACIN ZINC 500 [USP'U]/G
OINTMENT TOPICAL ONCE
Status: COMPLETED | OUTPATIENT
Start: 2022-12-23 | End: 2022-12-23

## 2022-12-23 RX ORDER — PERMETHRIN 50 MG/G
CREAM TOPICAL
Qty: 60 G | Refills: 1 | Status: SHIPPED | OUTPATIENT
Start: 2022-12-23

## 2022-12-23 RX ORDER — IBUPROFEN 800 MG/1
800 TABLET ORAL EVERY 8 HOURS PRN
Qty: 21 TABLET | Refills: 0 | Status: SHIPPED | OUTPATIENT
Start: 2022-12-23 | End: 2022-12-30

## 2022-12-23 RX ORDER — CEPHALEXIN 250 MG/1
500 CAPSULE ORAL ONCE
Status: COMPLETED | OUTPATIENT
Start: 2022-12-23 | End: 2022-12-23

## 2022-12-23 RX ORDER — IBUPROFEN 800 MG/1
800 TABLET ORAL ONCE
Status: DISCONTINUED | OUTPATIENT
Start: 2022-12-23 | End: 2022-12-23 | Stop reason: HOSPADM

## 2022-12-23 RX ORDER — CEPHALEXIN 500 MG/1
500 CAPSULE ORAL 4 TIMES DAILY
Qty: 40 CAPSULE | Refills: 0 | Status: SHIPPED | OUTPATIENT
Start: 2022-12-23 | End: 2023-01-02

## 2022-12-23 RX ADMIN — CEPHALEXIN 500 MG: 250 CAPSULE ORAL at 17:52

## 2022-12-23 RX ADMIN — BACITRACIN: 500 OINTMENT TOPICAL at 17:54

## 2022-12-23 ASSESSMENT — LIFESTYLE VARIABLES
HOW OFTEN DO YOU HAVE A DRINK CONTAINING ALCOHOL: MONTHLY OR LESS
HOW MANY STANDARD DRINKS CONTAINING ALCOHOL DO YOU HAVE ON A TYPICAL DAY: 3 OR 4

## 2022-12-23 NOTE — ED PROVIDER NOTES
ED Attending  CC: No   HPI:  12/23/22, Time: 5:29 PM EDUARD Venegas is a 32 y.o. male presenting to the ED for hand burn, rash, beginning 5 days ago. The complaint has been persistent, moderate in severity, and worsened by movement of hands. Patient Comes in with complaint of hand burn bilateral hands on fingers and knuckles started a 5  days ago. He also has rash of the wrist and hands from insect bites from staying at a friend's house. Review of Systems:   A complete review of systems was performed and pertinent positives and negatives are stated within HPI, all other systems reviewed and are negative.          --------------------------------------------- PAST HISTORY ---------------------------------------------  Past Medical History:  has a past medical history of Hepatitis C virus carrier state ([facility] [address] Past Surgical History:  has no past surgical history on file. Social History:  reports that he has been smoking cigarettes. He started smoking about 18 years ago. He has a 15.00 pack-year smoking history. He has never used smokeless tobacco. He reports current alcohol use of about 3.0 standard drinks per week. He reports that he does not currently use drugs after having used the following drugs: Cocaine, IV, Methamphetamines (Crystal Meth), and Opiates . Family History: Family history is unknown by patient. The patients home medications have been reviewed. Allergies: Haloperidol and related    -------------------------------------------------- RESULTS -------------------------------------------------  All laboratory and radiology results have been personally reviewed by myself   LABS:  No results found for this visit on 12/23/22. RADIOLOGY:  Interpreted by Radiologist.  No orders to display       ------------------------- NURSING NOTES AND VITALS REVIEWED ---------------------------   The nursing notes within the ED encounter and vital signs as below have been reviewed. BP (!) 151/92   Pulse 72   Temp 97.7 °F (36.5 °C) (Oral)   Resp 18   Ht 5' 9\" (1.753 m)   Wt 150 lb (68 kg)   SpO2 100%   BMI 22.15 kg/m²   Oxygen Saturation Interpretation: Normal      ---------------------------------------------------PHYSICAL EXAM--------------------------------------      Constitutional/General: Alert and oriented x3, well appearing, non toxic in NAD  Head: Normocephalic and atraumatic  Eyes: PERRL, EOMI  Mouth: Oropharynx clear, handling secretions, no trismus  Neck: Supple, full ROM,   Pulmonary: Lungs clear to auscultation bilaterally, no wheezes, rales, or rhonchi. Not in respiratory distress  Cardiovascular:  Regular rate and rhythm, no murmurs, gallops, or rubs. 2+ distal pulses  Abdomen: Soft, non tender, non distended,   Extremities: Moves all extremities x 4. Warm and well perfused bilateral hands with scattered first second and degree burns of the palm and over the PIP joint of the right index and ring finger. Scattered areas of scabbing on the right lateral thumb. As well as scattered small excoriated rash of the bilateral wrist and dorsal aspect bilateral hands. Patient with full flexion extension of the fingers hands pulses normal cap refill less than 2-second  Skin: warm and dry without rash  Neurologic: GCS 15,  Psych: Anxious affect      ------------------------------ ED COURSE/MEDICAL DECISION MAKING----------------------  Medications   ibuprofen (ADVIL;MOTRIN) tablet 800 mg (800 mg Oral Not Given 12/23/22 1754)   bacitracin zinc ointment ( Topical Given 12/23/22 1754)   cephALEXin (KEFLEX) capsule 500 mg (500 mg Oral Given 12/23/22 1752)         ED COURSE:  ED Course as of 12/23/22 1822   Fri Dec 23, 2022   [address] ATTENDING PROVIDER ATTESTATION:     I have personally performed and/or participated in the history, exam, medical decision making, and procedures and agree with all pertinent clinical information unless otherwise noted.     I have also reviewed and agree with the past medical, family and social history unless otherwise noted. My findings/plan: Patient presents here for hand pain requesting narcotics on initial exam and during my exam.  States he burned his hand picking something up, does not recall what it was states he was either on drugs or drinking and he did that over a week ago and states that he just got kicked out of his house and his hands hurt so he is here for narcotics. Denies fevers, sweats or chills. Has no other complaints initially and during my questioning. He then states he would like help and to go somewhere be admitted because it is cold out and he was kicked out of his house. Once he is informed that we are not writing him narcotics and cannot admit him just because he would like to stay warm he then states that he will state he is suicidal so that we will admit him to a psychiatric facility since his cold out. Patient actually stated this. He has no active plan or intent and is not suicidal and states he will not be suicidal if we just admit him and keep him warm. At this time patient is instructed to follow-up closely on outpatient basis, he is instructed is inappropriate to claim and use suicidal ideation as a ploidy to get what he wants or be admitted because it is cold. Please officers are here and helping with the patient with his discharge. [NC]      ED Course User Index  [NC] Christine Pastor DO       Medical Decision Making:    Patient with complaint of burn to his hand when he grabbed a hot object 5 days ago at home. He also had multiple insect bites of the wrist dorsal aspect bilateral hands. He was placed on Bacid tracing ointment as well as Keflex for minimal cellulitic changes. Patient was also placed on eliminate for possible scabie appearing lesions patient denies any fever. Does not appear to be infectious rash. Counseling:    The emergency provider has spoken with the patient and discussed todays results, in addition to providing specific details for the plan of care and counseling regarding the diagnosis and prognosis. Questions are answered at this time and they are agreeable with the plan.      --------------------------------- IMPRESSION AND DISPOSITION ---------------------------------    IMPRESSION  1. Rash and other nonspecific skin eruption    2. Partial thickness burn of hand, unspecified laterality, unspecified site of hand, initial encounter        DISPOSITION  Disposition: Discharge to home  Patient condition is good      NOTE: This report was transcribed using voice recognition software.  Every effort was made to ensure accuracy; however, inadvertent computerized transcription errors may be present      ALEXANDRO Lebron  12/23/22 [address] , [place]  12/23/22 Humberto Cisneros

## 2023-01-11 ENCOUNTER — HOSPITAL ENCOUNTER (EMERGENCY)
Age: 28
Discharge: OTHER INSTITUTION | End: 2023-01-11
Attending: EMERGENCY MEDICINE
Payer: COMMERCIAL

## 2023-01-11 ENCOUNTER — APPOINTMENT (OUTPATIENT)
Dept: GENERAL RADIOLOGY | Age: 28
End: 2023-01-11
Payer: COMMERCIAL

## 2023-01-11 VITALS
RESPIRATION RATE: 16 BRPM | TEMPERATURE: 97.8 F | DIASTOLIC BLOOD PRESSURE: 88 MMHG | HEART RATE: 92 BPM | OXYGEN SATURATION: 95 % | SYSTOLIC BLOOD PRESSURE: 135 MMHG

## 2023-01-11 DIAGNOSIS — F29 PSYCHOSIS, UNSPECIFIED PSYCHOSIS TYPE (HCC): Primary | ICD-10-CM

## 2023-01-11 DIAGNOSIS — F19.10 SUBSTANCE ABUSE (HCC): ICD-10-CM

## 2023-01-11 DIAGNOSIS — Z51.89 VISIT FOR WOUND CHECK: ICD-10-CM

## 2023-01-11 LAB
ACETAMINOPHEN LEVEL: <5 MCG/ML (ref 10–30)
ALBUMIN SERPL-MCNC: 4.4 G/DL (ref 3.5–5.2)
ALP BLD-CCNC: 106 U/L (ref 40–129)
ALT SERPL-CCNC: 118 U/L (ref 0–40)
AMPHETAMINE SCREEN, URINE: POSITIVE
ANION GAP SERPL CALCULATED.3IONS-SCNC: 12 MMOL/L (ref 7–16)
AST SERPL-CCNC: 62 U/L (ref 0–39)
BARBITURATE SCREEN URINE: NOT DETECTED
BASOPHILS ABSOLUTE: 0.05 E9/L (ref 0–0.2)
BASOPHILS RELATIVE PERCENT: 0.5 % (ref 0–2)
BENZODIAZEPINE SCREEN, URINE: NOT DETECTED
BILIRUB SERPL-MCNC: 1.1 MG/DL (ref 0–1.2)
BILIRUBIN URINE: NEGATIVE
BLOOD, URINE: NEGATIVE
BUN BLDV-MCNC: 22 MG/DL (ref 6–20)
CALCIUM SERPL-MCNC: 9.2 MG/DL (ref 8.6–10.2)
CANNABINOID SCREEN URINE: POSITIVE
CHLORIDE BLD-SCNC: 93 MMOL/L (ref 98–107)
CLARITY: CLEAR
CO2: 27 MMOL/L (ref 22–29)
COCAINE METABOLITE SCREEN URINE: POSITIVE
COLOR: YELLOW
CREAT SERPL-MCNC: 1.1 MG/DL (ref 0.7–1.2)
EKG ATRIAL RATE: 92 BPM
EKG P AXIS: 48 DEGREES
EKG P-R INTERVAL: 142 MS
EKG Q-T INTERVAL: 344 MS
EKG QRS DURATION: 72 MS
EKG QTC CALCULATION (BAZETT): 425 MS
EKG R AXIS: 54 DEGREES
EKG T AXIS: 20 DEGREES
EKG VENTRICULAR RATE: 92 BPM
EOSINOPHILS ABSOLUTE: 0.09 E9/L (ref 0.05–0.5)
EOSINOPHILS RELATIVE PERCENT: 0.9 % (ref 0–6)
ETHANOL: <10 MG/DL (ref 0–0.08)
FENTANYL SCREEN, URINE: NOT DETECTED
GFR SERPL CREATININE-BSD FRML MDRD: >60 ML/MIN/1.73
GLUCOSE BLD-MCNC: 92 MG/DL (ref 74–99)
GLUCOSE URINE: NEGATIVE MG/DL
HCT VFR BLD CALC: 39.1 % (ref 37–54)
HEMOGLOBIN: 13.6 G/DL (ref 12.5–16.5)
IMMATURE GRANULOCYTES #: 0.05 E9/L
IMMATURE GRANULOCYTES %: 0.5 % (ref 0–5)
INFLUENZA A: NOT DETECTED
INFLUENZA B: NOT DETECTED
KETONES, URINE: NEGATIVE MG/DL
LEUKOCYTE ESTERASE, URINE: NEGATIVE
LYMPHOCYTES ABSOLUTE: 2.28 E9/L (ref 1.5–4)
LYMPHOCYTES RELATIVE PERCENT: 23.9 % (ref 20–42)
Lab: ABNORMAL
MCH RBC QN AUTO: 30.2 PG (ref 26–35)
MCHC RBC AUTO-ENTMCNC: 34.8 % (ref 32–34.5)
MCV RBC AUTO: 86.9 FL (ref 80–99.9)
METHADONE SCREEN, URINE: NOT DETECTED
MONOCYTES ABSOLUTE: 1.02 E9/L (ref 0.1–0.95)
MONOCYTES RELATIVE PERCENT: 10.7 % (ref 2–12)
NEUTROPHILS ABSOLUTE: 6.06 E9/L (ref 1.8–7.3)
NEUTROPHILS RELATIVE PERCENT: 63.5 % (ref 43–80)
NITRITE, URINE: NEGATIVE
OPIATE SCREEN URINE: NOT DETECTED
OXYCODONE URINE: NOT DETECTED
PDW BLD-RTO: 13.2 FL (ref 11.5–15)
PH UA: 6 (ref 5–9)
PHENCYCLIDINE SCREEN URINE: NOT DETECTED
PLATELET # BLD: 261 E9/L (ref 130–450)
PMV BLD AUTO: 8.3 FL (ref 7–12)
POTASSIUM SERPL-SCNC: 3.6 MMOL/L (ref 3.5–5)
PROTEIN UA: NEGATIVE MG/DL
RBC # BLD: 4.5 E12/L (ref 3.8–5.8)
SALICYLATE, SERUM: <0.3 MG/DL (ref 0–30)
SARS-COV-2 RNA, RT PCR: NOT DETECTED
SODIUM BLD-SCNC: 132 MMOL/L (ref 132–146)
SPECIFIC GRAVITY UA: 1.02 (ref 1–1.03)
TOTAL CK: 1039 U/L (ref 20–200)
TOTAL PROTEIN: 7 G/DL (ref 6.4–8.3)
TRICYCLIC ANTIDEPRESSANTS SCREEN SERUM: NEGATIVE NG/ML
UROBILINOGEN, URINE: 0.2 E.U./DL
VALPROIC ACID LEVEL: 3 MCG/ML (ref 50–100)
WBC # BLD: 9.6 E9/L (ref 4.5–11.5)

## 2023-01-11 PROCEDURE — 80143 DRUG ASSAY ACETAMINOPHEN: CPT

## 2023-01-11 PROCEDURE — 82550 ASSAY OF CK (CPK): CPT

## 2023-01-11 PROCEDURE — 81003 URINALYSIS AUTO W/O SCOPE: CPT

## 2023-01-11 PROCEDURE — 80307 DRUG TEST PRSMV CHEM ANLYZR: CPT

## 2023-01-11 PROCEDURE — 73130 X-RAY EXAM OF HAND: CPT

## 2023-01-11 PROCEDURE — 80053 COMPREHEN METABOLIC PANEL: CPT

## 2023-01-11 PROCEDURE — 6370000000 HC RX 637 (ALT 250 FOR IP): Performed by: NURSE PRACTITIONER

## 2023-01-11 PROCEDURE — 2580000003 HC RX 258

## 2023-01-11 PROCEDURE — 85025 COMPLETE CBC W/AUTO DIFF WBC: CPT

## 2023-01-11 PROCEDURE — 93010 ELECTROCARDIOGRAM REPORT: CPT | Performed by: INTERNAL MEDICINE

## 2023-01-11 PROCEDURE — 96372 THER/PROPH/DIAG INJ SC/IM: CPT

## 2023-01-11 PROCEDURE — 36415 COLL VENOUS BLD VENIPUNCTURE: CPT

## 2023-01-11 PROCEDURE — 99285 EMERGENCY DEPT VISIT HI MDM: CPT

## 2023-01-11 PROCEDURE — 87636 SARSCOV2 & INF A&B AMP PRB: CPT

## 2023-01-11 PROCEDURE — 80164 ASSAY DIPROPYLACETIC ACD TOT: CPT

## 2023-01-11 PROCEDURE — 93005 ELECTROCARDIOGRAM TRACING: CPT | Performed by: NURSE PRACTITIONER

## 2023-01-11 PROCEDURE — 80179 DRUG ASSAY SALICYLATE: CPT

## 2023-01-11 PROCEDURE — 82077 ASSAY SPEC XCP UR&BREATH IA: CPT

## 2023-01-11 PROCEDURE — 6360000002 HC RX W HCPCS: Performed by: NURSE PRACTITIONER

## 2023-01-11 RX ORDER — DOXYCYCLINE HYCLATE 100 MG/1
100 CAPSULE ORAL ONCE
Status: COMPLETED | OUTPATIENT
Start: 2023-01-11 | End: 2023-01-11

## 2023-01-11 RX ORDER — WATER 1000 ML/1000ML
INJECTION, SOLUTION INTRAVENOUS
Status: COMPLETED
Start: 2023-01-11 | End: 2023-01-11

## 2023-01-11 RX ORDER — BACITRACIN ZINC 500 [USP'U]/G
OINTMENT TOPICAL ONCE
Status: COMPLETED | OUTPATIENT
Start: 2023-01-11 | End: 2023-01-11

## 2023-01-11 RX ORDER — ZIPRASIDONE MESYLATE 20 MG/ML
20 INJECTION, POWDER, LYOPHILIZED, FOR SOLUTION INTRAMUSCULAR ONCE
Status: COMPLETED | OUTPATIENT
Start: 2023-01-11 | End: 2023-01-11

## 2023-01-11 RX ORDER — BACITRACIN, NEOMYCIN, POLYMYXIN B 400; 3.5; 5 [USP'U]/G; MG/G; [USP'U]/G
OINTMENT TOPICAL
Qty: 30 G | Refills: 0 | Status: SHIPPED | OUTPATIENT
Start: 2023-01-11 | End: 2023-01-21

## 2023-01-11 RX ORDER — DOXYCYCLINE 100 MG/1
100 CAPSULE ORAL 2 TIMES DAILY
Qty: 20 CAPSULE | Refills: 0 | Status: SHIPPED | OUTPATIENT
Start: 2023-01-11 | End: 2023-01-21

## 2023-01-11 RX ADMIN — DOXYCYCLINE HYCLATE 100 MG: 100 CAPSULE ORAL at 02:46

## 2023-01-11 RX ADMIN — BACITRACIN ZINC: 500 OINTMENT TOPICAL at 08:17

## 2023-01-11 RX ADMIN — WATER: 1 INJECTION INTRAMUSCULAR; INTRAVENOUS; SUBCUTANEOUS at 03:14

## 2023-01-11 RX ADMIN — ZIPRASIDONE MESYLATE 20 MG: 20 INJECTION, POWDER, LYOPHILIZED, FOR SOLUTION INTRAMUSCULAR at 03:14

## 2023-01-11 ASSESSMENT — ENCOUNTER SYMPTOMS
ABDOMINAL PAIN: 0
NAUSEA: 0
DIARRHEA: 0
GASTROINTESTINAL NEGATIVE: 1
WHEEZING: 0
STRIDOR: 0
VOMITING: 0
RESPIRATORY NEGATIVE: 1
EYES NEGATIVE: 1
COUGH: 0

## 2023-01-11 NOTE — ED NOTES
Patient masturbating standing in his room looking at nurses station. Has been masturbating at various times this shift. He does not respond to redirection regards to this.        Rafael Chang RN  01/11/23 140 KAELYN Suarez  01/11/23 [id number]
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
## 2023-01-11 NOTE — ED NOTES
Discussed d/c plan with Dr. Mariola Victor PD as pt is a police hold     [facility]  01/11/23 6630

## 2023-01-11 NOTE — ED NOTES
Pt stated that he is itching because he has bedbug bites due to staying with his friend, Eve Burns.      Winifred Quiros  01/11/23 5905

## 2023-01-11 NOTE — ED NOTES
Received report form KAELYN Morales who states that patient is a police hold but that she doesn't know for which department or why. This RN asked [facility])  who states it is [facility] police hold and that the [employer] will be filling out a police hold paper and that the charge is violating a protection order. Police Hold FYI entered into Epic per protocol.       Clarke Kanner, RN  01/11/23 5505 [facility] KAELYN Lopez  01/11/23 3888

## 2023-01-11 NOTE — ED NOTES
Nurse to nurse report given to COURTNEY Acuña RN at the senior living at [phone].      Sherri Pederson, KAELYN  01/11/23 1000
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
## 2023-01-11 NOTE — DISCHARGE INSTRUCTIONS
Please reach out to one of the following community providers for treatment and support. Help Hotline: 362 545 236 or [address] and Recovery Board [phone]  [address] and [address] [facility] [phone]  If you are in need of help and experiencing any barriers to care please contact help hotline 24 hours a day and/or your local board of mental health and recovery during normal business hours. Detox program inpatient:   Ajit Ivory [phone]  Jaymie Osborn [phone]  [facility] [phone]  [facility] [phone]  Jese Merced [phone]  [place] services hotline [phone]    Outpatient programs  [address] outpatient treatment [phone] ext. Humbertoalexandra [phone]  [facility] [phone]  [facility] services:   Luis Peterson  [facility] [phone]  [address] [phone]  [address]  [phone]  [place] [phone]   [facility] services [phone]  The counseling center of [facility] [phone]    For additional resource support please feel free to contact the behavioral access line at [phone] or the Intensive outpatient department at [phone].
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
## 2023-01-11 NOTE — ED NOTES
Patient refused vital signs at discharge. Discharge instructions reviewed with patient prior to being discharged into police custody.       Osman Dominguezr, RN  01/11/23 [address], RN  01/11/23 9406

## 2023-01-11 NOTE — ED NOTES
Patient screaming racial slurs, screaming word \"nigger\" several times.       Jadon Morales RN  01/11/23 4777
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
## 2023-01-11 NOTE — ED NOTES
Pt medicated at  due to increase in agitation and pacing the hallways. Pt is to drowsy at this time to complete assessment. SW will continue to monitor and assess once more alter and oriented.       PATEL Oakes, [place]  01/11/23 0261

## 2023-01-11 NOTE — ED NOTES
Behavioral Health Crisis Assessment    Chief Complaint: \"to get my cuts checked\"    Mental Status Exam: calm, cooperative, alert and oriented x's 3, shared fair eye contact, has clear speech, focus has improved and is able to communicate effectively, follows direction, behavior controlled, appears to be stable. Legal Status:  [] Voluntary:  [x] Involuntary, Issued by:  er doc    Gender:  [x] Male [] Female [] Transgender  [] Other    Sexual Orientation:  [x] Heterosexual [] Homosexual [] Bisexual [] Other    Brief Clinical Summary:  Pt has been pink slipped after displaying paranoid behaviors. He was brought in for wound checks. While in the er he started screaming that staff was hurting him. He has a hx of psychosis. He displayed pressured speech and was unable to focus. Pt has a hx of drug abuse and is positive for amphetamine, marijuana, and cocaine. I met with pt who is now able to be assessed, presenting calm, cooperative, alert and oriented x's 3, shared fair eye contact, has clear speech, focus has improved and is able to communicate effectively, follows direction, behavior controlled, appears to be stable. Pt explained that he came to the ER to get his wounds checked \"because the police wouldn't take me to penitentiary like this\". Pt said that he was released from group home 45 days ago for aggravated burglary and parole violation. Pt admits that he uses drugs, but declined any PEER support, stating \"I just adriel and dabble\". Pt explained that he has been in penitentiary over 10 times and said that he utilizes penitentiary for [place] time\". He is not suicidal or homicidal and stated \"I would never hurt myself or anyone else\". Pt said that he has no psychosis, hears no voices and does not appear to be internally stimulated. He presents somewhat anxious but is able to stay on task. He stated \"I'm not a psych pt I am a very calm convict\".        Pt has no MH services and said that he feels he does not need MH help.  He was focused on getting his social security card. His only complaint to me is his feet hurting. He asked that I text a family member from my personal phone - I offered to call the number using the ER phone and he declined. No text messages were sent to anyone. Pt is not employed, lives with step dad but said that he plans to go and live with his dad in [place], not . Collateral Information:  none obtained    Risk Factors:    HX OF VIOLENCE AND AGGRESSION   Hx of being in senior care  On parole  Substance Use  Lack of self-care  Trouble with the law  Most likely Off prescribed Psych meds  Has no out pt provider  Financial stressors  Poor communication skills    Protective Factors:  Goals & Hope for the future  Safe and stable housing  Has access to essential needs  Effective communication skills  No access to weapons     Suicidal Ideations:  [] Reports:    [] Past [] Present   [x] Denies    Suicide Attempts:  [] Reports:   [x] Denies    C-SSRS Screening Completed by RN: Current Suicide Risk:  \"unable to assess\"  [x] No Risk [] Low [] Moderate [] High    Homicidal Ideations:  [] Reports:   [] Past [] Present   [x] Denies     Self Injurious/Self Mutilation Behaviors:  [] Reports:    [] Past [] Present   [x] Denies    Hallucinations/Delusions:  [] Reports:   [x] Denies     Substance Use/Alcohol Use/Addiction:  [x] Reports:   [] Denies   [x] SBIRT Screen Complete. Current or Past Substance Abuse Treatment:  [] Yes, When and Where:  [x] No    Current or Past Mental Health Treatment:  [] Yes, When and Where:  [x] No    Legal Issues:  [x]  Yes (Specify)  on probation  []  No    Access to Weapons:  []  Yes (Specify)  [x]  No    Trauma History:  [] Reports:  [x] Denies     Living Situation:  with step dad    Employment:  no job    Education Level:  some HS    Violence Risk Screening:        Have you ever thought about hurting someone? []  No  [x]  Yes (Ask the questions listed below)   When? Did you follow through with the thoughts? [] No     [x] Yes- When and what happened?  jailed  2. Have you ever threatened anyone? []  No  [x]  Yes (Ask the questions listed below)   When and what happened? Have you ever threatened someone with a gun, knife or other weapon? []  No  [x]  Yes - When and what happened?  jailed  2. Have you ever had an order of protection taken out against you? []  Yes [x]  No  3. Have you ever been arrested due to violence? [x]  Yes []  No  4. Have you ever been cruel to animals?  []  Yes [x]  No    After consideration of C-SSRS screening results, C-SSRS assessments, and this professional's assessment the patient's overall suicide risk assessed to be:  [x] No Risk  [] Low   [] Moderate   [] High     [] Discussed current suicide risk, protective and risk factors with RN and ED Physician     Disposition:  [] Home:   [] Outpatient Provider:   [] Crisis Unit:   [] Inpatient Psychiatric Unit:  [x] Other:   LJ Goldman  01/11/23 9109

## 2023-01-11 NOTE — ED NOTES
Patient eventually provided urine specimen but refused to allow this RN to complete wound care and tetanus shot at this time. patient stated \"It will have to wait until after breakfast\".       Ivan Meza RN  01/11/23 1129

## 2023-01-11 NOTE — ED NOTES
[facility] ([facility])  brought police hold paper. Police hold paper placed in patient's paper chart.       Nain Vizcaino RN  01/11/23 6473

## 2023-01-11 NOTE — ED NOTES
Patient pacing in [facility] screaming \"Give me my Percocet\". Patient refusing to stay in his room. Patient is extremely disruptive to entire unit.       Jadon Morales RN  01/11/23 8114

## 2023-03-29 ENCOUNTER — APPOINTMENT (OUTPATIENT)
Dept: GENERAL RADIOLOGY | Age: 28
DRG: 426 | End: 2023-03-29
Payer: COMMERCIAL

## 2023-03-29 ENCOUNTER — APPOINTMENT (OUTPATIENT)
Dept: CT IMAGING | Age: 28
DRG: 426 | End: 2023-03-29
Payer: COMMERCIAL

## 2023-03-29 ENCOUNTER — HOSPITAL ENCOUNTER (INPATIENT)
Age: 28
LOS: 4 days | Discharge: HOME | DRG: 426 | End: 2023-04-02
Attending: EMERGENCY MEDICINE | Admitting: FAMILY MEDICINE
Payer: COMMERCIAL

## 2023-03-29 DIAGNOSIS — R79.89 ELEVATED LACTIC ACID LEVEL: ICD-10-CM

## 2023-03-29 DIAGNOSIS — E87.6 HYPOKALEMIA: ICD-10-CM

## 2023-03-29 DIAGNOSIS — R45.1 AGITATION REQUIRING SEDATION PROTOCOL: ICD-10-CM

## 2023-03-29 DIAGNOSIS — E87.8 HYPOCHLOREMIA: ICD-10-CM

## 2023-03-29 DIAGNOSIS — E87.1 HYPONATREMIA: Primary | ICD-10-CM

## 2023-03-29 DIAGNOSIS — R41.0 ACUTE DELIRIUM: ICD-10-CM

## 2023-03-29 LAB
ALBUMIN SERPL-MCNC: 4.2 G/DL (ref 3.5–5.2)
ALBUMIN SERPL-MCNC: 4.3 G/DL (ref 3.5–5.2)
ALP SERPL-CCNC: 73 U/L (ref 40–129)
ALP SERPL-CCNC: 75 U/L (ref 40–129)
ALT SERPL-CCNC: 25 U/L (ref 0–40)
ALT SERPL-CCNC: 25 U/L (ref 0–40)
AMPHET UR QL SCN: NOT DETECTED
ANION GAP SERPL CALCULATED.3IONS-SCNC: 11 MMOL/L (ref 7–16)
ANION GAP SERPL CALCULATED.3IONS-SCNC: 12 MMOL/L (ref 7–16)
ANION GAP SERPL CALCULATED.3IONS-SCNC: 16 MMOL/L (ref 7–16)
ANION GAP SERPL CALCULATED.3IONS-SCNC: 8 MMOL/L (ref 7–16)
APAP SERPL-MCNC: <5 MCG/ML (ref 10–30)
AST SERPL-CCNC: 29 U/L (ref 0–39)
AST SERPL-CCNC: 30 U/L (ref 0–39)
B.E.: 2 MMOL/L (ref -3–3)
BARBITURATES UR QL SCN: NOT DETECTED
BASOPHILS # BLD: 0.06 E9/L (ref 0–0.2)
BASOPHILS NFR BLD: 0.6 % (ref 0–2)
BENZODIAZ UR QL SCN: NOT DETECTED
BILIRUB SERPL-MCNC: 0.5 MG/DL (ref 0–1.2)
BILIRUB SERPL-MCNC: 0.6 MG/DL (ref 0–1.2)
BILIRUB UR QL STRIP: NEGATIVE
BUN SERPL-MCNC: 3 MG/DL (ref 6–20)
BUN SERPL-MCNC: 5 MG/DL (ref 6–20)
CALCIUM SERPL-MCNC: 5 MG/DL (ref 8.6–10.2)
CALCIUM SERPL-MCNC: 8.2 MG/DL (ref 8.6–10.2)
CALCIUM SERPL-MCNC: 8.4 MG/DL (ref 8.6–10.2)
CALCIUM SERPL-MCNC: 9.2 MG/DL (ref 8.6–10.2)
CANNABINOIDS UR QL SCN: NOT DETECTED
CHLORIDE SERPL-SCNC: 110 MMOL/L (ref 98–107)
CHLORIDE SERPL-SCNC: 74 MMOL/L (ref 98–107)
CHLORIDE SERPL-SCNC: 77 MMOL/L (ref 98–107)
CHLORIDE SERPL-SCNC: 87 MMOL/L (ref 98–107)
CHLORIDE UR-SCNC: <20 MMOL/L
CK SERPL-CCNC: 207 U/L (ref 20–200)
CLARITY UR: CLEAR
CO2 SERPL-SCNC: 14 MMOL/L (ref 22–29)
CO2 SERPL-SCNC: 19 MMOL/L (ref 22–29)
CO2 SERPL-SCNC: 24 MMOL/L (ref 22–29)
CO2 SERPL-SCNC: 25 MMOL/L (ref 22–29)
COCAINE UR QL SCN: NOT DETECTED
COHB: 0.4 % (ref 0–1.5)
COLOR UR: YELLOW
CREAT SERPL-MCNC: 0.5 MG/DL (ref 0.7–1.2)
CREAT SERPL-MCNC: 0.8 MG/DL (ref 0.7–1.2)
CREAT SERPL-MCNC: 0.9 MG/DL (ref 0.7–1.2)
CREAT SERPL-MCNC: 0.9 MG/DL (ref 0.7–1.2)
CREAT UR-MCNC: 7 MG/DL (ref 40–278)
CRITICAL: ABNORMAL
DATE ANALYZED: ABNORMAL
DATE OF COLLECTION: ABNORMAL
DRUG SCREEN COMMENT UR-IMP: NORMAL
EOSINOPHIL # BLD: 0.36 E9/L (ref 0.05–0.5)
EOSINOPHIL NFR BLD: 3.3 % (ref 0–6)
ERYTHROCYTE [DISTWIDTH] IN BLOOD BY AUTOMATED COUNT: 12.3 FL (ref 11.5–15)
ETHANOLAMINE SERPL-MCNC: <10 MG/DL (ref 0–0.08)
FENTANYL SCREEN, URINE: NOT DETECTED
FLUAV RNA RESP QL NAA+PROBE: NOT DETECTED
FLUBV RNA RESP QL NAA+PROBE: NOT DETECTED
GLUCOSE SERPL-MCNC: 131 MG/DL (ref 74–99)
GLUCOSE SERPL-MCNC: 149 MG/DL (ref 74–99)
GLUCOSE SERPL-MCNC: 73 MG/DL (ref 74–99)
GLUCOSE SERPL-MCNC: 99 MG/DL (ref 74–99)
GLUCOSE UR STRIP-MCNC: NEGATIVE MG/DL
HCO3: 24.5 MMOL/L (ref 22–26)
HCT VFR BLD AUTO: 33.5 % (ref 37–54)
HGB BLD-MCNC: 12.2 G/DL (ref 12.5–16.5)
HGB UR QL STRIP: NEGATIVE
HHB: 2.9 % (ref 0–5)
IMM GRANULOCYTES # BLD: 0.15 E9/L
IMM GRANULOCYTES NFR BLD: 1.4 % (ref 0–5)
KETONES UR STRIP-MCNC: NEGATIVE MG/DL
LAB: ABNORMAL
LACTATE BLDV-SCNC: 3 MMOL/L (ref 0.5–2.2)
LEUKOCYTE ESTERASE UR QL STRIP: NEGATIVE
LYMPHOCYTES # BLD: 2.92 E9/L (ref 1.5–4)
LYMPHOCYTES NFR BLD: 26.9 % (ref 20–42)
Lab: ABNORMAL
MCH RBC QN AUTO: 30.3 PG (ref 26–35)
MCHC RBC AUTO-ENTMCNC: 36.4 % (ref 32–34.5)
MCV RBC AUTO: 83.1 FL (ref 80–99.9)
METHADONE UR QL SCN: NOT DETECTED
METHB: 0.4 % (ref 0–1.5)
MONOCYTES # BLD: 0.86 E9/L (ref 0.1–0.95)
MONOCYTES NFR BLD: 7.9 % (ref 2–12)
NEUTROPHILS # BLD: 6.49 E9/L (ref 1.8–7.3)
NEUTS SEG NFR BLD: 59.9 % (ref 43–80)
NITRITE UR QL STRIP: NEGATIVE
O2 CONTENT: 17.8 ML/DL
O2 SATURATION: 97.1 % (ref 92–98.5)
O2HB: 96.3 % (ref 94–97)
OPERATOR ID: 187
OPIATES UR QL SCN: NOT DETECTED
OSMOLALITY SERPL CALC.SUM OF ELEC: 244 MOSM/KG (ref 285–310)
OSMOLALITY URINE: 62 MOSM/KG (ref 300–900)
OXYCODONE URINE: NOT DETECTED
PATIENT TEMP: 37 C
PCO2: 32 MMHG (ref 35–45)
PCP UR QL SCN: NOT DETECTED
PH BLOOD GAS: 7.5 (ref 7.35–7.45)
PH UR STRIP: 7 [PH] (ref 5–9)
PLATELET # BLD AUTO: 260 E9/L (ref 130–450)
PMV BLD AUTO: 8.5 FL (ref 7–12)
PO2: 89.3 MMHG (ref 75–100)
POTASSIUM SERPL-SCNC: 2.2 MMOL/L (ref 3.5–5)
POTASSIUM SERPL-SCNC: 2.9 MMOL/L (ref 3.5–5)
POTASSIUM SERPL-SCNC: 3.1 MMOL/L (ref 3.5–5)
POTASSIUM SERPL-SCNC: 3.7 MMOL/L (ref 3.5–5)
POTASSIUM UR-SCNC: <3 MMOL/L
PROT SERPL-MCNC: 6.5 G/DL (ref 6.4–8.3)
PROT SERPL-MCNC: 6.5 G/DL (ref 6.4–8.3)
PROT UR STRIP-MCNC: NEGATIVE MG/DL
PROT UR-MCNC: <4 MG/DL (ref 0–12)
PROTEIN/CREAT RATIO: 0.6
PROTEIN/CREAT RATIO: 0.6 (ref 0–0.2)
RBC # BLD AUTO: 4.03 E12/L (ref 3.8–5.8)
SALICYLATES SERPL-MCNC: <0.3 MG/DL (ref 0–30)
SARS-COV-2 RNA RESP QL NAA+PROBE: NOT DETECTED
SODIUM SERPL-SCNC: 109 MMOL/L (ref 132–146)
SODIUM SERPL-SCNC: 113 MMOL/L (ref 132–146)
SODIUM SERPL-SCNC: 123 MMOL/L (ref 132–146)
SODIUM SERPL-SCNC: 132 MMOL/L (ref 132–146)
SODIUM UR-SCNC: 21 MMOL/L
SOURCE, BLOOD GAS: ABNORMAL
SP GR UR STRIP: <=1.005 (ref 1–1.03)
THB: 13.1 G/DL (ref 11.5–16.5)
TIME ANALYZED: 2002
TRICYCLIC ANTIDEPRESSANTS SCREEN SERUM: NEGATIVE NG/ML
TSH SERPL-MCNC: 1.78 UIU/ML (ref 0.27–4.2)
UROBILINOGEN UR STRIP-ACNC: 0.2 E.U./DL
WBC # BLD: 10.8 E9/L (ref 4.5–11.5)

## 2023-03-29 PROCEDURE — 87636 SARSCOV2 & INF A&B AMP PRB: CPT

## 2023-03-29 PROCEDURE — 6360000002 HC RX W HCPCS: Performed by: EMERGENCY MEDICINE

## 2023-03-29 PROCEDURE — 85025 COMPLETE CBC W/AUTO DIFF WBC: CPT

## 2023-03-29 PROCEDURE — 6360000002 HC RX W HCPCS

## 2023-03-29 PROCEDURE — 81003 URINALYSIS AUTO W/O SCOPE: CPT

## 2023-03-29 PROCEDURE — 83930 ASSAY OF BLOOD OSMOLALITY: CPT

## 2023-03-29 PROCEDURE — 84156 ASSAY OF PROTEIN URINE: CPT

## 2023-03-29 PROCEDURE — 99285 EMERGENCY DEPT VISIT HI MDM: CPT

## 2023-03-29 PROCEDURE — 82077 ASSAY SPEC XCP UR&BREATH IA: CPT

## 2023-03-29 PROCEDURE — 82805 BLOOD GASES W/O2 SATURATION: CPT

## 2023-03-29 PROCEDURE — 84443 ASSAY THYROID STIM HORMONE: CPT

## 2023-03-29 PROCEDURE — 96374 THER/PROPH/DIAG INJ IV PUSH: CPT

## 2023-03-29 PROCEDURE — 80048 BASIC METABOLIC PNL TOTAL CA: CPT

## 2023-03-29 PROCEDURE — 71045 X-RAY EXAM CHEST 1 VIEW: CPT

## 2023-03-29 PROCEDURE — 2580000003 HC RX 258: Performed by: EMERGENCY MEDICINE

## 2023-03-29 PROCEDURE — 80307 DRUG TEST PRSMV CHEM ANLYZR: CPT

## 2023-03-29 PROCEDURE — 80143 DRUG ASSAY ACETAMINOPHEN: CPT

## 2023-03-29 PROCEDURE — 6360000002 HC RX W HCPCS: Performed by: FAMILY MEDICINE

## 2023-03-29 PROCEDURE — 96375 TX/PRO/DX INJ NEW DRUG ADDON: CPT

## 2023-03-29 PROCEDURE — 84133 ASSAY OF URINE POTASSIUM: CPT

## 2023-03-29 PROCEDURE — 93005 ELECTROCARDIOGRAM TRACING: CPT | Performed by: EMERGENCY MEDICINE

## 2023-03-29 PROCEDURE — 84300 ASSAY OF URINE SODIUM: CPT

## 2023-03-29 PROCEDURE — 83605 ASSAY OF LACTIC ACID: CPT

## 2023-03-29 PROCEDURE — 82550 ASSAY OF CK (CPK): CPT

## 2023-03-29 PROCEDURE — 70450 CT HEAD/BRAIN W/O DYE: CPT

## 2023-03-29 PROCEDURE — 36415 COLL VENOUS BLD VENIPUNCTURE: CPT

## 2023-03-29 PROCEDURE — 80053 COMPREHEN METABOLIC PANEL: CPT

## 2023-03-29 PROCEDURE — 96372 THER/PROPH/DIAG INJ SC/IM: CPT

## 2023-03-29 PROCEDURE — 2000000000 HC ICU R&B

## 2023-03-29 PROCEDURE — 83935 ASSAY OF URINE OSMOLALITY: CPT

## 2023-03-29 PROCEDURE — 82570 ASSAY OF URINE CREATININE: CPT

## 2023-03-29 PROCEDURE — 82436 ASSAY OF URINE CHLORIDE: CPT

## 2023-03-29 PROCEDURE — 80179 DRUG ASSAY SALICYLATE: CPT

## 2023-03-29 RX ORDER — MIDAZOLAM HYDROCHLORIDE 2 MG/2ML
2 INJECTION, SOLUTION INTRAMUSCULAR; INTRAVENOUS ONCE
Status: COMPLETED | OUTPATIENT
Start: 2023-03-29 | End: 2023-03-29

## 2023-03-29 RX ORDER — ZIPRASIDONE MESYLATE 20 MG/ML
10 INJECTION, POWDER, LYOPHILIZED, FOR SOLUTION INTRAMUSCULAR ONCE
Status: COMPLETED | OUTPATIENT
Start: 2023-03-29 | End: 2023-03-29

## 2023-03-29 RX ORDER — SODIUM CHLORIDE 9 MG/ML
INJECTION, SOLUTION INTRAVENOUS ONCE
Status: COMPLETED | OUTPATIENT
Start: 2023-03-29 | End: 2023-03-29

## 2023-03-29 RX ORDER — ONDANSETRON 2 MG/ML
4 INJECTION INTRAMUSCULAR; INTRAVENOUS EVERY 6 HOURS PRN
Status: DISCONTINUED | OUTPATIENT
Start: 2023-03-29 | End: 2023-04-02 | Stop reason: HOSPADM

## 2023-03-29 RX ORDER — MIDAZOLAM HYDROCHLORIDE 1 MG/ML
INJECTION INTRAMUSCULAR; INTRAVENOUS
Status: COMPLETED
Start: 2023-03-29 | End: 2023-03-29

## 2023-03-29 RX ORDER — ACETAMINOPHEN 650 MG/1
650 SUPPOSITORY RECTAL EVERY 6 HOURS PRN
Status: DISCONTINUED | OUTPATIENT
Start: 2023-03-29 | End: 2023-03-30 | Stop reason: SDUPTHER

## 2023-03-29 RX ORDER — PROMETHAZINE HYDROCHLORIDE 12.5 MG/1
12.5 TABLET ORAL EVERY 6 HOURS PRN
Status: DISCONTINUED | OUTPATIENT
Start: 2023-03-29 | End: 2023-04-02 | Stop reason: HOSPADM

## 2023-03-29 RX ORDER — ENOXAPARIN SODIUM 100 MG/ML
40 INJECTION SUBCUTANEOUS DAILY
Status: DISCONTINUED | OUTPATIENT
Start: 2023-03-30 | End: 2023-04-02 | Stop reason: HOSPADM

## 2023-03-29 RX ORDER — SODIUM CHLORIDE 0.9 % (FLUSH) 0.9 %
10 SYRINGE (ML) INJECTION PRN
Status: DISCONTINUED | OUTPATIENT
Start: 2023-03-29 | End: 2023-03-30

## 2023-03-29 RX ORDER — ZIPRASIDONE MESYLATE 20 MG/ML
20 INJECTION, POWDER, LYOPHILIZED, FOR SOLUTION INTRAMUSCULAR ONCE
Status: COMPLETED | OUTPATIENT
Start: 2023-03-29 | End: 2023-03-29

## 2023-03-29 RX ORDER — MIDAZOLAM HYDROCHLORIDE 2 MG/2ML
2 INJECTION, SOLUTION INTRAMUSCULAR; INTRAVENOUS
Status: COMPLETED | OUTPATIENT
Start: 2023-03-29 | End: 2023-03-30

## 2023-03-29 RX ORDER — POTASSIUM CHLORIDE 7.45 MG/ML
10 INJECTION INTRAVENOUS
Status: DISCONTINUED | OUTPATIENT
Start: 2023-03-29 | End: 2023-03-30

## 2023-03-29 RX ORDER — SODIUM CHLORIDE 9 MG/ML
INJECTION, SOLUTION INTRAVENOUS PRN
Status: DISCONTINUED | OUTPATIENT
Start: 2023-03-29 | End: 2023-04-02 | Stop reason: HOSPADM

## 2023-03-29 RX ORDER — POLYETHYLENE GLYCOL 3350 17 G/17G
17 POWDER, FOR SOLUTION ORAL DAILY PRN
Status: DISCONTINUED | OUTPATIENT
Start: 2023-03-29 | End: 2023-03-30

## 2023-03-29 RX ORDER — ACETAMINOPHEN 325 MG/1
650 TABLET ORAL EVERY 6 HOURS PRN
Status: DISCONTINUED | OUTPATIENT
Start: 2023-03-29 | End: 2023-03-30 | Stop reason: SDUPTHER

## 2023-03-29 RX ORDER — ZIPRASIDONE MESYLATE 20 MG/ML
INJECTION, POWDER, LYOPHILIZED, FOR SOLUTION INTRAMUSCULAR
Status: COMPLETED
Start: 2023-03-29 | End: 2023-03-29

## 2023-03-29 RX ORDER — SODIUM CHLORIDE 0.9 % (FLUSH) 0.9 %
10 SYRINGE (ML) INJECTION EVERY 12 HOURS SCHEDULED
Status: DISCONTINUED | OUTPATIENT
Start: 2023-03-29 | End: 2023-03-30

## 2023-03-29 RX ADMIN — MIDAZOLAM 2 MG: 1 INJECTION INTRAMUSCULAR; INTRAVENOUS at 18:26

## 2023-03-29 RX ADMIN — ZIPRASIDONE MESYLATE 20 MG: 20 INJECTION, POWDER, LYOPHILIZED, FOR SOLUTION INTRAMUSCULAR at 18:28

## 2023-03-29 RX ADMIN — MIDAZOLAM 2 MG: 1 INJECTION, SOLUTION INTRAMUSCULAR; INTRAVENOUS at 21:47

## 2023-03-29 RX ADMIN — MIDAZOLAM 2 MG: 1 INJECTION, SOLUTION INTRAMUSCULAR; INTRAVENOUS at 22:42

## 2023-03-29 RX ADMIN — SODIUM CHLORIDE: 9 INJECTION, SOLUTION INTRAVENOUS at 21:18

## 2023-03-29 RX ADMIN — MIDAZOLAM HYDROCHLORIDE 2 MG: 2 INJECTION, SOLUTION INTRAMUSCULAR; INTRAVENOUS at 18:26

## 2023-03-29 RX ADMIN — Medication 10 MEQ: at 22:39

## 2023-03-29 RX ADMIN — Medication 10 MEQ: at 21:18

## 2023-03-29 RX ADMIN — MIDAZOLAM 2 MG: 1 INJECTION INTRAMUSCULAR; INTRAVENOUS at 19:51

## 2023-03-29 RX ADMIN — ZIPRASIDONE MESYLATE 10 MG: 20 INJECTION, POWDER, LYOPHILIZED, FOR SOLUTION INTRAMUSCULAR at 23:47

## 2023-03-29 RX ADMIN — MIDAZOLAM 2 MG: 1 INJECTION, SOLUTION INTRAMUSCULAR; INTRAVENOUS at 23:43

## 2023-03-29 ASSESSMENT — PAIN - FUNCTIONAL ASSESSMENT
PAIN_FUNCTIONAL_ASSESSMENT: NONE - DENIES PAIN

## 2023-03-29 NOTE — ED NOTES
Patient is combative and fighting against staff when attempting to collects tests.       Tamika Torres, KAELYN  03/29/23 7070

## 2023-03-29 NOTE — ED PROVIDER NOTES
Additionally, QT interval may be of use in decision making regarding any medications administered here in the ED. Patient is placed on cardiac monitor and continuous pulse ox for monitoring. CBC is ordered to evaluate for any signs of infection or inflammation by obtaining a WBC count, or any signs of acute anemia by interpreting hemoglobin. CMP was ordered to evaluate for any electrolyte imbalances, kidney function, or any elevations in anion gap. A metabolic panel with electrolytes was ordered to evaluate for an electrolyte abnormalities and/or to evaluate for kidney function which may impact decision on types, doses of medications or imaging studies ordered here in the ER. Urinalysis ordered to evaluate for UTI as the possible cause of symptoms, and may also evaluate hematuria as well. Lactic acid levels were ordered to evaluate for signs of ischemia or decrease perfusion to organ systems. Viral swabs are ordered to evaluate possible viral etiology of symptoms. CK levels ordered to rule out rhabdomyolysis as cause of symptoms. BNP ordered to evaluate for possible cardiac failure or worsening cardiac function. CT head without contrast was ordered to evaluate for acute or chronic intracranial hemorrhage or masses. Chest x-ray is ordered to evaluate for any possible signs of pneumonia, pleural effusions, cardiomegaly, pneumothorax, atelectasis, rib or sternal abnormalities including fractures. Problems Addressed:  Acute delirium: acute illness or injury  Hypochloremia: acute illness or injury  Hyponatremia: acute illness or injury with systemic symptoms that poses a threat to life or bodily functions    Amount and/or Complexity of Data Reviewed  External Data Reviewed: ECG and notes. Labs: ordered. Decision-making details documented in ED Course. Radiology: ordered and independent interpretation performed. Decision-making details documented in ED Course.   ECG/medicine tests: ordered and independent
Yes-Patient/Caregiver accepts free interpretation services...

## 2023-03-30 LAB
AMMONIA PLAS-SCNC: 22 UMOL/L (ref 16–60)
ANION GAP SERPL CALCULATED.3IONS-SCNC: 11 MMOL/L (ref 7–16)
ANION GAP SERPL CALCULATED.3IONS-SCNC: 12 MMOL/L (ref 7–16)
ANION GAP SERPL CALCULATED.3IONS-SCNC: 13 MMOL/L (ref 7–16)
ANION GAP SERPL CALCULATED.3IONS-SCNC: 16 MMOL/L (ref 7–16)
ANION GAP SERPL CALCULATED.3IONS-SCNC: 19 MMOL/L (ref 7–16)
ANISOCYTOSIS: ABNORMAL
ANISOCYTOSIS: ABNORMAL
BASOPHILS # BLD: 0 E9/L (ref 0–0.2)
BASOPHILS # BLD: 0.02 E9/L (ref 0–0.2)
BASOPHILS NFR BLD: 0 % (ref 0–2)
BASOPHILS NFR BLD: 0.2 % (ref 0–2)
BUN SERPL-MCNC: 4 MG/DL (ref 6–20)
BUN SERPL-MCNC: 5 MG/DL (ref 6–20)
BURR CELLS: ABNORMAL
CALCIUM SERPL-MCNC: 8.8 MG/DL (ref 8.6–10.2)
CALCIUM SERPL-MCNC: 8.9 MG/DL (ref 8.6–10.2)
CALCIUM SERPL-MCNC: 8.9 MG/DL (ref 8.6–10.2)
CALCIUM SERPL-MCNC: 9.1 MG/DL (ref 8.6–10.2)
CALCIUM SERPL-MCNC: 9.3 MG/DL (ref 8.6–10.2)
CALCIUM SERPL-MCNC: 9.4 MG/DL (ref 8.6–10.2)
CHLORIDE SERPL-SCNC: 92 MMOL/L (ref 98–107)
CHLORIDE SERPL-SCNC: 93 MMOL/L (ref 98–107)
CHLORIDE SERPL-SCNC: 93 MMOL/L (ref 98–107)
CHLORIDE SERPL-SCNC: 96 MMOL/L (ref 98–107)
CHLORIDE SERPL-SCNC: 97 MMOL/L (ref 98–107)
CHLORIDE SERPL-SCNC: 98 MMOL/L (ref 98–107)
CHLORIDE SERPL-SCNC: 98 MMOL/L (ref 98–107)
CHLORIDE SERPL-SCNC: 99 MMOL/L (ref 98–107)
CHLORIDE SERPL-SCNC: 99 MMOL/L (ref 98–107)
CHLORIDE UR-SCNC: 27 MMOL/L
CHLORIDE UR-SCNC: 66 MMOL/L
CO2 SERPL-SCNC: 17 MMOL/L (ref 22–29)
CO2 SERPL-SCNC: 19 MMOL/L (ref 22–29)
CO2 SERPL-SCNC: 20 MMOL/L (ref 22–29)
CO2 SERPL-SCNC: 21 MMOL/L (ref 22–29)
CO2 SERPL-SCNC: 22 MMOL/L (ref 22–29)
CO2 SERPL-SCNC: 23 MMOL/L (ref 22–29)
CO2 SERPL-SCNC: 23 MMOL/L (ref 22–29)
CO2 SERPL-SCNC: 24 MMOL/L (ref 22–29)
CO2 SERPL-SCNC: 24 MMOL/L (ref 22–29)
CORTIS SERPL-MCNC: 17.97 MCG/DL (ref 2.68–18.4)
CREAT SERPL-MCNC: 0.8 MG/DL (ref 0.7–1.2)
EKG ATRIAL RATE: 92 BPM
EKG P AXIS: 45 DEGREES
EKG P-R INTERVAL: 170 MS
EKG Q-T INTERVAL: 402 MS
EKG QRS DURATION: 82 MS
EKG QTC CALCULATION (BAZETT): 497 MS
EKG R AXIS: 24 DEGREES
EKG T AXIS: 8 DEGREES
EKG VENTRICULAR RATE: 92 BPM
EOSINOPHIL # BLD: 0 E9/L (ref 0.05–0.5)
EOSINOPHIL # BLD: 0.01 E9/L (ref 0.05–0.5)
EOSINOPHIL NFR BLD: 0 % (ref 0–6)
EOSINOPHIL NFR BLD: 0.1 % (ref 0–6)
ERYTHROCYTE [DISTWIDTH] IN BLOOD BY AUTOMATED COUNT: 13.2 FL (ref 11.5–15)
ERYTHROCYTE [DISTWIDTH] IN BLOOD BY AUTOMATED COUNT: 16.9 FL (ref 11.5–15)
FOLATE SERPL-MCNC: 14.5 NG/ML (ref 4.8–24.2)
GLUCOSE SERPL-MCNC: 101 MG/DL (ref 74–99)
GLUCOSE SERPL-MCNC: 107 MG/DL (ref 74–99)
GLUCOSE SERPL-MCNC: 108 MG/DL (ref 74–99)
GLUCOSE SERPL-MCNC: 108 MG/DL (ref 74–99)
GLUCOSE SERPL-MCNC: 109 MG/DL (ref 74–99)
GLUCOSE SERPL-MCNC: 109 MG/DL (ref 74–99)
GLUCOSE SERPL-MCNC: 111 MG/DL (ref 74–99)
GLUCOSE SERPL-MCNC: 113 MG/DL (ref 74–99)
GLUCOSE SERPL-MCNC: 114 MG/DL (ref 74–99)
HCT VFR BLD AUTO: 31.2 % (ref 37–54)
HCT VFR BLD AUTO: 39.4 % (ref 37–54)
HGB BLD-MCNC: 10.3 G/DL (ref 12.5–16.5)
HGB BLD-MCNC: 13.5 G/DL (ref 12.5–16.5)
IMM GRANULOCYTES # BLD: 0.02 E9/L
IMM GRANULOCYTES # BLD: 0.04 E9/L
IMM GRANULOCYTES NFR BLD: 0.3 % (ref 0–5)
IMM GRANULOCYTES NFR BLD: 0.4 % (ref 0–5)
LACTATE BLDV-SCNC: 1.6 MMOL/L (ref 0.5–2.2)
LACTATE BLDV-SCNC: 1.7 MMOL/L (ref 0.5–2.2)
LACTATE BLDV-SCNC: 3 MMOL/L (ref 0.5–2.2)
LACTATE BLDV-SCNC: 3.7 MMOL/L (ref 0.5–2.2)
LYMPHOCYTES # BLD: 0.47 E9/L (ref 1.5–4)
LYMPHOCYTES # BLD: 0.5 E9/L (ref 1.5–4)
LYMPHOCYTES NFR BLD: 4.5 % (ref 20–42)
LYMPHOCYTES NFR BLD: 6.8 % (ref 20–42)
MAGNESIUM SERPL-MCNC: 2.2 MG/DL (ref 1.6–2.6)
MCH RBC QN AUTO: 29.6 PG (ref 26–35)
MCH RBC QN AUTO: 30.1 PG (ref 26–35)
MCHC RBC AUTO-ENTMCNC: 33 % (ref 32–34.5)
MCHC RBC AUTO-ENTMCNC: 34.3 % (ref 32–34.5)
MCV RBC AUTO: 87.8 FL (ref 80–99.9)
MCV RBC AUTO: 89.7 FL (ref 80–99.9)
METER GLUCOSE: 110 MG/DL (ref 74–99)
MONOCYTES # BLD: 0.54 E9/L (ref 0.1–0.95)
MONOCYTES # BLD: 0.78 E9/L (ref 0.1–0.95)
MONOCYTES NFR BLD: 7.1 % (ref 2–12)
MONOCYTES NFR BLD: 7.8 % (ref 2–12)
NEUTROPHILS # BLD: 5.86 E9/L (ref 1.8–7.3)
NEUTROPHILS # BLD: 9.65 E9/L (ref 1.8–7.3)
NEUTS SEG NFR BLD: 85.1 % (ref 43–80)
NEUTS SEG NFR BLD: 87.7 % (ref 43–80)
OSMOLALITY SERPL CALC.SUM OF ELEC: 268 MOSM/KG (ref 285–310)
OSMOLALITY URINE: 459 MOSM/KG (ref 300–900)
OSMOLALITY URINE: 89 MOSM/KG (ref 300–900)
OVALOCYTES: ABNORMAL
OVALOCYTES: ABNORMAL
PHOSPHATE SERPL-MCNC: 3.6 MG/DL (ref 2.5–4.5)
PLATELET # BLD AUTO: 194 E9/L (ref 130–450)
PLATELET # BLD AUTO: 216 E9/L (ref 130–450)
PMV BLD AUTO: 10.7 FL (ref 7–12)
PMV BLD AUTO: 8.7 FL (ref 7–12)
POIKILOCYTES: ABNORMAL
POIKILOCYTES: ABNORMAL
POLYCHROMASIA: ABNORMAL
POLYCHROMASIA: ABNORMAL
POTASSIUM SERPL-SCNC: 3.7 MMOL/L (ref 3.5–5)
POTASSIUM SERPL-SCNC: 3.9 MMOL/L (ref 3.5–5)
POTASSIUM SERPL-SCNC: 4.1 MMOL/L (ref 3.5–5)
POTASSIUM SERPL-SCNC: 4.1 MMOL/L (ref 3.5–5)
POTASSIUM SERPL-SCNC: 4.2 MMOL/L (ref 3.5–5)
POTASSIUM SERPL-SCNC: 4.5 MMOL/L (ref 3.5–5)
POTASSIUM SERPL-SCNC: 4.8 MMOL/L (ref 3.5–5)
POTASSIUM UR-SCNC: 45.7 MMOL/L
POTASSIUM UR-SCNC: 6.4 MMOL/L
PROCALCITONIN: 0.07 NG/ML (ref 0–0.08)
PROCALCITONIN: 0.08 NG/ML (ref 0–0.08)
RBC # BLD AUTO: 3.48 E12/L (ref 3.8–5.8)
RBC # BLD AUTO: 4.49 E12/L (ref 3.8–5.8)
REASON FOR REJECTION: NORMAL
REJECTED TEST: NORMAL
SODIUM SERPL-SCNC: 127 MMOL/L (ref 132–146)
SODIUM SERPL-SCNC: 130 MMOL/L (ref 132–146)
SODIUM SERPL-SCNC: 131 MMOL/L (ref 132–146)
SODIUM SERPL-SCNC: 132 MMOL/L (ref 132–146)
SODIUM SERPL-SCNC: 133 MMOL/L (ref 132–146)
SODIUM SERPL-SCNC: 134 MMOL/L (ref 132–146)
SODIUM SERPL-SCNC: 134 MMOL/L (ref 132–146)
SODIUM UR-SCNC: 112 MMOL/L
SODIUM UR-SCNC: 33 MMOL/L
STOMATOCYTES: ABNORMAL
TSH SERPL-MCNC: 0.46 UIU/ML (ref 0.27–4.2)
VALPROATE SERPL-MCNC: 3 MCG/ML (ref 50–100)
VIT B12 SERPL-MCNC: 424 PG/ML (ref 211–946)
WBC # BLD: 11 E9/L (ref 4.5–11.5)
WBC # BLD: 6.9 E9/L (ref 4.5–11.5)

## 2023-03-30 PROCEDURE — 82533 TOTAL CORTISOL: CPT

## 2023-03-30 PROCEDURE — 83935 ASSAY OF URINE OSMOLALITY: CPT

## 2023-03-30 PROCEDURE — 82436 ASSAY OF URINE CHLORIDE: CPT

## 2023-03-30 PROCEDURE — 82746 ASSAY OF FOLIC ACID SERUM: CPT

## 2023-03-30 PROCEDURE — 84145 PROCALCITONIN (PCT): CPT

## 2023-03-30 PROCEDURE — 6360000002 HC RX W HCPCS: Performed by: EMERGENCY MEDICINE

## 2023-03-30 PROCEDURE — 99291 CRITICAL CARE FIRST HOUR: CPT | Performed by: INTERNAL MEDICINE

## 2023-03-30 PROCEDURE — 85025 COMPLETE CBC W/AUTO DIFF WBC: CPT

## 2023-03-30 PROCEDURE — 83735 ASSAY OF MAGNESIUM: CPT

## 2023-03-30 PROCEDURE — 83605 ASSAY OF LACTIC ACID: CPT

## 2023-03-30 PROCEDURE — 6360000002 HC RX W HCPCS: Performed by: LICENSED PRACTICAL NURSE

## 2023-03-30 PROCEDURE — 80164 ASSAY DIPROPYLACETIC ACD TOT: CPT

## 2023-03-30 PROCEDURE — 84300 ASSAY OF URINE SODIUM: CPT

## 2023-03-30 PROCEDURE — 6360000002 HC RX W HCPCS: Performed by: INTERNAL MEDICINE

## 2023-03-30 PROCEDURE — 80048 BASIC METABOLIC PNL TOTAL CA: CPT

## 2023-03-30 PROCEDURE — 84588 ASSAY OF VASOPRESSIN: CPT

## 2023-03-30 PROCEDURE — 82607 VITAMIN B-12: CPT

## 2023-03-30 PROCEDURE — 2000000000 HC ICU R&B

## 2023-03-30 PROCEDURE — 2500000003 HC RX 250 WO HCPCS: Performed by: INTERNAL MEDICINE

## 2023-03-30 PROCEDURE — 6360000002 HC RX W HCPCS

## 2023-03-30 PROCEDURE — 82962 GLUCOSE BLOOD TEST: CPT

## 2023-03-30 PROCEDURE — 6360000002 HC RX W HCPCS: Performed by: FAMILY MEDICINE

## 2023-03-30 PROCEDURE — 2580000003 HC RX 258

## 2023-03-30 PROCEDURE — 87040 BLOOD CULTURE FOR BACTERIA: CPT

## 2023-03-30 PROCEDURE — 93010 ELECTROCARDIOGRAM REPORT: CPT | Performed by: INTERNAL MEDICINE

## 2023-03-30 PROCEDURE — 84100 ASSAY OF PHOSPHORUS: CPT

## 2023-03-30 PROCEDURE — 82140 ASSAY OF AMMONIA: CPT

## 2023-03-30 PROCEDURE — 36415 COLL VENOUS BLD VENIPUNCTURE: CPT

## 2023-03-30 PROCEDURE — 84133 ASSAY OF URINE POTASSIUM: CPT

## 2023-03-30 PROCEDURE — 84443 ASSAY THYROID STIM HORMONE: CPT

## 2023-03-30 PROCEDURE — 2580000003 HC RX 258: Performed by: INTERNAL MEDICINE

## 2023-03-30 PROCEDURE — 83930 ASSAY OF BLOOD OSMOLALITY: CPT

## 2023-03-30 RX ORDER — DIMETHICONE, OXYBENZONE, AND PADIMATE O 2; 2.5; 6.6 G/100G; G/100G; G/100G
STICK TOPICAL PRN
Status: DISCONTINUED | OUTPATIENT
Start: 2023-03-30 | End: 2023-04-02 | Stop reason: HOSPADM

## 2023-03-30 RX ORDER — DESMOPRESSIN ACETATE 4 UG/ML
2 INJECTION, SOLUTION INTRAVENOUS; SUBCUTANEOUS ONCE
Status: DISCONTINUED | OUTPATIENT
Start: 2023-03-30 | End: 2023-03-30

## 2023-03-30 RX ORDER — DEXTROSE MONOHYDRATE 50 MG/ML
INJECTION, SOLUTION INTRAVENOUS CONTINUOUS
Status: ACTIVE | OUTPATIENT
Start: 2023-03-30 | End: 2023-03-30

## 2023-03-30 RX ORDER — SODIUM CHLORIDE 9 MG/ML
INJECTION, SOLUTION INTRAVENOUS PRN
Status: DISCONTINUED | OUTPATIENT
Start: 2023-03-30 | End: 2023-03-30

## 2023-03-30 RX ORDER — SODIUM CHLORIDE 0.9 % (FLUSH) 0.9 %
5-40 SYRINGE (ML) INJECTION EVERY 12 HOURS SCHEDULED
Status: DISCONTINUED | OUTPATIENT
Start: 2023-03-30 | End: 2023-04-02 | Stop reason: HOSPADM

## 2023-03-30 RX ORDER — ACETAMINOPHEN 650 MG/1
650 SUPPOSITORY RECTAL EVERY 6 HOURS PRN
Status: DISCONTINUED | OUTPATIENT
Start: 2023-03-30 | End: 2023-04-02 | Stop reason: HOSPADM

## 2023-03-30 RX ORDER — POLYETHYLENE GLYCOL 3350 17 G/17G
17 POWDER, FOR SOLUTION ORAL DAILY PRN
Status: DISCONTINUED | OUTPATIENT
Start: 2023-03-30 | End: 2023-04-02 | Stop reason: HOSPADM

## 2023-03-30 RX ORDER — DESMOPRESSIN ACETATE 4 UG/ML
2 INJECTION, SOLUTION INTRAVENOUS; SUBCUTANEOUS ONCE
Status: COMPLETED | OUTPATIENT
Start: 2023-03-30 | End: 2023-03-30

## 2023-03-30 RX ORDER — DEXTROSE MONOHYDRATE 100 MG/ML
INJECTION, SOLUTION INTRAVENOUS CONTINUOUS PRN
Status: DISCONTINUED | OUTPATIENT
Start: 2023-03-30 | End: 2023-04-02 | Stop reason: HOSPADM

## 2023-03-30 RX ORDER — DESMOPRESSIN ACETATE 4 UG/ML
2 INJECTION, SOLUTION INTRAVENOUS; SUBCUTANEOUS EVERY 6 HOURS
Status: COMPLETED | OUTPATIENT
Start: 2023-03-30 | End: 2023-03-30

## 2023-03-30 RX ORDER — DEXTROSE MONOHYDRATE 50 MG/ML
INJECTION, SOLUTION INTRAVENOUS CONTINUOUS
Status: DISCONTINUED | OUTPATIENT
Start: 2023-03-30 | End: 2023-04-01

## 2023-03-30 RX ORDER — ACETAMINOPHEN 325 MG/1
650 TABLET ORAL EVERY 6 HOURS PRN
Status: DISCONTINUED | OUTPATIENT
Start: 2023-03-30 | End: 2023-04-02 | Stop reason: HOSPADM

## 2023-03-30 RX ORDER — SODIUM CHLORIDE 0.9 % (FLUSH) 0.9 %
5-40 SYRINGE (ML) INJECTION PRN
Status: DISCONTINUED | OUTPATIENT
Start: 2023-03-30 | End: 2023-04-02 | Stop reason: HOSPADM

## 2023-03-30 RX ADMIN — DEXTROSE MONOHYDRATE: 50 INJECTION, SOLUTION INTRAVENOUS at 07:07

## 2023-03-30 RX ADMIN — DEXTROSE MONOHYDRATE: 50 INJECTION, SOLUTION INTRAVENOUS at 14:25

## 2023-03-30 RX ADMIN — DESMOPRESSIN ACETATE 2 MCG: 4 INJECTION INTRAVENOUS at 01:54

## 2023-03-30 RX ADMIN — ENOXAPARIN SODIUM 40 MG: 100 INJECTION SUBCUTANEOUS at 08:39

## 2023-03-30 RX ADMIN — DESMOPRESSIN ACETATE 2 MCG: 4 SOLUTION INTRAVENOUS at 14:10

## 2023-03-30 RX ADMIN — SODIUM CHLORIDE, PRESERVATIVE FREE 10 ML: 5 INJECTION INTRAVENOUS at 09:03

## 2023-03-30 RX ADMIN — SODIUM CHLORIDE, PRESERVATIVE FREE 10 ML: 5 INJECTION INTRAVENOUS at 20:12

## 2023-03-30 RX ADMIN — MIDAZOLAM 2 MG: 1 INJECTION, SOLUTION INTRAMUSCULAR; INTRAVENOUS at 08:16

## 2023-03-30 RX ADMIN — DEXTROSE MONOHYDRATE: 50 INJECTION, SOLUTION INTRAVENOUS at 20:40

## 2023-03-30 RX ADMIN — MIDAZOLAM 2 MG: 1 INJECTION, SOLUTION INTRAMUSCULAR; INTRAVENOUS at 09:20

## 2023-03-30 RX ADMIN — DESMOPRESSIN ACETATE 2 MCG: 4 SOLUTION INTRAVENOUS at 08:16

## 2023-03-30 RX ADMIN — MIDAZOLAM 2 MG: 1 INJECTION, SOLUTION INTRAMUSCULAR; INTRAVENOUS at 06:11

## 2023-03-30 RX ADMIN — DESMOPRESSIN ACETATE 2 MCG: 4 SOLUTION INTRAVENOUS at 20:08

## 2023-03-30 RX ADMIN — DEXTROSE MONOHYDRATE: 50 INJECTION, SOLUTION INTRAVENOUS at 08:39

## 2023-03-30 RX ADMIN — DEXTROSE MONOHYDRATE: 50 INJECTION, SOLUTION INTRAVENOUS at 02:58

## 2023-03-30 RX ADMIN — ONDANSETRON 4 MG: 2 INJECTION INTRAMUSCULAR; INTRAVENOUS at 12:10

## 2023-03-30 RX ADMIN — PIPERACILLIN AND TAZOBACTAM 4500 MG: 4; .5 INJECTION, POWDER, LYOPHILIZED, FOR SOLUTION INTRAVENOUS at 08:25

## 2023-03-30 RX ADMIN — DEXMEDETOMIDINE 0.2 MCG/KG/HR: 100 INJECTION, SOLUTION INTRAVENOUS at 06:44

## 2023-03-30 RX ADMIN — VANCOMYCIN HYDROCHLORIDE 1000 MG: 1 INJECTION, POWDER, LYOPHILIZED, FOR SOLUTION INTRAVENOUS at 07:06

## 2023-03-30 ASSESSMENT — PAIN SCALES - GENERAL
PAINLEVEL_OUTOF10: 0

## 2023-03-30 NOTE — ED NOTES
Patient continued scream as he was wheeled to the ICU and was uncooperative.       Ace Sanchez RN  03/30/23 9132

## 2023-03-30 NOTE — FLOWSHEET NOTE
Screaming out, attempts to pull out PIVs & salas when unrestrained. Becomes violent with staff as well.

## 2023-03-30 NOTE — ED NOTES
Patient states \"my head\" when this nurse asked what was wrong with his head he would not answer. He continues to be fidgety in bed. MD made aware.       Alfred Garcia RN  03/29/23 2063

## 2023-03-30 NOTE — H&P
RDW 12.3        BMP:   Recent Labs     03/29/23 1829 03/29/23 1950   * 113*   K 3.1* 2.9*   CL 74* 77*   CO2 19* 24   BUN 5* 5*   CREATININE 0.9 0.9     LFT:  Recent Labs     03/29/23 1829 03/29/23  1950   PROT 6.5 6.5   ALKPHOS 75 73   ALT 25 25   AST 29 30   BILITOT 0.6 0.5     CE:  Recent Labs     03/29/23 1829   CKTOTAL 207*     PT/INR: No results for input(s): INR, APTT in the last 72 hours. BNP: No results for input(s): BNP in the last 72 hours. ESR:   Lab Results   Component Value Date    SEDRATE 9 03/11/2021     CRP:   Lab Results   Component Value Date    CRP 4.0 (H) 03/15/2021     D Dimer: No results found for: DDIMER   Folate and B12: No results found for: GIOAQUOD82, No results found for: FOLATE  Lactic Acid:   Lab Results   Component Value Date    LACTA 3.0 (H) 03/29/2023     Thyroid Studies:   Lab Results   Component Value Date    TSH 8.920 (H) 02/02/2021    C9IJORR 6.9 02/02/2021       Oupatient labs:  Lab Results   Component Value Date    CHOL 181 01/09/2021    TRIG 362 (H) 01/09/2021    HDL 55 01/09/2021    LDLCALC 54 01/09/2021    TSH 8.920 (H) 02/02/2021    LABA1C 5.2 01/09/2021       Urinalysis:    Lab Results   Component Value Date/Time    NITRU Negative 03/29/2023 06:29 PM    [address] NONE 04/01/2022 12:36 PM    BACTERIA NONE SEEN 04/01/2022 12:36 PM    RBCUA NONE 04/01/2022 12:36 PM    BLOODU Negative 03/29/2023 06:29 PM    SPECGRAV <=1.005 03/29/2023 06:29 PM    GLUCOSEU Negative 03/29/2023 06:29 PM       Imaging:  XR CHEST PORTABLE    Result Date: 3/29/2023  EXAMINATION: ONE XRAY VIEW OF THE CHEST 3/29/2023 6:31 pm COMPARISON: 06/02/2016 HISTORY: ORDERING SYSTEM PROVIDED HISTORY: ams TECHNOLOGIST PROVIDED HISTORY: Reason for exam:->ams What reading provider will be dictating this exam?->CRC FINDINGS: The lungs are without acute focal process. There is no effusion or pneumothorax. The cardiomediastinal silhouette is without acute process.  The osseous structures are without

## 2023-03-30 NOTE — PLAN OF CARE
Problem: Safety - Medical Restraint  Goal: Remains free of injury from restraints (Restraint for Interference with Medical Device)  3/30/2023 1028 by Aristeo Herrera RN  Outcome: Progressing  Flowsheets  Taken 3/30/2023 0929  Remains free of injury from restraints (restraint for interference with medical device): Every 2 hours: Monitor safety, psychosocial status, comfort, nutrition and hydration  Taken 3/30/2023 0800  Remains free of injury from restraints (restraint for interference with medical device): Every 2 hours: Monitor safety, psychosocial status, comfort, nutrition and hydration

## 2023-03-30 NOTE — ED NOTES
Full bed change for patient as he urinated. Patient has put out approx 2000 mL of urine. Placed on a male external cath. Changed out of nursing home uniform since it was saturated in urine. Placed male external cath on him.       Jeromy Mason RN  03/29/23 2024

## 2023-03-30 NOTE — PLAN OF CARE
Pt in bilateral wrist and bilateral ankle restraints. Pt is being combative and pulling at lines and tubes when released. Problem: Safety - Medical Restraint  Goal: Remains free of injury from restraints (Restraint for Interference with Medical Device)  Description: INTERVENTIONS:  1. Determine that other, less restrictive measures have been tried or would not be effective before applying the restraint  2. Evaluate the patient's condition at the time of restraint application  3. Inform patient/family regarding the reason for restraint  4.  Q2H: Monitor safety, psychosocial status, comfort, nutrition and hydration  Outcome: Progressing  Flowsheets (Taken 3/30/2023 0605)  Remains free of injury from restraints (restraint for interference with medical device): Determine that other, less restrictive measures have been tried or would not be effective before applying the restraint

## 2023-03-30 NOTE — ED NOTES
Patient making urine, has external cath on tolerating well. Will document I&O's.       Luciano Chin RN  03/29/23 2100

## 2023-03-30 NOTE — DISCHARGE INSTR - COC
[facility]AL[facility] MED Delivery:[id number]}    Wound Care Documentation and Therapy:  Wound 21 Radial Left;Proximal;Inner (Active)   Number of days: 593       Wound 21 Finger (Comment which one) Anterior;Right (Active)   Number of days: 599       Wound 21 Ankle Anterior; Left (Active)   Number of days: 599        Elimination:  Continence: Bowel: {YES / VN:01478}  Bladder: {YES / QO:24136}  Urinary Catheter: {Urinary Catheter:568931204}   Colostomy/Ileostomy/Ileal Conduit: {YES / TL:60719}       Date of Last BM: ***    Intake/Output Summary (Last 24 hours) at 3/29/2023 2016  Last data filed at 3/29/2023 193  Gross per 24 hour   Intake --   Output 150 ml   Net -150 ml     No intake/output data recorded.     Safety Concerns:     [employer] Safety Concerns:[id number]}    Impairments/Disabilities:      [employer] Impairments/Disabilities:591771458}    Nutrition Therapy:  Current Nutrition Therapy:   [employer] Diet List:326003668}    Routes of Feeding: {CHP DME Other Feedings:988607653}  Liquids: {Slp liquid thickness:09379}  Daily Fluid Restriction: {CHP DME Yes amt example:381199047}  Last Modified Barium Swallow with Video (Video Swallowing Test): {Done Not Done XLHX:268538079}    Treatments at the Time of Hospital Discharge:   Respiratory Treatments: ***  Oxygen Therapy:  {Therapy; copd oxygen:77727}  Ventilator:    { CC Vent LFJV:577015407}    Rehab Therapies: {THERAPEUTIC INTERVENTION:9791544164}  Weight Bearing Status/Restrictions: [employer]  Weight Bearin}  Other Medical Equipment (for information only, NOT a DME order):  {EQUIPMENT:479232587}  Other Treatments: ***    Patient's personal belongings (please select all that are sent with patient):  {P DME Belongings:770415438}    RN SIGNATURE:  {Esignature:917088904}    CASE MANAGEMENT/SOCIAL WORK SECTION    Inpatient Status Date: ***    Readmission Risk Assessment Score:  Readmission Risk              Risk of Unplanned Readmission:  0           Discharging to

## 2023-03-30 NOTE — PLAN OF CARE
Problem: Safety - Medical Restraint  Goal: Remains free of injury from restraints (Restraint for Interference with Medical Device)  3/30/2023 1752 by Lyn Hauser RN  Outcome: Progressing  Flowsheets  Taken 3/30/2023 1600  Remains free of injury from restraints (restraint for interference with medical device): Every 2 hours: Monitor safety, psychosocial status, comfort, nutrition and hydration  Taken 3/30/2023 1400  Remains free of injury from restraints (restraint for interference with medical device): Every 2 hours: Monitor safety, psychosocial status, comfort, nutrition and hydration  Taken 3/30/2023 1200  Remains free of injury from restraints (restraint for interference with medical device): Every 2 hours: Monitor safety, psychosocial status, comfort, nutrition and hydration

## 2023-03-30 NOTE — ED NOTES
Dr. Butch Lackey called this RN, verbalized order for DDAVP 2mcg IV once.  Also asked that Na level be checked in two hours, with notification to provider if Na <113 or >130     Shilo Lloyd RN  03/30/23 0003

## 2023-03-31 LAB
ANION GAP SERPL CALCULATED.3IONS-SCNC: 11 MMOL/L (ref 7–16)
ANION GAP SERPL CALCULATED.3IONS-SCNC: 12 MMOL/L (ref 7–16)
ANION GAP SERPL CALCULATED.3IONS-SCNC: 12 MMOL/L (ref 7–16)
BASOPHILS # BLD: 0.06 E9/L (ref 0–0.2)
BASOPHILS NFR BLD: 0.8 % (ref 0–2)
BUN SERPL-MCNC: 12 MG/DL (ref 6–20)
BUN SERPL-MCNC: 17 MG/DL (ref 6–20)
BUN SERPL-MCNC: 17 MG/DL (ref 6–20)
BUN SERPL-MCNC: 6 MG/DL (ref 6–20)
BUN SERPL-MCNC: 6 MG/DL (ref 6–20)
BUN SERPL-MCNC: 9 MG/DL (ref 6–20)
CALCIUM SERPL-MCNC: 8.7 MG/DL (ref 8.6–10.2)
CALCIUM SERPL-MCNC: 8.9 MG/DL (ref 8.6–10.2)
CALCIUM SERPL-MCNC: 9.1 MG/DL (ref 8.6–10.2)
CALCIUM SERPL-MCNC: 9.2 MG/DL (ref 8.6–10.2)
CALCIUM SERPL-MCNC: 9.2 MG/DL (ref 8.6–10.2)
CALCIUM SERPL-MCNC: 9.5 MG/DL (ref 8.6–10.2)
CHLORIDE SERPL-SCNC: 100 MMOL/L (ref 98–107)
CHLORIDE SERPL-SCNC: 90 MMOL/L (ref 98–107)
CHLORIDE SERPL-SCNC: 93 MMOL/L (ref 98–107)
CHLORIDE SERPL-SCNC: 94 MMOL/L (ref 98–107)
CHLORIDE SERPL-SCNC: 94 MMOL/L (ref 98–107)
CHLORIDE SERPL-SCNC: 98 MMOL/L (ref 98–107)
CHLORIDE UR-SCNC: 82 MMOL/L
CO2 SERPL-SCNC: 21 MMOL/L (ref 22–29)
CO2 SERPL-SCNC: 23 MMOL/L (ref 22–29)
CO2 SERPL-SCNC: 23 MMOL/L (ref 22–29)
CO2 SERPL-SCNC: 24 MMOL/L (ref 22–29)
CO2 SERPL-SCNC: 25 MMOL/L (ref 22–29)
CO2 SERPL-SCNC: 25 MMOL/L (ref 22–29)
CREAT SERPL-MCNC: 0.8 MG/DL (ref 0.7–1.2)
CREAT SERPL-MCNC: 0.9 MG/DL (ref 0.7–1.2)
CREAT SERPL-MCNC: 1 MG/DL (ref 0.7–1.2)
CREAT SERPL-MCNC: 1 MG/DL (ref 0.7–1.2)
EOSINOPHIL # BLD: 0.69 E9/L (ref 0.05–0.5)
EOSINOPHIL NFR BLD: 9.3 % (ref 0–6)
ERYTHROCYTE [DISTWIDTH] IN BLOOD BY AUTOMATED COUNT: 13.3 FL (ref 11.5–15)
GLUCOSE SERPL-MCNC: 109 MG/DL (ref 74–99)
GLUCOSE SERPL-MCNC: 124 MG/DL (ref 74–99)
GLUCOSE SERPL-MCNC: 82 MG/DL (ref 74–99)
GLUCOSE SERPL-MCNC: 91 MG/DL (ref 74–99)
GLUCOSE SERPL-MCNC: 94 MG/DL (ref 74–99)
GLUCOSE SERPL-MCNC: 98 MG/DL (ref 74–99)
HCT VFR BLD AUTO: 38.3 % (ref 37–54)
HGB BLD-MCNC: 13.2 G/DL (ref 12.5–16.5)
IMM GRANULOCYTES # BLD: 0.02 E9/L
IMM GRANULOCYTES NFR BLD: 0.3 % (ref 0–5)
LYMPHOCYTES # BLD: 2.04 E9/L (ref 1.5–4)
LYMPHOCYTES NFR BLD: 27.4 % (ref 20–42)
MAGNESIUM SERPL-MCNC: 2.1 MG/DL (ref 1.6–2.6)
MCH RBC QN AUTO: 29.5 PG (ref 26–35)
MCHC RBC AUTO-ENTMCNC: 34.5 % (ref 32–34.5)
MCV RBC AUTO: 85.7 FL (ref 80–99.9)
METER GLUCOSE: 108 MG/DL (ref 74–99)
MONOCYTES # BLD: 0.74 E9/L (ref 0.1–0.95)
MONOCYTES NFR BLD: 9.9 % (ref 2–12)
NEUTROPHILS # BLD: 3.89 E9/L (ref 1.8–7.3)
NEUTS SEG NFR BLD: 52.3 % (ref 43–80)
OSMOLALITY SERPL CALC.SUM OF ELEC: 263 MOSM/KG (ref 285–310)
OSMOLALITY URINE: 337 MOSM/KG (ref 300–900)
PLATELET # BLD AUTO: 232 E9/L (ref 130–450)
PMV BLD AUTO: 8.3 FL (ref 7–12)
POTASSIUM SERPL-SCNC: 4 MMOL/L (ref 3.5–5)
POTASSIUM SERPL-SCNC: 4.3 MMOL/L (ref 3.5–5)
POTASSIUM SERPL-SCNC: 4.3 MMOL/L (ref 3.5–5)
POTASSIUM SERPL-SCNC: 4.4 MMOL/L (ref 3.5–5)
POTASSIUM SERPL-SCNC: 4.5 MMOL/L (ref 3.5–5)
POTASSIUM SERPL-SCNC: 4.6 MMOL/L (ref 3.5–5)
POTASSIUM UR-SCNC: 20.6 MMOL/L
RBC # BLD AUTO: 4.47 E12/L (ref 3.8–5.8)
SODIUM SERPL-SCNC: 124 MMOL/L (ref 132–146)
SODIUM SERPL-SCNC: 126 MMOL/L (ref 132–146)
SODIUM SERPL-SCNC: 129 MMOL/L (ref 132–146)
SODIUM SERPL-SCNC: 129 MMOL/L (ref 132–146)
SODIUM SERPL-SCNC: 133 MMOL/L (ref 132–146)
SODIUM SERPL-SCNC: 137 MMOL/L (ref 132–146)
SODIUM UR-SCNC: 76 MMOL/L
WBC # BLD: 7.4 E9/L (ref 4.5–11.5)

## 2023-03-31 PROCEDURE — 2580000003 HC RX 258

## 2023-03-31 PROCEDURE — 36415 COLL VENOUS BLD VENIPUNCTURE: CPT

## 2023-03-31 PROCEDURE — 82962 GLUCOSE BLOOD TEST: CPT

## 2023-03-31 PROCEDURE — 83735 ASSAY OF MAGNESIUM: CPT

## 2023-03-31 PROCEDURE — 6360000002 HC RX W HCPCS: Performed by: FAMILY MEDICINE

## 2023-03-31 PROCEDURE — 85025 COMPLETE CBC W/AUTO DIFF WBC: CPT

## 2023-03-31 PROCEDURE — 82436 ASSAY OF URINE CHLORIDE: CPT

## 2023-03-31 PROCEDURE — 84133 ASSAY OF URINE POTASSIUM: CPT

## 2023-03-31 PROCEDURE — 99233 SBSQ HOSP IP/OBS HIGH 50: CPT | Performed by: INTERNAL MEDICINE

## 2023-03-31 PROCEDURE — 80048 BASIC METABOLIC PNL TOTAL CA: CPT

## 2023-03-31 PROCEDURE — 2060000000 HC ICU INTERMEDIATE R&B

## 2023-03-31 PROCEDURE — 2700000000 HC OXYGEN THERAPY PER DAY

## 2023-03-31 PROCEDURE — 83930 ASSAY OF BLOOD OSMOLALITY: CPT

## 2023-03-31 PROCEDURE — 84300 ASSAY OF URINE SODIUM: CPT

## 2023-03-31 PROCEDURE — 83935 ASSAY OF URINE OSMOLALITY: CPT

## 2023-03-31 RX ADMIN — SODIUM CHLORIDE, PRESERVATIVE FREE 10 ML: 5 INJECTION INTRAVENOUS at 20:59

## 2023-03-31 RX ADMIN — SODIUM CHLORIDE, PRESERVATIVE FREE 5 ML: 5 INJECTION INTRAVENOUS at 08:27

## 2023-03-31 RX ADMIN — ENOXAPARIN SODIUM 40 MG: 100 INJECTION SUBCUTANEOUS at 09:32

## 2023-03-31 ASSESSMENT — PAIN SCALES - GENERAL
PAINLEVEL_OUTOF10: 0
PAINLEVEL_OUTOF10: 0

## 2023-03-31 NOTE — CARE COORDINATION
Care Coordination: LOS 2. Spoke to Medical at [address] [place] [address] Dr SÁNCHEZ Initially with AMS-renal following for Hypotonic, euvolemic hyponatremia, initial Na 109, K 2.2 Ca 5.0 with lactic 3.7- serum and urine drug screen neg. Current Na 126. Plan is to return at discharge, pending medical treatment plan. Currently room air. Reviewed labs including tox screen and updated on current treatment and transfer order. Psychiatry evaluated for hx of psych disorder, agitation- had initially received Geodon reji 2- pt cleared to return to alf and follow up with psychiatric services there upon discharge- sign off.      Electronically signed by Lor Case RN on 3/31/2023 at 11:25 AM

## 2023-03-31 NOTE — PLAN OF CARE
Most recent BMP checked showed Na at 127. Nephrology previously stated in note for goal Na at 121. Reached out to nephrology for advisement of any current change. Per nephrology to stop all IV fluids, including D5, and stop BMP checks until morning. Will continue to check BMP q4h at this time and stop all IVF.

## 2023-03-31 NOTE — PLAN OF CARE
Problem: Discharge Planning  Goal: Discharge to home or other facility with appropriate resources  Outcome: Progressing     Problem: Skin/Tissue Integrity  Goal: Absence of new skin breakdown  Description: 1. Monitor for areas of redness and/or skin breakdown  2. Assess vascular access sites hourly  3. Every 4-6 hours minimum:  Change oxygen saturation probe site  4. Every 4-6 hours:  If on nasal continuous positive airway pressure, respiratory therapy assess nares and determine need for appliance change or resting period. Outcome: Progressing     Problem: Pain  Goal: Verbalizes/displays adequate comfort level or baseline comfort level  Outcome: Progressing     Problem: Safety - Medical Restraint  Goal: Remains free of injury from restraints (Restraint for Interference with Medical Device)  Description: INTERVENTIONS:  1. Determine that other, less restrictive measures have been tried or would not be effective before applying the restraint  2. Evaluate the patient's condition at the time of restraint application  3. Inform patient/family regarding the reason for restraint  4.  Q2H: Monitor safety, psychosocial status, comfort, nutrition and hydration  3/31/2023 0644 by Landon Banuelos RN  Outcome: Progressing  Flowsheets  Taken 3/30/2023 2000 by Landon Banuelos RN  Remains free of injury from restraints (restraint for interference with medical device): Every 2 hours: Monitor safety, psychosocial status, comfort, nutrition and hydration    Outcome: Progressing  Flowsheets  Taken 3/30/2023 1600  Remains free of injury from restraints (restraint for interference with medical device): Every 2 hours: Monitor safety, psychosocial status, comfort, nutrition and hydration  Taken 3/30/2023 1400  Remains free of injury from restraints (restraint for interference with medical device): Every 2 hours: Monitor safety, psychosocial status, comfort, nutrition and hydration  Taken 3/30/2023 1200  Remains free of injury from

## 2023-03-31 NOTE — CONSULTS
Behavior health consult      Consulted by: Dr. Sarah Crocker    Reason for consult: hx of psych disorder at long term, currently agitated on olanzapine      Chief complaint: \"I drink too much water. \"      HPI: Patient is a 78-year-old male with a past medical history of hepatitis C, polysubstance abuse, antisocial personality disorder presented to the ED brought in by custodial as patient had a change of mental status while in custodial in the ED showed a sodium of 113 and a potassium of 2.9 and patient was admitted to the ICU for further management of altered mental status, severe hyponatremia and hypokalemia. Psychiatry is consulted for \"history of psych disorder at present, currently agitated on olanzapine\"    I saw patient this morning. .  Patient is known to me. I asked patient recognizes me he states he does however he was not able to tell me my name or what floor I work on. He does know that he is in the hospital he knows the year is 2023 but he is not able to tell me the month or date. At the time my evaluation patient is not agitated. He states he is here because \"I drink too much water. \"  Patient and guards also tell me that patient had a seizure while he was at the custodial. Patient denies suicidal ideations intent or plan denies any auditory or visual hallucinations he does not appear to be internally stimulated internally preoccupied. He is not agitated or aggressive at the time that I evaluate the patient. Patient is not suicidal homicidal psychotic or manic      Mental status examination  Mental status examination reveals a 78-year-old male lying in bed he does have wrist restraints on with guards present. Psychomotor evaluation revealed no agitation retardation any abnormal movements. His eye contact is fair his speech is normal rate rhythm and tone. His mood is \"I feel okay. \"  Affect is mood incongruent flat and blunted. His thought process is linear without flight of ideas loose associations.   Thought contents
AM     Phosphorus:    Lab Results   Component Value Date/Time    PHOS 3.6 03/30/2023 05:47 AM     Radiology Review:    CT head 3/30/2023      No acute intracranial abnormality. Stable exam.        IMPRESSION/RECOMMENDATIONS:      Helena Bassett is a 32 y.o. male, past medical history significant for schizoaffective disorder with bipolar disorder and psychosis, IV opiate abuse, HCV, history of suicidal ideation who presented to the ED on 3/29/2023 with a chief complaint of altered mental status, patient is currently incarcerated, initial ED work-up revealed serum sodium level 109, potassium level 3.1, a Alfonso catheter was inserted with 2 L of urine return, repeat lab work revealed sodium level 113 and potassium 2.9, nephrology was consulted at that time and recommendations made to avoid hypertonic saline due to high risk for spontaneous rapid correction. Patient then received 10 mEq KCl IV x2 doses with 100 cc normal saline, and at the next serum sodium check sodium level had increased to 132 which was felt to be a lab error; BMP repeated at 2307 and sodium had resulted at 123 mmol/L, our service was notified and 2 mcg IV DDAVP was ordered at that time. Patient was admitted to MICU for close monitoring and treatment. Patient is seen in the ICU, mildly agitated in restraints, unable to obtain any history from patient    Severe hypotonic hyponatremia, multifactorial likely 2/2 poor oral solute intake, as well as water intoxication in the setting of meloxicam?, can r/o DI (repeat UOsm 459 s/p DDAVP). Initial urine sodium 21 with urine osmolality of 62, high risk for rapid correction, Alfredo + UK/serum Na: 0.2, control of urine output is critical to control of rapid sodium correction.   In view of polyuria (8L in 12 h), will give DDAVP 2 mcg every 6 hours x2 doses, continue D5W 3 cc/kg/h, q2h BMP  ----------------------------------------------------  Encephalopathy likely 2/2 #1 and #3  Schizoaffective disorder with
radiation dose to as low as reasonably achievable. COMPARISON: CT head, 01/09/2021 HISTORY: ORDERING SYSTEM PROVIDED HISTORY: AMS, Hyponatremia TECHNOLOGIST PROVIDED HISTORY: Has a \"code stroke\" or \"stroke alert\" been called? ->No Reason for exam:->AMS, Hyponatremia What reading provider will be dictating this exam?->CRC FINDINGS: BRAIN/VENTRICLES: There is no acute intracranial hemorrhage, mass effect or midline shift. No abnormal extra-axial fluid collection. The gray-white differentiation is maintained without evidence of an acute infarct. There is no evidence of hydrocephalus. ORBITS: The visualized portion of the orbits demonstrate no acute abnormality. SINUSES: The visualized paranasal sinuses and mastoid air cells demonstrate no acute abnormality. SOFT TISSUES/SKULL:  No acute abnormality of the visualized skull or soft tissues. No acute intracranial abnormality. Stable exam.     XR CHEST PORTABLE    Result Date: 3/29/2023  EXAMINATION: ONE XRAY VIEW OF THE CHEST 3/29/2023 6:31 pm COMPARISON: 06/02/2016 HISTORY: ORDERING SYSTEM PROVIDED HISTORY: ams TECHNOLOGIST PROVIDED HISTORY: Reason for exam:->ams What reading provider will be dictating this exam?->CRC FINDINGS: The lungs are without acute focal process. There is no effusion or pneumothorax. The cardiomediastinal silhouette is without acute process. The osseous structures are without acute process. No acute process. Resident's Assessment and Plan     Neurology:   Acute metabolic encephalopathy 2/2 electrolyte abnormalities, r/o other causes  Na on arrival 109  TSH 1.78, ammonia 22, B12 424 , WBC 10.8; procal    CT head showed no acute intracranial abnormalities   UDS and SDS negative   Lactic acid 3.7   Sent blood cultures - follow results  Gave 1 dose Vanco and Zosyn empirically       2.  History of psychosis/bipolar disorder   Was taking Zyprexa 20 mg and Trazodone 150 mg nightly   Has been taking regularly since incarcerated

## 2023-04-01 LAB
ANION GAP SERPL CALCULATED.3IONS-SCNC: 11 MMOL/L (ref 7–16)
ANION GAP SERPL CALCULATED.3IONS-SCNC: 11 MMOL/L (ref 7–16)
ANION GAP SERPL CALCULATED.3IONS-SCNC: 12 MMOL/L (ref 7–16)
ANION GAP SERPL CALCULATED.3IONS-SCNC: 13 MMOL/L (ref 7–16)
BASOPHILS # BLD: 0.07 E9/L (ref 0–0.2)
BASOPHILS NFR BLD: 1.1 % (ref 0–2)
BUN SERPL-MCNC: 11 MG/DL (ref 6–20)
BUN SERPL-MCNC: 11 MG/DL (ref 6–20)
BUN SERPL-MCNC: 14 MG/DL (ref 6–20)
BUN SERPL-MCNC: 15 MG/DL (ref 6–20)
CALCIUM SERPL-MCNC: 9.1 MG/DL (ref 8.6–10.2)
CALCIUM SERPL-MCNC: 9.1 MG/DL (ref 8.6–10.2)
CALCIUM SERPL-MCNC: 9.2 MG/DL (ref 8.6–10.2)
CALCIUM SERPL-MCNC: 9.7 MG/DL (ref 8.6–10.2)
CHLORIDE SERPL-SCNC: 102 MMOL/L (ref 98–107)
CHLORIDE SERPL-SCNC: 103 MMOL/L (ref 98–107)
CHLORIDE SERPL-SCNC: 105 MMOL/L (ref 98–107)
CHLORIDE SERPL-SCNC: 105 MMOL/L (ref 98–107)
CHLORIDE UR-SCNC: 141 MMOL/L
CO2 SERPL-SCNC: 23 MMOL/L (ref 22–29)
CO2 SERPL-SCNC: 23 MMOL/L (ref 22–29)
CO2 SERPL-SCNC: 24 MMOL/L (ref 22–29)
CO2 SERPL-SCNC: 24 MMOL/L (ref 22–29)
CREAT SERPL-MCNC: 0.9 MG/DL (ref 0.7–1.2)
CREAT SERPL-MCNC: 1 MG/DL (ref 0.7–1.2)
EOSINOPHIL # BLD: 0.66 E9/L (ref 0.05–0.5)
EOSINOPHIL NFR BLD: 10.8 % (ref 0–6)
ERYTHROCYTE [DISTWIDTH] IN BLOOD BY AUTOMATED COUNT: 13.4 FL (ref 11.5–15)
GLUCOSE SERPL-MCNC: 100 MG/DL (ref 74–99)
GLUCOSE SERPL-MCNC: 101 MG/DL (ref 74–99)
GLUCOSE SERPL-MCNC: 125 MG/DL (ref 74–99)
GLUCOSE SERPL-MCNC: 87 MG/DL (ref 74–99)
HCT VFR BLD AUTO: 39.1 % (ref 37–54)
HGB BLD-MCNC: 13.2 G/DL (ref 12.5–16.5)
IMM GRANULOCYTES # BLD: 0.02 E9/L
IMM GRANULOCYTES NFR BLD: 0.3 % (ref 0–5)
LYMPHOCYTES # BLD: 1.52 E9/L (ref 1.5–4)
LYMPHOCYTES NFR BLD: 24.8 % (ref 20–42)
MCH RBC QN AUTO: 29.6 PG (ref 26–35)
MCHC RBC AUTO-ENTMCNC: 33.8 % (ref 32–34.5)
MCV RBC AUTO: 87.7 FL (ref 80–99.9)
METER GLUCOSE: 81 MG/DL (ref 74–99)
MONOCYTES # BLD: 0.97 E9/L (ref 0.1–0.95)
MONOCYTES NFR BLD: 15.8 % (ref 2–12)
NEUTROPHILS # BLD: 2.88 E9/L (ref 1.8–7.3)
NEUTS SEG NFR BLD: 47.2 % (ref 43–80)
OSMOLALITY URINE: 675 MOSM/KG (ref 300–900)
PLATELET # BLD AUTO: 267 E9/L (ref 130–450)
PMV BLD AUTO: 8.6 FL (ref 7–12)
POTASSIUM SERPL-SCNC: 4 MMOL/L (ref 3.5–5)
POTASSIUM SERPL-SCNC: 4.5 MMOL/L (ref 3.5–5)
POTASSIUM SERPL-SCNC: 4.7 MMOL/L (ref 3.5–5)
POTASSIUM SERPL-SCNC: 4.8 MMOL/L (ref 3.5–5)
POTASSIUM UR-SCNC: 71.7 MMOL/L
RBC # BLD AUTO: 4.46 E12/L (ref 3.8–5.8)
SODIUM SERPL-SCNC: 138 MMOL/L (ref 132–146)
SODIUM SERPL-SCNC: 139 MMOL/L (ref 132–146)
SODIUM SERPL-SCNC: 139 MMOL/L (ref 132–146)
SODIUM SERPL-SCNC: 140 MMOL/L (ref 132–146)
SODIUM UR-SCNC: 129 MMOL/L
WBC # BLD: 6.1 E9/L (ref 4.5–11.5)

## 2023-04-01 PROCEDURE — 2580000003 HC RX 258

## 2023-04-01 PROCEDURE — 6360000002 HC RX W HCPCS: Performed by: FAMILY MEDICINE

## 2023-04-01 PROCEDURE — 84300 ASSAY OF URINE SODIUM: CPT

## 2023-04-01 PROCEDURE — 80048 BASIC METABOLIC PNL TOTAL CA: CPT

## 2023-04-01 PROCEDURE — 6370000000 HC RX 637 (ALT 250 FOR IP)

## 2023-04-01 PROCEDURE — 84133 ASSAY OF URINE POTASSIUM: CPT

## 2023-04-01 PROCEDURE — 82436 ASSAY OF URINE CHLORIDE: CPT

## 2023-04-01 PROCEDURE — 2060000000 HC ICU INTERMEDIATE R&B

## 2023-04-01 PROCEDURE — 85025 COMPLETE CBC W/AUTO DIFF WBC: CPT

## 2023-04-01 PROCEDURE — 83935 ASSAY OF URINE OSMOLALITY: CPT

## 2023-04-01 PROCEDURE — 36415 COLL VENOUS BLD VENIPUNCTURE: CPT

## 2023-04-01 RX ADMIN — SODIUM CHLORIDE, PRESERVATIVE FREE 10 ML: 5 INJECTION INTRAVENOUS at 09:37

## 2023-04-01 RX ADMIN — ENOXAPARIN SODIUM 40 MG: 100 INJECTION SUBCUTANEOUS at 09:37

## 2023-04-01 RX ADMIN — ACETAMINOPHEN 650 MG: 325 TABLET ORAL at 00:34

## 2023-04-01 ASSESSMENT — PAIN SCALES - GENERAL
PAINLEVEL_OUTOF10: 0
PAINLEVEL_OUTOF10: 5
PAINLEVEL_OUTOF10: 0

## 2023-04-01 ASSESSMENT — PAIN DESCRIPTION - DESCRIPTORS: DESCRIPTORS: ACHING

## 2023-04-01 ASSESSMENT — PAIN DESCRIPTION - LOCATION: LOCATION: LEG

## 2023-04-01 ASSESSMENT — PAIN DESCRIPTION - ORIENTATION: ORIENTATION: OTHER (COMMENT)

## 2023-04-01 NOTE — PLAN OF CARE
Problem: Discharge Planning  Goal: Discharge to home or other facility with appropriate resources  Outcome: Progressing     Problem: Skin/Tissue Integrity  Goal: Absence of new skin breakdown  Description: 1. Monitor for areas of redness and/or skin breakdown  2. Assess vascular access sites hourly  3. Every 4-6 hours minimum:  Change oxygen saturation probe site  4. Every 4-6 hours:  If on nasal continuous positive airway pressure, respiratory therapy assess nares and determine need for appliance change or resting period. Outcome: Progressing     Problem: Pain  Goal: Verbalizes/displays adequate comfort level or baseline comfort level  Outcome: Progressing     Problem: Safety - Medical Restraint  Goal: Remains free of injury from restraints (Restraint for Interference with Medical Device)  Description: INTERVENTIONS:  1. Determine that other, less restrictive measures have been tried or would not be effective before applying the restraint  2. Evaluate the patient's condition at the time of restraint application  3. Inform patient/family regarding the reason for restraint  4.  Q2H: Monitor safety, psychosocial status, comfort, nutrition and hydration  Outcome: Progressing

## 2023-04-02 VITALS
DIASTOLIC BLOOD PRESSURE: 76 MMHG | HEIGHT: 69 IN | OXYGEN SATURATION: 100 % | WEIGHT: 148.56 LBS | RESPIRATION RATE: 16 BRPM | TEMPERATURE: 98.4 F | SYSTOLIC BLOOD PRESSURE: 126 MMHG | HEART RATE: 81 BPM | BODY MASS INDEX: 22 KG/M2

## 2023-04-02 LAB
ANION GAP SERPL CALCULATED.3IONS-SCNC: 12 MMOL/L (ref 7–16)
BASOPHILS # BLD: 0.05 E9/L (ref 0–0.2)
BASOPHILS NFR BLD: 1 % (ref 0–2)
BUN SERPL-MCNC: 13 MG/DL (ref 6–20)
CALCIUM SERPL-MCNC: 9.5 MG/DL (ref 8.6–10.2)
CHLORIDE SERPL-SCNC: 101 MMOL/L (ref 98–107)
CO2 SERPL-SCNC: 27 MMOL/L (ref 22–29)
CREAT SERPL-MCNC: 1 MG/DL (ref 0.7–1.2)
EOSINOPHIL # BLD: 0.73 E9/L (ref 0.05–0.5)
EOSINOPHIL NFR BLD: 14.6 % (ref 0–6)
ERYTHROCYTE [DISTWIDTH] IN BLOOD BY AUTOMATED COUNT: 13.8 FL (ref 11.5–15)
GLUCOSE SERPL-MCNC: 102 MG/DL (ref 74–99)
HCT VFR BLD AUTO: 40.6 % (ref 37–54)
HGB BLD-MCNC: 13.3 G/DL (ref 12.5–16.5)
IMM GRANULOCYTES # BLD: 0.03 E9/L
IMM GRANULOCYTES NFR BLD: 0.6 % (ref 0–5)
LYMPHOCYTES # BLD: 1.21 E9/L (ref 1.5–4)
LYMPHOCYTES NFR BLD: 24.2 % (ref 20–42)
MCH RBC QN AUTO: 29.8 PG (ref 26–35)
MCHC RBC AUTO-ENTMCNC: 32.8 % (ref 32–34.5)
MCV RBC AUTO: 90.8 FL (ref 80–99.9)
MONOCYTES # BLD: 0.76 E9/L (ref 0.1–0.95)
MONOCYTES NFR BLD: 15.2 % (ref 2–12)
NEUTROPHILS # BLD: 2.23 E9/L (ref 1.8–7.3)
NEUTS SEG NFR BLD: 44.4 % (ref 43–80)
PLATELET # BLD AUTO: 276 E9/L (ref 130–450)
PMV BLD AUTO: 8.7 FL (ref 7–12)
POTASSIUM SERPL-SCNC: 4.3 MMOL/L (ref 3.5–5)
RBC # BLD AUTO: 4.47 E12/L (ref 3.8–5.8)
SODIUM SERPL-SCNC: 140 MMOL/L (ref 132–146)
WBC # BLD: 5 E9/L (ref 4.5–11.5)

## 2023-04-02 PROCEDURE — 80048 BASIC METABOLIC PNL TOTAL CA: CPT

## 2023-04-02 PROCEDURE — 36415 COLL VENOUS BLD VENIPUNCTURE: CPT

## 2023-04-02 PROCEDURE — 6360000002 HC RX W HCPCS: Performed by: FAMILY MEDICINE

## 2023-04-02 PROCEDURE — 2580000003 HC RX 258

## 2023-04-02 PROCEDURE — 85025 COMPLETE CBC W/AUTO DIFF WBC: CPT

## 2023-04-02 RX ADMIN — SODIUM CHLORIDE, PRESERVATIVE FREE 10 ML: 5 INJECTION INTRAVENOUS at 09:44

## 2023-04-02 RX ADMIN — ENOXAPARIN SODIUM 40 MG: 100 INJECTION SUBCUTANEOUS at 09:44

## 2023-04-02 ASSESSMENT — PAIN SCALES - GENERAL: PAINLEVEL_OUTOF10: 0

## 2023-04-02 NOTE — PROGRESS NOTES
[address] [facility]  Department of Internal Medicine   Internal Medicine Residency   MICU Progress Note    Patient:  Tigist Avila 32 y.o. male  MRN: [id number]     Date of Service: 3/31/2023      Subjective     Overnight Events:  Patient Na 127 overnight, continued current care    Patient seen and evaluated at bedside. He is alert and oriented today. He denies any CP, SOB, abd pain, and rash. He states that he eats well and drinks approximately 2 gallons of water daily. Objective     Vital Signs        BP (!) 123/48   Pulse 77   Temp 99.1 °F (37.3 °C) (Bladder)   Resp 18   Ht 5' 9\" (1.753 m)   Wt 131 lb 2.8 oz (59.5 kg)   SpO2 97%   BMI 19.37 kg/m²     Intake/Output Summary (Last 24 hours) at 3/31/2023 1316  Last data filed at 3/31/2023 0600  Gross per 24 hour   Intake 2130 ml   Output 675 ml   Net 1455 ml       Physical Exam:  General Appearance: alert, appears stated age, and cooperative  Neck: no carotid bruit, no JVD, supple, symmetrical, trachea midline, and thyroid not enlarged, symmetric, no tenderness/mass/nodules  Lung: clear to auscultation bilaterally  Heart: regular rate and rhythm, S1, S2 normal, no murmur, click, rub or gallop  Abdomen: soft, non-tender; bowel sounds normal; no masses,  no organomegaly  Extremities:  extremities normal, atraumatic, no cyanosis or edema  Musculoskeletal: No joint swelling, no muscle tenderness. ROM normal in all joints of extremities. Neurologic: Mental status: Alert, oriented, thought content appropriate  Other:    Lines:  None    Oxygen:    Room Air      Labs and Imaging     ABG:   Recent Labs     03/29/23 2002   PH 7.502*   PCO2 32.0*   PO2 89.3   HCO3 24.5   BE 2.0   THB 13.1   O2SAT 97.1       CBC:  Recent Labs     03/31/23  0402   WBC 7.4   RBC 4.47   HGB 13.2   HCT 38.3          Coags:  No results for input(s): PROTIME, INR, APTT in the last 72 hours.     Chemistry:  Recent Labs     03/29/23  1950 03/29/23  2159 03/30/23  0010
[address] [facility]  Department of Internal Medicine   Internal Medicine Residency   MICU Progress Note    Patient:  Sylvia Mcleod 32 y.o. male  MRN: [id number]     Date of Service: 3/31/2023    Allergy: Haloperidol and related    Subjective     I have seen and examine the patient at bed side. Patient is alert and oriented. Denies any chest pain, SOB, no weakness, and numbness. Wanted to eat breakfast, reassured that order is already in place. 24h change: None  ROS: Denies Fever/chills/CP/SOB/N/V/D/C/Dysuria/Blood in stool or urine  Objective     VS: /67   Pulse 67   Temp 99.1 °F (37.3 °C) (Bladder)   Resp 14   Ht 5' 9\" (1.753 m)   Wt 131 lb 2.8 oz (59.5 kg)   SpO2 99%   BMI 19.37 kg/m²           I & O - 24hr:   Intake/Output Summary (Last 24 hours) at 3/31/2023 2046  Last data filed at 3/31/2023 0600  Gross per 24 hour   Intake 2230 ml   Output 845 ml   Net 1385 ml       Physical Exam:  General Appearance: alert, appears stated age, and cooperative  Neck: no adenopathy, no carotid bruit, no JVD, supple, symmetrical, trachea midline, and thyroid not enlarged, symmetric, no tenderness/mass/nodules  Lung: clear to auscultation bilaterally  Heart: regular rate and rhythm, S1, S2 normal, no murmur, click, rub or gallop  Abdomen: soft, non-tender; bowel sounds normal; no masses,  no organomegaly  Extremities:  extremities normal, atraumatic, no cyanosis or edema  Musculoskeletal: No joint swelling, no muscle tenderness. ROM normal in all joints of extremities.    Neurologic: Mental status: Alert, oriented, thought content appropriate    Lines     site day    Art line   None    TLC None    PICC None    Hemoaccess None      Lab Results   Component Value Date/Time    PH 7.502 03/29/2023 08:02 PM    PCO2 32.0 03/29/2023 08:02 PM    PO2 89.3 03/29/2023 08:02 PM    HCO3 24.5 03/29/2023 08:02 PM    BE 2.0 03/29/2023 08:02 PM    THB 13.1 03/29/2023 08:02 PM    O2SAT 97.1 03/29/2023 08:02 PM
Department of Internal Medicine  Nephrology Progress Note      Reason for Consult: Hyponatremia  Requesting Physician: Dr. Ludmila Pimentel: Altered mental status    History Obtained From:  electronic medical record    HISTORY OF PRESENT ILLNESS:  Ashkan Arreola is a 32 y.o. male, past medical history significant for schizoaffective disorder with bipolar disorder and psychosis, IV opiate abuse, HCV, history of suicidal ideation who presented to the ED on 3/29/2023 with a chief complaint of altered mental status, patient is currently incarcerated, initial ED work-up revealed serum sodium level 109, potassium level 3.1, a Alfonso catheter was inserted with 2 L of urine return, repeat lab work revealed sodium level 113 and potassium 2.9, nephrology was consulted at that time and recommendations made to avoid hypertonic saline due to high risk for spontaneous rapid correction. Patient then received 10 mEq KCl IV x2 doses with 100 cc normal saline, and at the next serum sodium check sodium level had increased to 132 which was felt to be a lab error; BMP repeated at 2307 and sodium had resulted at 123 mmol/L, our service was notified and 2 mcg IV DDAVP was ordered at that time. Patient was admitted to MICU for close monitoring and treatment. 3/31/23:  Patient is alert, much calmer, no complaints  4/1/23  Alert and no complaints  4/1/23  Alert and no complaints.  Good appetite and oral intake    Current Medications:    Current Facility-Administered Medications: sodium chloride flush 0.9 % injection 5-40 mL, 5-40 mL, IntraVENous, 2 times per day  sodium chloride flush 0.9 % injection 5-40 mL, 5-40 mL, IntraVENous, PRN  polyethylene glycol (GLYCOLAX) packet 17 g, 17 g, Oral, Daily PRN  acetaminophen (TYLENOL) tablet 650 mg, 650 mg, Oral, Q6H PRN **OR** acetaminophen (TYLENOL) suppository 650 mg, 650 mg, Rectal, Q6H PRN  glucose chewable tablet 16 g, 4 tablet, Oral, PRN  dextrose bolus 10% 125 mL, 125 mL, IntraVENous,
Department of Internal Medicine  Nephrology Progress Note      Reason for Consult: Hyponatremia  Requesting Physician: Dr. Philippe Mc: Altered mental status    History Obtained From:  electronic medical record    HISTORY OF PRESENT ILLNESS:  Verna Sicard is a 32 y.o. male, past medical history significant for schizoaffective disorder with bipolar disorder and psychosis, IV opiate abuse, HCV, history of suicidal ideation who presented to the ED on 3/29/2023 with a chief complaint of altered mental status, patient is currently incarcerated, initial ED work-up revealed serum sodium level 109, potassium level 3.1, a Alfonso catheter was inserted with 2 L of urine return, repeat lab work revealed sodium level 113 and potassium 2.9, nephrology was consulted at that time and recommendations made to avoid hypertonic saline due to high risk for spontaneous rapid correction. Patient then received 10 mEq KCl IV x2 doses with 100 cc normal saline, and at the next serum sodium check sodium level had increased to 132 which was felt to be a lab error; BMP repeated at 2307 and sodium had resulted at 123 mmol/L, our service was notified and 2 mcg IV DDAVP was ordered at that time. Patient was admitted to MICU for close monitoring and treatment.   3/31/23:  Patient is alert, much calmer, no complaints  4/1/23  Alert and no complaints    Current Medications:    Current Facility-Administered Medications: sodium chloride flush 0.9 % injection 5-40 mL, 5-40 mL, IntraVENous, 2 times per day  sodium chloride flush 0.9 % injection 5-40 mL, 5-40 mL, IntraVENous, PRN  polyethylene glycol (GLYCOLAX) packet 17 g, 17 g, Oral, Daily PRN  acetaminophen (TYLENOL) tablet 650 mg, 650 mg, Oral, Q6H PRN **OR** acetaminophen (TYLENOL) suppository 650 mg, 650 mg, Rectal, Q6H PRN  glucose chewable tablet 16 g, 4 tablet, Oral, PRN  dextrose bolus 10% 125 mL, 125 mL, IntraVENous, PRN **OR** dextrose bolus 10% 250 mL, 250 mL, IntraVENous,
Discussed with ED RN about serum sodium level of 123.   We will give 2 mcg ddAVP iv now and repeat bmp in 2 hours
Hospitalist Progress Note      PCP: No primary care provider on file. Date of Admission: 3/29/2023    Chief Complaint: AMS, Hyponatremia    Hospital Course:    Zachary Boyd is a 32 y.o. male who presents altered mental status. History of psychosis, currently incarcerated, they report they have had custody of him for several days, he has been alert and ambulatory, when he walked back into his cell he was mumbling incoherent very paranoid and agitated. Upon arrival he is mumbling to himself, appears very fearful and paranoid, he is unable to cooperate with neurologic exam.  In restraints from residential however he still attempts to swing and grab staff. Danger to others. Vital signs reveal the patient to be tachycardic with a rate of 103. EKG shows sinus tachycardia. Laboratory studies demonstrate sodium 113, potassium 2.9, lactic acid 3.0, glucose 131, serum osmolality 244, total . Critical care and nephrology was consulted. The patient be admitted to the ICU. Subjective:  Patient was seen at bedside this morning in the ICU, correctional officers present at bedside in the ICU as well. He appears to be agitated and keeps mentioning not to hurt him. Confused at this time unable to have a logical conversation.       Medications:  Reviewed    Infusion Medications    dextrose      dextrose      dextrose 200 mL/hr at 03/30/23 0839    sodium chloride       Scheduled Medications    sodium chloride flush  5-40 mL IntraVENous 2 times per day    desmopressin  2 mcg SubCUTAneous Q6H    enoxaparin  40 mg SubCUTAneous Daily     PRN Meds: sodium chloride flush, polyethylene glycol, acetaminophen **OR** acetaminophen, glucose, dextrose bolus **OR** dextrose bolus, glucagon (rDNA), dextrose, dextrose, medicated lip balm, sodium chloride, promethazine **OR** ondansetron      Intake/Output Summary (Last 24 hours) at 3/30/2023 1307  Last data filed at 3/30/2023 1200  Gross per 24 hour   Intake 250 ml   Output 8235
Patients HR sustaining low 40's. Patient is asymptomatic at this time. Medical residents aware.
Report given, transport requested. Pt & deputy  @ bedside aware of new room.
Exam:    /64   Pulse 81   Temp 98 °F (36.7 °C) (Temporal)   Resp 17   Ht 5' 9\" (1.753 m)   Wt 148 lb 9 oz (67.4 kg)   SpO2 100%   BMI 21.94 kg/m²     General appearance: , appears stated age and cooperative. HEENT: Pupils equal, round, and reactive to light. Conjunctivae/corneas clear. Neck: Supple, with full range of motion. No jugular venous distention. Trachea midline. Respiratory:  Normal respiratory effort. Clear to auscultation, bilaterally without Rales/Wheezes/Rhonchi. Cardiovascular: Regular rate and rhythm with normal S1/S2 without murmurs, rubs or gallops. Abdomen: Soft, non-tender, non-distended with normal bowel sounds. Musculoskeletal: No clubbing, cyanosis or edema bilaterally. Full range of motion without deformity. Skin: Skin color, texture, turgor normal.  No rashes or lesions. Neurologic: Currently in four-point restraints, oriented to time place and person, strength intact bilaterally in upper and lower extremities    Labs:   Recent Labs     03/30/23  0415 03/31/23  0402 04/01/23  0342   WBC 6.9 7.4 6.1   HGB 10.3* 13.2 13.2   HCT 31.2* 38.3 39.1    232 267     Recent Labs     03/30/23  0547 03/30/23  0829 03/31/23  2303 04/01/23  0342 04/01/23  0726     134   < > 137 138 139   K 4.8  4.5   < > 4.4 4.5 4.8   CL 97*  99   < > 100 102 103   CO2 17*  19*   < > 25 24 23   BUN 5*  5*   < > 17 14 11   CREATININE 0.8  0.8   < > 1.0 0.9 0.9   CALCIUM 9.4  9.3   < > 9.1 9.1 9.7   PHOS 3.6  --   --   --   --     < > = values in this interval not displayed. Recent Labs     03/29/23 1829 03/29/23  1950   AST 29 30   ALT 25 25   BILITOT 0.6 0.5   ALKPHOS 75 73     No results for input(s): INR in the last 72 hours.   Recent Labs     03/29/23 1829   CKTOTAL 207*       Assessment/Plan:    Active Hospital Problems    Diagnosis Date Noted    Hyponatremia [E87.1] 03/29/2023   -Euvolemic Hyponatremia 2/2 Water intoxication  -Hypokalemia  - hypochloridemia  -Lactic
hypochloridemia  -Lactic acidosis  -Altered mental status        PLAN:  -Continue to monitor in the ICU and transferred to the floor when deemed appropriate  -Critical care consulted  - nephrology consulted- s/p D5W, monitor Na closely  - Na at admission -109- fast correction to 131 this AM, goal to get down to 121 and then stopped. Currently at 124, mentating well. Oriented to time place and person.  -Psychiatry was consulted-recommend discontinuing olanzapine and any psychotropic medications at this time.  -Telemetry  -Monitor serum electrolytes every 2 hours      DVT Prophylaxis: locenox  Diet: ADULT DIET; Regular;  No Fluids on Tray  Code Status: Full Code    Dispo - monitor in the ICU, transferred to telemetry when deemed appropriate    Kisha Joseph MD
from patient    Severe hypotonic hyponatremia, multifactorial likely 2/2 poor oral solute intake, as well as water intoxication in the setting of meloxicam?, can r/o DI (repeat UOsm 459 s/p DDAVP). Initial urine sodium 21 with urine osmolality of 62, high risk for rapid correction, Alfredo + UK/serum Na: 0.2, control of urine output is critical to control of rapid sodium correction. In view of polyuria (8L in 12 h), will give DDAVP 2 mcg every 6 hours x2 doses, continue D5W 3 cc/kg/h, q2h BMP. Serum sodium 126 improved from 109 over last 36 hours  ----------------------------------------------------  Encephalopathy likely 2/2 #1 and #3  Schizoaffective disorder with bipolar disorder and psychosis  IV opiate abuse  HCV infection  POLYURIA ?secondary postobstructive diuresis vs zyprexa induced temporary diabetes insipidus- resolved    Plan:    Goal to bring serum sodium to 134 by tomorrow  Continue every 4 hours BMP  Discontinue D5W 3 cc/kg/hr  Strict intake and output  Avoid solutions containing sodium chloride  May transfer out of the micu  Check  ADH level result  Monitor neurological status closely for the next 1-2 days      Thank you Dr. Leo Funk for allowing us to participate in the care of [facility].      Electronically signed by Teresa Cary MD on 3/31/2023 at 12:05 PM    Patient seen and discussed with MICU staff

## 2023-04-02 NOTE — DISCHARGE SUMMARY
examination, evaluation, counseling and review of medications and discharge plan.    +++++++++++++++++++++++++++++++++++++++++++++++++  Sarah Dominguez MD  [facility] Physician - [address] [place] [address]  +++++++++++++++++++++++++++++++++++++++++++++++++  NOTE: This report was transcribed using voice recognition software. Every effort was made to ensure accuracy; however, inadvertent computerized transcription errors may be present.

## 2023-04-04 LAB
BACTERIA BLD CULT ORG #2: NORMAL
BACTERIA BLD CULT: NORMAL

## 2023-04-06 LAB — VASOPRESSIN SERPL-MCNC: 1.2 PG/ML (ref 0–6.9)
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
